# Patient Record
Sex: MALE | Race: WHITE | Employment: OTHER | URBAN - METROPOLITAN AREA
[De-identification: names, ages, dates, MRNs, and addresses within clinical notes are randomized per-mention and may not be internally consistent; named-entity substitution may affect disease eponyms.]

---

## 2018-05-23 ENCOUNTER — APPOINTMENT (OUTPATIENT)
Dept: GENERAL RADIOLOGY | Age: 78
DRG: 189 | End: 2018-05-23
Payer: MEDICARE

## 2018-05-23 ENCOUNTER — HOSPITAL ENCOUNTER (INPATIENT)
Age: 78
LOS: 2 days | Discharge: HOME HEALTH | DRG: 189 | End: 2018-05-25
Attending: EMERGENCY MEDICINE | Admitting: INTERNAL MEDICINE
Payer: MEDICARE

## 2018-05-23 ENCOUNTER — APPOINTMENT (OUTPATIENT)
Dept: CT IMAGING | Age: 78
DRG: 189 | End: 2018-05-23
Payer: MEDICARE

## 2018-05-23 DIAGNOSIS — J90 PLEURAL EFFUSION: ICD-10-CM

## 2018-05-23 DIAGNOSIS — R06.03 RESPIRATORY DISTRESS: Primary | ICD-10-CM

## 2018-05-23 PROBLEM — E11.9 DM2 (DIABETES MELLITUS, TYPE 2) (HCC): Status: ACTIVE | Noted: 2018-05-23

## 2018-05-23 PROBLEM — D50.9 IRON DEFICIENCY ANEMIA: Status: ACTIVE | Noted: 2018-05-23

## 2018-05-23 PROBLEM — I27.21 PULMONARY ARTERY HYPERTENSION (HCC): Status: ACTIVE | Noted: 2018-05-23

## 2018-05-23 PROBLEM — Z99.81 ON HOME O2: Status: ACTIVE | Noted: 2018-05-23

## 2018-05-23 PROBLEM — J44.1 ACUTE EXACERBATION OF CHRONIC OBSTRUCTIVE PULMONARY DISEASE (COPD) (HCC): Status: ACTIVE | Noted: 2018-05-23

## 2018-05-23 PROBLEM — Z87.891 EX-SMOKER: Status: ACTIVE | Noted: 2018-05-23

## 2018-05-23 LAB
ABSOLUTE EOS #: 0.06 K/UL (ref 0–0.44)
ABSOLUTE IMMATURE GRANULOCYTE: 0.09 K/UL (ref 0–0.3)
ABSOLUTE LYMPH #: 1.05 K/UL (ref 1.1–3.7)
ABSOLUTE MONO #: 0.87 K/UL (ref 0.1–1.2)
ALLEN TEST: ABNORMAL
ANION GAP SERPL CALCULATED.3IONS-SCNC: 10 MMOL/L (ref 9–17)
BASOPHILS # BLD: 0 % (ref 0–2)
BASOPHILS ABSOLUTE: 0.06 K/UL (ref 0–0.2)
BNP INTERPRETATION: ABNORMAL
BUN BLDV-MCNC: 11 MG/DL (ref 8–23)
BUN/CREAT BLD: ABNORMAL (ref 9–20)
CALCIUM SERPL-MCNC: 7.9 MG/DL (ref 8.6–10.4)
CARBOXYHEMOGLOBIN: 5.6 % (ref 0–5)
CHLORIDE BLD-SCNC: 96 MMOL/L (ref 98–107)
CO2: 29 MMOL/L (ref 20–31)
CREAT SERPL-MCNC: 0.63 MG/DL (ref 0.7–1.2)
CULTURE: ABNORMAL
CULTURE: ABNORMAL
DIFFERENTIAL TYPE: ABNORMAL
DIRECT EXAM: ABNORMAL
EKG ATRIAL RATE: 87 BPM
EKG ATRIAL RATE: 94 BPM
EKG P AXIS: 42 DEGREES
EKG P AXIS: 58 DEGREES
EKG P-R INTERVAL: 128 MS
EKG P-R INTERVAL: 128 MS
EKG Q-T INTERVAL: 322 MS
EKG Q-T INTERVAL: 336 MS
EKG QRS DURATION: 72 MS
EKG QRS DURATION: 82 MS
EKG QTC CALCULATION (BAZETT): 402 MS
EKG QTC CALCULATION (BAZETT): 404 MS
EKG R AXIS: 54 DEGREES
EKG R AXIS: 54 DEGREES
EKG T AXIS: 16 DEGREES
EKG T AXIS: 32 DEGREES
EKG VENTRICULAR RATE: 87 BPM
EKG VENTRICULAR RATE: 94 BPM
EOSINOPHILS RELATIVE PERCENT: 0 % (ref 1–4)
ESTIMATED AVERAGE GLUCOSE: 197 MG/DL
FERRITIN: 177 UG/L (ref 30–400)
FIO2: ABNORMAL
GFR AFRICAN AMERICAN: >60 ML/MIN
GFR NON-AFRICAN AMERICAN: >60 ML/MIN
GFR SERPL CREATININE-BSD FRML MDRD: ABNORMAL ML/MIN/{1.73_M2}
GFR SERPL CREATININE-BSD FRML MDRD: ABNORMAL ML/MIN/{1.73_M2}
GLUCOSE BLD-MCNC: 203 MG/DL (ref 75–110)
GLUCOSE BLD-MCNC: 269 MG/DL (ref 70–99)
GLUCOSE BLD-MCNC: 313 MG/DL (ref 75–110)
GLUCOSE BLD-MCNC: 324 MG/DL (ref 75–110)
GLUCOSE BLD-MCNC: 352 MG/DL (ref 75–110)
GLUCOSE BLD-MCNC: 399 MG/DL (ref 75–110)
GLUCOSE BLD-MCNC: 406 MG/DL (ref 75–110)
HBA1C MFR BLD: 8.5 % (ref 4–6)
HCO3 VENOUS: 29.1 MMOL/L (ref 24–30)
HCT VFR BLD CALC: 31.7 % (ref 40.7–50.3)
HEMOGLOBIN: 10.2 G/DL (ref 13–17)
IMMATURE GRANULOCYTES: 1 %
IRON SATURATION: 8 % (ref 20–55)
IRON: 16 UG/DL (ref 59–158)
LV EF: 55 %
LVEF MODALITY: NORMAL
LYMPHOCYTES # BLD: 8 % (ref 24–43)
Lab: ABNORMAL
MAGNESIUM: 1.8 MG/DL (ref 1.6–2.6)
MCH RBC QN AUTO: 30.9 PG (ref 25.2–33.5)
MCHC RBC AUTO-ENTMCNC: 32.2 G/DL (ref 28.4–34.8)
MCV RBC AUTO: 96.1 FL (ref 82.6–102.9)
METHEMOGLOBIN: ABNORMAL % (ref 0–1.5)
MODE: ABNORMAL
MONOCYTES # BLD: 6 % (ref 3–12)
NEGATIVE BASE EXCESS, VEN: ABNORMAL MMOL/L (ref 0–2)
NOTIFICATION TIME: ABNORMAL
NOTIFICATION: ABNORMAL
NRBC AUTOMATED: 0 PER 100 WBC
O2 DEVICE/FLOW/%: ABNORMAL
O2 SAT, VEN: 88.3 % (ref 60–85)
OXYHEMOGLOBIN: ABNORMAL % (ref 95–98)
PATIENT TEMP: 37
PCO2, VEN, TEMP ADJ: ABNORMAL MMHG (ref 39–55)
PCO2, VEN: 51.5 (ref 39–55)
PDW BLD-RTO: 13.6 % (ref 11.8–14.4)
PEEP/CPAP: ABNORMAL
PH VENOUS: 7.37 (ref 7.32–7.42)
PH, VEN, TEMP ADJ: ABNORMAL (ref 7.32–7.42)
PHOSPHORUS: 3 MG/DL (ref 2.5–4.5)
PLATELET # BLD: 247 K/UL (ref 138–453)
PLATELET ESTIMATE: ABNORMAL
PMV BLD AUTO: 9.7 FL (ref 8.1–13.5)
PO2, VEN, TEMP ADJ: ABNORMAL MMHG (ref 30–50)
PO2, VEN: 54.1 (ref 30–50)
POC TROPONIN I: 0.01 NG/ML (ref 0–0.1)
POC TROPONIN I: 0.01 NG/ML (ref 0–0.1)
POC TROPONIN INTERP: NORMAL
POC TROPONIN INTERP: NORMAL
POSITIVE BASE EXCESS, VEN: 3.7 MMOL/L (ref 0–2)
POTASSIUM SERPL-SCNC: 4.6 MMOL/L (ref 3.7–5.3)
PRO-BNP: 487 PG/ML
PSV: ABNORMAL
PT. POSITION: ABNORMAL
RBC # BLD: 3.3 M/UL (ref 4.21–5.77)
RBC # BLD: ABNORMAL 10*6/UL
RESPIRATORY RATE: ABNORMAL
SAMPLE SITE: ABNORMAL
SEG NEUTROPHILS: 85 % (ref 36–65)
SEGMENTED NEUTROPHILS ABSOLUTE COUNT: 11.88 K/UL (ref 1.5–8.1)
SET RATE: ABNORMAL
SODIUM BLD-SCNC: 135 MMOL/L (ref 135–144)
SPECIMEN DESCRIPTION: ABNORMAL
STATUS: ABNORMAL
TEXT FOR RESPIRATORY: ABNORMAL
TOTAL HB: ABNORMAL G/DL (ref 12–16)
TOTAL IRON BINDING CAPACITY: 212 UG/DL (ref 250–450)
TOTAL RATE: ABNORMAL
TROPONIN INTERP: NORMAL
TROPONIN INTERP: NORMAL
TROPONIN T: <0.03 NG/ML
TROPONIN T: <0.03 NG/ML
UNSATURATED IRON BINDING CAPACITY: 196 UG/DL (ref 112–347)
VT: ABNORMAL
WBC # BLD: 14 K/UL (ref 3.5–11.3)
WBC # BLD: ABNORMAL 10*3/UL

## 2018-05-23 PROCEDURE — 6370000000 HC RX 637 (ALT 250 FOR IP): Performed by: INTERNAL MEDICINE

## 2018-05-23 PROCEDURE — G8979 MOBILITY GOAL STATUS: HCPCS

## 2018-05-23 PROCEDURE — 6360000002 HC RX W HCPCS: Performed by: EMERGENCY MEDICINE

## 2018-05-23 PROCEDURE — 83880 ASSAY OF NATRIURETIC PEPTIDE: CPT

## 2018-05-23 PROCEDURE — 97530 THERAPEUTIC ACTIVITIES: CPT

## 2018-05-23 PROCEDURE — 99285 EMERGENCY DEPT VISIT HI MDM: CPT

## 2018-05-23 PROCEDURE — 6360000002 HC RX W HCPCS: Performed by: INTERNAL MEDICINE

## 2018-05-23 PROCEDURE — 82947 ASSAY GLUCOSE BLOOD QUANT: CPT

## 2018-05-23 PROCEDURE — 71045 X-RAY EXAM CHEST 1 VIEW: CPT

## 2018-05-23 PROCEDURE — G8988 SELF CARE GOAL STATUS: HCPCS

## 2018-05-23 PROCEDURE — 6360000004 HC RX CONTRAST MEDICATION: Performed by: EMERGENCY MEDICINE

## 2018-05-23 PROCEDURE — 97165 OT EVAL LOW COMPLEX 30 MIN: CPT

## 2018-05-23 PROCEDURE — G8987 SELF CARE CURRENT STATUS: HCPCS

## 2018-05-23 PROCEDURE — 6370000000 HC RX 637 (ALT 250 FOR IP): Performed by: STUDENT IN AN ORGANIZED HEALTH CARE EDUCATION/TRAINING PROGRAM

## 2018-05-23 PROCEDURE — 2580000003 HC RX 258: Performed by: STUDENT IN AN ORGANIZED HEALTH CARE EDUCATION/TRAINING PROGRAM

## 2018-05-23 PROCEDURE — 6370000000 HC RX 637 (ALT 250 FOR IP): Performed by: HOSPITALIST

## 2018-05-23 PROCEDURE — 80048 BASIC METABOLIC PNL TOTAL CA: CPT

## 2018-05-23 PROCEDURE — 83036 HEMOGLOBIN GLYCOSYLATED A1C: CPT

## 2018-05-23 PROCEDURE — 85025 COMPLETE CBC W/AUTO DIFF WBC: CPT

## 2018-05-23 PROCEDURE — 94640 AIRWAY INHALATION TREATMENT: CPT

## 2018-05-23 PROCEDURE — 83735 ASSAY OF MAGNESIUM: CPT

## 2018-05-23 PROCEDURE — 94660 CPAP INITIATION&MGMT: CPT

## 2018-05-23 PROCEDURE — 87205 SMEAR GRAM STAIN: CPT

## 2018-05-23 PROCEDURE — 2060000000 HC ICU INTERMEDIATE R&B

## 2018-05-23 PROCEDURE — 93306 TTE W/DOPPLER COMPLETE: CPT

## 2018-05-23 PROCEDURE — 83540 ASSAY OF IRON: CPT

## 2018-05-23 PROCEDURE — 97535 SELF CARE MNGMENT TRAINING: CPT

## 2018-05-23 PROCEDURE — 6360000002 HC RX W HCPCS: Performed by: HOSPITALIST

## 2018-05-23 PROCEDURE — 36415 COLL VENOUS BLD VENIPUNCTURE: CPT

## 2018-05-23 PROCEDURE — 94762 N-INVAS EAR/PLS OXIMTRY CONT: CPT

## 2018-05-23 PROCEDURE — 84484 ASSAY OF TROPONIN QUANT: CPT

## 2018-05-23 PROCEDURE — 82805 BLOOD GASES W/O2 SATURATION: CPT

## 2018-05-23 PROCEDURE — 97162 PT EVAL MOD COMPLEX 30 MIN: CPT

## 2018-05-23 PROCEDURE — 71260 CT THORAX DX C+: CPT

## 2018-05-23 PROCEDURE — 99223 1ST HOSP IP/OBS HIGH 75: CPT | Performed by: INTERNAL MEDICINE

## 2018-05-23 PROCEDURE — 82728 ASSAY OF FERRITIN: CPT

## 2018-05-23 PROCEDURE — 6360000002 HC RX W HCPCS: Performed by: STUDENT IN AN ORGANIZED HEALTH CARE EDUCATION/TRAINING PROGRAM

## 2018-05-23 PROCEDURE — 2580000003 HC RX 258: Performed by: EMERGENCY MEDICINE

## 2018-05-23 PROCEDURE — G8978 MOBILITY CURRENT STATUS: HCPCS

## 2018-05-23 PROCEDURE — 93005 ELECTROCARDIOGRAM TRACING: CPT

## 2018-05-23 PROCEDURE — 83550 IRON BINDING TEST: CPT

## 2018-05-23 PROCEDURE — 84100 ASSAY OF PHOSPHORUS: CPT

## 2018-05-23 PROCEDURE — 87070 CULTURE OTHR SPECIMN AEROBIC: CPT

## 2018-05-23 RX ORDER — METHYLPREDNISOLONE SODIUM SUCCINATE 40 MG/ML
40 INJECTION, POWDER, LYOPHILIZED, FOR SOLUTION INTRAMUSCULAR; INTRAVENOUS EVERY 12 HOURS
Status: DISCONTINUED | OUTPATIENT
Start: 2018-05-23 | End: 2018-05-24

## 2018-05-23 RX ORDER — FAMOTIDINE 20 MG/1
20 TABLET, FILM COATED ORAL DAILY
Status: DISCONTINUED | OUTPATIENT
Start: 2018-05-23 | End: 2018-05-25 | Stop reason: HOSPADM

## 2018-05-23 RX ORDER — IPRATROPIUM BROMIDE AND ALBUTEROL SULFATE 2.5; .5 MG/3ML; MG/3ML
1 SOLUTION RESPIRATORY (INHALATION) 4 TIMES DAILY
Status: DISCONTINUED | OUTPATIENT
Start: 2018-05-23 | End: 2018-05-25 | Stop reason: HOSPADM

## 2018-05-23 RX ORDER — AZITHROMYCIN 250 MG/1
500 TABLET, FILM COATED ORAL DAILY
Status: CANCELLED | OUTPATIENT
Start: 2018-05-23 | End: 2018-05-24

## 2018-05-23 RX ORDER — ACETAMINOPHEN 325 MG/1
650 TABLET ORAL EVERY 4 HOURS PRN
Status: DISCONTINUED | OUTPATIENT
Start: 2018-05-23 | End: 2018-05-23 | Stop reason: SDUPTHER

## 2018-05-23 RX ORDER — SODIUM CHLORIDE 0.9 % (FLUSH) 0.9 %
10 SYRINGE (ML) INJECTION EVERY 12 HOURS SCHEDULED
Status: DISCONTINUED | OUTPATIENT
Start: 2018-05-23 | End: 2018-05-25 | Stop reason: HOSPADM

## 2018-05-23 RX ORDER — METHYLPREDNISOLONE SODIUM SUCCINATE 40 MG/ML
40 INJECTION, POWDER, LYOPHILIZED, FOR SOLUTION INTRAMUSCULAR; INTRAVENOUS EVERY 12 HOURS
Status: DISCONTINUED | OUTPATIENT
Start: 2018-05-23 | End: 2018-05-23

## 2018-05-23 RX ORDER — DEXTROSE MONOHYDRATE 25 G/50ML
12.5 INJECTION, SOLUTION INTRAVENOUS PRN
Status: DISCONTINUED | OUTPATIENT
Start: 2018-05-23 | End: 2018-05-24 | Stop reason: SDUPTHER

## 2018-05-23 RX ORDER — INSULIN GLARGINE 100 [IU]/ML
15 INJECTION, SOLUTION SUBCUTANEOUS NIGHTLY
Status: DISCONTINUED | OUTPATIENT
Start: 2018-05-23 | End: 2018-05-24

## 2018-05-23 RX ORDER — AZITHROMYCIN 250 MG/1
250 TABLET, FILM COATED ORAL DAILY
Status: CANCELLED | OUTPATIENT
Start: 2018-05-24 | End: 2018-05-28

## 2018-05-23 RX ORDER — LEVOFLOXACIN 500 MG/1
500 TABLET, FILM COATED ORAL DAILY
Status: DISCONTINUED | OUTPATIENT
Start: 2018-05-24 | End: 2018-05-23

## 2018-05-23 RX ORDER — ASPIRIN 81 MG/1
81 TABLET ORAL DAILY
Status: DISCONTINUED | OUTPATIENT
Start: 2018-05-23 | End: 2018-05-25 | Stop reason: HOSPADM

## 2018-05-23 RX ORDER — ALBUTEROL SULFATE 2.5 MG/3ML
2.5 SOLUTION RESPIRATORY (INHALATION) EVERY 4 HOURS PRN
Status: DISCONTINUED | OUTPATIENT
Start: 2018-05-23 | End: 2018-05-25 | Stop reason: HOSPADM

## 2018-05-23 RX ORDER — ESCITALOPRAM OXALATE 10 MG/1
10 TABLET ORAL DAILY
Status: DISCONTINUED | OUTPATIENT
Start: 2018-05-23 | End: 2018-05-25 | Stop reason: HOSPADM

## 2018-05-23 RX ORDER — ACETAMINOPHEN 325 MG/1
650 TABLET ORAL EVERY 4 HOURS PRN
Status: DISCONTINUED | OUTPATIENT
Start: 2018-05-23 | End: 2018-05-25 | Stop reason: HOSPADM

## 2018-05-23 RX ORDER — METHYLPREDNISOLONE SODIUM SUCCINATE 40 MG/ML
40 INJECTION, POWDER, LYOPHILIZED, FOR SOLUTION INTRAMUSCULAR; INTRAVENOUS DAILY
Status: DISCONTINUED | OUTPATIENT
Start: 2018-05-23 | End: 2018-05-23

## 2018-05-23 RX ORDER — METHYLPREDNISOLONE SODIUM SUCCINATE 125 MG/2ML
125 INJECTION, POWDER, LYOPHILIZED, FOR SOLUTION INTRAMUSCULAR; INTRAVENOUS ONCE
Status: COMPLETED | OUTPATIENT
Start: 2018-05-23 | End: 2018-05-23

## 2018-05-23 RX ORDER — 0.9 % SODIUM CHLORIDE 0.9 %
1000 INTRAVENOUS SOLUTION INTRAVENOUS ONCE
Status: COMPLETED | OUTPATIENT
Start: 2018-05-23 | End: 2018-05-23

## 2018-05-23 RX ORDER — LEVOFLOXACIN 5 MG/ML
750 INJECTION, SOLUTION INTRAVENOUS ONCE
Status: COMPLETED | OUTPATIENT
Start: 2018-05-23 | End: 2018-05-23

## 2018-05-23 RX ORDER — DEXTROSE MONOHYDRATE 50 MG/ML
100 INJECTION, SOLUTION INTRAVENOUS PRN
Status: DISCONTINUED | OUTPATIENT
Start: 2018-05-23 | End: 2018-05-24 | Stop reason: SDUPTHER

## 2018-05-23 RX ORDER — SODIUM CHLORIDE 0.9 % (FLUSH) 0.9 %
10 SYRINGE (ML) INJECTION PRN
Status: DISCONTINUED | OUTPATIENT
Start: 2018-05-23 | End: 2018-05-25 | Stop reason: HOSPADM

## 2018-05-23 RX ORDER — ONDANSETRON 2 MG/ML
4 INJECTION INTRAMUSCULAR; INTRAVENOUS EVERY 6 HOURS PRN
Status: DISCONTINUED | OUTPATIENT
Start: 2018-05-23 | End: 2018-05-25 | Stop reason: HOSPADM

## 2018-05-23 RX ORDER — NICOTINE POLACRILEX 4 MG
15 LOZENGE BUCCAL PRN
Status: DISCONTINUED | OUTPATIENT
Start: 2018-05-23 | End: 2018-05-24 | Stop reason: SDUPTHER

## 2018-05-23 RX ORDER — LEVOFLOXACIN 750 MG/1
750 TABLET ORAL DAILY
Status: DISCONTINUED | OUTPATIENT
Start: 2018-05-24 | End: 2018-05-25 | Stop reason: HOSPADM

## 2018-05-23 RX ADMIN — Medication 10 ML: at 09:10

## 2018-05-23 RX ADMIN — IPRATROPIUM BROMIDE AND ALBUTEROL SULFATE 1 AMPULE: .5; 3 SOLUTION RESPIRATORY (INHALATION) at 20:40

## 2018-05-23 RX ADMIN — ENOXAPARIN SODIUM 40 MG: 40 INJECTION SUBCUTANEOUS at 09:11

## 2018-05-23 RX ADMIN — IPRATROPIUM BROMIDE AND ALBUTEROL SULFATE 1 AMPULE: .5; 3 SOLUTION RESPIRATORY (INHALATION) at 08:33

## 2018-05-23 RX ADMIN — INSULIN GLARGINE 15 UNITS: 100 INJECTION, SOLUTION SUBCUTANEOUS at 13:46

## 2018-05-23 RX ADMIN — MOMETASONE FUROATE AND FORMOTEROL FUMARATE DIHYDRATE 2 PUFF: 200; 5 AEROSOL RESPIRATORY (INHALATION) at 08:33

## 2018-05-23 RX ADMIN — METHYLPREDNISOLONE SODIUM SUCCINATE 125 MG: 125 INJECTION, POWDER, FOR SOLUTION INTRAMUSCULAR; INTRAVENOUS at 00:56

## 2018-05-23 RX ADMIN — INSULIN LISPRO 10 UNITS: 100 INJECTION, SOLUTION INTRAVENOUS; SUBCUTANEOUS at 09:11

## 2018-05-23 RX ADMIN — IPRATROPIUM BROMIDE AND ALBUTEROL SULFATE 1 AMPULE: .5; 3 SOLUTION RESPIRATORY (INHALATION) at 15:34

## 2018-05-23 RX ADMIN — FAMOTIDINE 20 MG: 20 TABLET, FILM COATED ORAL at 09:11

## 2018-05-23 RX ADMIN — METHYLPREDNISOLONE SODIUM SUCCINATE 40 MG: 40 INJECTION, POWDER, FOR SOLUTION INTRAMUSCULAR; INTRAVENOUS at 20:47

## 2018-05-23 RX ADMIN — ALBUTEROL SULFATE 2.5 MG: 2.5 SOLUTION RESPIRATORY (INHALATION) at 05:43

## 2018-05-23 RX ADMIN — ASPIRIN 81 MG: 81 TABLET, COATED ORAL at 09:11

## 2018-05-23 RX ADMIN — IPRATROPIUM BROMIDE AND ALBUTEROL SULFATE 1 AMPULE: .5; 3 SOLUTION RESPIRATORY (INHALATION) at 12:23

## 2018-05-23 RX ADMIN — IOPAMIDOL 75 ML: 755 INJECTION, SOLUTION INTRAVENOUS at 02:36

## 2018-05-23 RX ADMIN — ACETAMINOPHEN 650 MG: 325 TABLET ORAL at 14:17

## 2018-05-23 RX ADMIN — INSULIN LISPRO 12 UNITS: 100 INJECTION, SOLUTION INTRAVENOUS; SUBCUTANEOUS at 11:40

## 2018-05-23 RX ADMIN — INSULIN LISPRO 12 UNITS: 100 INJECTION, SOLUTION INTRAVENOUS; SUBCUTANEOUS at 16:39

## 2018-05-23 RX ADMIN — SODIUM CHLORIDE 1000 ML: 9 INJECTION, SOLUTION INTRAVENOUS at 00:56

## 2018-05-23 RX ADMIN — LEVOFLOXACIN 750 MG: 5 INJECTION, SOLUTION INTRAVENOUS at 02:46

## 2018-05-23 RX ADMIN — METHYLPREDNISOLONE SODIUM SUCCINATE 40 MG: 40 INJECTION, POWDER, FOR SOLUTION INTRAMUSCULAR; INTRAVENOUS at 09:10

## 2018-05-23 RX ADMIN — Medication 10 ML: at 20:47

## 2018-05-23 RX ADMIN — ACETAMINOPHEN 650 MG: 325 TABLET ORAL at 05:15

## 2018-05-23 RX ADMIN — ESCITALOPRAM 10 MG: 10 TABLET, FILM COATED ORAL at 09:11

## 2018-05-23 RX ADMIN — INSULIN LISPRO 6 UNITS: 100 INJECTION, SOLUTION INTRAVENOUS; SUBCUTANEOUS at 20:46

## 2018-05-23 RX ADMIN — ACETAMINOPHEN 650 MG: 325 TABLET ORAL at 22:54

## 2018-05-23 RX ADMIN — MOMETASONE FUROATE AND FORMOTEROL FUMARATE DIHYDRATE 2 PUFF: 200; 5 AEROSOL RESPIRATORY (INHALATION) at 20:40

## 2018-05-23 RX ADMIN — ACETAMINOPHEN 650 MG: 325 TABLET ORAL at 18:17

## 2018-05-23 ASSESSMENT — ENCOUNTER SYMPTOMS
CONSTIPATION: 0
COUGH: 1
DIARRHEA: 0
VOMITING: 0
SORE THROAT: 0
ABDOMINAL PAIN: 0
RHINORRHEA: 0
SHORTNESS OF BREATH: 1
WHEEZING: 0
NAUSEA: 0

## 2018-05-23 ASSESSMENT — PAIN SCALES - GENERAL
PAINLEVEL_OUTOF10: 5
PAINLEVEL_OUTOF10: 7
PAINLEVEL_OUTOF10: 6
PAINLEVEL_OUTOF10: 6

## 2018-05-24 LAB
ABSOLUTE EOS #: <0.03 K/UL (ref 0–0.44)
ABSOLUTE IMMATURE GRANULOCYTE: 0.1 K/UL (ref 0–0.3)
ABSOLUTE LYMPH #: 0.9 K/UL (ref 1.1–3.7)
ABSOLUTE MONO #: 0.72 K/UL (ref 0.1–1.2)
ALBUMIN SERPL-MCNC: 2.7 G/DL (ref 3.5–5.2)
ALBUMIN/GLOBULIN RATIO: 0.7 (ref 1–2.5)
ALP BLD-CCNC: 67 U/L (ref 40–129)
ALT SERPL-CCNC: 6 U/L (ref 5–41)
ANION GAP SERPL CALCULATED.3IONS-SCNC: 12 MMOL/L (ref 9–17)
AST SERPL-CCNC: 7 U/L
BASOPHILS # BLD: 0 % (ref 0–2)
BASOPHILS ABSOLUTE: <0.03 K/UL (ref 0–0.2)
BILIRUB SERPL-MCNC: 0.19 MG/DL (ref 0.3–1.2)
BILIRUBIN DIRECT: <0.08 MG/DL
BILIRUBIN, INDIRECT: ABNORMAL MG/DL (ref 0–1)
BUN BLDV-MCNC: 20 MG/DL (ref 8–23)
BUN/CREAT BLD: ABNORMAL (ref 9–20)
CALCIUM SERPL-MCNC: 8.3 MG/DL (ref 8.6–10.4)
CHLORIDE BLD-SCNC: 97 MMOL/L (ref 98–107)
CO2: 25 MMOL/L (ref 20–31)
CREAT SERPL-MCNC: 0.57 MG/DL (ref 0.7–1.2)
DIFFERENTIAL TYPE: ABNORMAL
EOSINOPHILS RELATIVE PERCENT: 0 % (ref 1–4)
GFR AFRICAN AMERICAN: >60 ML/MIN
GFR NON-AFRICAN AMERICAN: >60 ML/MIN
GFR SERPL CREATININE-BSD FRML MDRD: ABNORMAL ML/MIN/{1.73_M2}
GFR SERPL CREATININE-BSD FRML MDRD: ABNORMAL ML/MIN/{1.73_M2}
GLOBULIN: ABNORMAL G/DL (ref 1.5–3.8)
GLUCOSE BLD-MCNC: 202 MG/DL (ref 75–110)
GLUCOSE BLD-MCNC: 217 MG/DL (ref 75–110)
GLUCOSE BLD-MCNC: 230 MG/DL (ref 70–99)
GLUCOSE BLD-MCNC: 277 MG/DL (ref 75–110)
GLUCOSE BLD-MCNC: 282 MG/DL (ref 75–110)
HCT VFR BLD CALC: 29.4 % (ref 40.7–50.3)
HEMOGLOBIN: 9.3 G/DL (ref 13–17)
IMMATURE GRANULOCYTES: 1 %
LYMPHOCYTES # BLD: 5 % (ref 24–43)
MCH RBC QN AUTO: 31.2 PG (ref 25.2–33.5)
MCHC RBC AUTO-ENTMCNC: 31.6 G/DL (ref 28.4–34.8)
MCV RBC AUTO: 98.7 FL (ref 82.6–102.9)
MONOCYTES # BLD: 4 % (ref 3–12)
NRBC AUTOMATED: 0 PER 100 WBC
PDW BLD-RTO: 13.6 % (ref 11.8–14.4)
PLATELET # BLD: 252 K/UL (ref 138–453)
PLATELET ESTIMATE: ABNORMAL
PMV BLD AUTO: 10.1 FL (ref 8.1–13.5)
POTASSIUM SERPL-SCNC: 4.7 MMOL/L (ref 3.7–5.3)
RBC # BLD: 2.98 M/UL (ref 4.21–5.77)
RBC # BLD: ABNORMAL 10*6/UL
SEG NEUTROPHILS: 90 % (ref 36–65)
SEGMENTED NEUTROPHILS ABSOLUTE COUNT: 16.81 K/UL (ref 1.5–8.1)
SODIUM BLD-SCNC: 134 MMOL/L (ref 135–144)
TOTAL PROTEIN: 6.4 G/DL (ref 6.4–8.3)
WBC # BLD: 18.6 K/UL (ref 3.5–11.3)
WBC # BLD: ABNORMAL 10*3/UL

## 2018-05-24 PROCEDURE — 6360000002 HC RX W HCPCS: Performed by: HOSPITALIST

## 2018-05-24 PROCEDURE — 6370000000 HC RX 637 (ALT 250 FOR IP): Performed by: STUDENT IN AN ORGANIZED HEALTH CARE EDUCATION/TRAINING PROGRAM

## 2018-05-24 PROCEDURE — 2060000000 HC ICU INTERMEDIATE R&B

## 2018-05-24 PROCEDURE — 85025 COMPLETE CBC W/AUTO DIFF WBC: CPT

## 2018-05-24 PROCEDURE — 6370000000 HC RX 637 (ALT 250 FOR IP): Performed by: HOSPITALIST

## 2018-05-24 PROCEDURE — 6370000000 HC RX 637 (ALT 250 FOR IP): Performed by: INTERNAL MEDICINE

## 2018-05-24 PROCEDURE — 97110 THERAPEUTIC EXERCISES: CPT

## 2018-05-24 PROCEDURE — 94762 N-INVAS EAR/PLS OXIMTRY CONT: CPT

## 2018-05-24 PROCEDURE — 6360000002 HC RX W HCPCS: Performed by: STUDENT IN AN ORGANIZED HEALTH CARE EDUCATION/TRAINING PROGRAM

## 2018-05-24 PROCEDURE — 82947 ASSAY GLUCOSE BLOOD QUANT: CPT

## 2018-05-24 PROCEDURE — 2580000003 HC RX 258: Performed by: INTERNAL MEDICINE

## 2018-05-24 PROCEDURE — 80076 HEPATIC FUNCTION PANEL: CPT

## 2018-05-24 PROCEDURE — 97116 GAIT TRAINING THERAPY: CPT

## 2018-05-24 PROCEDURE — 36415 COLL VENOUS BLD VENIPUNCTURE: CPT

## 2018-05-24 PROCEDURE — 94640 AIRWAY INHALATION TREATMENT: CPT

## 2018-05-24 PROCEDURE — 99233 SBSQ HOSP IP/OBS HIGH 50: CPT | Performed by: INTERNAL MEDICINE

## 2018-05-24 PROCEDURE — 80048 BASIC METABOLIC PNL TOTAL CA: CPT

## 2018-05-24 RX ORDER — DEXTROSE MONOHYDRATE 50 MG/ML
100 INJECTION, SOLUTION INTRAVENOUS PRN
Status: DISCONTINUED | OUTPATIENT
Start: 2018-05-24 | End: 2018-05-25 | Stop reason: HOSPADM

## 2018-05-24 RX ORDER — NICOTINE POLACRILEX 4 MG
15 LOZENGE BUCCAL PRN
Status: DISCONTINUED | OUTPATIENT
Start: 2018-05-24 | End: 2018-05-25 | Stop reason: HOSPADM

## 2018-05-24 RX ORDER — PREDNISONE 20 MG/1
40 TABLET ORAL DAILY
Status: DISCONTINUED | OUTPATIENT
Start: 2018-05-25 | End: 2018-05-25 | Stop reason: HOSPADM

## 2018-05-24 RX ORDER — INSULIN GLARGINE 100 [IU]/ML
30 INJECTION, SOLUTION SUBCUTANEOUS NIGHTLY
Status: DISCONTINUED | OUTPATIENT
Start: 2018-05-24 | End: 2018-05-25 | Stop reason: HOSPADM

## 2018-05-24 RX ORDER — DEXTROSE MONOHYDRATE 25 G/50ML
12.5 INJECTION, SOLUTION INTRAVENOUS PRN
Status: DISCONTINUED | OUTPATIENT
Start: 2018-05-24 | End: 2018-05-25 | Stop reason: HOSPADM

## 2018-05-24 RX ADMIN — INSULIN GLARGINE 30 UNITS: 100 INJECTION, SOLUTION SUBCUTANEOUS at 19:59

## 2018-05-24 RX ADMIN — MOMETASONE FUROATE AND FORMOTEROL FUMARATE DIHYDRATE 2 PUFF: 200; 5 AEROSOL RESPIRATORY (INHALATION) at 08:40

## 2018-05-24 RX ADMIN — FAMOTIDINE 20 MG: 20 TABLET, FILM COATED ORAL at 08:40

## 2018-05-24 RX ADMIN — INSULIN LISPRO 6 UNITS: 100 INJECTION, SOLUTION INTRAVENOUS; SUBCUTANEOUS at 18:00

## 2018-05-24 RX ADMIN — INSULIN LISPRO 4 UNITS: 100 INJECTION, SOLUTION INTRAVENOUS; SUBCUTANEOUS at 11:29

## 2018-05-24 RX ADMIN — IPRATROPIUM BROMIDE AND ALBUTEROL SULFATE 1 AMPULE: .5; 3 SOLUTION RESPIRATORY (INHALATION) at 12:16

## 2018-05-24 RX ADMIN — INSULIN LISPRO 6 UNITS: 100 INJECTION, SOLUTION INTRAVENOUS; SUBCUTANEOUS at 08:40

## 2018-05-24 RX ADMIN — METHYLPREDNISOLONE SODIUM SUCCINATE 40 MG: 40 INJECTION, POWDER, FOR SOLUTION INTRAMUSCULAR; INTRAVENOUS at 08:39

## 2018-05-24 RX ADMIN — IPRATROPIUM BROMIDE AND ALBUTEROL SULFATE 1 AMPULE: .5; 3 SOLUTION RESPIRATORY (INHALATION) at 21:01

## 2018-05-24 RX ADMIN — MOMETASONE FUROATE AND FORMOTEROL FUMARATE DIHYDRATE 2 PUFF: 200; 5 AEROSOL RESPIRATORY (INHALATION) at 21:01

## 2018-05-24 RX ADMIN — IPRATROPIUM BROMIDE AND ALBUTEROL SULFATE 1 AMPULE: .5; 3 SOLUTION RESPIRATORY (INHALATION) at 08:40

## 2018-05-24 RX ADMIN — INSULIN LISPRO 8 UNITS: 100 INJECTION, SOLUTION INTRAVENOUS; SUBCUTANEOUS at 17:58

## 2018-05-24 RX ADMIN — ASPIRIN 81 MG: 81 TABLET, COATED ORAL at 08:40

## 2018-05-24 RX ADMIN — IPRATROPIUM BROMIDE AND ALBUTEROL SULFATE 1 AMPULE: .5; 3 SOLUTION RESPIRATORY (INHALATION) at 15:21

## 2018-05-24 RX ADMIN — SODIUM CHLORIDE, PRESERVATIVE FREE 10 ML: 5 INJECTION INTRAVENOUS at 08:44

## 2018-05-24 RX ADMIN — LEVOFLOXACIN 750 MG: 750 TABLET, FILM COATED ORAL at 08:40

## 2018-05-24 RX ADMIN — ESCITALOPRAM 10 MG: 10 TABLET, FILM COATED ORAL at 08:40

## 2018-05-24 RX ADMIN — ENOXAPARIN SODIUM 40 MG: 40 INJECTION SUBCUTANEOUS at 08:40

## 2018-05-24 RX ADMIN — INSULIN LISPRO 8 UNITS: 100 INJECTION, SOLUTION INTRAVENOUS; SUBCUTANEOUS at 11:31

## 2018-05-24 RX ADMIN — INSULIN LISPRO 3 UNITS: 100 INJECTION, SOLUTION INTRAVENOUS; SUBCUTANEOUS at 19:58

## 2018-05-25 VITALS
RESPIRATION RATE: 22 BRPM | HEART RATE: 85 BPM | HEIGHT: 70 IN | DIASTOLIC BLOOD PRESSURE: 66 MMHG | BODY MASS INDEX: 22.6 KG/M2 | OXYGEN SATURATION: 95 % | WEIGHT: 157.9 LBS | SYSTOLIC BLOOD PRESSURE: 118 MMHG | TEMPERATURE: 98.6 F

## 2018-05-25 LAB
ABSOLUTE EOS #: 0.03 K/UL (ref 0–0.44)
ABSOLUTE IMMATURE GRANULOCYTE: 0.1 K/UL (ref 0–0.3)
ABSOLUTE LYMPH #: 2.21 K/UL (ref 1.1–3.7)
ABSOLUTE MONO #: 0.87 K/UL (ref 0.1–1.2)
ANION GAP SERPL CALCULATED.3IONS-SCNC: 9 MMOL/L (ref 9–17)
BASOPHILS # BLD: 0 % (ref 0–2)
BASOPHILS ABSOLUTE: <0.03 K/UL (ref 0–0.2)
BUN BLDV-MCNC: 24 MG/DL (ref 8–23)
BUN/CREAT BLD: ABNORMAL (ref 9–20)
CALCIUM SERPL-MCNC: 8.4 MG/DL (ref 8.6–10.4)
CHLORIDE BLD-SCNC: 101 MMOL/L (ref 98–107)
CO2: 28 MMOL/L (ref 20–31)
CREAT SERPL-MCNC: 0.72 MG/DL (ref 0.7–1.2)
DIFFERENTIAL TYPE: ABNORMAL
EOSINOPHILS RELATIVE PERCENT: 0 % (ref 1–4)
GFR AFRICAN AMERICAN: >60 ML/MIN
GFR NON-AFRICAN AMERICAN: >60 ML/MIN
GFR SERPL CREATININE-BSD FRML MDRD: ABNORMAL ML/MIN/{1.73_M2}
GFR SERPL CREATININE-BSD FRML MDRD: ABNORMAL ML/MIN/{1.73_M2}
GLUCOSE BLD-MCNC: 172 MG/DL (ref 75–110)
GLUCOSE BLD-MCNC: 74 MG/DL (ref 70–99)
GLUCOSE BLD-MCNC: 74 MG/DL (ref 75–110)
GLUCOSE BLD-MCNC: 95 MG/DL (ref 75–110)
HCT VFR BLD CALC: 28.7 % (ref 40.7–50.3)
HEMOGLOBIN: 9.1 G/DL (ref 13–17)
IMMATURE GRANULOCYTES: 1 %
LYMPHOCYTES # BLD: 16 % (ref 24–43)
MCH RBC QN AUTO: 30.6 PG (ref 25.2–33.5)
MCHC RBC AUTO-ENTMCNC: 31.7 G/DL (ref 28.4–34.8)
MCV RBC AUTO: 96.6 FL (ref 82.6–102.9)
MONOCYTES # BLD: 6 % (ref 3–12)
NRBC AUTOMATED: 0 PER 100 WBC
PDW BLD-RTO: 13.9 % (ref 11.8–14.4)
PLATELET # BLD: 267 K/UL (ref 138–453)
PLATELET ESTIMATE: ABNORMAL
PMV BLD AUTO: 9.9 FL (ref 8.1–13.5)
POTASSIUM SERPL-SCNC: 3.8 MMOL/L (ref 3.7–5.3)
RBC # BLD: 2.97 M/UL (ref 4.21–5.77)
RBC # BLD: ABNORMAL 10*6/UL
SEG NEUTROPHILS: 77 % (ref 36–65)
SEGMENTED NEUTROPHILS ABSOLUTE COUNT: 10.74 K/UL (ref 1.5–8.1)
SODIUM BLD-SCNC: 138 MMOL/L (ref 135–144)
WBC # BLD: 14 K/UL (ref 3.5–11.3)
WBC # BLD: ABNORMAL 10*3/UL

## 2018-05-25 PROCEDURE — 80048 BASIC METABOLIC PNL TOTAL CA: CPT

## 2018-05-25 PROCEDURE — 94640 AIRWAY INHALATION TREATMENT: CPT

## 2018-05-25 PROCEDURE — 6370000000 HC RX 637 (ALT 250 FOR IP): Performed by: INTERNAL MEDICINE

## 2018-05-25 PROCEDURE — 99233 SBSQ HOSP IP/OBS HIGH 50: CPT | Performed by: INTERNAL MEDICINE

## 2018-05-25 PROCEDURE — 6360000002 HC RX W HCPCS: Performed by: STUDENT IN AN ORGANIZED HEALTH CARE EDUCATION/TRAINING PROGRAM

## 2018-05-25 PROCEDURE — 2580000003 HC RX 258: Performed by: STUDENT IN AN ORGANIZED HEALTH CARE EDUCATION/TRAINING PROGRAM

## 2018-05-25 PROCEDURE — 97116 GAIT TRAINING THERAPY: CPT

## 2018-05-25 PROCEDURE — 97110 THERAPEUTIC EXERCISES: CPT

## 2018-05-25 PROCEDURE — 82947 ASSAY GLUCOSE BLOOD QUANT: CPT

## 2018-05-25 PROCEDURE — 6370000000 HC RX 637 (ALT 250 FOR IP): Performed by: STUDENT IN AN ORGANIZED HEALTH CARE EDUCATION/TRAINING PROGRAM

## 2018-05-25 PROCEDURE — 94762 N-INVAS EAR/PLS OXIMTRY CONT: CPT

## 2018-05-25 PROCEDURE — 36415 COLL VENOUS BLD VENIPUNCTURE: CPT

## 2018-05-25 PROCEDURE — 85025 COMPLETE CBC W/AUTO DIFF WBC: CPT

## 2018-05-25 RX ORDER — ALBUTEROL SULFATE 90 UG/1
2 AEROSOL, METERED RESPIRATORY (INHALATION) EVERY 6 HOURS PRN
Qty: 1 INHALER | Refills: 3 | Status: SHIPPED | OUTPATIENT
Start: 2018-05-25 | End: 2018-06-22 | Stop reason: SDUPTHER

## 2018-05-25 RX ORDER — PREDNISONE 20 MG/1
40 TABLET ORAL DAILY
Qty: 4 TABLET | Refills: 0 | Status: SHIPPED | OUTPATIENT
Start: 2018-05-26 | End: 2018-05-28

## 2018-05-25 RX ORDER — BUDESONIDE AND FORMOTEROL FUMARATE DIHYDRATE 80; 4.5 UG/1; UG/1
2 AEROSOL RESPIRATORY (INHALATION) 2 TIMES DAILY
Qty: 1 INHALER | Refills: 3 | Status: SHIPPED | OUTPATIENT
Start: 2018-05-25

## 2018-05-25 RX ORDER — LEVOFLOXACIN 750 MG/1
750 TABLET ORAL DAILY
Qty: 1 TABLET | Refills: 0 | Status: SHIPPED | OUTPATIENT
Start: 2018-05-26 | End: 2018-05-27

## 2018-05-25 RX ADMIN — PREDNISONE 40 MG: 20 TABLET ORAL at 08:33

## 2018-05-25 RX ADMIN — Medication 10 ML: at 08:35

## 2018-05-25 RX ADMIN — ESCITALOPRAM 10 MG: 10 TABLET, FILM COATED ORAL at 08:34

## 2018-05-25 RX ADMIN — FAMOTIDINE 20 MG: 20 TABLET, FILM COATED ORAL at 08:33

## 2018-05-25 RX ADMIN — ENOXAPARIN SODIUM 40 MG: 40 INJECTION SUBCUTANEOUS at 08:33

## 2018-05-25 RX ADMIN — ASPIRIN 81 MG: 81 TABLET, COATED ORAL at 08:33

## 2018-05-25 RX ADMIN — ACETAMINOPHEN 650 MG: 325 TABLET ORAL at 02:56

## 2018-05-25 RX ADMIN — LEVOFLOXACIN 750 MG: 750 TABLET, FILM COATED ORAL at 08:33

## 2018-05-25 RX ADMIN — IPRATROPIUM BROMIDE AND ALBUTEROL SULFATE 1 AMPULE: .5; 3 SOLUTION RESPIRATORY (INHALATION) at 07:56

## 2018-05-25 RX ADMIN — IPRATROPIUM BROMIDE AND ALBUTEROL SULFATE 1 AMPULE: .5; 3 SOLUTION RESPIRATORY (INHALATION) at 11:40

## 2018-05-25 RX ADMIN — MOMETASONE FUROATE AND FORMOTEROL FUMARATE DIHYDRATE 2 PUFF: 200; 5 AEROSOL RESPIRATORY (INHALATION) at 07:56

## 2018-05-25 ASSESSMENT — PAIN SCALES - GENERAL: PAINLEVEL_OUTOF10: 6

## 2018-06-06 ENCOUNTER — HOSPITAL ENCOUNTER (EMERGENCY)
Age: 78
Discharge: HOME | End: 2018-06-06
Attending: EMERGENCY MEDICINE
Payer: MEDICARE

## 2018-06-06 ENCOUNTER — APPOINTMENT (OUTPATIENT)
Dept: GENERAL RADIOLOGY | Age: 78
End: 2018-06-06
Payer: MEDICARE

## 2018-06-06 VITALS
OXYGEN SATURATION: 96 % | RESPIRATION RATE: 18 BRPM | TEMPERATURE: 97.2 F | SYSTOLIC BLOOD PRESSURE: 132 MMHG | WEIGHT: 155 LBS | BODY MASS INDEX: 22.96 KG/M2 | HEART RATE: 82 BPM | HEIGHT: 69 IN | DIASTOLIC BLOOD PRESSURE: 76 MMHG

## 2018-06-06 DIAGNOSIS — J20.9 ACUTE BRONCHITIS, UNSPECIFIED ORGANISM: Primary | ICD-10-CM

## 2018-06-06 LAB
HCT VFR BLD CALC: 34.8 % (ref 40.7–50.3)
HEMOGLOBIN: 11.1 G/DL (ref 13–17)
MCH RBC QN AUTO: 30.4 PG (ref 25.2–33.5)
MCHC RBC AUTO-ENTMCNC: 31.9 G/DL (ref 28.4–34.8)
MCV RBC AUTO: 95.3 FL (ref 82.6–102.9)
NRBC AUTOMATED: 0 PER 100 WBC
PDW BLD-RTO: 13.2 % (ref 11.8–14.4)
PLATELET # BLD: 259 K/UL (ref 138–453)
PMV BLD AUTO: 9.6 FL (ref 8.1–13.5)
RBC # BLD: 3.65 M/UL (ref 4.21–5.77)
WBC # BLD: 8.6 K/UL (ref 3.5–11.3)

## 2018-06-06 PROCEDURE — 71046 X-RAY EXAM CHEST 2 VIEWS: CPT

## 2018-06-06 PROCEDURE — 85027 COMPLETE CBC AUTOMATED: CPT

## 2018-06-06 PROCEDURE — 99284 EMERGENCY DEPT VISIT MOD MDM: CPT

## 2018-06-06 ASSESSMENT — ENCOUNTER SYMPTOMS
NAUSEA: 0
VOMITING: 0
COUGH: 1
DIARRHEA: 0
CHEST TIGHTNESS: 0
SORE THROAT: 0
CONSTIPATION: 0
CHOKING: 0
TROUBLE SWALLOWING: 0
SHORTNESS OF BREATH: 0
BACK PAIN: 0
WHEEZING: 0
ABDOMINAL PAIN: 0

## 2018-06-22 ENCOUNTER — OFFICE VISIT (OUTPATIENT)
Dept: PULMONOLOGY | Age: 78
End: 2018-06-22
Payer: MEDICARE

## 2018-06-22 VITALS
HEIGHT: 69 IN | DIASTOLIC BLOOD PRESSURE: 65 MMHG | WEIGHT: 155 LBS | BODY MASS INDEX: 22.96 KG/M2 | SYSTOLIC BLOOD PRESSURE: 97 MMHG | RESPIRATION RATE: 12 BRPM | HEART RATE: 90 BPM | TEMPERATURE: 98.2 F | OXYGEN SATURATION: 93 %

## 2018-06-22 DIAGNOSIS — J44.9 COPD WITH CHRONIC BRONCHITIS AND EMPHYSEMA (HCC): Primary | ICD-10-CM

## 2018-06-22 DIAGNOSIS — D63.8 ANEMIA DUE TO CHRONIC ILLNESS: ICD-10-CM

## 2018-06-22 DIAGNOSIS — J96.11 CHRONIC RESPIRATORY FAILURE WITH HYPOXIA, ON HOME O2 THERAPY (HCC): ICD-10-CM

## 2018-06-22 DIAGNOSIS — E11.29 TYPE 2 DIABETES MELLITUS WITH MICROALBUMINURIA, WITHOUT LONG-TERM CURRENT USE OF INSULIN (HCC): ICD-10-CM

## 2018-06-22 DIAGNOSIS — I27.81 CHRONIC COR PULMONALE (HCC): ICD-10-CM

## 2018-06-22 DIAGNOSIS — Z99.81 CHRONIC RESPIRATORY FAILURE WITH HYPOXIA, ON HOME O2 THERAPY (HCC): ICD-10-CM

## 2018-06-22 DIAGNOSIS — Z87.891 EX-SMOKER: ICD-10-CM

## 2018-06-22 DIAGNOSIS — J96.11 CHRONIC RESPIRATORY FAILURE WITH HYPOXIA (HCC): ICD-10-CM

## 2018-06-22 DIAGNOSIS — R80.9 TYPE 2 DIABETES MELLITUS WITH MICROALBUMINURIA, WITHOUT LONG-TERM CURRENT USE OF INSULIN (HCC): ICD-10-CM

## 2018-06-22 PROCEDURE — G8420 CALC BMI NORM PARAMETERS: HCPCS | Performed by: INTERNAL MEDICINE

## 2018-06-22 PROCEDURE — 99214 OFFICE O/P EST MOD 30 MIN: CPT | Performed by: INTERNAL MEDICINE

## 2018-06-22 PROCEDURE — 1036F TOBACCO NON-USER: CPT | Performed by: INTERNAL MEDICINE

## 2018-06-22 PROCEDURE — 4040F PNEUMOC VAC/ADMIN/RCVD: CPT | Performed by: INTERNAL MEDICINE

## 2018-06-22 PROCEDURE — 1111F DSCHRG MED/CURRENT MED MERGE: CPT | Performed by: INTERNAL MEDICINE

## 2018-06-22 PROCEDURE — G8427 DOCREV CUR MEDS BY ELIG CLIN: HCPCS | Performed by: INTERNAL MEDICINE

## 2018-06-22 PROCEDURE — G8926 SPIRO NO PERF OR DOC: HCPCS | Performed by: INTERNAL MEDICINE

## 2018-06-22 PROCEDURE — 3023F SPIROM DOC REV: CPT | Performed by: INTERNAL MEDICINE

## 2018-06-22 PROCEDURE — 1123F ACP DISCUSS/DSCN MKR DOCD: CPT | Performed by: INTERNAL MEDICINE

## 2018-06-22 RX ORDER — ALBUTEROL SULFATE 90 UG/1
2 AEROSOL, METERED RESPIRATORY (INHALATION) EVERY 6 HOURS PRN
Qty: 1 INHALER | Refills: 3 | Status: SHIPPED | OUTPATIENT
Start: 2018-06-22

## 2018-06-25 ENCOUNTER — HOSPITAL ENCOUNTER (OUTPATIENT)
Age: 78
Discharge: HOME | End: 2018-06-25
Payer: MEDICARE

## 2018-06-25 LAB
ALBUMIN SERPL-MCNC: 4.1 G/DL (ref 3.5–5.2)
ALBUMIN/GLOBULIN RATIO: 1.2 (ref 1–2.5)
ALP BLD-CCNC: 88 U/L (ref 40–129)
ALT SERPL-CCNC: 9 U/L (ref 5–41)
ANION GAP SERPL CALCULATED.3IONS-SCNC: 12 MMOL/L (ref 9–17)
AST SERPL-CCNC: 10 U/L
BILIRUB SERPL-MCNC: 0.42 MG/DL (ref 0.3–1.2)
BILIRUBIN DIRECT: 0.09 MG/DL
BILIRUBIN, INDIRECT: 0.33 MG/DL (ref 0–1)
BUN BLDV-MCNC: 9 MG/DL (ref 8–23)
CALCIUM SERPL-MCNC: 9.1 MG/DL (ref 8.6–10.4)
CHLORIDE BLD-SCNC: 102 MMOL/L (ref 98–107)
CHOLESTEROL/HDL RATIO: 2.7
CHOLESTEROL: 173 MG/DL
CO2: 25 MMOL/L (ref 20–31)
CREAT SERPL-MCNC: 0.66 MG/DL (ref 0.7–1.2)
CREATININE URINE: 107.1 MG/DL (ref 39–259)
GFR AFRICAN AMERICAN: >60 ML/MIN
GFR NON-AFRICAN AMERICAN: >60 ML/MIN
GFR SERPL CREATININE-BSD FRML MDRD: ABNORMAL ML/MIN/{1.73_M2}
GFR SERPL CREATININE-BSD FRML MDRD: ABNORMAL ML/MIN/{1.73_M2}
GLOBULIN: NORMAL G/DL (ref 1.5–3.8)
GLUCOSE BLD-MCNC: 142 MG/DL (ref 70–99)
HCT VFR BLD CALC: 36.6 % (ref 40.7–50.3)
HDLC SERPL-MCNC: 64 MG/DL
HEMOGLOBIN: 11.8 G/DL (ref 13–17)
LDL CHOLESTEROL: 95 MG/DL (ref 0–130)
MCH RBC QN AUTO: 30.3 PG (ref 25.2–33.5)
MCHC RBC AUTO-ENTMCNC: 32.2 G/DL (ref 28.4–34.8)
MCV RBC AUTO: 93.8 FL (ref 82.6–102.9)
MICROALBUMIN/CREAT 24H UR: 16 MG/L
MICROALBUMIN/CREAT UR-RTO: 15 MCG/MG CREAT
NRBC AUTOMATED: 0 PER 100 WBC
PDW BLD-RTO: 14.3 % (ref 11.8–14.4)
PLATELET # BLD: 285 K/UL (ref 138–453)
PMV BLD AUTO: 9.8 FL (ref 8.1–13.5)
POTASSIUM SERPL-SCNC: 4.1 MMOL/L (ref 3.7–5.3)
RBC # BLD: 3.9 M/UL (ref 4.21–5.77)
SODIUM BLD-SCNC: 139 MMOL/L (ref 135–144)
TOTAL PROTEIN: 7.4 G/DL (ref 6.4–8.3)
TRIGL SERPL-MCNC: 71 MG/DL
TSH SERPL DL<=0.05 MIU/L-ACNC: 2.37 MIU/L (ref 0.3–5)
VITAMIN D 25-HYDROXY: 14.9 NG/ML (ref 30–100)
VLDLC SERPL CALC-MCNC: NORMAL MG/DL (ref 1–30)
WBC # BLD: 8 K/UL (ref 3.5–11.3)

## 2018-06-25 PROCEDURE — 82310 ASSAY OF CALCIUM: CPT

## 2018-06-25 PROCEDURE — 83036 HEMOGLOBIN GLYCOSYLATED A1C: CPT

## 2018-06-25 PROCEDURE — 80061 LIPID PANEL: CPT

## 2018-06-25 PROCEDURE — 82570 ASSAY OF URINE CREATININE: CPT

## 2018-06-25 PROCEDURE — 84443 ASSAY THYROID STIM HORMONE: CPT

## 2018-06-25 PROCEDURE — 36415 COLL VENOUS BLD VENIPUNCTURE: CPT

## 2018-06-25 PROCEDURE — 82565 ASSAY OF CREATININE: CPT

## 2018-06-25 PROCEDURE — 80051 ELECTROLYTE PANEL: CPT

## 2018-06-25 PROCEDURE — 85027 COMPLETE CBC AUTOMATED: CPT

## 2018-06-25 PROCEDURE — 80076 HEPATIC FUNCTION PANEL: CPT

## 2018-06-25 PROCEDURE — 82947 ASSAY GLUCOSE BLOOD QUANT: CPT

## 2018-06-25 PROCEDURE — 84520 ASSAY OF UREA NITROGEN: CPT

## 2018-06-25 PROCEDURE — 82043 UR ALBUMIN QUANTITATIVE: CPT

## 2018-06-25 PROCEDURE — 82306 VITAMIN D 25 HYDROXY: CPT

## 2018-06-26 LAB
ESTIMATED AVERAGE GLUCOSE: 177 MG/DL
HBA1C MFR BLD: 7.8 % (ref 4–6)

## 2018-10-22 ENCOUNTER — APPOINTMENT (OUTPATIENT)
Dept: CT IMAGING | Age: 78
DRG: 193 | End: 2018-10-22
Payer: MEDICARE

## 2018-10-22 ENCOUNTER — HOSPITAL ENCOUNTER (INPATIENT)
Age: 78
LOS: 7 days | Discharge: NURSING, REHAB OR LONG-TERM CARE | DRG: 193 | End: 2018-10-29
Attending: EMERGENCY MEDICINE | Admitting: INTERNAL MEDICINE
Payer: MEDICARE

## 2018-10-22 DIAGNOSIS — J18.9 PNEUMONIA DUE TO ORGANISM: Primary | ICD-10-CM

## 2018-10-22 PROBLEM — E87.20 LACTIC ACIDOSIS: Status: ACTIVE | Noted: 2018-10-22

## 2018-10-22 PROBLEM — D72.829 LEUKOCYTOSIS: Status: ACTIVE | Noted: 2018-10-22

## 2018-10-22 PROBLEM — R30.0 DYSURIA: Status: ACTIVE | Noted: 2018-10-22

## 2018-10-22 PROBLEM — J84.10 PULMONARY FIBROSIS (HCC): Status: ACTIVE | Noted: 2018-10-22

## 2018-10-22 LAB
ABSOLUTE EOS #: 0.18 K/UL (ref 0–0.44)
ABSOLUTE IMMATURE GRANULOCYTE: 0.05 K/UL (ref 0–0.3)
ABSOLUTE LYMPH #: 0.86 K/UL (ref 1.1–3.7)
ABSOLUTE MONO #: 0.79 K/UL (ref 0.1–1.2)
ALBUMIN SERPL-MCNC: 3.8 G/DL (ref 3.5–5.2)
ALBUMIN/GLOBULIN RATIO: 0.9 (ref 1–2.5)
ALLEN TEST: ABNORMAL
ALP BLD-CCNC: 99 U/L (ref 40–129)
ALT SERPL-CCNC: 9 U/L (ref 5–41)
ANION GAP SERPL CALCULATED.3IONS-SCNC: 13 MMOL/L (ref 9–17)
ANION GAP: 11 MMOL/L (ref 7–16)
AST SERPL-CCNC: 13 U/L
BASOPHILS # BLD: 0 % (ref 0–2)
BASOPHILS ABSOLUTE: 0.05 K/UL (ref 0–0.2)
BILIRUB SERPL-MCNC: 0.57 MG/DL (ref 0.3–1.2)
BUN BLDV-MCNC: 11 MG/DL (ref 8–23)
BUN/CREAT BLD: ABNORMAL (ref 9–20)
CALCIUM SERPL-MCNC: 8.8 MG/DL (ref 8.6–10.4)
CHLORIDE BLD-SCNC: 99 MMOL/L (ref 98–107)
CO2: 27 MMOL/L (ref 20–31)
CREAT SERPL-MCNC: 0.6 MG/DL (ref 0.7–1.2)
DIFFERENTIAL TYPE: ABNORMAL
DIRECT EXAM: NORMAL
EKG ATRIAL RATE: 93 BPM
EKG P AXIS: 60 DEGREES
EKG P-R INTERVAL: 128 MS
EKG Q-T INTERVAL: 344 MS
EKG QRS DURATION: 72 MS
EKG QTC CALCULATION (BAZETT): 427 MS
EKG R AXIS: 55 DEGREES
EKG T AXIS: -67 DEGREES
EKG VENTRICULAR RATE: 93 BPM
EOSINOPHILS RELATIVE PERCENT: 1 % (ref 1–4)
FERRITIN: 109 UG/L (ref 30–400)
FIO2: ABNORMAL
GFR AFRICAN AMERICAN: >60 ML/MIN
GFR NON-AFRICAN AMERICAN: >60 ML/MIN
GFR NON-AFRICAN AMERICAN: >60 ML/MIN
GFR SERPL CREATININE-BSD FRML MDRD: >60 ML/MIN
GFR SERPL CREATININE-BSD FRML MDRD: ABNORMAL ML/MIN/{1.73_M2}
GFR SERPL CREATININE-BSD FRML MDRD: ABNORMAL ML/MIN/{1.73_M2}
GFR SERPL CREATININE-BSD FRML MDRD: NORMAL ML/MIN/{1.73_M2}
GLUCOSE BLD-MCNC: 134 MG/DL (ref 75–110)
GLUCOSE BLD-MCNC: 174 MG/DL (ref 74–100)
GLUCOSE BLD-MCNC: 183 MG/DL (ref 70–99)
GLUCOSE BLD-MCNC: 263 MG/DL (ref 75–110)
HCO3 VENOUS: 29.6 MMOL/L (ref 22–29)
HCT VFR BLD CALC: 38 % (ref 40.7–50.3)
HEMOGLOBIN: 12.2 G/DL (ref 13–17)
IMMATURE GRANULOCYTES: 0 %
INR BLD: 1
IRON SATURATION: 11 % (ref 20–55)
IRON: 26 UG/DL (ref 59–158)
LACTIC ACID, WHOLE BLOOD: 2.4 MMOL/L (ref 0.7–2.1)
LACTIC ACID, WHOLE BLOOD: 2.5 MMOL/L (ref 0.7–2.1)
LACTIC ACID, WHOLE BLOOD: 3.1 MMOL/L (ref 0.7–2.1)
LYMPHOCYTES # BLD: 6 % (ref 24–43)
Lab: NORMAL
MCH RBC QN AUTO: 29.9 PG (ref 25.2–33.5)
MCHC RBC AUTO-ENTMCNC: 32.1 G/DL (ref 28.4–34.8)
MCV RBC AUTO: 93.1 FL (ref 82.6–102.9)
MODE: ABNORMAL
MONOCYTES # BLD: 5 % (ref 3–12)
NEGATIVE BASE EXCESS, VEN: ABNORMAL (ref 0–2)
NRBC AUTOMATED: 0 PER 100 WBC
O2 DEVICE/FLOW/%: ABNORMAL
O2 SAT, VEN: 23 % (ref 60–85)
PARTIAL THROMBOPLASTIN TIME: 18.4 SEC (ref 20.5–30.5)
PATIENT TEMP: ABNORMAL
PCO2, VEN: 49.1 MM HG (ref 41–51)
PDW BLD-RTO: 15 % (ref 11.8–14.4)
PH VENOUS: 7.39 (ref 7.32–7.43)
PLATELET # BLD: 212 K/UL (ref 138–453)
PLATELET ESTIMATE: ABNORMAL
PMV BLD AUTO: 10.2 FL (ref 8.1–13.5)
PO2, VEN: 17 MM HG (ref 30–50)
POC CHLORIDE: 100 MMOL/L (ref 98–107)
POC CREATININE: 0.69 MG/DL (ref 0.51–1.19)
POC HEMATOCRIT: 41 % (ref 41–53)
POC HEMOGLOBIN: 14 G/DL (ref 13.5–17.5)
POC IONIZED CALCIUM: 1.12 MMOL/L (ref 1.15–1.33)
POC LACTIC ACID: 3.35 MMOL/L (ref 0.56–1.39)
POC PCO2 TEMP: ABNORMAL MM HG
POC PH TEMP: ABNORMAL
POC PO2 TEMP: ABNORMAL MM HG
POC POTASSIUM: 4.6 MMOL/L (ref 3.5–4.5)
POC SODIUM: 141 MMOL/L (ref 138–146)
POC TROPONIN I: 0.01 NG/ML (ref 0–0.1)
POC TROPONIN INTERP: NORMAL
POSITIVE BASE EXCESS, VEN: 4 (ref 0–3)
POTASSIUM SERPL-SCNC: 4.1 MMOL/L (ref 3.7–5.3)
PROTHROMBIN TIME: 10.4 SEC (ref 9–12)
RBC # BLD: 4.08 M/UL (ref 4.21–5.77)
RBC # BLD: ABNORMAL 10*6/UL
SAMPLE SITE: ABNORMAL
SEG NEUTROPHILS: 88 % (ref 36–65)
SEGMENTED NEUTROPHILS ABSOLUTE COUNT: 13.36 K/UL (ref 1.5–8.1)
SODIUM BLD-SCNC: 139 MMOL/L (ref 135–144)
SPECIMEN DESCRIPTION: NORMAL
STATUS: NORMAL
TOTAL CO2, VENOUS: 31 MMOL/L (ref 23–30)
TOTAL IRON BINDING CAPACITY: 240 UG/DL (ref 250–450)
TOTAL PROTEIN: 8 G/DL (ref 6.4–8.3)
TROPONIN INTERP: NORMAL
TROPONIN T: <0.03 NG/ML
UNSATURATED IRON BINDING CAPACITY: 214 UG/DL (ref 112–347)
WBC # BLD: 15.3 K/UL (ref 3.5–11.3)
WBC # BLD: ABNORMAL 10*3/UL

## 2018-10-22 PROCEDURE — 83550 IRON BINDING TEST: CPT

## 2018-10-22 PROCEDURE — 84484 ASSAY OF TROPONIN QUANT: CPT

## 2018-10-22 PROCEDURE — 82330 ASSAY OF CALCIUM: CPT

## 2018-10-22 PROCEDURE — 71260 CT THORAX DX C+: CPT

## 2018-10-22 PROCEDURE — 87070 CULTURE OTHR SPECIMN AEROBIC: CPT

## 2018-10-22 PROCEDURE — 85014 HEMATOCRIT: CPT

## 2018-10-22 PROCEDURE — 83540 ASSAY OF IRON: CPT

## 2018-10-22 PROCEDURE — 99285 EMERGENCY DEPT VISIT HI MDM: CPT

## 2018-10-22 PROCEDURE — 6370000000 HC RX 637 (ALT 250 FOR IP): Performed by: STUDENT IN AN ORGANIZED HEALTH CARE EDUCATION/TRAINING PROGRAM

## 2018-10-22 PROCEDURE — 6360000002 HC RX W HCPCS: Performed by: STUDENT IN AN ORGANIZED HEALTH CARE EDUCATION/TRAINING PROGRAM

## 2018-10-22 PROCEDURE — 84132 ASSAY OF SERUM POTASSIUM: CPT

## 2018-10-22 PROCEDURE — 83605 ASSAY OF LACTIC ACID: CPT

## 2018-10-22 PROCEDURE — 84295 ASSAY OF SERUM SODIUM: CPT

## 2018-10-22 PROCEDURE — 2580000003 HC RX 258: Performed by: STUDENT IN AN ORGANIZED HEALTH CARE EDUCATION/TRAINING PROGRAM

## 2018-10-22 PROCEDURE — 6360000004 HC RX CONTRAST MEDICATION: Performed by: EMERGENCY MEDICINE

## 2018-10-22 PROCEDURE — 82565 ASSAY OF CREATININE: CPT

## 2018-10-22 PROCEDURE — 6360000002 HC RX W HCPCS: Performed by: EMERGENCY MEDICINE

## 2018-10-22 PROCEDURE — 85025 COMPLETE CBC W/AUTO DIFF WBC: CPT

## 2018-10-22 PROCEDURE — 80053 COMPREHEN METABOLIC PANEL: CPT

## 2018-10-22 PROCEDURE — 87205 SMEAR GRAM STAIN: CPT

## 2018-10-22 PROCEDURE — 82728 ASSAY OF FERRITIN: CPT

## 2018-10-22 PROCEDURE — 85730 THROMBOPLASTIN TIME PARTIAL: CPT

## 2018-10-22 PROCEDURE — 2700000000 HC OXYGEN THERAPY PER DAY

## 2018-10-22 PROCEDURE — 82435 ASSAY OF BLOOD CHLORIDE: CPT

## 2018-10-22 PROCEDURE — 82947 ASSAY GLUCOSE BLOOD QUANT: CPT

## 2018-10-22 PROCEDURE — 87804 INFLUENZA ASSAY W/OPTIC: CPT

## 2018-10-22 PROCEDURE — 85610 PROTHROMBIN TIME: CPT

## 2018-10-22 PROCEDURE — 2060000000 HC ICU INTERMEDIATE R&B

## 2018-10-22 PROCEDURE — 93005 ELECTROCARDIOGRAM TRACING: CPT

## 2018-10-22 PROCEDURE — 36415 COLL VENOUS BLD VENIPUNCTURE: CPT

## 2018-10-22 PROCEDURE — 94762 N-INVAS EAR/PLS OXIMTRY CONT: CPT

## 2018-10-22 PROCEDURE — 82803 BLOOD GASES ANY COMBINATION: CPT

## 2018-10-22 PROCEDURE — 94640 AIRWAY INHALATION TREATMENT: CPT

## 2018-10-22 PROCEDURE — 2580000003 HC RX 258: Performed by: EMERGENCY MEDICINE

## 2018-10-22 PROCEDURE — 87040 BLOOD CULTURE FOR BACTERIA: CPT

## 2018-10-22 PROCEDURE — 94660 CPAP INITIATION&MGMT: CPT

## 2018-10-22 RX ORDER — SODIUM CHLORIDE 0.9 % (FLUSH) 0.9 %
10 SYRINGE (ML) INJECTION EVERY 12 HOURS SCHEDULED
Status: DISCONTINUED | OUTPATIENT
Start: 2018-10-22 | End: 2018-10-29 | Stop reason: HOSPADM

## 2018-10-22 RX ORDER — ALBUTEROL SULFATE 2.5 MG/3ML
5 SOLUTION RESPIRATORY (INHALATION)
Status: DISCONTINUED | OUTPATIENT
Start: 2018-10-22 | End: 2018-10-23

## 2018-10-22 RX ORDER — IPRATROPIUM BROMIDE AND ALBUTEROL SULFATE 2.5; .5 MG/3ML; MG/3ML
1 SOLUTION RESPIRATORY (INHALATION)
Status: DISCONTINUED | OUTPATIENT
Start: 2018-10-22 | End: 2018-10-23

## 2018-10-22 RX ORDER — 0.9 % SODIUM CHLORIDE 0.9 %
1000 INTRAVENOUS SOLUTION INTRAVENOUS ONCE
Status: COMPLETED | OUTPATIENT
Start: 2018-10-22 | End: 2018-10-22

## 2018-10-22 RX ORDER — ESCITALOPRAM OXALATE 10 MG/1
10 TABLET ORAL DAILY
Status: DISCONTINUED | OUTPATIENT
Start: 2018-10-22 | End: 2018-10-29 | Stop reason: HOSPADM

## 2018-10-22 RX ORDER — ALBUTEROL SULFATE 90 UG/1
2 AEROSOL, METERED RESPIRATORY (INHALATION)
Status: DISCONTINUED | OUTPATIENT
Start: 2018-10-22 | End: 2018-10-23

## 2018-10-22 RX ORDER — LEVOFLOXACIN 5 MG/ML
750 INJECTION, SOLUTION INTRAVENOUS EVERY 24 HOURS
Status: DISCONTINUED | OUTPATIENT
Start: 2018-10-22 | End: 2018-10-27

## 2018-10-22 RX ORDER — SODIUM CHLORIDE 0.9 % (FLUSH) 0.9 %
10 SYRINGE (ML) INJECTION PRN
Status: DISCONTINUED | OUTPATIENT
Start: 2018-10-22 | End: 2018-10-29 | Stop reason: HOSPADM

## 2018-10-22 RX ORDER — ONDANSETRON 2 MG/ML
4 INJECTION INTRAMUSCULAR; INTRAVENOUS EVERY 6 HOURS PRN
Status: DISCONTINUED | OUTPATIENT
Start: 2018-10-22 | End: 2018-10-29 | Stop reason: HOSPADM

## 2018-10-22 RX ORDER — ASPIRIN 81 MG/1
81 TABLET ORAL DAILY
Status: DISCONTINUED | OUTPATIENT
Start: 2018-10-22 | End: 2018-10-29 | Stop reason: HOSPADM

## 2018-10-22 RX ORDER — SODIUM CHLORIDE 9 MG/ML
INJECTION, SOLUTION INTRAVENOUS CONTINUOUS
Status: DISCONTINUED | OUTPATIENT
Start: 2018-10-22 | End: 2018-10-24

## 2018-10-22 RX ADMIN — SODIUM CHLORIDE: 9 INJECTION, SOLUTION INTRAVENOUS at 20:50

## 2018-10-22 RX ADMIN — INSULIN LISPRO 6 UNITS: 100 INJECTION, SOLUTION INTRAVENOUS; SUBCUTANEOUS at 18:28

## 2018-10-22 RX ADMIN — CEFTRIAXONE SODIUM 1 G: 1 INJECTION, POWDER, FOR SOLUTION INTRAMUSCULAR; INTRAVENOUS at 16:53

## 2018-10-22 RX ADMIN — IOPAMIDOL 75 ML: 755 INJECTION, SOLUTION INTRAVENOUS at 12:54

## 2018-10-22 RX ADMIN — PIPERACILLIN AND TAZOBACTAM 3.38 G: 3; .375 INJECTION, POWDER, LYOPHILIZED, FOR SOLUTION INTRAVENOUS; PARENTERAL at 20:52

## 2018-10-22 RX ADMIN — ESCITALOPRAM OXALATE 10 MG: 10 TABLET ORAL at 18:32

## 2018-10-22 RX ADMIN — SODIUM CHLORIDE 500 ML: 9 INJECTION, SOLUTION INTRAVENOUS at 14:38

## 2018-10-22 RX ADMIN — SODIUM CHLORIDE 1000 ML: 9 INJECTION, SOLUTION INTRAVENOUS at 18:21

## 2018-10-22 RX ADMIN — IPRATROPIUM BROMIDE 0.5 MG: 0.5 SOLUTION RESPIRATORY (INHALATION) at 11:52

## 2018-10-22 RX ADMIN — AZITHROMYCIN MONOHYDRATE 500 MG: 500 INJECTION, POWDER, LYOPHILIZED, FOR SOLUTION INTRAVENOUS at 14:38

## 2018-10-22 RX ADMIN — ALBUTEROL SULFATE 5 MG: 2.5 SOLUTION RESPIRATORY (INHALATION) at 11:51

## 2018-10-22 RX ADMIN — IPRATROPIUM BROMIDE AND ALBUTEROL SULFATE 1 AMPULE: .5; 3 SOLUTION RESPIRATORY (INHALATION) at 18:31

## 2018-10-22 RX ADMIN — Medication 10 ML: at 20:54

## 2018-10-22 ASSESSMENT — PAIN SCALES - GENERAL
PAINLEVEL_OUTOF10: 0
PAINLEVEL_OUTOF10: 0

## 2018-10-22 NOTE — ED PROVIDER NOTES
limits   CALCIUM, IONIC (POC) - Abnormal; Notable for the following:     POC Ionized Calcium 1.12 (*)     All other components within normal limits   LACTIC ACID, WHOLE BLOOD - Abnormal; Notable for the following:     Lactic Acid, Whole Blood 3.1 (*)     All other components within normal limits   VENOUS BLOOD GAS, POINT OF CARE - Abnormal; Notable for the following:     pO2, Cuate 17.0 (*)     HCO3, Venous 29.6 (*)     Total CO2, Venous 31 (*)     Positive Base Excess, Cuate 4 (*)     O2 Sat, Cuate 23 (*)     All other components within normal limits   LACTIC ACID,POINT OF CARE - Abnormal; Notable for the following:     POC Lactic Acid 3.35 (*)     All other components within normal limits   POCT GLUCOSE - Abnormal; Notable for the following:     POC Glucose 174 (*)     All other components within normal limits   CULTURE BLOOD #1   CULTURE BLOOD #1   URINE CULTURE   PROTIME-INR   HGB/HCT   SODIUM (POC)   CHLORIDE (POC)   URINALYSIS WITH MICROSCOPIC   LACTIC ACID, WHOLE BLOOD   TROPONIN   POC BLOOD GAS AND CHEMISTRY   POCT TROPONIN   CREATININE W/GFR POINT OF CARE   ANION GAP (CALC) POC       Ct Chest Pulmonary Embolism W Contrast    Result Date: 10/22/2018  EXAMINATION: CTA OF THE CHEST 10/22/2018 12:59 pm TECHNIQUE: CTA of the chest was performed after the administration of intravenous contrast.  Multiplanar reformatted images are provided for review. MIP images are provided for review. Dose modulation, iterative reconstruction, and/or weight based adjustment of the mA/kV was utilized to reduce the radiation dose to as low as reasonably achievable. COMPARISON: Chest CT may 23, 2018 HISTORY: ORDERING SYSTEM PROVIDED HISTORY: tachycardic, hypoxic FINDINGS: Pulmonary Arteries: Pulmonary arteries are adequately opacified for evaluation. No evidence of intraluminal filling defect to suggest pulmonary embolism. Main pulmonary artery is mildly enlarged measuring 35 mm.  Mediastinum: Mediastinal lymph nodes appear less prominent in the interval. Mildly enlarged right hilar lymphatic tissue is again noted measuring up to 18 mm. The heart and pericardium demonstrate no acute abnormality. There is no acute abnormality of the thoracic aorta. Lungs/pleura: Moderately severe emphysema is noted. Chronic peripheral opacities and pleural thickening is again noted with an upper lobe predominance. More confluent airspace disease is noted in the posterior lateral right upper lobe in the interval.  Findings of subsegmental atelectasis or scar in the right lower lobe appears unchanged. No pneumothorax. No layering effusion. Partially calcified pleural plaques are noted bilaterally. Upper Abdomen: No acute findings. Status post cholecystectomy. Status post endovascular repair of the aorta, partially visualized. Soft Tissues/Bones: No acute bone or soft tissue abnormality. No evidence for acute pulmonary embolism. More confluent peripheral pleuroparenchymal opacities, predominantly in the upper lobes. This may represent progression of inflammatory process or infection. Mildly prominent right hilar lymphatic tissue appears unchanged. Mediastinal lymph nodes appear less prominent. Attention to on follow-up is recommended. Emphysema with fibrosis. Calcified pleural plaques suggestive of prior asbestos exposure. RECENT VITALS:     Temp: 97.8 °F (36.6 °C),  Pulse: 102, Resp: (!) 32, BP: 133/66, SpO2: 93 %    This patient is a 70 y.o. Male with shortness of breath. Patient found to have pneumonia. Hypoxic on arrival which improved with Ventimask. Started on required antibiotics. Septic workup done. OUTSTANDING TASKS / RECOMMENDATIONS:    1. Patient admitted medicine. Awaiting transfer to floor. FINAL IMPRESSION:     1.  Pneumonia due to organism        DISPOSITION:         DISPOSITION:  []  Discharge   []  Transfer -    [x]  Admission - IM    []  Against Medical Advice   []  Eloped   FOLLOW-UP: Maury Flores VANDANA  [facility]  [phone]           DISCHARGE MEDICATIONS: New Prescriptions    No medications on file           Hanh Delarosa DO  Emergency Medicine Resident  [facility]        Hanh Delarosa, [address] [place] [address] Avenue  10/22/18 0863

## 2018-10-22 NOTE — ED NOTES
Pt came to Ed with onset of COPD exacerbation and yellow sputum noted. Pt noted to be low 02 sat in 50s and placed on NC and tald to brath through nose. On arrival IV placed blood sent. EKG done. Monitor placed. Pt shows moderate distress. RT paged to bedside. Pt has no CP. Pt states jut increased fatigue.       Natalie Enriquez RN  10/22/18 0141

## 2018-10-22 NOTE — CARE COORDINATION
Initial DC assessment deferred secondary to dyspnea. Patient is on full face mask O2, no family at bedside.

## 2018-10-22 NOTE — ED PROVIDER NOTES
Anion Gap (Calc) POC   Result Value Ref Range    Anion Gap 11 7 - 16 mmol/L       IMPRESSION: Pneumonia    RADIOLOGY:  Ct Chest Pulmonary Embolism W Contrast    Result Date: 10/22/2018  EXAMINATION: CTA OF THE CHEST 10/22/2018 12:59 pm TECHNIQUE: CTA of the chest was performed after the administration of intravenous contrast.  Multiplanar reformatted images are provided for review. MIP images are provided for review. Dose modulation, iterative reconstruction, and/or weight based adjustment of the mA/kV was utilized to reduce the radiation dose to as low as reasonably achievable. COMPARISON: Chest CT may 23, 2018 HISTORY: ORDERING SYSTEM PROVIDED HISTORY: tachycardic, hypoxic FINDINGS: Pulmonary Arteries: Pulmonary arteries are adequately opacified for evaluation. No evidence of intraluminal filling defect to suggest pulmonary embolism. Main pulmonary artery is mildly enlarged measuring 35 mm. Mediastinum: Mediastinal lymph nodes appear less prominent in the interval. Mildly enlarged right hilar lymphatic tissue is again noted measuring up to 18 mm. The heart and pericardium demonstrate no acute abnormality. There is no acute abnormality of the thoracic aorta. Lungs/pleura: Moderately severe emphysema is noted. Chronic peripheral opacities and pleural thickening is again noted with an upper lobe predominance. More confluent airspace disease is noted in the posterior lateral right upper lobe in the interval.  Findings of subsegmental atelectasis or scar in the right lower lobe appears unchanged. No pneumothorax. No layering effusion. Partially calcified pleural plaques are noted bilaterally. Upper Abdomen: No acute findings. Status post cholecystectomy. Status post endovascular repair of the aorta, partially visualized. Soft Tissues/Bones: No acute bone or soft tissue abnormality. No evidence for acute pulmonary embolism.  More confluent peripheral pleuroparenchymal opacities, predominantly in the albuterol (PROVENTIL) nebulizer solution 5 mg    albuterol sulfate  (90 Base) MCG/ACT inhaler 2 puff    ipratropium (ATROVENT) 0.02 % nebulizer solution 0.5 mg    iopamidol (ISOVUE-370) 76 % injection 75 mL    cefTRIAXone (ROCEPHIN) 1 g IVPB in 50 mL D5W minibag    azithromycin (ZITHROMAX) 500 mg in dextrose 5 % 250 mL IVPB    0.9 % sodium chloride bolus         PROCEDURES:  None    CONSULTS:  IP CONSULT TO INTERNAL MEDICINE    CRITICAL CARE:  None    FINAL IMPRESSION      1. Pneumonia due to organism          DISPOSITION / PLAN     DISPOSITION Decision To Admit 10/22/2018 01:31:51 PM      PATIENT REFERRED TO:  No follow-up provider specified. DISCHARGE MEDICATIONS:  New Prescriptions    No medications on file       Petty Lindo DO  Emergency Medicine Resident    Pre-hypertension/Hypertension: You have been informed that you may have pre-hypertension or Hypertension based on a blood pressure reading in the Emergency Department. I recommend you call the primary care provider listed on your discharge instructions or a physician of your choice this week to arrange follow up for further evaluation of possible pre-hypertension or Hypertension. (Please note that portions of this note were completed with a voice recognition program.  Efforts were made to edit the dictations but occasionally words are mis-transcribed.  Whenever words are used in this note in any gender, they shall be construed as though they were used in the gender appropriate to the circumstances; and whenever words are used in this note in the singular or plural form, they shall be construed as though they were used in the form appropriate to the circumstances.)       Petty Lindo DO  Resident  10/22/18 5012

## 2018-10-22 NOTE — ED TRIAGE NOTES
Patient arrived with O2 sat of 55% after 10 seconds without O2 on to take shirt off. Brought up to 85% on 3L/min after five minutes. Patient coughing up brown sputum. Chronic COPD normally wears 6L O2 at home. Bilateral rales per EMS report. Has tremors that affected EKG reading.

## 2018-10-22 NOTE — ED NOTES
Bed: 29  Expected date:   Expected time:   Means of arrival:   Comments:   Maggie Lock RN  10/22/18 112

## 2018-10-23 PROBLEM — J96.20 ACUTE ON CHRONIC RESPIRATORY FAILURE (HCC): Status: ACTIVE | Noted: 2018-10-23

## 2018-10-23 LAB
ABSOLUTE EOS #: 0.14 K/UL (ref 0–0.44)
ABSOLUTE IMMATURE GRANULOCYTE: 0.06 K/UL (ref 0–0.3)
ABSOLUTE LYMPH #: 1.18 K/UL (ref 1.1–3.7)
ABSOLUTE MONO #: 0.97 K/UL (ref 0.1–1.2)
ANION GAP SERPL CALCULATED.3IONS-SCNC: 15 MMOL/L (ref 9–17)
BASOPHILS # BLD: 0 % (ref 0–2)
BASOPHILS ABSOLUTE: 0.03 K/UL (ref 0–0.2)
BILIRUBIN URINE: NEGATIVE
BUN BLDV-MCNC: 10 MG/DL (ref 8–23)
BUN/CREAT BLD: ABNORMAL (ref 9–20)
CALCIUM SERPL-MCNC: 8 MG/DL (ref 8.6–10.4)
CHLORIDE BLD-SCNC: 100 MMOL/L (ref 98–107)
CO2: 25 MMOL/L (ref 20–31)
COLOR: YELLOW
COMMENT UA: NORMAL
CREAT SERPL-MCNC: 0.64 MG/DL (ref 0.7–1.2)
DIFFERENTIAL TYPE: ABNORMAL
DIRECT EXAM: NORMAL
EOSINOPHILS RELATIVE PERCENT: 1 % (ref 1–4)
GFR AFRICAN AMERICAN: >60 ML/MIN
GFR NON-AFRICAN AMERICAN: >60 ML/MIN
GFR SERPL CREATININE-BSD FRML MDRD: ABNORMAL ML/MIN/{1.73_M2}
GFR SERPL CREATININE-BSD FRML MDRD: ABNORMAL ML/MIN/{1.73_M2}
GLUCOSE BLD-MCNC: 126 MG/DL (ref 75–110)
GLUCOSE BLD-MCNC: 135 MG/DL (ref 70–99)
GLUCOSE BLD-MCNC: 153 MG/DL (ref 75–110)
GLUCOSE BLD-MCNC: 191 MG/DL (ref 75–110)
GLUCOSE BLD-MCNC: 232 MG/DL (ref 75–110)
GLUCOSE URINE: NEGATIVE
HCT VFR BLD CALC: 29.5 % (ref 40.7–50.3)
HEMOGLOBIN: 9.6 G/DL (ref 13–17)
IMMATURE GRANULOCYTES: 1 %
KETONES, URINE: NEGATIVE
LACTIC ACID, WHOLE BLOOD: 1.5 MMOL/L (ref 0.7–2.1)
LEUKOCYTE ESTERASE, URINE: NEGATIVE
LYMPHOCYTES # BLD: 10 % (ref 24–43)
Lab: NORMAL
MCH RBC QN AUTO: 30.6 PG (ref 25.2–33.5)
MCHC RBC AUTO-ENTMCNC: 32.5 G/DL (ref 28.4–34.8)
MCV RBC AUTO: 93.9 FL (ref 82.6–102.9)
MONOCYTES # BLD: 8 % (ref 3–12)
NITRITE, URINE: NEGATIVE
NRBC AUTOMATED: 0 PER 100 WBC
PDW BLD-RTO: 15.6 % (ref 11.8–14.4)
PH UA: 5.5 (ref 5–8)
PLATELET # BLD: 206 K/UL (ref 138–453)
PLATELET ESTIMATE: ABNORMAL
PMV BLD AUTO: 10.6 FL (ref 8.1–13.5)
POTASSIUM SERPL-SCNC: 3.8 MMOL/L (ref 3.7–5.3)
PROTEIN UA: NEGATIVE
RBC # BLD: 3.14 M/UL (ref 4.21–5.77)
RBC # BLD: ABNORMAL 10*6/UL
SEG NEUTROPHILS: 80 % (ref 36–65)
SEGMENTED NEUTROPHILS ABSOLUTE COUNT: 9.7 K/UL (ref 1.5–8.1)
SODIUM BLD-SCNC: 140 MMOL/L (ref 135–144)
SPECIFIC GRAVITY UA: 1.02 (ref 1–1.03)
SPECIMEN DESCRIPTION: NORMAL
STATUS: NORMAL
TURBIDITY: CLEAR
URINE HGB: NEGATIVE
UROBILINOGEN, URINE: NORMAL
WBC # BLD: 12.1 K/UL (ref 3.5–11.3)
WBC # BLD: ABNORMAL 10*3/UL

## 2018-10-23 PROCEDURE — 83605 ASSAY OF LACTIC ACID: CPT

## 2018-10-23 PROCEDURE — 6360000002 HC RX W HCPCS: Performed by: HOSPITALIST

## 2018-10-23 PROCEDURE — 2580000003 HC RX 258: Performed by: STUDENT IN AN ORGANIZED HEALTH CARE EDUCATION/TRAINING PROGRAM

## 2018-10-23 PROCEDURE — 97530 THERAPEUTIC ACTIVITIES: CPT

## 2018-10-23 PROCEDURE — 87899 AGENT NOS ASSAY W/OPTIC: CPT

## 2018-10-23 PROCEDURE — 6360000002 HC RX W HCPCS: Performed by: STUDENT IN AN ORGANIZED HEALTH CARE EDUCATION/TRAINING PROGRAM

## 2018-10-23 PROCEDURE — 2700000000 HC OXYGEN THERAPY PER DAY

## 2018-10-23 PROCEDURE — G0008 ADMIN INFLUENZA VIRUS VAC: HCPCS | Performed by: INTERNAL MEDICINE

## 2018-10-23 PROCEDURE — 87300 AG DETECTION POLYVAL IF: CPT

## 2018-10-23 PROCEDURE — G8978 MOBILITY CURRENT STATUS: HCPCS

## 2018-10-23 PROCEDURE — 99222 1ST HOSP IP/OBS MODERATE 55: CPT | Performed by: INTERNAL MEDICINE

## 2018-10-23 PROCEDURE — 87255 GENET VIRUS ISOLATE HSV: CPT

## 2018-10-23 PROCEDURE — 85025 COMPLETE CBC W/AUTO DIFF WBC: CPT

## 2018-10-23 PROCEDURE — 6370000000 HC RX 637 (ALT 250 FOR IP): Performed by: STUDENT IN AN ORGANIZED HEALTH CARE EDUCATION/TRAINING PROGRAM

## 2018-10-23 PROCEDURE — 87015 SPECIMEN INFECT AGNT CONCNTJ: CPT

## 2018-10-23 PROCEDURE — 81003 URINALYSIS AUTO W/O SCOPE: CPT

## 2018-10-23 PROCEDURE — 87252 VIRUS INOCULATION TISSUE: CPT

## 2018-10-23 PROCEDURE — 36415 COLL VENOUS BLD VENIPUNCTURE: CPT

## 2018-10-23 PROCEDURE — 94762 N-INVAS EAR/PLS OXIMTRY CONT: CPT

## 2018-10-23 PROCEDURE — 2060000000 HC ICU INTERMEDIATE R&B

## 2018-10-23 PROCEDURE — 87140 CULTURE TYPE IMMUNOFLUORESC: CPT

## 2018-10-23 PROCEDURE — 80048 BASIC METABOLIC PNL TOTAL CA: CPT

## 2018-10-23 PROCEDURE — 6360000002 HC RX W HCPCS: Performed by: INTERNAL MEDICINE

## 2018-10-23 PROCEDURE — G8979 MOBILITY GOAL STATUS: HCPCS

## 2018-10-23 PROCEDURE — 82947 ASSAY GLUCOSE BLOOD QUANT: CPT

## 2018-10-23 PROCEDURE — 90686 IIV4 VACC NO PRSV 0.5 ML IM: CPT | Performed by: INTERNAL MEDICINE

## 2018-10-23 PROCEDURE — 99223 1ST HOSP IP/OBS HIGH 75: CPT | Performed by: INTERNAL MEDICINE

## 2018-10-23 PROCEDURE — 97162 PT EVAL MOD COMPLEX 30 MIN: CPT

## 2018-10-23 PROCEDURE — 94640 AIRWAY INHALATION TREATMENT: CPT

## 2018-10-23 RX ORDER — IPRATROPIUM BROMIDE AND ALBUTEROL SULFATE 2.5; .5 MG/3ML; MG/3ML
1 SOLUTION RESPIRATORY (INHALATION) 4 TIMES DAILY
Status: DISCONTINUED | OUTPATIENT
Start: 2018-10-23 | End: 2018-10-29 | Stop reason: HOSPADM

## 2018-10-23 RX ORDER — ALBUTEROL SULFATE 2.5 MG/3ML
2.5 SOLUTION RESPIRATORY (INHALATION) EVERY 6 HOURS PRN
Status: DISCONTINUED | OUTPATIENT
Start: 2018-10-23 | End: 2018-10-29 | Stop reason: HOSPADM

## 2018-10-23 RX ORDER — ALBUTEROL SULFATE 2.5 MG/3ML
2.5 SOLUTION RESPIRATORY (INHALATION)
Status: DISCONTINUED | OUTPATIENT
Start: 2018-10-23 | End: 2018-10-23

## 2018-10-23 RX ORDER — METHYLPREDNISOLONE SODIUM SUCCINATE 40 MG/ML
40 INJECTION, POWDER, LYOPHILIZED, FOR SOLUTION INTRAMUSCULAR; INTRAVENOUS EVERY 8 HOURS
Status: DISCONTINUED | OUTPATIENT
Start: 2018-10-23 | End: 2018-10-25

## 2018-10-23 RX ADMIN — PIPERACILLIN AND TAZOBACTAM 3.38 G: 3; .375 INJECTION, POWDER, LYOPHILIZED, FOR SOLUTION INTRAVENOUS; PARENTERAL at 11:22

## 2018-10-23 RX ADMIN — IPRATROPIUM BROMIDE AND ALBUTEROL SULFATE 1 AMPULE: .5; 3 SOLUTION RESPIRATORY (INHALATION) at 15:27

## 2018-10-23 RX ADMIN — IPRATROPIUM BROMIDE AND ALBUTEROL SULFATE 1 AMPULE: .5; 3 SOLUTION RESPIRATORY (INHALATION) at 10:08

## 2018-10-23 RX ADMIN — METHYLPREDNISOLONE SODIUM SUCCINATE 40 MG: 40 INJECTION, POWDER, FOR SOLUTION INTRAMUSCULAR; INTRAVENOUS at 20:39

## 2018-10-23 RX ADMIN — IPRATROPIUM BROMIDE AND ALBUTEROL SULFATE 1 AMPULE: .5; 3 SOLUTION RESPIRATORY (INHALATION) at 19:37

## 2018-10-23 RX ADMIN — ASPIRIN 81 MG: 81 TABLET, COATED ORAL at 09:02

## 2018-10-23 RX ADMIN — ESCITALOPRAM OXALATE 10 MG: 10 TABLET ORAL at 09:02

## 2018-10-23 RX ADMIN — INSULIN LISPRO 2 UNITS: 100 INJECTION, SOLUTION INTRAVENOUS; SUBCUTANEOUS at 20:42

## 2018-10-23 RX ADMIN — PIPERACILLIN AND TAZOBACTAM 3.38 G: 3; .375 INJECTION, POWDER, LYOPHILIZED, FOR SOLUTION INTRAVENOUS; PARENTERAL at 04:09

## 2018-10-23 RX ADMIN — SODIUM CHLORIDE: 9 INJECTION, SOLUTION INTRAVENOUS at 18:22

## 2018-10-23 RX ADMIN — LEVOFLOXACIN 750 MG: 5 INJECTION, SOLUTION INTRAVENOUS at 00:21

## 2018-10-23 RX ADMIN — ENOXAPARIN SODIUM 40 MG: 40 INJECTION SUBCUTANEOUS at 09:02

## 2018-10-23 RX ADMIN — MOMETASONE FUROATE AND FORMOTEROL FUMARATE DIHYDRATE 2 PUFF: 100; 5 AEROSOL RESPIRATORY (INHALATION) at 10:12

## 2018-10-23 RX ADMIN — INSULIN LISPRO 2 UNITS: 100 INJECTION, SOLUTION INTRAVENOUS; SUBCUTANEOUS at 11:22

## 2018-10-23 RX ADMIN — INSULIN LISPRO 2 UNITS: 100 INJECTION, SOLUTION INTRAVENOUS; SUBCUTANEOUS at 16:45

## 2018-10-23 RX ADMIN — MOMETASONE FUROATE AND FORMOTEROL FUMARATE DIHYDRATE 2 PUFF: 100; 5 AEROSOL RESPIRATORY (INHALATION) at 19:37

## 2018-10-23 RX ADMIN — INFLUENZA A VIRUS A/MICHIGAN/45/2015 X-275 (H1N1) ANTIGEN (FORMALDEHYDE INACTIVATED), INFLUENZA A VIRUS A/SINGAPORE/INFIMH-16-0019/2016 IVR-186 (H3N2) ANTIGEN (FORMALDEHYDE INACTIVATED), INFLUENZA B VIRUS B/PHUKET/3073/2013 ANTIGEN (FORMALDEHYDE INACTIVATED), AND INFLUENZA B VIRUS B/MARYLAND/15/2016 BX-69A ANTIGEN (FORMALDEHYDE INACTIVATED) 0.5 ML: 15; 15; 15; 15 INJECTION, SUSPENSION INTRAMUSCULAR at 09:02

## 2018-10-23 RX ADMIN — PIPERACILLIN AND TAZOBACTAM 3.38 G: 3; .375 INJECTION, POWDER, LYOPHILIZED, FOR SOLUTION INTRAVENOUS; PARENTERAL at 19:05

## 2018-10-23 RX ADMIN — IRON SUCROSE 200 MG: 20 INJECTION, SOLUTION INTRAVENOUS at 09:02

## 2018-10-23 RX ADMIN — IPRATROPIUM BROMIDE AND ALBUTEROL SULFATE 1 AMPULE: .5; 3 SOLUTION RESPIRATORY (INHALATION) at 12:24

## 2018-10-23 RX ADMIN — METHYLPREDNISOLONE SODIUM SUCCINATE 40 MG: 40 INJECTION, POWDER, FOR SOLUTION INTRAMUSCULAR; INTRAVENOUS at 14:15

## 2018-10-23 ASSESSMENT — PAIN SCALES - GENERAL: PAINLEVEL_OUTOF10: 5

## 2018-10-23 NOTE — PROGRESS NOTES
- on duonebs and dulera. Hold spiriva               - RT eval and treat. Keep sPO2> 90%              - follow urine pneumococcal antigen, sputum culture, rapid flu              - started on IV zosyn and levaquin.              - Monitor WBC. - Pulmonology consulted. Follow up                2. Type 2 diabetes:              - hold metformin XR (takes 500 mg bid at home)              - start medium dose SS insulin     3. Probable UTI:              - symptoms of frequency, burning micturition              - UA with reflex to culture pending.               - patient started on antibiotics as stated above.     4. Lactic acidosis:              - initial 3.1. Patient rehydrated with 1 L NS bolus and continuous 100 ml/hr.              - latest lactic acid 1.5. Resolved      5. Iron deficiency anemia:              - Hb at presentation.              - iron studies ordered. Shows low TIBC, low serum iron but normal ferritin   - started on IV venofer.                 No growth on blood cultures.     PT/OT  Lovenox for DVT prophylaxis.   Discharge planning: home with home care when stable. Belinda Dhillon MD             10/23/2018, 12:42 PM     Attending Physician Statement  I have discussed the care of Jamel Lennox, including pertinent history and exam findings with the resident. I have reviewed the key elements of all parts of the encounter with the resident. I have seen and examined the patient with the resident and the key elements of all parts of the encounter have been performed by me.         Christopher Ordonez MD  Attending and Faculty Internal Medicine  [address] [place] [address]  Internal Medicine [address] [place] [address], S.   10/23/18

## 2018-10-23 NOTE — PROGRESS NOTES
Pt SpO2 decreased to 50's per RN. Upon entering pt room, SpO2 in the 80's. Pt RR is high, in the mid to upper 20's. RN perfectserved dr, but awaiting response for orders. Began BiPAP to aid in pt comfort and oxygenation. Pt oxygenating well, but still high RR at 33. Will continue to monitor.

## 2018-10-23 NOTE — H&P
ROS: negative  Musculoskeletal ROS: negative  Dermatological ROS: negative      Physical Exam:    Vitals: /82   Pulse 107   Temp 99.1 °F (37.3 °C) (Axillary)   Resp (!) 33   Ht 5' 10\" (1.778 m)   Wt 160 lb (72.6 kg)   SpO2 98%   BMI 22.96 kg/m²     Last Body weight:   Wt Readings from Last 3 Encounters:   10/22/18 160 lb (72.6 kg)   06/22/18 155 lb (70.3 kg)   06/06/18 155 lb (70.3 kg)       Body Mass Index : Body mass index is 22.96 kg/m². PHYSICAL EXAMINATION :    General appearance - alert, well appearing, and in mild respiratory distress, on BiPAP  Mental status - alert, oriented to person, place, and time  Head- atraumatic, normocephalic  Eyes - pupils equal and reactive, extraocular eye movements intact, sclera anicteric  Ears - hearing grossly normal bilaterally  Mouth - mucous membranes moist, pharynx normal without lesions  Neck - supple, no significant adenopathy, carotids upstroke normal bilaterally, no bruits  Chest - diffuse wheezes and rales heard. Heart - normal rate, regular rhythm, normal S1, S2, no murmurs, rubs, clicks or gallops, no JVD  Abdomen - soft, +BS, nontender, nondistended, no masses or organomegaly   Neurological - alert, oriented, normal speech, no focal findings or movement disorder noted, cranial nerves II-XII grossly intact  Extremities - peripheral pulses normal, no pedal edema, no clubbing or cyanosis,  Skin - normal coloration and turgor, no rashes, no suspicious skin lesions noted     Any additional physical findings:    Laboratory findings:-    CBC:   Recent Labs      10/22/18   1207   WBC  15.3*   HGB  12.2*   PLT  212     BMP:    Recent Labs      10/22/18   1140  10/22/18   1207   NA   --   139   K   --   4.1   CL   --   99   CO2   --   27   BUN   --   11   CREATININE  0.69  0.60*   GLUCOSE   --   183*     S. Calcium:  Recent Labs      10/22/18   1207   CALCIUM  8.8     S. Ionized Calcium:No results for input(s): IONCA in the last 72 hours.   S.

## 2018-10-23 NOTE — CARE COORDINATION
Case Management Initial Discharge Plan  Sirena Stevens,             Met with:patient to discuss discharge plans. Information verified: address, contacts, phone number, , insurance Yes  PCP: Angle Higginbotham PA-C  Date of last visit: 8 weeks ago    Insurance Provider: [facility] Medicare    Discharge Planning    Living Arrangements:  Alone   Support Systems:  Family Members, Friends/Neighbors    Home has 1 stories  2 stairs to climb to get into front door, n/a stairs to climb to reach second floor  Location of bedroom/bathroom in home main    Patient able to perform ADL's:Independent    Current Services (outpatient & in home) none  DME equipment: oxygen  DME provider: unknown    Pharmacy: [address] [place] [address] in [address] [place] [address] Medications:  No  Does patient want to participate in local refill/ meds to beds program?  No    Potential Assistance Needed:  Home Care  y  Patient agreeable to home care: Yes  Freedom of choice provided:  yes    Prior SNF/Rehab Placement and Facility: none  Agreeable to SNF/Rehab: No  [place] of choice provided: n/a   Evaluation: n/a    Expected Discharge date:     Patient expects to be discharged to: Follow Up Appointment: Best Day/ Time:      Transportation provider: marbella  Transportation arrangements needed for discharge: No    Readmission Risk              Risk of Unplanned Readmission:        20               Does patient have a readmission risk score greater than 14?: Yes  If yes, follow-up appointment must be made within 7 days of discharge. Discharge Plan: home with [facility].           Electronically signed by Dariel Mobley RN on 10/23/18 at 11:56 AM

## 2018-10-23 NOTE — PROGRESS NOTES
surgery. Restrictions  Restrictions/Precautions  Required Braces or Orthoses?: No  Vision/Hearing  Vision: Within Functional Limits  Hearing: Within functional limits     Subjective  General  Patient assessed for rehabilitation services?: Yes  Family / Caregiver Present: No  Follows Commands: Within Functional Limits  Subjective  Subjective: Pt supine in bed upon entry. Agreeable to PT. Pt on high flow O2 @ 100%. Pain Screening  Patient Currently in Pain: Yes  Pain Assessment  Pain Assessment: 0-10  Pain Level: 5  Vital Signs  Patient Currently in Pain: Yes     Orientation  Orientation  Overall Orientation Status: Within Normal Limits    Social/Functional History  Social/Functional History  Lives With: Alone  Type of Home: House  Home Layout: One level  Home Access: Stairs to enter without rails  Entrance Stairs - Number of Steps: 2  Bathroom Shower/Tub: Tub/Shower unit  Bathroom Equipment: Grab bars in shower  Home Equipment: Oxygen  Receives Help From: Friend(s)  ADL Assistance: Needs assistance  Homemaking Assistance: Needs assistance  Ambulation Assistance: Independent  Transfer Assistance: Independent  Active : Yes  Mode of Transportation: Car  Occupation: Retired  Additional Comments: Pt is on 4 L of O2 at home. Pt has a friend across the street who is there to help him when needed.    Objective  AROM RLE (degrees)  RLE AROM: WFL  AROM LLE (degrees)  LLE AROM : WFL  AROM RUE (degrees)  RUE AROM : WFL  AROM LUE (degrees)  LUE AROM : WFL  Strength RLE  Strength RLE: WFL  Strength LLE  Strength LLE: WFL  Strength RUE  Strength RUE: WFL  Strength LUE  Strength LUE: WFL        Bed mobility  Supine to Sit: Minimal assistance  Sit to Supine: Contact guard assistance  Scooting: Independent  Transfers  Sit to Stand: Contact guard assistance  Stand to sit: Contact guard assistance  Ambulation  Ambulation?: Yes  Ambulation 1  Surface: level tile  Device: Rolling Walker  Other Apparatus: O2  Assistance: Contact exercise to improve endurance  Short term goal 2: Independent bed mobility  Short term goal 3: Ambulate 100 ft with no AD SBA  Short term goal 4: Ambulate 2 stairs without rails CGA  Short term goal 5: Independent with home exercise program  Patient Goals   Patient goals : To return home     Therapy Time   Individual Concurrent Group Co-treatment   Time In 1424         Time Out 1450         Minutes Maria C MOYA  Evaluation/treatment performed by Student PT under the supervision of co-signing PT who agrees with all evaluation/treatment and documentation.

## 2018-10-23 NOTE — CONSULTS
PULMONARY & CRITICAL CARE MEDICINE CONSULT NOTE     Patient:  Deirdre Veronica  MRN: [id number]  Admit date: 10/22/2018    Primary Care Physician: Carlotta Ching PA-C  Consulting Physician: Carmelo Haider MD  Reason for Consult: Pneumonia, Emphysema with fibrosis    HISTORY     HISTORY OF PRESENT ILLNESS:   The patient is a 70 y.o. male with significant past medical history of COPD on 4 L home oxygen, ex-smoker, interstitial lung disease presented to hospital for worsening shortness of breath, cough and sputum production. Patient states for last 2-4 days he's feeling more short of breath and bringing up brown and yellow color sputum. He reports compliance with the medication intake, he is using albuterol/Symbicort/Tudorza at home. Patient denies fever. Headache,runny nose, watery eyes, nausea, vomiting, chest pain, abdominal pain, changes in bowel habits, focal neurological deficit. On arrival to the ED he was found to be tachycardic, tachypneic with desaturating in 70s. He was placed on nonrebreather mask with improvement of the saturation. He is being treated with Ull[place] Must and started on Levaquin and Zosyn. Initial labs showed white count of 15 K, CT PE done that was negative. PAST MEDICAL HISTORY:        Diagnosis Date    Anemia     Anxiety     Bronchitis     [facility]     Diabetes [facility]     Former smoker     Hyperlipidemia     Oxygen dependent     Respiratory distress     Type 2 diabetes mellitus ([facility]      PAST SURGICAL HISTORY:        Procedure Laterality Date    ABDOMINAL AORTIC ANEURYSM REPAIR, OPEN      FINGER SURGERY      FOOT SURGERY      PANCREAS SURGERY       FAMILY HISTORY:   History reviewed. No pertinent family history. SOCIAL HISTORY:   TOBACCO:   reports that he has quit smoking. His smoking use included Cigarettes. He has never used smokeless tobacco.  ETOH:  reports that he does not drink alcohol.   DRUGS: reports that sputum  CARDIOVASCULAR:  negative  GASTROINTESTINAL:  negative  GENITOURINARY:  positive for dysuria  HEMATOLOGIC/LYMPHATIC:  negative  MUSCULOSKELETAL:  negative  NEUROLOGICAL:  negative    PHYSICAL EXAMINATION     VITAL SIGNS:   /83   Pulse 84   Temp 98 °F (36.7 °C) (Oral)   Resp 24   Ht 5' 10\" (1.778 m)   Wt 160 lb (72.6 kg)   SpO2 98%   BMI 22.96 kg/m²   SYSTEMIC EXAMINATION:   General appearance - alert, well appearing, and in no distress  Mental status - alert, oriented to person, place, and time  Eyes - pupils equal and reactive, extraocular eye movements intact  Mouth - mucous membranes moist, pharynx normal without lesions  Neck - supple, no significant adenopathy  Chest - Chest was symmetrical without dullness to percussion. He has bilateral mild diffuse wheezing and prolonged expiration. Fine inspiratory crackles at bases.   Heart - normal rate, regular rhythm, normal S1, S2, no murmurs, rubs, clicks or gallops  Abdomen - soft, nontender, nondistended, no masses or organomegaly  Neurological - alert, oriented, normal speech, no focal findings or movement disorder noted}  Extremities - peripheral pulses normal, no pedal edema, no clubbing or cyanosis  Skin - normal coloration and turgor, no rashes, no suspicious skin lesions noted     DATA REVIEW     Medications: Current Inpatient  Scheduled Meds:   iron sucrose  200 mg Intravenous Q24H    sodium chloride flush  10 mL Intravenous 2 times per day    aspirin  81 mg Oral Daily    escitalopram  10 mg Oral Daily    sodium chloride flush  10 mL Intravenous 2 times per day    ipratropium-albuterol  1 ampule Inhalation Q4H WA    mometasone-formoterol  2 puff Inhalation BID    piperacillin-tazobactam  3.375 g Intravenous Q8H    And    levofloxacin  750 mg Intravenous Q24H    insulin lispro  0-12 Units Subcutaneous TID WC    insulin lispro  0-6 Units Subcutaneous Nightly    enoxaparin  40 mg Subcutaneous Daily     Continuous Infusions:   sodium chloride 100 mL/hr at 10/22/18 2050     INPUT/OUTPUT:  In: 1750 [P.O.:450;  I.V.:1300]  Out: -   Ventilator Settings:  Vent Information  FiO2 : (S) 60 %  I Time/ I Time %: 0.9 s  Additional Respiratory  Assessments  Pulse: 84  Resp: 24  SpO2: 98 %  Lab Results   Component Value Date    MODE NOT REPORTED 10/22/2018     LABS:-  ABGs:   No results found for: PH, PCO2, PO2, HCO3, O2SAT  No results found for: PHART, PO2ART, AIB1AVI, TEV9QUN, BEART, C7HFAURW  No results found for: PH, PCO2, PO2, HCO3, O2SAT  CBC:   Lab Results   Component Value Date    WBC 12.1 (H) 10/23/2018    HGB 9.6 (L) 10/23/2018    HCT 29.5 (L) 10/23/2018    MCV 93.9 10/23/2018     10/23/2018    LYMPHOPCT 10 (L) 10/23/2018    RBC 3.14 (L) 10/23/2018    MCH 30.6 10/23/2018    MCHC 32.5 10/23/2018    RDW 15.6 (H) 10/23/2018     Recent Labs      10/22/18   1207  10/23/18   0621   WBC  15.3*  12.1*   HGB  12.2*  9.6*   PLT  212  206     Lab Results   Component Value Date    NEUTROABS 9.70 (H) 10/23/2018      BMP:   Lab Results   Component Value Date     10/23/2018    K 3.8 10/23/2018     10/23/2018    CO2 25 10/23/2018    BUN 10 10/23/2018    CREATININE 0.64 10/23/2018    GLUCOSE 135 10/23/2018    GLUCOSE 151 03/26/2012    MG 1.8 05/23/2018    CALCIUM 8.0 10/23/2018    PHOS 3.0 05/23/2018       Recent Labs      10/22/18   1140  10/22/18   1207  10/23/18   0621   NA   --   139  140   K   --   4.1  3.8   CL   --   99  100   CO2   --   27  25   BUN   --   11  10   CREATININE  0.69  0.60*  0.64*   GLUCOSE   --   183*  135*     Liver Function Test:   Lab Results   Component Value Date    ALT 9 10/22/2018    AST 13 10/22/2018    ALKPHOS 99 10/22/2018    BILITOT 0.57 10/22/2018     Coagulation Profile:   Lab Results   Component Value Date    INR 1.0 10/22/2018    PROTIME 10.4 10/22/2018    APTT 18.4 (L) 10/22/2018     Cardiac Enzymes:  Lab Results   Component Value Date    TROPONINI 0.01 10/22/2018     Recent Labs      10/22/18   1141

## 2018-10-24 LAB
ABSOLUTE EOS #: 0 K/UL (ref 0–0.44)
ABSOLUTE IMMATURE GRANULOCYTE: 0.11 K/UL (ref 0–0.3)
ABSOLUTE LYMPH #: 0.57 K/UL (ref 1.1–3.7)
ABSOLUTE MONO #: 0.34 K/UL (ref 0.1–1.2)
BASOPHILS # BLD: 0 % (ref 0–2)
BASOPHILS ABSOLUTE: 0 K/UL (ref 0–0.2)
DIFFERENTIAL TYPE: ABNORMAL
EOSINOPHILS RELATIVE PERCENT: 0 % (ref 1–4)
FOLATE: 13.5 NG/ML
GLUCOSE BLD-MCNC: 198 MG/DL (ref 75–110)
GLUCOSE BLD-MCNC: 205 MG/DL (ref 75–110)
GLUCOSE BLD-MCNC: 214 MG/DL (ref 75–110)
GLUCOSE BLD-MCNC: 320 MG/DL (ref 75–110)
HCT VFR BLD CALC: 30.5 % (ref 40.7–50.3)
HEMOGLOBIN: 10 G/DL (ref 13–17)
IMMATURE GRANULOCYTES: 1 %
LYMPHOCYTES # BLD: 5 % (ref 24–43)
MCH RBC QN AUTO: 30.8 PG (ref 25.2–33.5)
MCHC RBC AUTO-ENTMCNC: 32.8 G/DL (ref 28.4–34.8)
MCV RBC AUTO: 93.8 FL (ref 82.6–102.9)
MONOCYTES # BLD: 3 % (ref 3–12)
MORPHOLOGY: ABNORMAL
NRBC AUTOMATED: 0 PER 100 WBC
PDW BLD-RTO: 15.9 % (ref 11.8–14.4)
PLATELET # BLD: 267 K/UL (ref 138–453)
PLATELET ESTIMATE: ABNORMAL
PMV BLD AUTO: 11 FL (ref 8.1–13.5)
RBC # BLD: 3.25 M/UL (ref 4.21–5.77)
RBC # BLD: ABNORMAL 10*6/UL
SEG NEUTROPHILS: 91 % (ref 36–65)
SEGMENTED NEUTROPHILS ABSOLUTE COUNT: 10.28 K/UL (ref 1.5–8.1)
VITAMIN B-12: 491 PG/ML (ref 232–1245)
WBC # BLD: 11.3 K/UL (ref 3.5–11.3)
WBC # BLD: ABNORMAL 10*3/UL

## 2018-10-24 PROCEDURE — 97110 THERAPEUTIC EXERCISES: CPT

## 2018-10-24 PROCEDURE — 99233 SBSQ HOSP IP/OBS HIGH 50: CPT | Performed by: INTERNAL MEDICINE

## 2018-10-24 PROCEDURE — 94762 N-INVAS EAR/PLS OXIMTRY CONT: CPT

## 2018-10-24 PROCEDURE — 36415 COLL VENOUS BLD VENIPUNCTURE: CPT

## 2018-10-24 PROCEDURE — 97116 GAIT TRAINING THERAPY: CPT

## 2018-10-24 PROCEDURE — 6370000000 HC RX 637 (ALT 250 FOR IP): Performed by: STUDENT IN AN ORGANIZED HEALTH CARE EDUCATION/TRAINING PROGRAM

## 2018-10-24 PROCEDURE — 2060000000 HC ICU INTERMEDIATE R&B

## 2018-10-24 PROCEDURE — 6360000002 HC RX W HCPCS: Performed by: STUDENT IN AN ORGANIZED HEALTH CARE EDUCATION/TRAINING PROGRAM

## 2018-10-24 PROCEDURE — 82746 ASSAY OF FOLIC ACID SERUM: CPT

## 2018-10-24 PROCEDURE — 6360000002 HC RX W HCPCS: Performed by: HOSPITALIST

## 2018-10-24 PROCEDURE — 82607 VITAMIN B-12: CPT

## 2018-10-24 PROCEDURE — 2700000000 HC OXYGEN THERAPY PER DAY

## 2018-10-24 PROCEDURE — 85025 COMPLETE CBC W/AUTO DIFF WBC: CPT

## 2018-10-24 PROCEDURE — 2580000003 HC RX 258: Performed by: STUDENT IN AN ORGANIZED HEALTH CARE EDUCATION/TRAINING PROGRAM

## 2018-10-24 PROCEDURE — 82947 ASSAY GLUCOSE BLOOD QUANT: CPT

## 2018-10-24 PROCEDURE — 94640 AIRWAY INHALATION TREATMENT: CPT

## 2018-10-24 RX ORDER — UREA 10 %
3 LOTION (ML) TOPICAL NIGHTLY PRN
Status: DISCONTINUED | OUTPATIENT
Start: 2018-10-24 | End: 2018-10-29 | Stop reason: HOSPADM

## 2018-10-24 RX ORDER — ACETAMINOPHEN 325 MG/1
650 TABLET ORAL ONCE
Status: COMPLETED | OUTPATIENT
Start: 2018-10-24 | End: 2018-10-24

## 2018-10-24 RX ADMIN — Medication 10 ML: at 20:54

## 2018-10-24 RX ADMIN — INSULIN LISPRO 4 UNITS: 100 INJECTION, SOLUTION INTRAVENOUS; SUBCUTANEOUS at 20:54

## 2018-10-24 RX ADMIN — ESCITALOPRAM OXALATE 10 MG: 10 TABLET ORAL at 08:11

## 2018-10-24 RX ADMIN — PIPERACILLIN AND TAZOBACTAM 3.38 G: 3; .375 INJECTION, POWDER, LYOPHILIZED, FOR SOLUTION INTRAVENOUS; PARENTERAL at 11:54

## 2018-10-24 RX ADMIN — ACETAMINOPHEN 650 MG: 325 TABLET ORAL at 11:55

## 2018-10-24 RX ADMIN — IPRATROPIUM BROMIDE AND ALBUTEROL SULFATE 1 AMPULE: .5; 3 SOLUTION RESPIRATORY (INHALATION) at 13:02

## 2018-10-24 RX ADMIN — INSULIN LISPRO 4 UNITS: 100 INJECTION, SOLUTION INTRAVENOUS; SUBCUTANEOUS at 11:46

## 2018-10-24 RX ADMIN — MOMETASONE FUROATE AND FORMOTEROL FUMARATE DIHYDRATE 2 PUFF: 100; 5 AEROSOL RESPIRATORY (INHALATION) at 20:04

## 2018-10-24 RX ADMIN — LEVOFLOXACIN 750 MG: 5 INJECTION, SOLUTION INTRAVENOUS at 00:26

## 2018-10-24 RX ADMIN — ASPIRIN 81 MG: 81 TABLET, COATED ORAL at 08:11

## 2018-10-24 RX ADMIN — IPRATROPIUM BROMIDE AND ALBUTEROL SULFATE 1 AMPULE: .5; 3 SOLUTION RESPIRATORY (INHALATION) at 08:10

## 2018-10-24 RX ADMIN — Medication 3 MG: at 20:54

## 2018-10-24 RX ADMIN — METHYLPREDNISOLONE SODIUM SUCCINATE 40 MG: 40 INJECTION, POWDER, FOR SOLUTION INTRAMUSCULAR; INTRAVENOUS at 05:37

## 2018-10-24 RX ADMIN — IPRATROPIUM BROMIDE AND ALBUTEROL SULFATE 1 AMPULE: .5; 3 SOLUTION RESPIRATORY (INHALATION) at 16:02

## 2018-10-24 RX ADMIN — PIPERACILLIN AND TAZOBACTAM 3.38 G: 3; .375 INJECTION, POWDER, LYOPHILIZED, FOR SOLUTION INTRAVENOUS; PARENTERAL at 20:53

## 2018-10-24 RX ADMIN — METHYLPREDNISOLONE SODIUM SUCCINATE 40 MG: 40 INJECTION, POWDER, FOR SOLUTION INTRAMUSCULAR; INTRAVENOUS at 20:53

## 2018-10-24 RX ADMIN — MOMETASONE FUROATE AND FORMOTEROL FUMARATE DIHYDRATE 2 PUFF: 100; 5 AEROSOL RESPIRATORY (INHALATION) at 08:10

## 2018-10-24 RX ADMIN — INSULIN LISPRO 2 UNITS: 100 INJECTION, SOLUTION INTRAVENOUS; SUBCUTANEOUS at 17:54

## 2018-10-24 RX ADMIN — Medication 3 MG: at 04:00

## 2018-10-24 RX ADMIN — PIPERACILLIN AND TAZOBACTAM 3.38 G: 3; .375 INJECTION, POWDER, LYOPHILIZED, FOR SOLUTION INTRAVENOUS; PARENTERAL at 02:47

## 2018-10-24 RX ADMIN — METHYLPREDNISOLONE SODIUM SUCCINATE 40 MG: 40 INJECTION, POWDER, FOR SOLUTION INTRAMUSCULAR; INTRAVENOUS at 14:41

## 2018-10-24 RX ADMIN — IRON SUCROSE 200 MG: 20 INJECTION, SOLUTION INTRAVENOUS at 07:22

## 2018-10-24 RX ADMIN — ENOXAPARIN SODIUM 40 MG: 40 INJECTION SUBCUTANEOUS at 08:11

## 2018-10-24 RX ADMIN — INSULIN LISPRO 4 UNITS: 100 INJECTION, SOLUTION INTRAVENOUS; SUBCUTANEOUS at 08:12

## 2018-10-24 RX ADMIN — IPRATROPIUM BROMIDE AND ALBUTEROL SULFATE 1 AMPULE: .5; 3 SOLUTION RESPIRATORY (INHALATION) at 20:04

## 2018-10-24 ASSESSMENT — PAIN SCALES - GENERAL
PAINLEVEL_OUTOF10: 0
PAINLEVEL_OUTOF10: 3
PAINLEVEL_OUTOF10: 0

## 2018-10-24 NOTE — CARE COORDINATION
Met with patient to discuss transitional planning  Would like to discharge to home  Wife has been to [facility] in the past  Discussed benefits of LTACH and pt requiring High Flow  Freedom of choice provided, referral sent to [facility]

## 2018-10-24 NOTE — PROGRESS NOTES
Attending Physician Statement  I have discussed the care of Jori Chauhan, including pertinent history and exam findings with the resident. I have reviewed the key elements of all parts of the encounter with the resident. I have seen and examined the patient with the resident. I agree with the assessment and plan and status of the problem list as documented. I seen the patient and events noted, chest x-ray and CT scan of the chest reviewed. CT scan of the chest from May 2018 seen also. CT scan showed pleural leg anteriorly more on the left than the right  Previous echo seen from May 2010 which shows moderate pulmonary hypertension with estimated right ventricle systolic pressure of 68-32  He looked comfortable at rest although he is mildly tachypneic, his oxygen saturation is 94% and he is on 30 L with FiO2 of 70% on high flow oxygen. He does have bilateral crackles present also at the bases dry inspiratory crackles no expiratory wheezing and no rhonchi. His CT scan of the chest shows fibrotic changes present in the upper and also in the lower lungs and also emphysematous changes present panlobular diffusely in the upper and the lower lung field. We'll continue with antibiotics IV and continued IV steroids currently. We'll continue to try to wean FiO2 on high flow    August with nursing staff and respiratory therapist.    Samuel Larson MD  10/24/2018 11:46 AM    Please note that this chart was generated using voice recognition Dragon dictation software. Although every effort was made to ensure the accuracy of this automated transcription, some errors in transcription may have occurred.

## 2018-10-24 NOTE — PROGRESS NOTES
10/22/18 160 lb (72.6 kg)   06/22/18 155 lb (70.3 kg)   06/06/18 155 lb (70.3 kg)         Physical Examination:   General appearance - alert, well appearing, and in no distress, on high flow nasal cannula  Mental status - alert, oriented to person, place, and time  Head- atraumatic, normocephalic  Eyes - pupils equal and reactive, extraocular eye movements intact, sclera anicteric  Ears - hearing grossly normal bilaterally  Mouth - mucous membranes moist, pharynx normal without lesions  Neck - supple, no significant adenopathy, carotids upstroke normal bilaterally, no bruits  Chest - mild bibasal crackles heard. Heart - normal rate, regular rhythm, normal S1, S2, no murmurs, rubs, clicks or gallops, no JVD  Abdomen - soft, +BS, nontender, nondistended, no masses or organomegaly   Neurological - alert, oriented, normal speech, no focal findings or movement disorder noted, cranial nerves II-XII grossly intact  Extremities - peripheral pulses normal, no pedal edema, no clubbing or cyanosis,  Skin - normal coloration and turgor, no rashes, no suspicious skin lesions noted      Labs:-     CBC:        Recent Labs      10/22/18   1207  10/23/18   0621   WBC  15.3*  12.1*   HGB  12.2*  9.6*   PLT  212  206            BMP:  Recent Labs      10/22/18   1140  10/22/18   1207  10/23/18   0621   NA   --   139  140   K   --   4.1  3.8   CL   --   99  100   CO2   --   27  25   BUN   --   11  10   CREATININE  0.69  0.60*  0.64*   GLUCOSE   --   183*  135*      Calcium:      Recent Labs      10/23/18   0621   CALCIUM  8.0*      Ionized Calcium:No results for input(s): IONCA in the last 72 hours. Magnesium:No results for input(s): MG in the last 72 hours. Phosphorus:No results for input(s): PHOS in the last 72 hours. BNP:No results for input(s): BNP in the last 72 hours.   Glucose:        Recent Labs      10/22/18   2037  10/23/18   0659  10/23/18   1120   POCGLU  134*  126*  153*      HgbA1C: No results for input(s): LABA1C in the last 72 hours. INR:       Recent Labs      10/22/18   1207   INR  1.0          Hepatic: Recent Labs      10/22/18   1207   ALKPHOS  99   ALT  9   AST  13   PROT  8.0   BILITOT  0.57   LABALBU  3.8      Amylase and Lipase:No results for input(s): LACTA, AMYLASE in the last 72 hours. Lactic Acid: No results for input(s): LACTA in the last 72 hours. CARDIAC ENZYMES:      Recent Labs      10/22/18   1141   TROPONINI  0.01      BNP: No results for input(s): BNP in the last 72 hours.   Lipids: No results for input(s): CHOL, TRIG, HDL, LDLCALC in the last 72 hours.     Invalid input(s): LDL  ABGs: No results found for: PH, PCO2, PO2, HCO3, O2SAT  Thyroid:         Lab Results   Component Value Date     TSH 2.37 06/25/2018            Urinalysis: Color, UA   Date Value Ref Range Status   10/23/2018 YELLOW YEL Final            pH, UA   Date Value Ref Range Status   10/23/2018 5.5 5.0 - 8.0 Final            Specific Gravity, UA   Date Value Ref Range Status   10/23/2018 1.021 1.005 - 1.030 Final            Protein, UA   Date Value Ref Range Status   10/23/2018 NEGATIVE NEG Final            Nitrite, Urine   Date Value Ref Range Status   10/23/2018 NEGATIVE NEG Final            Leukocyte Esterase, Urine   Date Value Ref Range Status   10/23/2018 NEGATIVE NEG Final            Glucose, UA   Date Value Ref Range Status   11/15/2011 3+ (A) NEG Final            Glucose, Ur   Date Value Ref Range Status   10/23/2018 NEGATIVE NEG Final            Bilirubin, Urine   Date Value Ref Range Status   11/15/2011 NEGATIVE NEG Final            Bilirubin Urine   Date Value Ref Range Status   10/23/2018 NEGATIVE NEG Final         CULTURES:     Current Rehabilitation Assessments:  PHYSICAL THERAPY:  pending     Assessment:  Principal Problem:    Pneumonia  Active Problems:    Type 2 diabetes mellitus (HCC)    Anxiety    On home O2    Pulmonary artery hypertension (HCC)    Ex-smoker    Iron deficiency anemia    Lactic acidosis    Pulmonary [facility]  Internal Medicine [address] [place] [address], S..   10/24/18

## 2018-10-24 NOTE — PROGRESS NOTES
SOB  Stairs/Curb  Stairs?: No     Balance  Posture: Fair  Sitting - Static: Good  Sitting - Dynamic: Good;-  Standing - Static: Good;- (very unsteady initially upon standing)  Standing - Dynamic: Fair;+       Exercises  Seated LE exercise program: Long Arc Quads, hip abduction/adduction, heel/toe raises, and marches. Reps: 10x each  Upper extremity exercises: Bicep curl, shoulder flexion/extension, punches, tricep curl, shoulder abduction/adduction. Reps: 10x each      Assessment   Body structures, Functions, Activity limitations: Decreased functional mobility ; Decreased endurance;Decreased balance;Decreased strength  Assessment: Pt required [place]/CGA for all functional mobility. Displays decreased activity tolerance 2* SOB and poor endurance. Rest breaks encouraged to ensure safety. Pt most appropriate to discharge to [facility] based on respiratory status. Prognosis: Good  Patient Education: safety with transfers, HEP  REQUIRES PT FOLLOW UP: Yes  Activity Tolerance  Activity Tolerance: Patient limited by endurance  Activity Tolerance: SOB with exertion     Goals  Short term goals  Time Frame for Short term goals: 14 visits  Short term goal 1: Tolerate 30 minutes of exercise to improve endurance  Short term goal 2: Independent bed mobility  Short term goal 3: Ambulate 100 ft with no AD SBA  Short term goal 4: Ambulate 2 stairs without rails CGA  Short term goal 5: Independent with home exercise program  Patient Goals   Patient goals : To return home    Plan    Plan  Times per week: 5x/week  Current Treatment Recommendations: Strengthening, Balance Training, Gait Training, Stair training, Safety Education & Training, Home Exercise Program, Endurance Training  Safety Devices  Type of devices:  All fall risk precautions in place, Left in chair, Call light within reach, Chair alarm in place, Patient at risk for falls, Gait belt, Nurse notified  Restraints  Initially in place: No     Therapy Time   Individual Concurrent

## 2018-10-24 NOTE — PLAN OF CARE
Problem: Respiratory  Intervention: Respiratory assessment  BRONCHOSPASM/BRONCHOCONSTRICTION     [x]         IMPROVE AERATION/BREATH SOUNDS  [x]   ADMINISTER BRONCHODILATOR THERAPY AS APPROPRIATE  [x]   ASSESS BREATH SOUNDS  [x]   IMPLEMENT AEROSOL/MDI PROTOCOL  [x]   PATIENT EDUCATION AS NEEDED    FAISAL WOODRUFF RCP   PROVIDE ADEQUATE OXYGENATION WITH ACCEPTABLE SP02/ABG'S    [x]  IDENTIFY APPROPRIATE OXYGEN THERAPY  [x]   MONITOR SP02/ABG'S AS NEEDED   [x]   PATIENT EDUCATION AS NEEDED    Patient Assessment complete. Pneumonia [J18.9] . Vitals:    10/24/18 0810   BP:    Pulse:    Resp: 20   Temp:    SpO2: 97%   . Patients home meds are   Prior to Admission medications    Medication Sig Start Date End Date Taking? Authorizing Provider   aclidinium (TUDORZA PRESSAIR) 400 MCG/ACT AEPB inhaler Inhale 1 puff into the lungs 2 times daily 6/22/18 7/22/19 Yes Bruna Gaines MD   albuterol sulfate HFA (VENTOLIN HFA) 108 (90 Base) MCG/ACT inhaler Inhale 2 puffs into the lungs every 6 hours as needed for Wheezing 6/22/18  Yes Bruna Gaines MD   budesonide-formoterol (SYMBICORT) 80-4.5 MCG/ACT AERO Inhale 2 puffs into the lungs 2 times daily 5/25/18  Yes Mary Mohr MD   metFORMIN ER (GLUCOPHAGE-XR) 500 MG XR tablet take 2 tablets by mouth twice a day 8/1/16  Yes Haylee Coello PA-C   aspirin 81 MG tablet Take 81 mg by mouth daily   Yes Historical Provider, MD   escitalopram (LEXAPRO) 10 MG tablet Take 1 tablet by mouth daily 11/10/15  Yes Dominique Jensen PA-C   ONE TOUCH ULTRA TEST strip STRIP MISC ONCE A DAY 7/1/15   Dominique Jensen PA-C   .       Assessment     RR 20  Breath Sounds: clear, diminished bases      · Bronchodilator assessment at level  3  · Hyperinflation assessment at level   · Secretion Management assessment at level    ·   · [x]    Bronchodilator Assessment  BRONCHODILATOR ASSESSMENT SCORE  Score 0 1 2 3 4 5   Breath Sounds   []  Patient Baseline []  No Wheeze good aeration [x]  Faint,

## 2018-10-25 PROBLEM — D72.829 LEUKOCYTOSIS: Status: RESOLVED | Noted: 2018-10-22 | Resolved: 2018-10-25

## 2018-10-25 PROBLEM — E87.20 LACTIC ACIDOSIS: Status: RESOLVED | Noted: 2018-10-22 | Resolved: 2018-10-25

## 2018-10-25 LAB
ABSOLUTE EOS #: 0 K/UL (ref 0–0.4)
ABSOLUTE IMMATURE GRANULOCYTE: 0 K/UL (ref 0–0.3)
ABSOLUTE LYMPH #: 0.17 K/UL (ref 1–4.8)
ABSOLUTE MONO #: 0.68 K/UL (ref 0.1–0.8)
ANION GAP SERPL CALCULATED.3IONS-SCNC: 17 MMOL/L (ref 9–17)
BASOPHILS # BLD: 0 % (ref 0–2)
BASOPHILS ABSOLUTE: 0 K/UL (ref 0–0.2)
BUN BLDV-MCNC: 20 MG/DL (ref 8–23)
BUN/CREAT BLD: ABNORMAL (ref 9–20)
CALCIUM SERPL-MCNC: 8.7 MG/DL (ref 8.6–10.4)
CHLORIDE BLD-SCNC: 96 MMOL/L (ref 98–107)
CO2: 21 MMOL/L (ref 20–31)
CREAT SERPL-MCNC: 0.85 MG/DL (ref 0.7–1.2)
CULTURE: ABNORMAL
DIFFERENTIAL TYPE: ABNORMAL
DIRECT EXAM: ABNORMAL
EOSINOPHILS RELATIVE PERCENT: 0 % (ref 1–4)
GFR AFRICAN AMERICAN: >60 ML/MIN
GFR NON-AFRICAN AMERICAN: >60 ML/MIN
GFR SERPL CREATININE-BSD FRML MDRD: ABNORMAL ML/MIN/{1.73_M2}
GFR SERPL CREATININE-BSD FRML MDRD: ABNORMAL ML/MIN/{1.73_M2}
GLUCOSE BLD-MCNC: 231 MG/DL (ref 75–110)
GLUCOSE BLD-MCNC: 241 MG/DL (ref 70–99)
GLUCOSE BLD-MCNC: 296 MG/DL (ref 75–110)
GLUCOSE BLD-MCNC: 321 MG/DL (ref 75–110)
HCT VFR BLD CALC: 32.2 % (ref 40.7–50.3)
HEMOGLOBIN: 10.4 G/DL (ref 13–17)
IMMATURE GRANULOCYTES: 0 %
LYMPHOCYTES # BLD: 1 % (ref 24–44)
Lab: ABNORMAL
MCH RBC QN AUTO: 30.9 PG (ref 25.2–33.5)
MCHC RBC AUTO-ENTMCNC: 32.3 G/DL (ref 28.4–34.8)
MCV RBC AUTO: 95.5 FL (ref 82.6–102.9)
MONOCYTES # BLD: 4 % (ref 1–7)
MORPHOLOGY: ABNORMAL
NRBC AUTOMATED: 0 PER 100 WBC
PDW BLD-RTO: 15.9 % (ref 11.8–14.4)
PLATELET # BLD: 268 K/UL (ref 138–453)
PLATELET ESTIMATE: ABNORMAL
PMV BLD AUTO: 10.4 FL (ref 8.1–13.5)
POTASSIUM SERPL-SCNC: 4.1 MMOL/L (ref 3.7–5.3)
RBC # BLD: 3.37 M/UL (ref 4.21–5.77)
RBC # BLD: ABNORMAL 10*6/UL
SEG NEUTROPHILS: 95 % (ref 36–66)
SEGMENTED NEUTROPHILS ABSOLUTE COUNT: 16.15 K/UL (ref 1.8–7.7)
SODIUM BLD-SCNC: 134 MMOL/L (ref 135–144)
SPECIMEN DESCRIPTION: ABNORMAL
STATUS: ABNORMAL
WBC # BLD: 17 K/UL (ref 3.5–11.3)
WBC # BLD: ABNORMAL 10*3/UL

## 2018-10-25 PROCEDURE — 82947 ASSAY GLUCOSE BLOOD QUANT: CPT

## 2018-10-25 PROCEDURE — 85025 COMPLETE CBC W/AUTO DIFF WBC: CPT

## 2018-10-25 PROCEDURE — 94762 N-INVAS EAR/PLS OXIMTRY CONT: CPT

## 2018-10-25 PROCEDURE — 97535 SELF CARE MNGMENT TRAINING: CPT

## 2018-10-25 PROCEDURE — 97116 GAIT TRAINING THERAPY: CPT

## 2018-10-25 PROCEDURE — 99233 SBSQ HOSP IP/OBS HIGH 50: CPT | Performed by: INTERNAL MEDICINE

## 2018-10-25 PROCEDURE — 80048 BASIC METABOLIC PNL TOTAL CA: CPT

## 2018-10-25 PROCEDURE — 6360000002 HC RX W HCPCS: Performed by: STUDENT IN AN ORGANIZED HEALTH CARE EDUCATION/TRAINING PROGRAM

## 2018-10-25 PROCEDURE — G8988 SELF CARE GOAL STATUS: HCPCS

## 2018-10-25 PROCEDURE — 6370000000 HC RX 637 (ALT 250 FOR IP): Performed by: STUDENT IN AN ORGANIZED HEALTH CARE EDUCATION/TRAINING PROGRAM

## 2018-10-25 PROCEDURE — 97110 THERAPEUTIC EXERCISES: CPT

## 2018-10-25 PROCEDURE — 97166 OT EVAL MOD COMPLEX 45 MIN: CPT

## 2018-10-25 PROCEDURE — 2060000000 HC ICU INTERMEDIATE R&B

## 2018-10-25 PROCEDURE — G8987 SELF CARE CURRENT STATUS: HCPCS

## 2018-10-25 PROCEDURE — 97530 THERAPEUTIC ACTIVITIES: CPT

## 2018-10-25 PROCEDURE — 2580000003 HC RX 258: Performed by: STUDENT IN AN ORGANIZED HEALTH CARE EDUCATION/TRAINING PROGRAM

## 2018-10-25 PROCEDURE — 94640 AIRWAY INHALATION TREATMENT: CPT

## 2018-10-25 PROCEDURE — 6360000002 HC RX W HCPCS: Performed by: HOSPITALIST

## 2018-10-25 PROCEDURE — 2700000000 HC OXYGEN THERAPY PER DAY

## 2018-10-25 PROCEDURE — 36415 COLL VENOUS BLD VENIPUNCTURE: CPT

## 2018-10-25 RX ORDER — PREDNISONE 20 MG/1
40 TABLET ORAL DAILY
Status: COMPLETED | OUTPATIENT
Start: 2018-10-26 | End: 2018-10-28

## 2018-10-25 RX ORDER — PREDNISONE 10 MG/1
10 TABLET ORAL DAILY
Status: DISCONTINUED | OUTPATIENT
Start: 2018-11-04 | End: 2018-10-29 | Stop reason: HOSPADM

## 2018-10-25 RX ORDER — METFORMIN HYDROCHLORIDE 500 MG/1
500 TABLET, EXTENDED RELEASE ORAL 2 TIMES DAILY WITH MEALS
Status: DISCONTINUED | OUTPATIENT
Start: 2018-10-26 | End: 2018-10-29 | Stop reason: HOSPADM

## 2018-10-25 RX ORDER — ACETAMINOPHEN 325 MG/1
650 TABLET ORAL EVERY 4 HOURS PRN
Status: DISCONTINUED | OUTPATIENT
Start: 2018-10-25 | End: 2018-10-29 | Stop reason: HOSPADM

## 2018-10-25 RX ORDER — PREDNISONE 20 MG/1
20 TABLET ORAL DAILY
Status: DISCONTINUED | OUTPATIENT
Start: 2018-11-01 | End: 2018-10-29 | Stop reason: HOSPADM

## 2018-10-25 RX ADMIN — ESCITALOPRAM OXALATE 10 MG: 10 TABLET ORAL at 15:50

## 2018-10-25 RX ADMIN — METHYLPREDNISOLONE SODIUM SUCCINATE 40 MG: 40 INJECTION, POWDER, FOR SOLUTION INTRAMUSCULAR; INTRAVENOUS at 05:40

## 2018-10-25 RX ADMIN — LEVOFLOXACIN 750 MG: 5 INJECTION, SOLUTION INTRAVENOUS at 23:10

## 2018-10-25 RX ADMIN — INSULIN LISPRO 8 UNITS: 100 INJECTION, SOLUTION INTRAVENOUS; SUBCUTANEOUS at 15:53

## 2018-10-25 RX ADMIN — Medication 10 ML: at 20:19

## 2018-10-25 RX ADMIN — IRON SUCROSE 200 MG: 20 INJECTION, SOLUTION INTRAVENOUS at 09:19

## 2018-10-25 RX ADMIN — IPRATROPIUM BROMIDE AND ALBUTEROL SULFATE 1 AMPULE: .5; 3 SOLUTION RESPIRATORY (INHALATION) at 11:14

## 2018-10-25 RX ADMIN — ACETAMINOPHEN 650 MG: 325 TABLET ORAL at 20:17

## 2018-10-25 RX ADMIN — INSULIN LISPRO 3 UNITS: 100 INJECTION, SOLUTION INTRAVENOUS; SUBCUTANEOUS at 20:19

## 2018-10-25 RX ADMIN — IPRATROPIUM BROMIDE AND ALBUTEROL SULFATE 1 AMPULE: .5; 3 SOLUTION RESPIRATORY (INHALATION) at 19:43

## 2018-10-25 RX ADMIN — Medication 3 MG: at 20:17

## 2018-10-25 RX ADMIN — ASPIRIN 81 MG: 81 TABLET, COATED ORAL at 09:34

## 2018-10-25 RX ADMIN — MOMETASONE FUROATE AND FORMOTEROL FUMARATE DIHYDRATE 2 PUFF: 100; 5 AEROSOL RESPIRATORY (INHALATION) at 19:43

## 2018-10-25 RX ADMIN — PIPERACILLIN AND TAZOBACTAM 3.38 G: 3; .375 INJECTION, POWDER, LYOPHILIZED, FOR SOLUTION INTRAVENOUS; PARENTERAL at 04:15

## 2018-10-25 RX ADMIN — MOMETASONE FUROATE AND FORMOTEROL FUMARATE DIHYDRATE 2 PUFF: 100; 5 AEROSOL RESPIRATORY (INHALATION) at 08:13

## 2018-10-25 RX ADMIN — ACETAMINOPHEN 650 MG: 325 TABLET ORAL at 09:19

## 2018-10-25 RX ADMIN — IPRATROPIUM BROMIDE AND ALBUTEROL SULFATE 1 AMPULE: .5; 3 SOLUTION RESPIRATORY (INHALATION) at 15:13

## 2018-10-25 RX ADMIN — INSULIN LISPRO 4 UNITS: 100 INJECTION, SOLUTION INTRAVENOUS; SUBCUTANEOUS at 09:21

## 2018-10-25 RX ADMIN — IPRATROPIUM BROMIDE AND ALBUTEROL SULFATE 1 AMPULE: .5; 3 SOLUTION RESPIRATORY (INHALATION) at 08:13

## 2018-10-25 RX ADMIN — LEVOFLOXACIN 750 MG: 5 INJECTION, SOLUTION INTRAVENOUS at 01:34

## 2018-10-25 RX ADMIN — PIPERACILLIN AND TAZOBACTAM 3.38 G: 3; .375 INJECTION, POWDER, LYOPHILIZED, FOR SOLUTION INTRAVENOUS; PARENTERAL at 15:51

## 2018-10-25 RX ADMIN — ENOXAPARIN SODIUM 40 MG: 40 INJECTION SUBCUTANEOUS at 09:20

## 2018-10-25 ASSESSMENT — PAIN SCALES - GENERAL
PAINLEVEL_OUTOF10: 3
PAINLEVEL_OUTOF10: 0
PAINLEVEL_OUTOF10: 3

## 2018-10-25 ASSESSMENT — PAIN DESCRIPTION - LOCATION
LOCATION: FOOT
LOCATION: FOOT

## 2018-10-25 ASSESSMENT — PAIN DESCRIPTION - ORIENTATION
ORIENTATION: RIGHT;LEFT
ORIENTATION: RIGHT;LEFT

## 2018-10-25 ASSESSMENT — PAIN DESCRIPTION - PAIN TYPE
TYPE: ACUTE PAIN
TYPE: ACUTE PAIN

## 2018-10-25 ASSESSMENT — PAIN DESCRIPTION - DESCRIPTORS
DESCRIPTORS: ACHING
DESCRIPTORS: PINS AND NEEDLES

## 2018-10-25 NOTE — PROGRESS NOTES
WFL  LUE Strength  Gross LUE Strength: WFL  L Hand Grasp: 5/5  L Hand Release: 5/5  RUE Strength  Gross RUE Strength: WFL  R Hand Grasp: 5/5  R Hand Release: 5/5     Assessment   Performance deficits / Impairments: Decreased functional mobility ; Decreased ADL status; Decreased endurance;Decreased high-level IADLs  Prognosis: Good  Decision Making: Medium Complexity  Patient Education: Safety awareness, OTPOC, discharge rec-good return  REQUIRES OT FOLLOW UP: Yes  Activity Tolerance  Activity Tolerance: Patient Tolerated treatment well;Patient limited by fatigue  Activity Tolerance: Poor O2 sats with activity  Safety Devices  Safety Devices in place: Yes  Type of devices: All fall risk precautions in place;Gait belt;Call light within reach  Restraints  Initially in place: No         Plan   Plan  Times per week: 3-4x  Current Treatment Recommendations: Balance Training, Functional Mobility Training, Endurance Training, Safety Education & Training, Self-Care / ADL, Home Management Training, Patient/Caregiver Education & Training, Equipment Evaluation, Education, & procurement    G-Code  OT G-codes  Functional Assessment Tool Used: [facility]  Score: 18/24  Functional Limitation: Self care  Self Care Current Status (): At least 40 percent but less than 60 percent impaired, limited or restricted  Self Care Goal Status (): At least 20 percent but less than 40 percent impaired, limited or restricted  How much help from another person does the pt currently need? Unable A Lot A Little None   1. Putting on and taking off regular lower body clothing? 1      2      3       4   2. Bathing (including washing, rinsing, drying)? 1      2      3      4   3. Toileting, which includes using toilet, bedpan, or urinal?      1      2        3      4   4. Putting on and taking off regular upper body clothing? 1      2      3       4   5. Taking care of personal grooming such as brushing teeth?       1      2

## 2018-10-25 NOTE — PROGRESS NOTES
46 - 49 2.70 2.92 3.14 3.37 3.61 3.85 4.10 4.36   50 - 53 2.31 2.48 2.66 2.85 3.04 3.24 3.45 3.66 50 - 53 2.58 2.80 3.02 3.25 3.49 3.73 3.98 4.24   54 - 57 2.21 2.38 2.57 2.75 2.95 3.14 3.35 3.56 54 - 57 2.46 2.67 2.89 3.12 3.36 3.60 3.85 4.11   58 - 61 2.10 2.28 2.46 2.65 2.84 3.04 3.24 3.45 58 - 61 2.32 2.54 2.76 2.99 3.23 3.47 3.72 3.98   62 - 65 1.99 2.17 2.35 2.54 2.73 2.93 3.13 3.34 62 - 65 2.19 2.40 2.62 2.85 3.09 3.33 3.58 3.84   66 - 69 1.88 2.05 2.23 2.42 2.61 2.81 3.02 3.23 66 - 69 2.04 2.26 2.48 2.71 2.95 3.19 3.44 3.70   70+ 1.82 1.99 2.17 2.36 2.55 2.75 2.95 3.16 70+ 1.97 2.19 2.41 2.64 2.87 3.12 3.37 3.62             Predicted Peak Expiratory Flow Rate                                       Height (in)  Female       Height (in) Male           Age 64 63 56 61 58 73 78 74 Age            21 344 357 372 387 [phone] 446  60 62 64 66 68 70 72 74 76   25 337 352 366 381 396 411 426 441 25 447 476 505 533 562 [phone]   30 329 344 359 374 389 404 419 434 30 437 466 494 523 552 [phone] 667   35 322 337 351 366 381 396 411 426 35 426 455 484 512 541 [phone]   40 314 329 344 359 374 389 404 419 40 416 445 473 [phone] 647   45 307 322 336 351 366 381 396 411 45 405 434 463 491 520 [phone] 636   50 299 314 329 344 359 374 389 404 50 395 424 452 481 [phone] 625   55 292 307 321 336 351 366 381 396 55 384 413 442 470 499 528 556 585 615   60 284 299 314 329 344 359 374 389 60 374 403 431 460 489 517 546 575 605   65 277 292 306 321 336 351 366 381 65 363 392 421 449 478 507 535 564 594   70 269 284 299 314 329 344 359 374 70 353 382 410 439 468 [phone] 583   75 261 274 289 305 319 334 348 364 75 344 372 400 429 458 487 515 544 573   80 253 266 282 296 312 327 342 356 80 335 362 390 419 448 476 [phone]

## 2018-10-25 NOTE — PROGRESS NOTES
Physical Therapy  Facility/Department: [address] STEPDOWN  Daily Treatment Note  NAME: Tadeo Alexander  : 1940  MRN: [id number]    Date of Service: 10/25/2018    Discharge Recommendations:   (LTACH 2* respiratory needs)        Patient Diagnosis(es): The encounter diagnosis was Pneumonia due to organism. has a past medical history of Anemia; Anxiety; Bronchitis; COPD (chronic obstructive pulmonary disease) ([facility] [address] Diabetes ([facility] [address] Former smoker; Hyperlipidemia; Oxygen dependent; Respiratory distress; and Type 2 diabetes mellitus ([facility] [address] has a past surgical history that includes AAA repair, open; Pancreas surgery; Finger surgery; and Foot surgery. Restrictions  Restrictions/Precautions  Required Braces or Orthoses?: No  Subjective   General  Chart Reviewed: Yes  Response To Previous Treatment: Patient with no complaints from previous session. Family / Caregiver Present: No  Subjective  Subjective: Pt states he feels great today; reports improved breathing. Denies pain. General Comment  Comments: Pt on 40L O2 via hi flow at 40%. SPO2 fluctuated between 82-95% with functional mobility.    Pain Screening  Patient Currently in Pain: Denies  Vital Signs  Patient Currently in Pain: Denies       Orientation  Orientation  Overall Orientation Status: Within Normal Limits  Objective   Bed mobility  Supine to Sit: Contact guard assistance (cues for sequencing, HOB partially elevated)  Scooting: Stand by assistance  Transfers  Sit to Stand: Stand by assistance (required VCs for hand placement. )  Bed to Chair: Contact guard assistance (required min A with line mgmt; pt demos good safety awareness)  Ambulation  Ambulation?: Yes  Ambulation 1  Surface: level tile  Device: Rolling Walker  Other Apparatus: O2  Assistance: Contact guard assistance  Quality of Gait: decreased imer, steady with no LOB  Distance: 10ft  Comments: desat into mid 80s; recovered quickly with seated rest break  Stairs/Curb  Stairs?: No

## 2018-10-25 NOTE — PROGRESS NOTES
PULMONARY PROGRESS NOTE      Patient:  Sergey Pineda  YOB: 1940    MRN: [id number]     Acct: [de-identified]     Admit date: 10/22/2018    REASON FOR CONSULT:- Pneumonia, Emphysema with fibrosis    Pt seen and Chart reviewed. Subjective:   No acute overnight  He is on High flow with 40% and 40 liter. (yesterday was on 70%)  Patient states he is feeling much better, his shortness of breath is improving and he is feeling more stronger  Cough is improving and he is not producing any sputum now  Afebrile, VS stable      Review of Systems -   CONSTITUTIONAL:  negative  EYES:  negative  HEENT:  negative  RESPIRATORY:  positive for dyspnea  CARDIOVASCULAR:  negative  GASTROINTESTINAL:  negative  GENITOURINARY:  negative  HEMATOLOGIC/LYMPHATIC:  negative  ALLERGIC/IMMUNOLOGIC:  negative  ENDOCRINE:  negative  MUSCULOSKELETAL:  negative  NEUROLOGICAL:  negative  BEHAVIOR/PSYCH:  negative        Physical Exam:  Vitals: /84   Pulse 72   Temp 97.5 °F (36.4 °C) (Temporal)   Resp 25   Ht 5' 10\" (1.778 m)   Wt 164 lb 14.5 oz (74.8 kg)   SpO2 96%   BMI 23.66 kg/m²   24 hour intake/output:    Intake/Output Summary (Last 24 hours) at 10/25/18 1446  Last data filed at 10/25/18 0443   Gross per 24 hour   Intake          2127.24 ml   Output             1600 ml   Net           527.24 ml     Last 3 weights:   Wt Readings from Last 3 Encounters:   10/25/18 164 lb 14.5 oz (74.8 kg)   06/22/18 155 lb (70.3 kg)   06/06/18 155 lb (70.3 kg)       General appearance: alert and cooperative with exam  Physical Examination:   General appearance - alert, well appearing, and in no distress  Mental status - alert, oriented to person, place, and time  Eyes - pupils equal and reactive, extraocular eye movements intact  Ears - bilateral TM's and external ear canals normal  Nose - normal and patent, no erythema, discharge or polyps  Mouth - mucous membranes moist, pharynx normal without lesions  Neck - supple, no significant 0.64*  0.85   GLUCOSE  135*  241*     Calcium:  Recent Labs      10/25/18   1225   CALCIUM  8.7     Ionized Calcium:No results for input(s): IONCA in the last 72 hours. Magnesium:No results for input(s): MG in the last 72 hours. Phosphorus:No results for input(s): PHOS in the last 72 hours. BNP:No results for input(s): BNP in the last 72 hours. Glucose:  Recent Labs      10/24/18   1706  10/24/18   2054  10/25/18   0621   POCGLU  198*  320*  231*     HgbA1C: No results for input(s): LABA1C in the last 72 hours. INR:   No results for input(s): INR in the last 72 hours. Hepatic: No results for input(s): ALKPHOS, ALT, AST, PROT, BILITOT, BILIDIR, LABALBU in the last 72 hours. Amylase and Lipase:No results for input(s): LACTA, AMYLASE in the last 72 hours. Lactic Acid: No results for input(s): LACTA in the last 72 hours. CARDIAC ENZYMES:  No results for input(s): CKTOTAL, CKMB, CKMBINDEX, TROPONINI in the last 72 hours. BNP: No results for input(s): BNP in the last 72 hours. Lipids: No results for input(s): CHOL, TRIG, HDL, LDLCALC in the last 72 hours. Invalid input(s): LDL  ABGs: No results found for: PH, PCO2, PO2, HCO3, O2SAT  Thyroid:   Lab Results   Component Value Date    TSH 2.37 06/25/2018      Urinalysis:   Recent Labs      10/23/18   0732   COLORU  YELLOW   PHUR  5.5   PROTEINU  NEGATIVE   SPECGRAV  1.021   BILIRUBINUR  NEGATIVE   NITRU  NEGATIVE   LEUKOCYTESUR  NEGATIVE   GLUCOSEU  NEGATIVE         CT Scans  Pleural Plaques anteriorly more on the left than the right. More infiltrates on right when compare to old Previous CT.   Chronic changes consistent with Insterstitial lung disease    ECHOMay 2010  shows moderate pulmonary hypertension with estimated right ventricle systolic pressure of 52-66      Assessment and Plan:    // Acute on chronic respiratory failure  // Right sided pneumonia  // COPD exacerbation  // On home O2  // Interstitial lung disease  // Pulmonary artery hypertension [facility])  // Ex-smoker  // Pneumonia  // Type 2 diabetes mellitus ([facility] [address]  // Anxiety  // Iron deficiency anemia     Plan:     1. Pulse oximetry. Continue Oxygen therapy, Maintain O2 sats 88 - 92 %  2. Wean high flow  3. Dulera 200/5 2 puff BID, Duoneb 4 times daily. 4. Antimicrobials reviewed; continue to Zosyn for total 5 days and Levaquin for total 10 days  5. D/C solumedrol IV and start on oral prednisone taper. 6. Continue incentive spirometry, pulmonary toilet, aspiration precautions and bronchodilators  7. Continue to monitor I/O with a goal of even/negative fluid balance  8. Physical/occupational therapy; increase activity as tolerated  Cayden Clark MD      10/25/2018, 2:46 PM    Pulmonary & Critical Care  Attending Physician Statement  I have discussed the care of Gregg Majano, including pertinent history and exam findings,  with the resident. I have seen and examined the patient and the key elements of all parts of the encounter have been performed by me. I agree with the assessment, plan and orders as documented by the resident with additions . Right upper lobe pneumonia super imposed on chronic fibrotic lung disease   Complete 5 days of zosyn   Complete 10 days of levoquin   Steroid taper       Treatment plan Discussed with nursing staff in detail , all questions answered . Electronically signed by Shelby Ledesma MD on   10/25/18 at 4:50 PM    Please note that this chart was generated using voice recognition Dragon dictation software. Although every effort was made to ensure the accuracy of this automated transcription, some errors in transcription may have occurred.

## 2018-10-25 NOTE — PLAN OF CARE
BRONCHOSPASM/BRONCHOCONSTRICTION     [x]         IMPROVE AERATION/BREATH SOUNDS  [x]   ADMINISTER BRONCHODILATOR THERAPY AS APPROPRIATE  [x]   ASSESS BREATH SOUNDS  []   IMPLEMENT AEROSOL/MDI PROTOCOL  [x]   PATIENT EDUCATION AS NEEDED    PROVIDE ADEQUATE OXYGENATION WITH ACCEPTABLE SP02/ABG'S    [x]  IDENTIFY APPROPRIATE OXYGEN THERAPY  [x]   MONITOR SP02/ABG'S AS NEEDED   [x]   PATIENT EDUCATION AS NEEDED    PATIENT REFUSES TO WEAR BIPAP     [x] Risks and benefits explained to patient   [x] Patient refuses to wear Bipap   [x] Patient verbalizes understanding of information presented.

## 2018-10-26 LAB
ABSOLUTE EOS #: <0.03 K/UL (ref 0–0.44)
ABSOLUTE IMMATURE GRANULOCYTE: 0.09 K/UL (ref 0–0.3)
ABSOLUTE LYMPH #: 1.17 K/UL (ref 1.1–3.7)
ABSOLUTE MONO #: 0.75 K/UL (ref 0.1–1.2)
ANION GAP SERPL CALCULATED.3IONS-SCNC: 13 MMOL/L (ref 9–17)
BASOPHILS # BLD: 0 % (ref 0–2)
BASOPHILS ABSOLUTE: <0.03 K/UL (ref 0–0.2)
BUN BLDV-MCNC: 17 MG/DL (ref 8–23)
BUN/CREAT BLD: ABNORMAL (ref 9–20)
CALCIUM SERPL-MCNC: 8.5 MG/DL (ref 8.6–10.4)
CHLORIDE BLD-SCNC: 97 MMOL/L (ref 98–107)
CO2: 27 MMOL/L (ref 20–31)
CREAT SERPL-MCNC: 0.71 MG/DL (ref 0.7–1.2)
DIFFERENTIAL TYPE: ABNORMAL
EOSINOPHILS RELATIVE PERCENT: 0 % (ref 1–4)
GFR AFRICAN AMERICAN: >60 ML/MIN
GFR NON-AFRICAN AMERICAN: >60 ML/MIN
GFR SERPL CREATININE-BSD FRML MDRD: ABNORMAL ML/MIN/{1.73_M2}
GFR SERPL CREATININE-BSD FRML MDRD: ABNORMAL ML/MIN/{1.73_M2}
GLUCOSE BLD-MCNC: 143 MG/DL (ref 75–110)
GLUCOSE BLD-MCNC: 152 MG/DL (ref 70–99)
GLUCOSE BLD-MCNC: 153 MG/DL (ref 75–110)
GLUCOSE BLD-MCNC: 238 MG/DL (ref 75–110)
GLUCOSE BLD-MCNC: 309 MG/DL (ref 75–110)
HCT VFR BLD CALC: 32 % (ref 40.7–50.3)
HEMOGLOBIN: 10 G/DL (ref 13–17)
IMMATURE GRANULOCYTES: 1 %
LYMPHOCYTES # BLD: 11 % (ref 24–43)
MCH RBC QN AUTO: 29.9 PG (ref 25.2–33.5)
MCHC RBC AUTO-ENTMCNC: 31.3 G/DL (ref 28.4–34.8)
MCV RBC AUTO: 95.5 FL (ref 82.6–102.9)
MONOCYTES # BLD: 7 % (ref 3–12)
NRBC AUTOMATED: 0.2 PER 100 WBC
PDW BLD-RTO: 16 % (ref 11.8–14.4)
PLATELET # BLD: 244 K/UL (ref 138–453)
PLATELET ESTIMATE: ABNORMAL
PMV BLD AUTO: 10.5 FL (ref 8.1–13.5)
POTASSIUM SERPL-SCNC: 4.7 MMOL/L (ref 3.7–5.3)
RBC # BLD: 3.35 M/UL (ref 4.21–5.77)
RBC # BLD: ABNORMAL 10*6/UL
SEG NEUTROPHILS: 81 % (ref 36–65)
SEGMENTED NEUTROPHILS ABSOLUTE COUNT: 8.64 K/UL (ref 1.5–8.1)
SODIUM BLD-SCNC: 137 MMOL/L (ref 135–144)
WBC # BLD: 10.7 K/UL (ref 3.5–11.3)
WBC # BLD: ABNORMAL 10*3/UL

## 2018-10-26 PROCEDURE — 97530 THERAPEUTIC ACTIVITIES: CPT

## 2018-10-26 PROCEDURE — 97110 THERAPEUTIC EXERCISES: CPT

## 2018-10-26 PROCEDURE — 85025 COMPLETE CBC W/AUTO DIFF WBC: CPT

## 2018-10-26 PROCEDURE — 94762 N-INVAS EAR/PLS OXIMTRY CONT: CPT

## 2018-10-26 PROCEDURE — 99232 SBSQ HOSP IP/OBS MODERATE 35: CPT | Performed by: INTERNAL MEDICINE

## 2018-10-26 PROCEDURE — 6370000000 HC RX 637 (ALT 250 FOR IP): Performed by: HOSPITALIST

## 2018-10-26 PROCEDURE — 6360000002 HC RX W HCPCS: Performed by: STUDENT IN AN ORGANIZED HEALTH CARE EDUCATION/TRAINING PROGRAM

## 2018-10-26 PROCEDURE — 99233 SBSQ HOSP IP/OBS HIGH 50: CPT | Performed by: INTERNAL MEDICINE

## 2018-10-26 PROCEDURE — 6370000000 HC RX 637 (ALT 250 FOR IP): Performed by: STUDENT IN AN ORGANIZED HEALTH CARE EDUCATION/TRAINING PROGRAM

## 2018-10-26 PROCEDURE — 2580000003 HC RX 258: Performed by: STUDENT IN AN ORGANIZED HEALTH CARE EDUCATION/TRAINING PROGRAM

## 2018-10-26 PROCEDURE — 2700000000 HC OXYGEN THERAPY PER DAY

## 2018-10-26 PROCEDURE — 36415 COLL VENOUS BLD VENIPUNCTURE: CPT

## 2018-10-26 PROCEDURE — 97535 SELF CARE MNGMENT TRAINING: CPT

## 2018-10-26 PROCEDURE — 82947 ASSAY GLUCOSE BLOOD QUANT: CPT

## 2018-10-26 PROCEDURE — 94640 AIRWAY INHALATION TREATMENT: CPT

## 2018-10-26 PROCEDURE — 2060000000 HC ICU INTERMEDIATE R&B

## 2018-10-26 PROCEDURE — 80048 BASIC METABOLIC PNL TOTAL CA: CPT

## 2018-10-26 RX ORDER — NICOTINE POLACRILEX 4 MG
15 LOZENGE BUCCAL PRN
Status: DISCONTINUED | OUTPATIENT
Start: 2018-10-26 | End: 2018-10-29 | Stop reason: HOSPADM

## 2018-10-26 RX ORDER — DEXTROSE MONOHYDRATE 25 G/50ML
12.5 INJECTION, SOLUTION INTRAVENOUS PRN
Status: DISCONTINUED | OUTPATIENT
Start: 2018-10-26 | End: 2018-10-29 | Stop reason: HOSPADM

## 2018-10-26 RX ORDER — DEXTROSE MONOHYDRATE 50 MG/ML
100 INJECTION, SOLUTION INTRAVENOUS PRN
Status: DISCONTINUED | OUTPATIENT
Start: 2018-10-26 | End: 2018-10-29 | Stop reason: HOSPADM

## 2018-10-26 RX ADMIN — PIPERACILLIN AND TAZOBACTAM 3.38 G: 3; .375 INJECTION, POWDER, LYOPHILIZED, FOR SOLUTION INTRAVENOUS; PARENTERAL at 01:02

## 2018-10-26 RX ADMIN — IPRATROPIUM BROMIDE AND ALBUTEROL SULFATE 1 AMPULE: .5; 3 SOLUTION RESPIRATORY (INHALATION) at 12:27

## 2018-10-26 RX ADMIN — PIPERACILLIN AND TAZOBACTAM 3.38 G: 3; .375 INJECTION, POWDER, LYOPHILIZED, FOR SOLUTION INTRAVENOUS; PARENTERAL at 17:05

## 2018-10-26 RX ADMIN — Medication 10 ML: at 21:09

## 2018-10-26 RX ADMIN — METFORMIN HYDROCHLORIDE 500 MG: 500 TABLET, EXTENDED RELEASE ORAL at 17:15

## 2018-10-26 RX ADMIN — PIPERACILLIN AND TAZOBACTAM 3.38 G: 3; .375 INJECTION, POWDER, LYOPHILIZED, FOR SOLUTION INTRAVENOUS; PARENTERAL at 08:14

## 2018-10-26 RX ADMIN — LEVOFLOXACIN 750 MG: 5 INJECTION, SOLUTION INTRAVENOUS at 23:55

## 2018-10-26 RX ADMIN — IRON SUCROSE 200 MG: 20 INJECTION, SOLUTION INTRAVENOUS at 06:29

## 2018-10-26 RX ADMIN — ASPIRIN 81 MG: 81 TABLET, COATED ORAL at 08:13

## 2018-10-26 RX ADMIN — METFORMIN HYDROCHLORIDE 500 MG: 500 TABLET, EXTENDED RELEASE ORAL at 08:13

## 2018-10-26 RX ADMIN — INSULIN LISPRO 8 UNITS: 100 INJECTION, SOLUTION INTRAVENOUS; SUBCUTANEOUS at 17:05

## 2018-10-26 RX ADMIN — PREDNISONE 40 MG: 20 TABLET ORAL at 08:13

## 2018-10-26 RX ADMIN — Medication 3 MG: at 21:09

## 2018-10-26 RX ADMIN — INSULIN LISPRO 2 UNITS: 100 INJECTION, SOLUTION INTRAVENOUS; SUBCUTANEOUS at 12:55

## 2018-10-26 RX ADMIN — ENOXAPARIN SODIUM 40 MG: 40 INJECTION SUBCUTANEOUS at 08:12

## 2018-10-26 RX ADMIN — INSULIN LISPRO 2 UNITS: 100 INJECTION, SOLUTION INTRAVENOUS; SUBCUTANEOUS at 21:08

## 2018-10-26 RX ADMIN — INSULIN LISPRO 2 UNITS: 100 INJECTION, SOLUTION INTRAVENOUS; SUBCUTANEOUS at 08:14

## 2018-10-26 RX ADMIN — IPRATROPIUM BROMIDE AND ALBUTEROL SULFATE 1 AMPULE: .5; 3 SOLUTION RESPIRATORY (INHALATION) at 15:31

## 2018-10-26 RX ADMIN — MOMETASONE FUROATE AND FORMOTEROL FUMARATE DIHYDRATE 2 PUFF: 100; 5 AEROSOL RESPIRATORY (INHALATION) at 20:04

## 2018-10-26 RX ADMIN — IPRATROPIUM BROMIDE AND ALBUTEROL SULFATE 1 AMPULE: .5; 3 SOLUTION RESPIRATORY (INHALATION) at 08:26

## 2018-10-26 RX ADMIN — IPRATROPIUM BROMIDE AND ALBUTEROL SULFATE 1 AMPULE: .5; 3 SOLUTION RESPIRATORY (INHALATION) at 20:05

## 2018-10-26 RX ADMIN — MOMETASONE FUROATE AND FORMOTEROL FUMARATE DIHYDRATE 2 PUFF: 100; 5 AEROSOL RESPIRATORY (INHALATION) at 08:26

## 2018-10-26 RX ADMIN — ESCITALOPRAM OXALATE 10 MG: 10 TABLET ORAL at 08:13

## 2018-10-26 ASSESSMENT — PAIN SCALES - GENERAL
PAINLEVEL_OUTOF10: 0

## 2018-10-26 NOTE — PLAN OF CARE
Problem: Pain:  Goal: Control of acute pain  Control of acute pain   Outcome: Met This Shift  Pt's pain assessed frequently with hourly rounding; assessed all pain characteristics including level, type, location, frequency, and onset. Pt medicated by RN per PRN orders. Non-pharmacologic interventions offered to pt as well. Pt states pain is tolerable at this time. Will continue to monitor.

## 2018-10-26 NOTE — PROGRESS NOTES
Physical Therapy  Facility/Department: [address] STEPDOWN  Daily Treatment Note  NAME: Dominique Taylor  : 1940  MRN: [id number]    Date of Service: 10/26/2018    Discharge Recommendations:   (LTACH 2* respiratory needs)        Patient Diagnosis(es): The encounter diagnosis was Pneumonia due to organism. has a past medical history of Anemia; Anxiety; Bronchitis; COPD (chronic obstructive pulmonary disease) ([facility] Diabetes ([facility] Former smoker; Hyperlipidemia; Oxygen dependent; Respiratory distress; and Type 2 diabetes mellitus ([facility] has a past surgical history that includes AAA repair, open; Pancreas surgery; Finger surgery; and Foot surgery. Restrictions  Restrictions/Precautions  Restrictions/Precautions: General Precautions, Fall Risk, Up as Tolerated  Required Braces or Orthoses?: No  Position Activity Restriction  Other position/activity restrictions: Hi-flow O2, sats drop very quickly  Subjective   General  Chart Reviewed: Yes  Response To Previous Treatment: Patient with no complaints from previous session. Family / Caregiver Present: No  Subjective  Subjective: Pt alert in bed finishing breathing treatment. Denies pain; requesting assistance to commode. General Comment  Comments: Pt on 30LPM at 60% hi flow.  Pt desatting into high 70s with mobility; recovers to low 90s after 1-2 minute seated rest break  Pain Screening  Patient Currently in Pain: Denies  Vital Signs  Patient Currently in Pain: Denies       Orientation  Orientation  Overall Orientation Status: Within Normal Limits  Objective   Bed mobility  Supine to Sit: Minimal assistance  Scooting: Contact guard assistance  Transfers  Sit to Stand: Minimal Assistance;Contact guard assistance (completed from bed and again from commode)  Stand to sit: Contact guard assistance  Bed to Chair: Contact guard assistance  Ambulation  Ambulation?: Yes  Ambulation 1  Surface: level tile  Device: No Device  Other Apparatus: O2  Assistance: Contact guard assistance  Quality of Gait: shuffling steps, flexed posture, steady  Distance: 5ft (bed to chair)  Comments: limited by SOB; desat to 77% while static standing  Stairs/Curb  Stairs?: No     Balance  Posture: Fair  Sitting - Static: Good  Sitting - Dynamic: Good  Standing - Static: Good;- (stood 2 minutes with no AD, SBA for safety, dependent for carol care 2* multiple line mgmt)  Standing - Dynamic: Fair;+       Exercises:  Seated LE exercise program: Long Arc Quads, hip abduction/adduction, heel/toe raises, and marches. Reps: 10x each    Assessment   Body structures, Functions, Activity limitations: Decreased functional mobility ; Decreased endurance;Decreased balance;Decreased strength  Assessment: Pt with decreased activity tolerance today secondary to SOB; required frequent rest breaks to recover. Prognosis: Good  Patient Education: breathing technique  Activity Tolerance  Activity Tolerance: Patient limited by endurance      Goals  Short term goals  Time Frame for Short term goals: 14 visits  Short term goal 1: Tolerate 30 minutes of exercise to improve endurance  Short term goal 2: Independent bed mobility  Short term goal 3: Ambulate 100 ft with no AD SBA  Short term goal 4: Ambulate 2 stairs without rails CGA  Short term goal 5: Independent with home exercise program  Patient Goals   Patient goals : To return home    Plan    Plan  Times per week: 5x/week  Current Treatment Recommendations: Strengthening, Balance Training, Gait Training, Stair training, Safety Education & Training, Home Exercise Program, Endurance Training  Safety Devices  Type of devices:  All fall risk precautions in place, Left in chair, Call light within reach, Gait belt, Chair alarm in place, Nurse notified  Restraints  Initially in place: No     Therapy Time   Individual Concurrent Group Co-treatment   Time In 0834         Time Out 0905         Minutes [address]

## 2018-10-26 NOTE — PROGRESS NOTES
input(s): BNP in the last 72 hours. Glucose:  Recent Labs      10/25/18   2019  10/26/18   0653  10/26/18   1156   POCGLU  296*  143*  153*     HgbA1C: No results for input(s): LABA1C in the last 72 hours. INR:   No results for input(s): INR in the last 72 hours. Hepatic: No results for input(s): ALKPHOS, ALT, AST, PROT, BILITOT, BILIDIR, LABALBU in the last 72 hours. Amylase and Lipase:No results for input(s): LACTA, AMYLASE in the last 72 hours. Lactic Acid: No results for input(s): LACTA in the last 72 hours. CARDIAC ENZYMES:  No results for input(s): CKTOTAL, CKMB, CKMBINDEX, TROPONINI in the last 72 hours. BNP: No results for input(s): BNP in the last 72 hours. Lipids: No results for input(s): CHOL, TRIG, HDL, LDLCALC in the last 72 hours. Invalid input(s): LDL  ABGs: No results found for: PH, PCO2, PO2, HCO3, O2SAT  Thyroid:   Lab Results   Component Value Date    TSH 2.37 06/25/2018      Urinalysis:   No results for input(s): BACTERIA, BLOODU, CLARITYU, COLORU, PHUR, PROTEINU, RBCUA, SPECGRAV, BILIRUBINUR, NITRU, WBCUA, LEUKOCYTESUR, GLUCOSEU in the last 72 hours. CT Scans  Pleural Plaques anteriorly more on the left than the right. More infiltrates on right when compare to old Previous CT. Chronic changes consistent with Insterstitial lung disease    ECHOMay 2010  shows moderate pulmonary hypertension with estimated right ventricle systolic pressure of 43-60      Assessment and Plan:    // Acute on chronic Hypoxic respiratory failure  // Right upper lobe community-acquired pneumonia  // fibrotic lung disease  // On home O2  // Pulmonary artery hypertension ([address] due to lung disease  // Ex-smokeR  // Type 2 diabetes mellitus ([address]  // Anxiety  // Iron deficiency anemia     Plan:     1. Continue to wean high flow oxygen, keep saturations 88-92%  2. Complete 5 days of Zosyn and 7 days of levofloxacin  3. Steroid taper  4. Bronchodilators as needed  5.  This will be a slow progress, will need

## 2018-10-26 NOTE — PROGRESS NOTES
Spoke with pulm resident, patient is medically clear for discharge, spoke with  regarding LTAC, awaiting bed. Patient has completed 11/15 doses of Zosyn thus far and 3/10 days of Levaquin. Can discharge on PO Levaquin when bed becomes available.     Flavio Bailey MD, PGY-2  Internal Medicine  [facility]  10/26/18  2:09 PM

## 2018-10-27 LAB
ABSOLUTE EOS #: 0.09 K/UL (ref 0–0.44)
ABSOLUTE IMMATURE GRANULOCYTE: 0.15 K/UL (ref 0–0.3)
ABSOLUTE LYMPH #: 1.9 K/UL (ref 1.1–3.7)
ABSOLUTE MONO #: 0.94 K/UL (ref 0.1–1.2)
ANION GAP SERPL CALCULATED.3IONS-SCNC: 12 MMOL/L (ref 9–17)
BASOPHILS # BLD: 0 % (ref 0–2)
BASOPHILS ABSOLUTE: <0.03 K/UL (ref 0–0.2)
BILIRUBIN URINE: NEGATIVE
BUN BLDV-MCNC: 14 MG/DL (ref 8–23)
BUN/CREAT BLD: ABNORMAL (ref 9–20)
CALCIUM SERPL-MCNC: 8.6 MG/DL (ref 8.6–10.4)
CHLORIDE BLD-SCNC: 94 MMOL/L (ref 98–107)
CO2: 29 MMOL/L (ref 20–31)
COLOR: YELLOW
COMMENT UA: ABNORMAL
CREAT SERPL-MCNC: 0.73 MG/DL (ref 0.7–1.2)
DIFFERENTIAL TYPE: ABNORMAL
EOSINOPHILS RELATIVE PERCENT: 1 % (ref 1–4)
GFR AFRICAN AMERICAN: >60 ML/MIN
GFR NON-AFRICAN AMERICAN: >60 ML/MIN
GFR SERPL CREATININE-BSD FRML MDRD: ABNORMAL ML/MIN/{1.73_M2}
GFR SERPL CREATININE-BSD FRML MDRD: ABNORMAL ML/MIN/{1.73_M2}
GLUCOSE BLD-MCNC: 130 MG/DL (ref 70–99)
GLUCOSE BLD-MCNC: 136 MG/DL (ref 75–110)
GLUCOSE BLD-MCNC: 193 MG/DL (ref 75–110)
GLUCOSE BLD-MCNC: 231 MG/DL (ref 75–110)
GLUCOSE BLD-MCNC: 294 MG/DL (ref 75–110)
GLUCOSE URINE: ABNORMAL
HCT VFR BLD CALC: 33.8 % (ref 40.7–50.3)
HEMOGLOBIN: 11.1 G/DL (ref 13–17)
IMMATURE GRANULOCYTES: 2 %
KETONES, URINE: NEGATIVE
LEUKOCYTE ESTERASE, URINE: NEGATIVE
LYMPHOCYTES # BLD: 20 % (ref 24–43)
MCH RBC QN AUTO: 30.6 PG (ref 25.2–33.5)
MCHC RBC AUTO-ENTMCNC: 32.8 G/DL (ref 28.4–34.8)
MCV RBC AUTO: 93.1 FL (ref 82.6–102.9)
MONOCYTES # BLD: 10 % (ref 3–12)
NITRITE, URINE: NEGATIVE
NRBC AUTOMATED: 0.2 PER 100 WBC
PDW BLD-RTO: 15.9 % (ref 11.8–14.4)
PH UA: 8 (ref 5–8)
PLATELET # BLD: 240 K/UL (ref 138–453)
PLATELET ESTIMATE: ABNORMAL
PMV BLD AUTO: 10 FL (ref 8.1–13.5)
POTASSIUM SERPL-SCNC: 3.6 MMOL/L (ref 3.7–5.3)
PROTEIN UA: NEGATIVE
RBC # BLD: 3.63 M/UL (ref 4.21–5.77)
RBC # BLD: ABNORMAL 10*6/UL
SEG NEUTROPHILS: 67 % (ref 36–65)
SEGMENTED NEUTROPHILS ABSOLUTE COUNT: 6.55 K/UL (ref 1.5–8.1)
SODIUM BLD-SCNC: 135 MMOL/L (ref 135–144)
SPECIFIC GRAVITY UA: 1.01 (ref 1–1.03)
TURBIDITY: CLEAR
URINE HGB: NEGATIVE
UROBILINOGEN, URINE: NORMAL
WBC # BLD: 9.6 K/UL (ref 3.5–11.3)
WBC # BLD: ABNORMAL 10*3/UL

## 2018-10-27 PROCEDURE — 99232 SBSQ HOSP IP/OBS MODERATE 35: CPT | Performed by: INTERNAL MEDICINE

## 2018-10-27 PROCEDURE — 6360000002 HC RX W HCPCS: Performed by: STUDENT IN AN ORGANIZED HEALTH CARE EDUCATION/TRAINING PROGRAM

## 2018-10-27 PROCEDURE — 2580000003 HC RX 258: Performed by: STUDENT IN AN ORGANIZED HEALTH CARE EDUCATION/TRAINING PROGRAM

## 2018-10-27 PROCEDURE — 80048 BASIC METABOLIC PNL TOTAL CA: CPT

## 2018-10-27 PROCEDURE — 97530 THERAPEUTIC ACTIVITIES: CPT

## 2018-10-27 PROCEDURE — 94640 AIRWAY INHALATION TREATMENT: CPT

## 2018-10-27 PROCEDURE — 2060000000 HC ICU INTERMEDIATE R&B

## 2018-10-27 PROCEDURE — 6370000000 HC RX 637 (ALT 250 FOR IP): Performed by: STUDENT IN AN ORGANIZED HEALTH CARE EDUCATION/TRAINING PROGRAM

## 2018-10-27 PROCEDURE — 85025 COMPLETE CBC W/AUTO DIFF WBC: CPT

## 2018-10-27 PROCEDURE — 2700000000 HC OXYGEN THERAPY PER DAY

## 2018-10-27 PROCEDURE — 6370000000 HC RX 637 (ALT 250 FOR IP): Performed by: HOSPITALIST

## 2018-10-27 PROCEDURE — 97110 THERAPEUTIC EXERCISES: CPT

## 2018-10-27 PROCEDURE — 36415 COLL VENOUS BLD VENIPUNCTURE: CPT

## 2018-10-27 PROCEDURE — 82947 ASSAY GLUCOSE BLOOD QUANT: CPT

## 2018-10-27 PROCEDURE — 81003 URINALYSIS AUTO W/O SCOPE: CPT

## 2018-10-27 PROCEDURE — 94762 N-INVAS EAR/PLS OXIMTRY CONT: CPT

## 2018-10-27 RX ORDER — TAMSULOSIN HYDROCHLORIDE 0.4 MG/1
0.4 CAPSULE ORAL DAILY
Status: DISCONTINUED | OUTPATIENT
Start: 2018-10-27 | End: 2018-10-29 | Stop reason: HOSPADM

## 2018-10-27 RX ORDER — LEVOFLOXACIN 750 MG/1
750 TABLET ORAL DAILY
Status: DISCONTINUED | OUTPATIENT
Start: 2018-10-27 | End: 2018-10-29 | Stop reason: HOSPADM

## 2018-10-27 RX ORDER — POTASSIUM CHLORIDE 20 MEQ/1
40 TABLET, EXTENDED RELEASE ORAL 2 TIMES DAILY WITH MEALS
Status: COMPLETED | OUTPATIENT
Start: 2018-10-27 | End: 2018-10-27

## 2018-10-27 RX ADMIN — Medication 10 ML: at 20:41

## 2018-10-27 RX ADMIN — PIPERACILLIN AND TAZOBACTAM 3.38 G: 3; .375 INJECTION, POWDER, LYOPHILIZED, FOR SOLUTION INTRAVENOUS; PARENTERAL at 08:00

## 2018-10-27 RX ADMIN — INSULIN LISPRO 6 UNITS: 100 INJECTION, SOLUTION INTRAVENOUS; SUBCUTANEOUS at 13:08

## 2018-10-27 RX ADMIN — INSULIN LISPRO 2 UNITS: 100 INJECTION, SOLUTION INTRAVENOUS; SUBCUTANEOUS at 20:51

## 2018-10-27 RX ADMIN — MOMETASONE FUROATE AND FORMOTEROL FUMARATE DIHYDRATE 2 PUFF: 100; 5 AEROSOL RESPIRATORY (INHALATION) at 08:38

## 2018-10-27 RX ADMIN — TAMSULOSIN HYDROCHLORIDE 0.4 MG: 0.4 CAPSULE ORAL at 20:41

## 2018-10-27 RX ADMIN — ASPIRIN 81 MG: 81 TABLET, COATED ORAL at 08:00

## 2018-10-27 RX ADMIN — POTASSIUM CHLORIDE 40 MEQ: 1500 TABLET, EXTENDED RELEASE ORAL at 09:41

## 2018-10-27 RX ADMIN — ESCITALOPRAM OXALATE 10 MG: 10 TABLET ORAL at 08:00

## 2018-10-27 RX ADMIN — PREDNISONE 40 MG: 20 TABLET ORAL at 08:01

## 2018-10-27 RX ADMIN — METFORMIN HYDROCHLORIDE 500 MG: 500 TABLET, EXTENDED RELEASE ORAL at 08:00

## 2018-10-27 RX ADMIN — MOMETASONE FUROATE AND FORMOTEROL FUMARATE DIHYDRATE 2 PUFF: 100; 5 AEROSOL RESPIRATORY (INHALATION) at 20:01

## 2018-10-27 RX ADMIN — PIPERACILLIN AND TAZOBACTAM 3.38 G: 3; .375 INJECTION, POWDER, LYOPHILIZED, FOR SOLUTION INTRAVENOUS; PARENTERAL at 17:02

## 2018-10-27 RX ADMIN — INSULIN LISPRO 2 UNITS: 100 INJECTION, SOLUTION INTRAVENOUS; SUBCUTANEOUS at 17:22

## 2018-10-27 RX ADMIN — IPRATROPIUM BROMIDE AND ALBUTEROL SULFATE 1 AMPULE: .5; 3 SOLUTION RESPIRATORY (INHALATION) at 08:38

## 2018-10-27 RX ADMIN — POTASSIUM CHLORIDE 40 MEQ: 1500 TABLET, EXTENDED RELEASE ORAL at 18:15

## 2018-10-27 RX ADMIN — IPRATROPIUM BROMIDE AND ALBUTEROL SULFATE 1 AMPULE: .5; 3 SOLUTION RESPIRATORY (INHALATION) at 20:00

## 2018-10-27 RX ADMIN — ENOXAPARIN SODIUM 40 MG: 40 INJECTION SUBCUTANEOUS at 08:02

## 2018-10-27 RX ADMIN — PIPERACILLIN AND TAZOBACTAM 3.38 G: 3; .375 INJECTION, POWDER, LYOPHILIZED, FOR SOLUTION INTRAVENOUS; PARENTERAL at 01:42

## 2018-10-27 RX ADMIN — IRON SUCROSE 200 MG: 20 INJECTION, SOLUTION INTRAVENOUS at 05:55

## 2018-10-27 RX ADMIN — IPRATROPIUM BROMIDE AND ALBUTEROL SULFATE 1 AMPULE: .5; 3 SOLUTION RESPIRATORY (INHALATION) at 11:56

## 2018-10-27 RX ADMIN — IPRATROPIUM BROMIDE AND ALBUTEROL SULFATE 1 AMPULE: .5; 3 SOLUTION RESPIRATORY (INHALATION) at 16:13

## 2018-10-27 RX ADMIN — LEVOFLOXACIN 750 MG: 750 TABLET, FILM COATED ORAL at 13:41

## 2018-10-27 RX ADMIN — METFORMIN HYDROCHLORIDE 500 MG: 500 TABLET, EXTENDED RELEASE ORAL at 18:15

## 2018-10-27 ASSESSMENT — PAIN DESCRIPTION - ORIENTATION: ORIENTATION: RIGHT;LEFT

## 2018-10-27 ASSESSMENT — PAIN DESCRIPTION - PAIN TYPE: TYPE: ACUTE PAIN

## 2018-10-27 ASSESSMENT — PAIN DESCRIPTION - LOCATION: LOCATION: GROIN

## 2018-10-27 ASSESSMENT — PAIN SCALES - GENERAL
PAINLEVEL_OUTOF10: 0
PAINLEVEL_OUTOF10: 4
PAINLEVEL_OUTOF10: 0

## 2018-10-27 ASSESSMENT — PAIN DESCRIPTION - DESCRIPTORS: DESCRIPTORS: PRESSURE

## 2018-10-27 ASSESSMENT — PAIN DESCRIPTION - FREQUENCY: FREQUENCY: INTERMITTENT

## 2018-10-27 NOTE — PROGRESS NOTES
Baseline []  No SOB []  No SOB [x]  SOB on exertion []  SOB min activity []  At rest/acute   e FEV% Predicted       [x]  NA []  Above 69%  []  Unable []  Above 60-69%  []  Unable []  Above 50-59%  []  Unable []  Above 35-49%  []  Unable []  Less than 35%  []  Unable                 []  Hyperinflation Assessment  Score 1 2 3   CXR and Breath Sounds   []  Clear []  No atelectasis  Basilar aeration []  Atelectasis or absent basilar breath sounds   Incentive Spirometry Volume  (Per IBW)   []  Greater than or equal to 15ml/Kg []  less than 15ml/Kg []  less than 15ml/Kg   Surgery within last 2 weeks []  None or general   []  Abdominal or thoracic surgery  []  Abdominal or thoracic   Chronic Pulmonary Historyre []  No []  Yes []  Yes     []  Secretion Management Assessment  Score 1 2 3   Bilateral Breath Sounds   []  Occasional Rhonchi []  Scattered Rhonchi []  Course Rhonchi and/or poor aeration   Sputum    []  Small amount of thin secretions []  Moderate amount of viscous secretions []  Copius, Viscious Yellow/ Secretions   CXR as reported by physician []  clear  []  Unavailable []  Infiltrates and/or consolidation  []  Unavailable []  Mucus Plugging and or lobar consolidation  []  Unavailable   Cough []  Strong, productive cough []  Weak productive cough []  No cough or weak non-productive cough   FUAD GAR  11:59 AM                            FEMALE                                  MALE                            FEV1 Predicted Normal Values                        FEV1 Predicted Normal Values          Age                                     Height in Feet and Inches       Age                                     Height in Feet and Inches       4' 11\" 5' 1\" 5' 3\" 5' 5\" 5' 7\" 5' 9\" 5' 11\" 6' 1\"  4' 11\" 5' 1\" 5' 3\" 5' 5\" 5' 7\" 5' 9\" 5' 11\" 6' 1\"   42 - 45 2.49 2.66 2.84 3.03 3.22 3.42 3.62 3.83 42 - 45 2.82 3.03 3.26 3.49 3.72 3.96 4.22 4.47   46 - 49 2.40 2.57 2.76 2.94 3.14 3.33 3.54 3.75 46 - 49 2.70 2.92 3.14

## 2018-10-27 NOTE — PLAN OF CARE
Problem: Respiratory  Intervention: Respiratory assessment  BRONCHOSPASM/BRONCHOCONSTRICTION     [x]         IMPROVE AERATION/BREATH SOUNDS  [x]   ADMINISTER BRONCHODILATOR THERAPY AS APPROPRIATE  [x]   ASSESS BREATH SOUNDS  [x]   IMPLEMENT AEROSOL/MDI PROTOCOL  [x]   PATIENT EDUCATION AS NEEDED    PROVIDE ADEQUATE OXYGENATION WITH ACCEPTABLE SP02/ABG'S    [x]  IDENTIFY APPROPRIATE OXYGEN THERAPY  [x]   MONITOR SP02/ABG'S AS NEEDED   [x]   PATIENT EDUCATION AS NEEDED

## 2018-10-27 NOTE — PROGRESS NOTES
IM RESIDENT PROGRESS NOTE           Patient:  Marleen Query  YOB: 1940     MRN: [id number]                                     Acct: [de-identified]      Admit date: 10/22/2018       Chief Complaint   Patient presents with    Shortness of Breath       H/o pneumonia, chronic COPD, coughing up brown sputum         Pt seen and Chart reviewed.     Subjective:   Patient evaluated at bedside. He reports no new complaints. Vital signs stable per chart.  Afebrile  On high flow nasal cannula, FiO2 30     Diet:  DIET CARB CONTROL;        Medications:Current Inpatient     Scheduled Meds:  Scheduled Medications    predniSONE  40 mg Oral Daily     Followed by   Elaine Owens ON 10/29/2018] predniSONE  30 mg Oral Daily     Followed by   Elaine Owens ON 11/1/2018] predniSONE  20 mg Oral Daily     Followed by   Elaine Owens ON 11/4/2018] predniSONE  10 mg Oral Daily    metFORMIN  500 mg Oral BID WC    iron sucrose  200 mg Intravenous Q24H    ipratropium-albuterol  1 ampule Inhalation 4x daily    sodium chloride flush  10 mL Intravenous 2 times per day    aspirin  81 mg Oral Daily    escitalopram  10 mg Oral Daily    sodium chloride flush  10 mL Intravenous 2 times per day    mometasone-formoterol  2 puff Inhalation BID    piperacillin-tazobactam  3.375 g Intravenous Q8H     And    levofloxacin  750 mg Intravenous Q24H    insulin lispro  0-12 Units Subcutaneous TID WC    insulin lispro  0-6 Units Subcutaneous Nightly    enoxaparin  40 mg Subcutaneous Daily         Continuous Infusions:  Infusions Meds        PRN Meds:  PRN Medications   acetaminophen, melatonin, albuterol, sodium chloride flush, sodium chloride flush, magnesium hydroxide, ondansetron        Objective:     Physical Exam:  Vitals: BP (!) 147/96   Pulse 65   Temp 97.5 °F (36.4 °C) (Oral)   Resp 21   Ht 5' 10\" (1.778 m)   Wt 166 lb 3.6 oz (75.4 kg)   SpO2 92%   BMI 23.85 kg/m²   24 hour intake/output:  Intake/Output Summary (Last 24 hours) at 10/26/18 1158  Last data filed at 10/26/18 0631    Gross per 24 hour   Intake          1017.48 ml   Output              975 ml   Net            42.48 ml      Last 3 weights: Wt Readings from Last 3 Encounters:   10/26/18 166 lb 3.6 oz (75.4 kg)   06/22/18 155 lb (70.3 kg)   06/06/18 155 lb (70.3 kg)         Physical Examination:   General appearance - alert, well appearing, and in no distress, on nasal cannula high flow O2. Mouth - mucous membranes moist, pharynx normal without lesions  Chest - bibasal rales present, decreased air entry bilaterally   Heart - normal rate, regular rhythm, normal S1, S2, no murmurs, rubs, clicks or gallops  Abdomen - soft, nontender, nondistended, no masses or organomegaly  Neurological - alert, oriented, normal speech, no focal findings or movement disorder noted  Musculoskeletal - no joint tenderness, deformity or swelling. Extremities: No pedal edema  Labs:-     CBC:   Recent Labs      10/24/18   0756  10/25/18   1225  10/26/18   0623   WBC  11.3  17.0*  10.7   HGB  10.0*  10.4*  10.0*   PLT  267  268  244           BMP:  Recent Labs      10/25/18   1225  10/26/18   0623   NA  134*  137   K  4.1  4.7   CL  96*  97*   CO2  21  27   BUN  20  17   CREATININE  0.85  0.71   GLUCOSE  241*  152*      Calcium:      Recent Labs      10/26/18   0623   CALCIUM  8.5*      Ionized Calcium:No results for input(s): IONCA in the last 72 hours. Magnesium:No results for input(s): MG in the last 72 hours. Phosphorus:No results for input(s): PHOS in the last 72 hours. BNP:No results for input(s): BNP in the last 72 hours. Glucose:        Recent Labs      10/25/18   1557  10/25/18   2019  10/26/18   0653   POCGLU  321*  296*  143*      HgbA1C: No results for input(s): LABA1C in the last 72 hours. INR: No results for input(s): INR in the last 72 hours. Hepatic: No results for input(s): ALKPHOS, ALT, AST, PROT, BILITOT, BILIDIR, LABALBU in the last 72 hours.   Amylase and Lipase:No results for

## 2018-10-27 NOTE — PLAN OF CARE
Problem: Falls - Risk of:  Goal: Will remain free from falls  Will remain free from falls    Outcome: Met This Shift  Pt remains free from falls at this time. Floor free from obstacles, and bed is locked and in lowest position. Adequate lighting provided. Call light within reach; pt encouraged to call before getting OOB for any need. Will continue to monitor needs during hourly rounding.

## 2018-10-28 LAB
ABSOLUTE EOS #: 0.12 K/UL (ref 0–0.44)
ABSOLUTE IMMATURE GRANULOCYTE: 0.23 K/UL (ref 0–0.3)
ABSOLUTE LYMPH #: 2.05 K/UL (ref 1.1–3.7)
ABSOLUTE MONO #: 0.97 K/UL (ref 0.1–1.2)
ANION GAP SERPL CALCULATED.3IONS-SCNC: 12 MMOL/L (ref 9–17)
BASOPHILS # BLD: 0 % (ref 0–2)
BASOPHILS ABSOLUTE: 0.03 K/UL (ref 0–0.2)
BUN BLDV-MCNC: 17 MG/DL (ref 8–23)
BUN/CREAT BLD: ABNORMAL (ref 9–20)
CALCIUM SERPL-MCNC: 8.7 MG/DL (ref 8.6–10.4)
CHLORIDE BLD-SCNC: 96 MMOL/L (ref 98–107)
CO2: 28 MMOL/L (ref 20–31)
CREAT SERPL-MCNC: 0.77 MG/DL (ref 0.7–1.2)
CULTURE: 2
CULTURE: NORMAL
DIFFERENTIAL TYPE: ABNORMAL
EOSINOPHILS RELATIVE PERCENT: 1 % (ref 1–4)
GFR AFRICAN AMERICAN: >60 ML/MIN
GFR NON-AFRICAN AMERICAN: >60 ML/MIN
GFR SERPL CREATININE-BSD FRML MDRD: ABNORMAL ML/MIN/{1.73_M2}
GFR SERPL CREATININE-BSD FRML MDRD: ABNORMAL ML/MIN/{1.73_M2}
GLUCOSE BLD-MCNC: 146 MG/DL (ref 75–110)
GLUCOSE BLD-MCNC: 158 MG/DL (ref 75–110)
GLUCOSE BLD-MCNC: 164 MG/DL (ref 70–99)
GLUCOSE BLD-MCNC: 262 MG/DL (ref 75–110)
GLUCOSE BLD-MCNC: 268 MG/DL (ref 75–110)
HCT VFR BLD CALC: 35.1 % (ref 40.7–50.3)
HEMOGLOBIN: 10.9 G/DL (ref 13–17)
IMMATURE GRANULOCYTES: 2 %
LYMPHOCYTES # BLD: 17 % (ref 24–43)
Lab: NORMAL
MCH RBC QN AUTO: 29.9 PG (ref 25.2–33.5)
MCHC RBC AUTO-ENTMCNC: 31.1 G/DL (ref 28.4–34.8)
MCV RBC AUTO: 96.4 FL (ref 82.6–102.9)
MONOCYTES # BLD: 8 % (ref 3–12)
NRBC AUTOMATED: 0.2 PER 100 WBC
PDW BLD-RTO: 16.4 % (ref 11.8–14.4)
PLATELET # BLD: 250 K/UL (ref 138–453)
PLATELET ESTIMATE: ABNORMAL
PMV BLD AUTO: 9.7 FL (ref 8.1–13.5)
POTASSIUM SERPL-SCNC: 4.5 MMOL/L (ref 3.7–5.3)
RBC # BLD: 3.64 M/UL (ref 4.21–5.77)
RBC # BLD: ABNORMAL 10*6/UL
SEG NEUTROPHILS: 72 % (ref 36–65)
SEGMENTED NEUTROPHILS ABSOLUTE COUNT: 8.81 K/UL (ref 1.5–8.1)
SODIUM BLD-SCNC: 136 MMOL/L (ref 135–144)
SPECIMEN DESCRIPTION: NORMAL
STATUS: NORMAL
WBC # BLD: 12.2 K/UL (ref 3.5–11.3)
WBC # BLD: ABNORMAL 10*3/UL

## 2018-10-28 PROCEDURE — 2580000003 HC RX 258: Performed by: INTERNAL MEDICINE

## 2018-10-28 PROCEDURE — 6370000000 HC RX 637 (ALT 250 FOR IP): Performed by: HOSPITALIST

## 2018-10-28 PROCEDURE — 85025 COMPLETE CBC W/AUTO DIFF WBC: CPT

## 2018-10-28 PROCEDURE — 6370000000 HC RX 637 (ALT 250 FOR IP): Performed by: STUDENT IN AN ORGANIZED HEALTH CARE EDUCATION/TRAINING PROGRAM

## 2018-10-28 PROCEDURE — 6360000002 HC RX W HCPCS: Performed by: STUDENT IN AN ORGANIZED HEALTH CARE EDUCATION/TRAINING PROGRAM

## 2018-10-28 PROCEDURE — 2700000000 HC OXYGEN THERAPY PER DAY

## 2018-10-28 PROCEDURE — 36415 COLL VENOUS BLD VENIPUNCTURE: CPT

## 2018-10-28 PROCEDURE — 80048 BASIC METABOLIC PNL TOTAL CA: CPT

## 2018-10-28 PROCEDURE — 2580000003 HC RX 258: Performed by: STUDENT IN AN ORGANIZED HEALTH CARE EDUCATION/TRAINING PROGRAM

## 2018-10-28 PROCEDURE — 94762 N-INVAS EAR/PLS OXIMTRY CONT: CPT

## 2018-10-28 PROCEDURE — 99232 SBSQ HOSP IP/OBS MODERATE 35: CPT | Performed by: INTERNAL MEDICINE

## 2018-10-28 PROCEDURE — 94640 AIRWAY INHALATION TREATMENT: CPT

## 2018-10-28 PROCEDURE — 2060000000 HC ICU INTERMEDIATE R&B

## 2018-10-28 PROCEDURE — 99233 SBSQ HOSP IP/OBS HIGH 50: CPT | Performed by: INTERNAL MEDICINE

## 2018-10-28 PROCEDURE — 82947 ASSAY GLUCOSE BLOOD QUANT: CPT

## 2018-10-28 RX ORDER — INSULIN GLARGINE 100 [IU]/ML
10 INJECTION, SOLUTION SUBCUTANEOUS DAILY
Status: DISCONTINUED | OUTPATIENT
Start: 2018-10-28 | End: 2018-10-29 | Stop reason: HOSPADM

## 2018-10-28 RX ADMIN — METFORMIN HYDROCHLORIDE 500 MG: 500 TABLET, EXTENDED RELEASE ORAL at 16:53

## 2018-10-28 RX ADMIN — TAMSULOSIN HYDROCHLORIDE 0.4 MG: 0.4 CAPSULE ORAL at 08:23

## 2018-10-28 RX ADMIN — ENOXAPARIN SODIUM 40 MG: 40 INJECTION SUBCUTANEOUS at 08:32

## 2018-10-28 RX ADMIN — INSULIN LISPRO 2 UNITS: 100 INJECTION, SOLUTION INTRAVENOUS; SUBCUTANEOUS at 12:19

## 2018-10-28 RX ADMIN — PIPERACILLIN AND TAZOBACTAM 3.38 G: 3; .375 INJECTION, POWDER, LYOPHILIZED, FOR SOLUTION INTRAVENOUS; PARENTERAL at 00:28

## 2018-10-28 RX ADMIN — LEVOFLOXACIN 750 MG: 750 TABLET, FILM COATED ORAL at 08:23

## 2018-10-28 RX ADMIN — ACETAMINOPHEN 650 MG: 325 TABLET ORAL at 20:15

## 2018-10-28 RX ADMIN — INSULIN LISPRO 6 UNITS: 100 INJECTION, SOLUTION INTRAVENOUS; SUBCUTANEOUS at 16:53

## 2018-10-28 RX ADMIN — INSULIN GLARGINE 10 UNITS: 100 INJECTION, SOLUTION SUBCUTANEOUS at 11:09

## 2018-10-28 RX ADMIN — MOMETASONE FUROATE AND FORMOTEROL FUMARATE DIHYDRATE 2 PUFF: 100; 5 AEROSOL RESPIRATORY (INHALATION) at 08:15

## 2018-10-28 RX ADMIN — ASPIRIN 81 MG: 81 TABLET, COATED ORAL at 08:24

## 2018-10-28 RX ADMIN — IPRATROPIUM BROMIDE AND ALBUTEROL SULFATE 1 AMPULE: .5; 3 SOLUTION RESPIRATORY (INHALATION) at 20:27

## 2018-10-28 RX ADMIN — MOMETASONE FUROATE AND FORMOTEROL FUMARATE DIHYDRATE 2 PUFF: 100; 5 AEROSOL RESPIRATORY (INHALATION) at 20:27

## 2018-10-28 RX ADMIN — SODIUM CHLORIDE, PRESERVATIVE FREE 10 ML: 5 INJECTION INTRAVENOUS at 09:00

## 2018-10-28 RX ADMIN — PREDNISONE 40 MG: 20 TABLET ORAL at 08:23

## 2018-10-28 RX ADMIN — IPRATROPIUM BROMIDE AND ALBUTEROL SULFATE 1 AMPULE: .5; 3 SOLUTION RESPIRATORY (INHALATION) at 08:15

## 2018-10-28 RX ADMIN — INSULIN LISPRO 1 UNITS: 100 INJECTION, SOLUTION INTRAVENOUS; SUBCUTANEOUS at 08:24

## 2018-10-28 RX ADMIN — ACETAMINOPHEN 650 MG: 325 TABLET ORAL at 04:18

## 2018-10-28 RX ADMIN — ESCITALOPRAM OXALATE 10 MG: 10 TABLET ORAL at 08:24

## 2018-10-28 RX ADMIN — IPRATROPIUM BROMIDE AND ALBUTEROL SULFATE 1 AMPULE: .5; 3 SOLUTION RESPIRATORY (INHALATION) at 15:02

## 2018-10-28 RX ADMIN — Medication 10 ML: at 08:24

## 2018-10-28 RX ADMIN — IPRATROPIUM BROMIDE AND ALBUTEROL SULFATE 1 AMPULE: .5; 3 SOLUTION RESPIRATORY (INHALATION) at 11:00

## 2018-10-28 RX ADMIN — METFORMIN HYDROCHLORIDE 500 MG: 500 TABLET, EXTENDED RELEASE ORAL at 08:23

## 2018-10-28 RX ADMIN — INSULIN LISPRO 3 UNITS: 100 INJECTION, SOLUTION INTRAVENOUS; SUBCUTANEOUS at 21:25

## 2018-10-28 RX ADMIN — Medication 3 MG: at 20:12

## 2018-10-28 ASSESSMENT — PAIN SCALES - GENERAL
PAINLEVEL_OUTOF10: 4
PAINLEVEL_OUTOF10: 3

## 2018-10-28 NOTE — PROGRESS NOTES
PULMONARY PROGRESS NOTE      Patient:  Dominique Taylor  YOB: 1940    MRN: [id number]     Acct: [de-identified]     Admit date: 10/22/2018    REASON FOR CONSULT:- Pneumonia, Emphysema with fibrosis    Pt seen and Chart reviewed. Subjective:     No acute events overnight. Patient reports he is feeling better  Continues to be on high flow oxygen and is on 40% FiO2 with 18 L, was on 45% and 25 liter yesterday  He has expectoration which is slow down. Light yellow. No hemoptysis. Dyspneic with exertion but at rest he has no work of breathing was slightly tachypneic  He is afebrile  Patient reports his his dysuria is resolved after starting Flomax. Review of Systems -  General ROS: negative for - chills, fatigue, fever or weight loss  ENT ROS: negative for - headaches, oral lesions or sore throat  Cardiovascular ROS: no chest pain , orthopnea or pnd   Gastrointestinal ROS: no abdominal pain, change in bowel habits, or black or bloody stools  Skin - no rash   Neuro - no blurry vision , no loc . No focal weakness   msk - no jt tenderness or swelling    Vascular - no claudication , rest completed and negative   Lymphatic - complete and negative   Hematology - oncology - complete and negative   Allergy immunology - complete and negative    no burning or hematuria          Physical Exam:  Vitals: /68   Pulse 88   Temp 98.4 °F (36.9 °C) (Oral)   Resp 17   Ht 5' 10\" (1.778 m)   Wt 156 lb 12 oz (71.1 kg)   SpO2 99%   BMI 22.49 kg/m²   24 hour intake/output:    Intake/Output Summary (Last 24 hours) at 10/28/18 1521  Last data filed at 10/28/18 0550   Gross per 24 hour   Intake             1590 ml   Output             1636 ml   Net              -46 ml     Last 3 weights:   Wt Readings from Last 3 Encounters:   10/28/18 156 lb 12 oz (71.1 kg)   06/22/18 155 lb (70.3 kg)   06/06/18 155 lb (70.3 kg)           Physical Examination:   Head and neck atraumatic, normocephalic    Lymph nodes-no cervical,

## 2018-10-28 NOTE — PROGRESS NOTES
IM RESIDENT PROGRESS NOTE           Patient:  Lionel Soliman  YOB: 1940     MRN: [id number]                                     HMVO: [id number]      Admit date: 10/22/2018          Chief Complaint   Patient presents with    Shortness of Breath       H/o pneumonia, chronic COPD, coughing up brown sputum         Pt seen and Chart reviewed.     Subjective:   Patient evaluated at bedside. He reports no new complaints. He denies shortness of breath, worsening cough, chest pain. Vital signs stable per chart. Afebrile  On high flow nasal cannula, FiO2 40%.  18 L/min.     Diet:  DIET CARB CONTROL;        Medications:Current Inpatient     Scheduled Meds:  Scheduled Medications    predniSONE  40 mg Oral Daily     Followed by   Jameson Trejo ON 10/29/2018] predniSONE  30 mg Oral Daily     Followed by   Jameson Trejo ON 11/1/2018] predniSONE  20 mg Oral Daily     Followed by   Jameson Trejo ON 11/4/2018] predniSONE  10 mg Oral Daily    metFORMIN  500 mg Oral BID WC    iron sucrose  200 mg Intravenous Q24H    ipratropium-albuterol  1 ampule Inhalation 4x daily    sodium chloride flush  10 mL Intravenous 2 times per day    aspirin  81 mg Oral Daily    escitalopram  10 mg Oral Daily    sodium chloride flush  10 mL Intravenous 2 times per day    mometasone-formoterol  2 puff Inhalation BID    piperacillin-tazobactam  3.375 g Intravenous Q8H     And    levofloxacin  750 mg Intravenous Q24H    insulin lispro  0-12 Units Subcutaneous TID WC    insulin lispro  0-6 Units Subcutaneous Nightly    enoxaparin  40 mg Subcutaneous Daily         Continuous Infusions:  Infusions Meds          PRN Meds:  PRN Medications   acetaminophen, melatonin, albuterol, sodium chloride flush, sodium chloride flush, magnesium hydroxide, ondansetron         Objective:     Physical Exam:  Vitals: BP (!) 147/96   Pulse 65   Temp 97.5 °F (36.4 °C) (Oral)   Resp 21   Ht 5' 10\" (1.778 m)   Wt 166 lb 3.6 oz (75.4 kg)   SpO2 92%   BMI 23.85 kg/m²   24 hour intake/output:  Intake/Output Summary (Last 24 hours) at 10/26/18 1158  Last data filed at 10/26/18 0631    Gross per 24 hour   Intake          1017.48 ml   Output              975 ml   Net            42.48 ml      Last 3 weights:        Wt Readings from Last 3 Encounters:   10/26/18 166 lb 3.6 oz (75.4 kg)   06/22/18 155 lb (70.3 kg)   06/06/18 155 lb (70.3 kg)         Physical Examination:   General appearance - alert, well appearing, and in no distress, on nasal cannula high flow O2. Mouth - mucous membranes moist, pharynx normal without lesions  Chest - bibasal rales present, decreased air entry bilaterally   Heart - normal rate, regular rhythm, normal S1, S2, no murmurs, rubs, clicks or gallops  Abdomen - soft, nontender, nondistended, no masses or organomegaly  Neurological - alert, oriented, normal speech, no focal findings or movement disorder noted  Musculoskeletal - no joint tenderness, deformity or swelling. Extremities: No pedal edema  Labs:-     CBC:         Recent Labs      10/24/18   0756  10/25/18   1225  10/26/18   0623   WBC  11.3  17.0*  10.7   HGB  10.0*  10.4*  10.0*   PLT  267  268  244              BMP:  Recent Labs      10/25/18   1225  10/26/18   0623   NA  134*  137   K  4.1  4.7   CL  96*  97*   CO2  21  27   BUN  20  17   CREATININE  0.85  0.71   GLUCOSE  241*  152*      Calcium:        Recent Labs      10/26/18   8509   CALCIUM  8.5*      Ionized Calcium:No results for input(s): IONCA in the last 72 hours. Magnesium:No results for input(s): MG in the last 72 hours. Phosphorus:No results for input(s): PHOS in the last 72 hours. BNP:No results for input(s): BNP in the last 72 hours. Glucose:            Recent Labs      10/25/18   1557  10/25/18   2019  10/26/18   2037   POCGLU  321*  296*  252*      HgbA1C: No results for input(s): LABA1C in the last 72 hours. INR: No results for input(s): INR in the last 72 hours.   Hepatic: No results for input(s): ALKPHOS, ALT,

## 2018-10-28 NOTE — PROGRESS NOTES
Writer notified Internal Medicine on-call Senior, via perfect serve,  that patients IV is due to be changed. However, patient is supposed to be discharged tomorrow and no longer has any IV meds. Was told that it was okay to remove IV line with no replacement.

## 2018-10-29 ENCOUNTER — HOSPITAL ENCOUNTER (OUTPATIENT)
Dept: MEDSURG UNIT | Age: 78
Discharge: HOME | End: 2018-11-16

## 2018-10-29 VITALS
WEIGHT: 156.75 LBS | RESPIRATION RATE: 23 BRPM | TEMPERATURE: 97.9 F | HEART RATE: 80 BPM | BODY MASS INDEX: 22.44 KG/M2 | HEIGHT: 70 IN | DIASTOLIC BLOOD PRESSURE: 62 MMHG | SYSTOLIC BLOOD PRESSURE: 120 MMHG | OXYGEN SATURATION: 94 %

## 2018-10-29 LAB
GLUCOSE BLD-MCNC: 135 MG/DL (ref 75–110)
GLUCOSE BLD-MCNC: 153 MG/DL (ref 75–110)
GLUCOSE BLD-MCNC: 302 MG/DL (ref 75–110)

## 2018-10-29 PROCEDURE — 6370000000 HC RX 637 (ALT 250 FOR IP): Performed by: HOSPITALIST

## 2018-10-29 PROCEDURE — 99232 SBSQ HOSP IP/OBS MODERATE 35: CPT | Performed by: INTERNAL MEDICINE

## 2018-10-29 PROCEDURE — 94762 N-INVAS EAR/PLS OXIMTRY CONT: CPT

## 2018-10-29 PROCEDURE — 6370000000 HC RX 637 (ALT 250 FOR IP): Performed by: STUDENT IN AN ORGANIZED HEALTH CARE EDUCATION/TRAINING PROGRAM

## 2018-10-29 PROCEDURE — 97116 GAIT TRAINING THERAPY: CPT

## 2018-10-29 PROCEDURE — 6360000002 HC RX W HCPCS: Performed by: STUDENT IN AN ORGANIZED HEALTH CARE EDUCATION/TRAINING PROGRAM

## 2018-10-29 PROCEDURE — 99239 HOSP IP/OBS DSCHRG MGMT >30: CPT | Performed by: INTERNAL MEDICINE

## 2018-10-29 PROCEDURE — 82947 ASSAY GLUCOSE BLOOD QUANT: CPT

## 2018-10-29 PROCEDURE — 97535 SELF CARE MNGMENT TRAINING: CPT

## 2018-10-29 PROCEDURE — 94640 AIRWAY INHALATION TREATMENT: CPT

## 2018-10-29 PROCEDURE — 97110 THERAPEUTIC EXERCISES: CPT

## 2018-10-29 PROCEDURE — 2700000000 HC OXYGEN THERAPY PER DAY

## 2018-10-29 RX ORDER — LEVOFLOXACIN 750 MG/1
750 TABLET ORAL DAILY
Qty: 2 TABLET | Refills: 0 | OUTPATIENT
Start: 2018-10-30 | End: 2018-11-01

## 2018-10-29 RX ORDER — TAMSULOSIN HYDROCHLORIDE 0.4 MG/1
0.4 CAPSULE ORAL DAILY
Qty: 30 CAPSULE | Refills: 3 | OUTPATIENT
Start: 2018-10-30

## 2018-10-29 RX ORDER — PREDNISONE 10 MG/1
30 TABLET ORAL DAILY
Qty: 6 TABLET | Refills: 0 | OUTPATIENT
Start: 2018-10-30 | End: 2018-11-01

## 2018-10-29 RX ORDER — PREDNISONE 10 MG/1
10 TABLET ORAL DAILY
Qty: 3 TABLET | Refills: 0 | OUTPATIENT
Start: 2018-11-04 | End: 2018-11-07

## 2018-10-29 RX ORDER — PREDNISONE 20 MG/1
20 TABLET ORAL DAILY
Qty: 3 TABLET | Refills: 0 | OUTPATIENT
Start: 2018-11-01 | End: 2018-11-04

## 2018-10-29 RX ORDER — INSULIN GLARGINE 100 [IU]/ML
10 INJECTION, SOLUTION SUBCUTANEOUS DAILY
Qty: 1 VIAL | Refills: 3 | OUTPATIENT
Start: 2018-10-30

## 2018-10-29 RX ADMIN — MOMETASONE FUROATE AND FORMOTEROL FUMARATE DIHYDRATE 2 PUFF: 100; 5 AEROSOL RESPIRATORY (INHALATION) at 08:41

## 2018-10-29 RX ADMIN — METFORMIN HYDROCHLORIDE 500 MG: 500 TABLET, EXTENDED RELEASE ORAL at 07:49

## 2018-10-29 RX ADMIN — ASPIRIN 81 MG: 81 TABLET, COATED ORAL at 07:49

## 2018-10-29 RX ADMIN — ESCITALOPRAM OXALATE 10 MG: 10 TABLET ORAL at 07:49

## 2018-10-29 RX ADMIN — TAMSULOSIN HYDROCHLORIDE 0.4 MG: 0.4 CAPSULE ORAL at 07:49

## 2018-10-29 RX ADMIN — IPRATROPIUM BROMIDE AND ALBUTEROL SULFATE 1 AMPULE: .5; 3 SOLUTION RESPIRATORY (INHALATION) at 17:27

## 2018-10-29 RX ADMIN — INSULIN LISPRO 2 UNITS: 100 INJECTION, SOLUTION INTRAVENOUS; SUBCUTANEOUS at 07:50

## 2018-10-29 RX ADMIN — METFORMIN HYDROCHLORIDE 500 MG: 500 TABLET, EXTENDED RELEASE ORAL at 18:18

## 2018-10-29 RX ADMIN — PREDNISONE 30 MG: 20 TABLET ORAL at 07:49

## 2018-10-29 RX ADMIN — ENOXAPARIN SODIUM 40 MG: 40 INJECTION SUBCUTANEOUS at 07:49

## 2018-10-29 RX ADMIN — IPRATROPIUM BROMIDE AND ALBUTEROL SULFATE 1 AMPULE: .5; 3 SOLUTION RESPIRATORY (INHALATION) at 11:43

## 2018-10-29 RX ADMIN — LEVOFLOXACIN 750 MG: 750 TABLET, FILM COATED ORAL at 07:49

## 2018-10-29 RX ADMIN — INSULIN LISPRO 8 UNITS: 100 INJECTION, SOLUTION INTRAVENOUS; SUBCUTANEOUS at 17:50

## 2018-10-29 RX ADMIN — IPRATROPIUM BROMIDE AND ALBUTEROL SULFATE 1 AMPULE: .5; 3 SOLUTION RESPIRATORY (INHALATION) at 08:40

## 2018-10-29 RX ADMIN — INSULIN GLARGINE 10 UNITS: 100 INJECTION, SOLUTION SUBCUTANEOUS at 07:50

## 2018-10-29 NOTE — PROGRESS NOTES
CAR[facility] JUNE[facility] [J18.9] . Vitals:    10/29/18 1731   BP:    Pulse:    Resp: 24   Temp:    SpO2: 98%   . Patients home meds are   Prior to Admission medications    Medication Sig Start Date End Date Taking? Authorizing Provider   aclidinium (TUDORZA PRESSAIR) 400 MCG/ACT AEPB inhaler Inhale 1 puff into the lungs 2 times daily 6/22/18 7/22/19 Yes Diogo Ragland MD   albuterol sulfate HFA (VENTOLIN HFA) 108 (90 Base) MCG/ACT inhaler Inhale 2 puffs into the lungs every 6 hours as needed for Wheezing 6/22/18  Yes Diogo Ragland MD   budesonide-formoterol (SYMBICORT) 80-4.5 MCG/ACT AERO Inhale 2 puffs into the lungs 2 times daily 5/25/18  Yes Garrett El MD   metFORMIN ER (GLUCOPHAGE-XR) 500 MG XR tablet take 2 tablets by mouth twice a day 8/1/16  Yes Ann Gordon PA-C   aspirin 81 MG tablet Take 81 mg by mouth daily   Yes Historical Provider, MD   escitalopram (LEXAPRO) 10 MG tablet Take 1 tablet by mouth daily 11/10/15  Yes Dominique Jensen PA-C   ONE TOUCH ULTRA TEST strip STRIP MISC ONCE A DAY 7/1/15   Dominique Jensen PA-C   .   Recent Surgical History: None = 0     Assessment     Peak Flow (asthma only)    Predicted:   Personal Best:   PEF   % Predicted   Peak Flow :     FEV1/FVC    FEV1 Predicted       FEV1     FEV1 % Predicted   FVC   IS volume   IBW     RR 21  Breath Sounds: clear      · Bronchodilator assessment at level  0  · Hyperinflation assessment at level   · Secretion Management assessment at level    ·   · []    Bronchodilator Assessment  BRONCHODILATOR ASSESSMENT SCORE  Score 0 1 2 3 4 5   Breath Sounds   [x]  Patient Baseline []  No Wheeze good aeration []  Faint, scattered wheezing, good aeration []  Expiratory Wheezing and or moderately diminished []  Insp/Exp wheeze and/or very diminished []  Insp/Exp and/ or marked distress   Respiratory Rate   [x]  Patient Baseline []  Less than 20 []  Less than 20 []  20-25 []  Greater than 25 []  Greater than 25   Peak flow % of Pred or PB [x]  NA   []  Greater than 90%  []  81-90% []  71-80% []  Less than or equal to 70%  or unable to perform []  Unable due to Respiratory Distress   Dyspnea re [x]  Patient Baseline []  No SOB []  No SOB []  SOB on exertion []  SOB min activity []  At rest/acute   e FEV% Predicted       [x]  NA []  Above 69%  []  Unable []  Above 60-69%  []  Unable []  Above 50-59%  []  Unable []  Above 35-49%  []  Unable []  Less than 35%  []  Unable                 []  Hyperinflation Assessment  Score 1 2 3   CXR and Breath Sounds   []  Clear []  No atelectasis  Basilar aeration []  Atelectasis or absent basilar breath sounds   Incentive Spirometry Volume  (Per IBW)   []  Greater than or equal to 15ml/Kg []  less than 15ml/Kg []  less than 15ml/Kg   Surgery within last 2 weeks []  None or general   []  Abdominal or thoracic surgery  []  Abdominal or thoracic   Chronic Pulmonary Historyre []  No []  Yes []  Yes     []  Secretion Management Assessment  Score 1 2 3   Bilateral Breath Sounds   []  Occasional Rhonchi []  Scattered Rhonchi []  Course Rhonchi and/or poor aeration   Sputum    []  Small amount of thin secretions []  Moderate amount of viscous secretions []  Copius, Viscious Yellow/ Secretions   CXR as reported by physician []  clear  []  Unavailable []  Infiltrates and/or consolidation  []  Unavailable []  Mucus Plugging and or lobar consolidation  []  Unavailable   Cough []  Strong, productive cough []  Weak productive cough []  No cough or weak non-productive cough   CAROLE JUNE  5:33 PM                            FEMALE                                  MALE                            FEV1 Predicted Normal Values                        FEV1 Predicted Normal Values          Age                                     Height in Feet and Inches       Age                                     Height in Feet and Inches       4' 11\" 5' 1\" 5' 3\" 5' 5\" 5' 7\" 5' 9\" 5' 11\" 6' 1\"  4' 11\" 5' 1\" 5'

## 2018-10-29 NOTE — PROGRESS NOTES
assistance  Standing Balance  Time: 3 minutes  Activity: [facility] in standing at chair w/o device. Sit to stand: Stand by assistance  Stand to sit: Stand by assistance  Transfers  Sit to stand: Stand by assistance  Stand to sit: Stand by assistance  Cognition  Overall Cognitive Status: [facility]     Assessment   Performance deficits / Impairments: Decreased functional mobility ; Decreased ADL status; Decreased endurance;Decreased high-level IADLs  Prognosis: Good  Patient Education: OT POC, transfer safety, EC/WS, pursed lip breathing with good return. REQUIRES OT FOLLOW UP: Yes  Activity Tolerance  Activity Tolerance: Patient Tolerated treatment well  Safety Devices  Safety Devices in place: Yes  Type of devices: Call light within reach; Chair alarm in place; Patient at risk for falls; Left in chair;Nurse notified          Plan   Plan  Times per week: 3-4x  Current Treatment Recommendations: Balance Training, Functional Mobility Training, Endurance Training, Safety Education & Training, Self-Care / ADL, Home Management Training, Patient/Caregiver Education & Training, Equipment Evaluation, Education, & procurement    Goals  Short term goals  Time Frame for Short term goals: Pt will by discharge  Short term goal 1: demo supine<>sit transfer to EOB with mod I  Short term goal 2: demo good safety awareness during func mob around room with RW and mod I (may be limited by hi-flow)  Short term goal 3: demo ADL UB/LB dressing/bathing activity seated with setup and mod I  Short term goal 4: demo EC/WS technniques during func activity with 1 vc  Short term goal 5: demo standing during func activity for 10 min with RW and mod I       Therapy Time   Individual Concurrent Group Co-treatment   Time In 9141         Time Out 1555         Minutes 76            Pt seated in chair upon therapist arrival. Pleasant and agreeable to therapy.  Pt displayed good energy conservation techniques monitoring O2 saturation independently with portable

## 2018-10-29 NOTE — PROGRESS NOTES
Ambulation  Ambulation?: Yes  Ambulation 1  Surface: level tile  Device: Rolling Walker  Other Apparatus: O2  Assistance: Contact guard assistance  Quality of Gait: Minimal forward flexed posture, steady steps  Distance: 7 steps forward/backward x 3 trials (bed to chair), 6 steps x2 trials lateral HHA (bed to chair)  Comments: limited by SOB and length of tubing for Hi-flow     Balance  Posture: Fair  Sitting - Static: Good  Sitting - Dynamic: Good  Standing - Static: Good  Standing - Dynamic: Fair    Exercises:  Supine LE exercise program: Short Arc Quads, hip abduction/adduction, heel/toe raises, and marches. Reps: 15  Upper extremity exercises: Bicep curl, shoulder flexion/extension, punches, tricep curl, shoulder abduction/adduction. Reps: 15       Assessment   Body structures, Functions, Activity limitations: Decreased functional mobility ; Decreased endurance;Decreased balance;Decreased strength  Assessment: Pt cont to require frequent seated rest breaks during ambulation and supine exercises. Decreaesed activity tolerance due to desats down to low 80's. Prognosis: Good  Patient Education: breathing technique  REQUIRES PT FOLLOW UP: Yes  Activity Tolerance  Activity Tolerance: Patient limited by endurance; Patient Tolerated treatment well  Activity Tolerance: SOB with exertion       Goals  Short term goals  Time Frame for Short term goals: 14 visits  Short term goal 1: Tolerate 30 minutes of exercise to improve endurance  Short term goal 2: Independent bed mobility  Short term goal 3: Ambulate 100 ft with no AD SBA  Short term goal 4: Ambulate 2 stairs without rails CGA  Short term goal 5: Independent with home exercise program  Patient Goals   Patient goals :  To return home    Plan    Plan  Times per week: 5x/week  Current Treatment Recommendations: Strengthening, Balance Training, Gait Training, Stair training, Safety Education & Training, Home Exercise Program, Endurance Training  Safety Devices  Type of

## 2018-11-05 NOTE — DISCHARGE SUMMARY
[address] [place] [address]     Department of Internal Medicine - Staff Internal Medicine Service    INPATIENT DISCHARGE SUMMARY        Patient Identification:  Nena Ayala is a 70 y.o. male. :  1940  MRN: [id number]     Acct: [de-identified]   Admit Date:  10/22/2018  Discharge date and time: 10/29/2018  7:51 PM   Attending Provider: No att. providers found                                     Admission Diagnoses:   Pneumonia [J18.9]    Discharge Diagnoses:   Principal Problem:    Pneumonia due to organism  Active Problems:    Type 2 diabetes mellitus ([address]    Anxiety    On high flow O2    Pulmonary artery hypertension (HCC)    Ex-smoker    Iron deficiency anemia    Pulmonary fibrosis (HCC)    Acute on chronic respiratory failure (HCC)    Pleural plaque due to asbestos exposure  Resolved Problems:    Lactic acidosis    Leukocytosis    Dysuria     Consults:   Pulmonology     Brief Inpatient course:  70 y.o male who presented with complaints of worsening shortness of breath, cough and sputum, lower urinary symptoms. He was found to be in respiratory distress with WBC 15.3. CT pulmonary angiogram did nont show PE. Fibrotic changes, emphysema and pleural plaques seen. He was managed for COPD exacerbation and UTI. He was started on levaquin and steroids. He required high flow oxygen via nasal cannula. Pulmonology consulted. Patient improved but had to be transferred to [facility] as a result of continued need for high flow oxygen.       Procedures:  none    Any Hospital Acquired Infections: none    Discharge Functional Status:  stable    Disposition: long term care facility    Patient Instructions:   Discharge Medication List as of 10/29/2018  6:16 PM      CONTINUE these medications which have NOT CHANGED    Details   aclidinium (TUDORZA PRESSAIR) 400 MCG/ACT AEPB inhaler Inhale 1 puff into the lungs 2 times daily, Disp-1 each, R-3Normal      albuterol sulfate HFA (VENTOLIN HFA) 108 (90 Base) MCG/ACT inhaler

## 2018-11-09 LAB
HCT VFR BLD CALC: 33.7 % (ref 41–53)
HEMOGLOBIN: 11.4 G/DL (ref 13.5–17.5)
MCH RBC QN AUTO: 32.2 PG (ref 26–34)
MCHC RBC AUTO-ENTMCNC: 33.9 G/DL (ref 31–37)
MCV RBC AUTO: 95.2 FL (ref 80–100)
NRBC AUTOMATED: ABNORMAL PER 100 WBC
PDW BLD-RTO: 17.5 % (ref 11.5–14.5)
PLATELET # BLD: 189 K/UL (ref 130–400)
PMV BLD AUTO: ABNORMAL FL (ref 6–12)
RBC # BLD: 3.54 M/UL (ref 4.5–5.9)
WBC # BLD: 16.2 K/UL (ref 3.5–11)

## 2018-11-09 PROCEDURE — 87493 C DIFF AMPLIFIED PROBE: CPT

## 2018-11-09 PROCEDURE — 85027 COMPLETE CBC AUTOMATED: CPT

## 2018-11-10 LAB
C DIFFICILE TOXINS, PCR: ABNORMAL
SPECIMEN DESCRIPTION: ABNORMAL

## 2018-11-27 ENCOUNTER — APPOINTMENT (OUTPATIENT)
Dept: GENERAL RADIOLOGY | Age: 78
DRG: 191 | End: 2018-11-27
Payer: MEDICARE

## 2018-11-27 ENCOUNTER — HOSPITAL ENCOUNTER (INPATIENT)
Age: 78
LOS: 3 days | Discharge: HOME HEALTH | DRG: 191 | End: 2018-11-30
Attending: EMERGENCY MEDICINE | Admitting: INTERNAL MEDICINE
Payer: MEDICARE

## 2018-11-27 DIAGNOSIS — J84.10 PULMONARY FIBROSIS (HCC): ICD-10-CM

## 2018-11-27 DIAGNOSIS — J44.1 COPD EXACERBATION (HCC): Primary | ICD-10-CM

## 2018-11-27 PROBLEM — J96.21 ACUTE ON CHRONIC RESPIRATORY FAILURE WITH HYPOXIA (HCC): Status: ACTIVE | Noted: 2018-11-27

## 2018-11-27 LAB
ANION GAP SERPL CALCULATED.3IONS-SCNC: 10 MMOL/L (ref 9–17)
BNP INTERPRETATION: ABNORMAL
BUN BLDV-MCNC: 9 MG/DL (ref 8–23)
BUN/CREAT BLD: ABNORMAL (ref 9–20)
CALCIUM SERPL-MCNC: 8.6 MG/DL (ref 8.6–10.4)
CHLORIDE BLD-SCNC: 96 MMOL/L (ref 98–107)
CO2: 28 MMOL/L (ref 20–31)
CREAT SERPL-MCNC: 0.59 MG/DL (ref 0.7–1.2)
EKG ATRIAL RATE: 85 BPM
EKG P AXIS: 54 DEGREES
EKG P-R INTERVAL: 122 MS
EKG Q-T INTERVAL: 346 MS
EKG QRS DURATION: 70 MS
EKG QTC CALCULATION (BAZETT): 411 MS
EKG R AXIS: 56 DEGREES
EKG T AXIS: 39 DEGREES
EKG VENTRICULAR RATE: 85 BPM
GFR AFRICAN AMERICAN: >60 ML/MIN
GFR NON-AFRICAN AMERICAN: >60 ML/MIN
GFR SERPL CREATININE-BSD FRML MDRD: ABNORMAL ML/MIN/{1.73_M2}
GFR SERPL CREATININE-BSD FRML MDRD: ABNORMAL ML/MIN/{1.73_M2}
GLUCOSE BLD-MCNC: 193 MG/DL (ref 70–99)
HCT VFR BLD CALC: 36.6 % (ref 40.7–50.3)
HEMOGLOBIN: 11.4 G/DL (ref 13–17)
MCH RBC QN AUTO: 30.6 PG (ref 25.2–33.5)
MCHC RBC AUTO-ENTMCNC: 31.1 G/DL (ref 28.4–34.8)
MCV RBC AUTO: 98.1 FL (ref 82.6–102.9)
NRBC AUTOMATED: 0 PER 100 WBC
PDW BLD-RTO: 15.1 % (ref 11.8–14.4)
PLATELET # BLD: 276 K/UL (ref 138–453)
PMV BLD AUTO: 10.1 FL (ref 8.1–13.5)
POC TROPONIN I: 0 NG/ML (ref 0–0.1)
POC TROPONIN I: 0 NG/ML (ref 0–0.1)
POC TROPONIN INTERP: NORMAL
POC TROPONIN INTERP: NORMAL
POTASSIUM SERPL-SCNC: 4.3 MMOL/L (ref 3.7–5.3)
PRO-BNP: 423 PG/ML
RBC # BLD: 3.73 M/UL (ref 4.21–5.77)
SODIUM BLD-SCNC: 134 MMOL/L (ref 135–144)
WBC # BLD: 12.5 K/UL (ref 3.5–11.3)

## 2018-11-27 PROCEDURE — 84484 ASSAY OF TROPONIN QUANT: CPT

## 2018-11-27 PROCEDURE — 85027 COMPLETE CBC AUTOMATED: CPT

## 2018-11-27 PROCEDURE — 6370000000 HC RX 637 (ALT 250 FOR IP): Performed by: STUDENT IN AN ORGANIZED HEALTH CARE EDUCATION/TRAINING PROGRAM

## 2018-11-27 PROCEDURE — 71045 X-RAY EXAM CHEST 1 VIEW: CPT

## 2018-11-27 PROCEDURE — 93005 ELECTROCARDIOGRAM TRACING: CPT

## 2018-11-27 PROCEDURE — 2580000003 HC RX 258: Performed by: STUDENT IN AN ORGANIZED HEALTH CARE EDUCATION/TRAINING PROGRAM

## 2018-11-27 PROCEDURE — 2700000000 HC OXYGEN THERAPY PER DAY

## 2018-11-27 PROCEDURE — 83880 ASSAY OF NATRIURETIC PEPTIDE: CPT

## 2018-11-27 PROCEDURE — 99223 1ST HOSP IP/OBS HIGH 75: CPT | Performed by: INTERNAL MEDICINE

## 2018-11-27 PROCEDURE — 94762 N-INVAS EAR/PLS OXIMTRY CONT: CPT

## 2018-11-27 PROCEDURE — 87040 BLOOD CULTURE FOR BACTERIA: CPT

## 2018-11-27 PROCEDURE — 99285 EMERGENCY DEPT VISIT HI MDM: CPT

## 2018-11-27 PROCEDURE — 6360000002 HC RX W HCPCS: Performed by: PHYSICIAN ASSISTANT

## 2018-11-27 PROCEDURE — 94640 AIRWAY INHALATION TREATMENT: CPT

## 2018-11-27 PROCEDURE — 80048 BASIC METABOLIC PNL TOTAL CA: CPT

## 2018-11-27 PROCEDURE — 1200000000 HC SEMI PRIVATE

## 2018-11-27 PROCEDURE — 6360000002 HC RX W HCPCS: Performed by: STUDENT IN AN ORGANIZED HEALTH CARE EDUCATION/TRAINING PROGRAM

## 2018-11-27 PROCEDURE — 94660 CPAP INITIATION&MGMT: CPT

## 2018-11-27 RX ORDER — SODIUM CHLORIDE 0.9 % (FLUSH) 0.9 %
10 SYRINGE (ML) INJECTION PRN
Status: DISCONTINUED | OUTPATIENT
Start: 2018-11-27 | End: 2018-11-30 | Stop reason: HOSPADM

## 2018-11-27 RX ORDER — ACETAMINOPHEN 325 MG/1
650 TABLET ORAL EVERY 6 HOURS PRN
COMMUNITY

## 2018-11-27 RX ORDER — FAMOTIDINE 20 MG/1
20 TABLET, FILM COATED ORAL 2 TIMES DAILY
COMMUNITY

## 2018-11-27 RX ORDER — ASPIRIN 81 MG/1
81 TABLET, CHEWABLE ORAL DAILY
Status: DISCONTINUED | OUTPATIENT
Start: 2018-11-27 | End: 2018-11-30 | Stop reason: HOSPADM

## 2018-11-27 RX ORDER — ALBUTEROL SULFATE 90 UG/1
2 AEROSOL, METERED RESPIRATORY (INHALATION)
Status: DISCONTINUED | OUTPATIENT
Start: 2018-11-27 | End: 2018-11-27

## 2018-11-27 RX ORDER — METHYLPREDNISOLONE SODIUM SUCCINATE 40 MG/ML
40 INJECTION, POWDER, LYOPHILIZED, FOR SOLUTION INTRAMUSCULAR; INTRAVENOUS EVERY 8 HOURS
Status: DISCONTINUED | OUTPATIENT
Start: 2018-11-27 | End: 2018-11-29

## 2018-11-27 RX ORDER — FLUTICASONE PROPIONATE AND SALMETEROL 113; 14 UG/1; UG/1
113 POWDER, METERED RESPIRATORY (INHALATION) 2 TIMES DAILY
Status: ON HOLD | COMMUNITY
Start: 2018-11-16 | End: 2018-11-27 | Stop reason: ALTCHOICE

## 2018-11-27 RX ORDER — SODIUM CHLORIDE 0.9 % (FLUSH) 0.9 %
10 SYRINGE (ML) INJECTION EVERY 12 HOURS SCHEDULED
Status: DISCONTINUED | OUTPATIENT
Start: 2018-11-27 | End: 2018-11-30 | Stop reason: HOSPADM

## 2018-11-27 RX ORDER — ALBUTEROL SULFATE 2.5 MG/3ML
2.5 SOLUTION RESPIRATORY (INHALATION) EVERY 4 HOURS PRN
Status: DISCONTINUED | OUTPATIENT
Start: 2018-11-27 | End: 2018-11-28

## 2018-11-27 RX ORDER — VANCOMYCIN HYDROCHLORIDE 125 MG/1
125 CAPSULE ORAL 4 TIMES DAILY
Status: ON HOLD | COMMUNITY
End: 2018-11-27 | Stop reason: ALTCHOICE

## 2018-11-27 RX ORDER — TAMSULOSIN HYDROCHLORIDE 0.4 MG/1
0.4 CAPSULE ORAL DAILY
Status: DISCONTINUED | OUTPATIENT
Start: 2018-11-27 | End: 2018-11-30 | Stop reason: HOSPADM

## 2018-11-27 RX ORDER — IPRATROPIUM BROMIDE AND ALBUTEROL SULFATE 2.5; .5 MG/3ML; MG/3ML
1 SOLUTION RESPIRATORY (INHALATION)
Status: DISCONTINUED | OUTPATIENT
Start: 2018-11-27 | End: 2018-11-27

## 2018-11-27 RX ORDER — ALBUTEROL SULFATE 2.5 MG/3ML
5 SOLUTION RESPIRATORY (INHALATION)
Status: DISCONTINUED | OUTPATIENT
Start: 2018-11-27 | End: 2018-11-27

## 2018-11-27 RX ORDER — TAMSULOSIN HYDROCHLORIDE 0.4 MG/1
0.4 CAPSULE ORAL DAILY
COMMUNITY

## 2018-11-27 RX ORDER — LEVOFLOXACIN 5 MG/ML
500 INJECTION, SOLUTION INTRAVENOUS EVERY 24 HOURS
Status: DISCONTINUED | OUTPATIENT
Start: 2018-11-28 | End: 2018-11-29

## 2018-11-27 RX ORDER — LEVOFLOXACIN 5 MG/ML
750 INJECTION, SOLUTION INTRAVENOUS ONCE
Status: COMPLETED | OUTPATIENT
Start: 2018-11-27 | End: 2018-11-27

## 2018-11-27 RX ORDER — FAMOTIDINE 20 MG/1
20 TABLET, FILM COATED ORAL 2 TIMES DAILY
Status: DISCONTINUED | OUTPATIENT
Start: 2018-11-27 | End: 2018-11-30 | Stop reason: HOSPADM

## 2018-11-27 RX ORDER — ACETAMINOPHEN 325 MG/1
650 TABLET ORAL EVERY 4 HOURS PRN
Status: DISCONTINUED | OUTPATIENT
Start: 2018-11-27 | End: 2018-11-30 | Stop reason: HOSPADM

## 2018-11-27 RX ORDER — VANCOMYCIN HYDROCHLORIDE 125 MG/1
125 CAPSULE ORAL 4 TIMES DAILY
Status: ON HOLD | COMMUNITY
End: 2018-12-21

## 2018-11-27 RX ORDER — ESCITALOPRAM OXALATE 10 MG/1
10 TABLET ORAL DAILY
Status: DISCONTINUED | OUTPATIENT
Start: 2018-11-27 | End: 2018-11-30 | Stop reason: HOSPADM

## 2018-11-27 RX ORDER — IPRATROPIUM BROMIDE AND ALBUTEROL SULFATE 2.5; .5 MG/3ML; MG/3ML
1 SOLUTION RESPIRATORY (INHALATION) 4 TIMES DAILY
Status: DISCONTINUED | OUTPATIENT
Start: 2018-11-27 | End: 2018-11-30 | Stop reason: HOSPADM

## 2018-11-27 RX ORDER — ONDANSETRON 2 MG/ML
4 INJECTION INTRAMUSCULAR; INTRAVENOUS EVERY 6 HOURS PRN
Status: DISCONTINUED | OUTPATIENT
Start: 2018-11-27 | End: 2018-11-30 | Stop reason: HOSPADM

## 2018-11-27 RX ORDER — ALBUTEROL SULFATE 2.5 MG/3ML
2.5 SOLUTION RESPIRATORY (INHALATION)
Status: DISCONTINUED | OUTPATIENT
Start: 2018-11-28 | End: 2018-11-27

## 2018-11-27 RX ORDER — ALBUTEROL SULFATE 2.5 MG/3ML
2.5 SOLUTION RESPIRATORY (INHALATION)
Status: DISCONTINUED | OUTPATIENT
Start: 2018-11-27 | End: 2018-11-27

## 2018-11-27 RX ORDER — ONDANSETRON 2 MG/ML
4 INJECTION INTRAMUSCULAR; INTRAVENOUS EVERY 8 HOURS PRN
Status: DISCONTINUED | OUTPATIENT
Start: 2018-11-27 | End: 2018-11-30 | Stop reason: HOSPADM

## 2018-11-27 RX ADMIN — METHYLPREDNISOLONE SODIUM SUCCINATE 40 MG: 40 INJECTION, POWDER, FOR SOLUTION INTRAMUSCULAR; INTRAVENOUS at 22:20

## 2018-11-27 RX ADMIN — Medication 10 ML: at 20:35

## 2018-11-27 RX ADMIN — IPRATROPIUM BROMIDE AND ALBUTEROL SULFATE 1 AMPULE: .5; 3 SOLUTION RESPIRATORY (INHALATION) at 21:20

## 2018-11-27 RX ADMIN — ENOXAPARIN SODIUM 40 MG: 40 INJECTION SUBCUTANEOUS at 22:21

## 2018-11-27 RX ADMIN — ASPIRIN 81 MG 81 MG: 81 TABLET ORAL at 16:40

## 2018-11-27 RX ADMIN — ACETAMINOPHEN 650 MG: 325 TABLET ORAL at 22:20

## 2018-11-27 RX ADMIN — MOMETASONE FUROATE AND FORMOTEROL FUMARATE DIHYDRATE 2 PUFF: 100; 5 AEROSOL RESPIRATORY (INHALATION) at 21:20

## 2018-11-27 RX ADMIN — LEVOFLOXACIN 750 MG: 5 INJECTION, SOLUTION INTRAVENOUS at 15:26

## 2018-11-27 RX ADMIN — TAMSULOSIN HYDROCHLORIDE 0.4 MG: 0.4 CAPSULE ORAL at 16:40

## 2018-11-27 RX ADMIN — FAMOTIDINE 20 MG: 20 TABLET, FILM COATED ORAL at 22:20

## 2018-11-27 RX ADMIN — ESCITALOPRAM OXALATE 10 MG: 10 TABLET ORAL at 18:03

## 2018-11-27 ASSESSMENT — PAIN SCALES - GENERAL
PAINLEVEL_OUTOF10: 5
PAINLEVEL_OUTOF10: 0

## 2018-11-27 ASSESSMENT — ENCOUNTER SYMPTOMS
ABDOMINAL DISTENTION: 0
STRIDOR: 0
SHORTNESS OF BREATH: 1
WHEEZING: 1
DIARRHEA: 0
EYE PAIN: 0
BACK PAIN: 0
ANAL BLEEDING: 0
EYE DISCHARGE: 0
SORE THROAT: 0
VOMITING: 0
ABDOMINAL PAIN: 0
APNEA: 0
CHEST TIGHTNESS: 0
BLOOD IN STOOL: 0
COUGH: 1
WHEEZING: 0
EYE ITCHING: 0
NAUSEA: 0
CHOKING: 0
COLOR CHANGE: 0

## 2018-11-27 NOTE — ED PROVIDER NOTES
[address] [facility]     Emergency Department     Faculty Attestation    I performed a history and physical examination of the patient and discussed management with the resident. I have reviewed and agree with the residents findings including all diagnostic interpretations, and treatment plans as written. Any areas of disagreement are noted on the chart. I was personally present for the key portions of any procedures. I have documented in the chart those procedures where I was not present during the key portions. I have reviewed the emergency nurses triage note. I agree with the chief complaint, past medical history, past surgical history, allergies, medications, social and family history as documented unless otherwise noted below. Documentation of the HPI, Physical Exam and Medical Decision Making performed by scribmartha is based on my personal performance of the HPI, PE and MDM. For Physician Assistant/ Nurse Practitioner cases/documentation I have personally evaluated this patient and have completed at least one if not all key elements of the E/M (history, physical exam, and MDM). Additional findings are as noted. Primary Care Physician: Gabino Joy PA-C    History: This is a 70 y.o. male who presents to the Emergency Department with complaint of shortness of breath, EMS was called today. Does have history of COPD he is on 4 L oxygen at home. When EMS arrived patient had to 6 L it was 80%. Patient does report he is coughing up sputum and the and the fluid. Denies a history of CHF, denies a history of lower extremity edema. No fever or chills    Physical:   height is 5' 8\" (1.727 m) and weight is 200 lb (90.7 kg). His oral temperature is 98.1 °F (36.7 °C). His blood pressure is 126/62 and his pulse is 89. His respiration is 24 and oxygen saturation is 99%.     Patient is awake alert, on nonrebreather mask he is able to answer questions,  Cardia vascular: Regular rate and rhythm, no murmurs gallops or rubs  Respiratory: No wheezes, patient does have Rales noted in the bases bilaterally  No swelling noted to the lower extremities bilaterally  Abdomen is soft nondistended nontender to palpation in all quadrants no rebound or guarding noted. Impression: SOB, ? CHF    Plan: bipap, o2, cbc, bmp, ekg, trop, BNP, CXR        EKG Interpretation    Interpreted by me    EKG shows normal sinus rhythm, normal axis, T-wave flattening noted in aVL, no Q waves noted, no ST changes noted.         Tc Hoffman D.O, M.P.H  Attending Emergency Medicine Physician         Tc Hoffman, DO  11/27/18 [address] [facility],[address] [place] Snellen, DO  11/27/18 [facility]

## 2018-11-27 NOTE — H&P
[address] [facility]     Department of Internal Medicine - Staff Internal Medicine Service          ADMISSION NOTE/HISTORY AND PHYSICAL EXAMINATION   ______________________________________________________________________    HISTORY OBTAIN FROM:  Patient    CHIEF COMPLAINT: Shortness of breath with productive cough X 1 day      HISTORY OF PRESENT ILLNESS:      69 Yo male with past medical history of COPD on home 4L of oxygen, pulmonary fibrosis, pulmonary artery hypertension, diabetes mellitus presenting with acute onset shortness of breath with productive cough for the last one day with increase in quantity and change in color from white to yellow. According to the patient he lives alone at home and has had increasing shortness of breath worse per baseline for the past 2-3 days and has usual white phlegm however it has changed to yellow for the past 1 day. Patient denies any fever, nausea, vomiting, chest pain, exposure to anyone with similar symptoms, flu or cold-like symptoms, myalgias. Has had multiple previous admissions for COPD, this time put on BiPAP after being brought in by EMS with 6 L of nasal cannula oxygen. Patient reports that he is compliant with his medications and inhalers, does not use of CPAP at home. PAST MEDICAL HISTORY:        Diagnosis Date    Anemia     Anxiety     Bronchitis     COPD (chronic obstructive pulmonary disease) [facility]     Diabetes ([facility]     Former smoker     Hyperlipidemia     Oxygen dependent     Respiratory distress     Type 2 diabetes mellitus ([facility]        PAST SURGICAL HISTORY:        Procedure Laterality Date    ABDOMINAL AORTIC ANEURYSM REPAIR, OPEN      FINGER SURGERY      FOOT SURGERY      PANCREAS SURGERY         MEDICATION PRIOR TO ADMISSION:  Not in a hospital admission. Allergies:  Patient has no known allergies. SOCIAL HISTORY:  Former cigarette smoker, denies any alcohol or illicit drug abuse.     FAMILY

## 2018-11-27 NOTE — ED NOTES
Report recd from [facility], [facility].       Nunu Powell RN  11/27/18 6959

## 2018-11-27 NOTE — ED NOTES
Pt report received from [facility] [address]  Pt here for COPD  Pt is to be admitted  Writer at bedside, ATB started at this time, pt comfortable on C-PAP machine   Pt remains on cardiac monitor      Antwon Nino RN  11/27/18 3367

## 2018-11-27 NOTE — ED PROVIDER NOTES
Respiratory distress     Type 2 diabetes mellitus ([facility]        SURGICAL HISTORY           Procedure Laterality Date    ABDOMINAL AORTIC ANEURYSM REPAIR, OPEN      FINGER SURGERY      FOOT SURGERY      PANCREAS SURGERY         CURRENT MEDICATIONS       Previous Medications    ACETAMINOPHEN (TYLENOL) 325 MG TABLET    Take 650 mg by mouth every 6 hours as needed for Pain    ACLIDINIUM (TUDORZA PRESSAIR) 400 MCG/ACT AEPB INHALER    Inhale 1 puff into the lungs 2 times daily    ALBUTEROL SULFATE HFA (VENTOLIN HFA) 108 (90 BASE) MCG/ACT INHALER    Inhale 2 puffs into the lungs every 6 hours as needed for Wheezing    ASPIRIN 81 MG TABLET    Take 81 mg by mouth daily    BUDESONIDE-FORMOTEROL (SYMBICORT) 80-4.5 MCG/ACT AERO    Inhale 2 puffs into the lungs 2 times daily    ESCITALOPRAM (LEXAPRO) 10 MG TABLET    Take 1 tablet by mouth daily    FAMOTIDINE (PEPCID) 20 MG TABLET    Take 20 mg by mouth 2 times daily    METFORMIN ER (GLUCOPHAGE-XR) 500 MG XR TABLET    take 2 tablets by mouth twice a day    ONE TOUCH ULTRA TEST STRIP    STRIP MISC ONCE A DAY    TAMSULOSIN (FLOMAX) 0.4 MG CAPSULE    Take 0.4 mg by mouth daily    VANCOMYCIN (VANCOCIN) 125 MG CAPSULE    Take 125 mg by mouth 4 times daily       ALLERGIES     Patient has no known allergies. Reviewed  FAMILY HISTORY     History reviewed. No pertinent family history. Family Status   Relation Status    Mother     Father     Sister Alive    Brother     MGM     MGF     [facility]     PGF     Sister Alive    Sister Alive    Sister Alive    Sister     Sister         SOCIAL HISTORY      reports that he has quit smoking. His smoking use included Cigarettes. He has never used smokeless tobacco. He reports that he does not drink alcohol or use drugs.     PHYSICAL EXAM    (up to 7 for level 4, 8 or more for level 5)     Vitals:    18 1232 18 1247 18 1346 18 1531   BP: 125/68 131/77 136/75 131/78   Pulse: 83 78 69 78   Resp:       Temp:       TempSrc:       SpO2: 99% 98% 97% 98%   Weight:       Height:           Physical Exam   Constitutional: He is oriented to person, place, and time. He appears well-developed and well-nourished. No distress. HENT:   Head: Normocephalic and atraumatic. Eyes: Pupils are equal, round, and reactive to light. Conjunctivae and EOM are normal.   Neck: Normal range of motion. Neck supple. Cardiovascular: Normal rate, regular rhythm, normal heart sounds and intact distal pulses. Exam reveals no gallop and no friction rub. No murmur heard. Pulmonary/Chest: Effort normal. No respiratory distress. He has no wheezes. He has rales. Abdominal: Soft. Bowel sounds are normal. He exhibits no distension and no mass. There is no tenderness. There is no rebound and no guarding. No hernia. Musculoskeletal: Normal range of motion. Neurological: He is alert and oriented to person, place, and time. No cranial nerve deficit. Skin: Skin is warm and dry. Capillary refill takes less than 2 seconds. He is not diaphoretic. Psychiatric: He has a normal mood and affect. His behavior is normal. Judgment and thought content normal.         DIAGNOSTIC RESULTS     EKG: All EKG's are interpreted by Virginia Anne PA-C in theabsence of a cardiologist.    Reviewed by attending physician. RADIOLOGY:   Non-plain film images such asCT, Ultrasound and MRI are read by the radiologist. Plain radiographic images are visualized and preliminarily interpreted by Virginia Anne PA-C with the below findings:    See below. 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       Difficult to exclude superimposed airspace disease at the lung bases, would   consider aspiration most likely given copious tracheobronchial secretions on   comparison recent CT. LABS:  Labs Reviewed   CBC - Abnormal; Notable for the following:        Result Value    WBC 12.5 (*)     RBC 3.73 (*)     Hemoglobin 11.4 (*)     Hematocrit 36.6 (*)     RDW 15.1 (*)     All other components within normal limits   BASIC METABOLIC PANEL - Abnormal; Notable for the following:     Glucose 193 (*)     CREATININE 0.59 (*)     Sodium 134 (*)     Chloride 96 (*)     All other components within normal limits   BRAIN NATRIURETIC PEPTIDE - Abnormal; Notable for the following:     Pro- (*)     All other components within normal limits   CULTURE BLOOD #1   CULTURE BLOOD #1   POCT TROPONIN   POCT TROPONIN   POCT TROPONIN   POCT TROPONIN           EMERGENCY DEPARTMENT COURSE and DIFFERENTIAL DIAGNOSIS/MDM:   Vitals:    Vitals:    11/27/18 1232 11/27/18 1247 11/27/18 1346 11/27/18 1531   BP: 125/68 131/77 136/75 131/78   Pulse: 83 78 69 78   Resp:       Temp:       TempSrc:       SpO2: 99% 98% 97% 98%   Weight:       Height: This is a 70-year-old male presenting to the emergency department with increased shortness of breath. He was on nonrebreather at arrival and we will place him on BiPAP at this time and he is satting around 95%. We will obtain a cardiac workup at this time. Laboratory studies are fairly unremarkable at this time and I do believe this to be partially associated with a potential hospital-acquired pneumonia versus COPD exacerbation. I did speak with internal medicine at this time and they graciously accepted admission. Patient will be admitted to the hospital at this time. Patient is completely agreeable with this. I will give center 750 IV of Levaquin at this time. This patient was seen by the attending [phone] they agreed with the assessment and plan.     CONSULTS:  IP CONSULT TO INTERNAL MEDICINE  IP

## 2018-11-28 LAB
ABSOLUTE EOS #: 0 K/UL (ref 0–0.4)
ABSOLUTE IMMATURE GRANULOCYTE: 0 K/UL (ref 0–0.3)
ABSOLUTE LYMPH #: 0.6 K/UL (ref 1–4.8)
ABSOLUTE MONO #: 0 K/UL (ref 0.1–0.8)
ANION GAP SERPL CALCULATED.3IONS-SCNC: 9 MMOL/L (ref 9–17)
BASOPHILS # BLD: 0 % (ref 0–2)
BASOPHILS ABSOLUTE: 0 K/UL (ref 0–0.2)
BUN BLDV-MCNC: 9 MG/DL (ref 8–23)
BUN/CREAT BLD: ABNORMAL (ref 9–20)
CALCIUM SERPL-MCNC: 8.5 MG/DL (ref 8.6–10.4)
CHLORIDE BLD-SCNC: 97 MMOL/L (ref 98–107)
CO2: 26 MMOL/L (ref 20–31)
CREAT SERPL-MCNC: 0.66 MG/DL (ref 0.7–1.2)
DIFFERENTIAL TYPE: ABNORMAL
EOSINOPHILS RELATIVE PERCENT: 0 % (ref 1–4)
GFR AFRICAN AMERICAN: >60 ML/MIN
GFR NON-AFRICAN AMERICAN: >60 ML/MIN
GFR SERPL CREATININE-BSD FRML MDRD: ABNORMAL ML/MIN/{1.73_M2}
GFR SERPL CREATININE-BSD FRML MDRD: ABNORMAL ML/MIN/{1.73_M2}
GLUCOSE BLD-MCNC: 335 MG/DL (ref 70–99)
GLUCOSE BLD-MCNC: 347 MG/DL (ref 75–110)
GLUCOSE BLD-MCNC: 420 MG/DL (ref 75–110)
HCT VFR BLD CALC: 34.9 % (ref 40.7–50.3)
HEMOGLOBIN: 11.2 G/DL (ref 13–17)
IMMATURE GRANULOCYTES: 0 %
LYMPHOCYTES # BLD: 7 % (ref 24–44)
MCH RBC QN AUTO: 31.1 PG (ref 25.2–33.5)
MCHC RBC AUTO-ENTMCNC: 32.1 G/DL (ref 28.4–34.8)
MCV RBC AUTO: 96.9 FL (ref 82.6–102.9)
MONOCYTES # BLD: 0 % (ref 1–7)
MORPHOLOGY: NORMAL
NRBC AUTOMATED: 0 PER 100 WBC
NUCLEATED RED BLOOD CELLS: 1 PER 100 WBC
PDW BLD-RTO: 14.6 % (ref 11.8–14.4)
PLATELET # BLD: 250 K/UL (ref 138–453)
PLATELET ESTIMATE: ABNORMAL
PMV BLD AUTO: 9.8 FL (ref 8.1–13.5)
POTASSIUM SERPL-SCNC: 4.9 MMOL/L (ref 3.7–5.3)
RBC # BLD: 3.6 M/UL (ref 4.21–5.77)
RBC # BLD: ABNORMAL 10*6/UL
SEG NEUTROPHILS: 93 % (ref 36–66)
SEGMENTED NEUTROPHILS ABSOLUTE COUNT: 7.9 K/UL (ref 1.8–7.7)
SODIUM BLD-SCNC: 132 MMOL/L (ref 135–144)
WBC # BLD: 8.5 K/UL (ref 3.5–11.3)
WBC # BLD: ABNORMAL 10*3/UL

## 2018-11-28 PROCEDURE — 80048 BASIC METABOLIC PNL TOTAL CA: CPT

## 2018-11-28 PROCEDURE — 6370000000 HC RX 637 (ALT 250 FOR IP): Performed by: HOSPITALIST

## 2018-11-28 PROCEDURE — G8988 SELF CARE GOAL STATUS: HCPCS

## 2018-11-28 PROCEDURE — 97162 PT EVAL MOD COMPLEX 30 MIN: CPT

## 2018-11-28 PROCEDURE — 97166 OT EVAL MOD COMPLEX 45 MIN: CPT

## 2018-11-28 PROCEDURE — 2580000003 HC RX 258: Performed by: STUDENT IN AN ORGANIZED HEALTH CARE EDUCATION/TRAINING PROGRAM

## 2018-11-28 PROCEDURE — S9441 ASTHMA EDUCATION: HCPCS

## 2018-11-28 PROCEDURE — 94640 AIRWAY INHALATION TREATMENT: CPT

## 2018-11-28 PROCEDURE — 6370000000 HC RX 637 (ALT 250 FOR IP): Performed by: STUDENT IN AN ORGANIZED HEALTH CARE EDUCATION/TRAINING PROGRAM

## 2018-11-28 PROCEDURE — 2580000003 HC RX 258: Performed by: PHYSICIAN ASSISTANT

## 2018-11-28 PROCEDURE — 97535 SELF CARE MNGMENT TRAINING: CPT

## 2018-11-28 PROCEDURE — G8987 SELF CARE CURRENT STATUS: HCPCS

## 2018-11-28 PROCEDURE — 82947 ASSAY GLUCOSE BLOOD QUANT: CPT

## 2018-11-28 PROCEDURE — 36415 COLL VENOUS BLD VENIPUNCTURE: CPT

## 2018-11-28 PROCEDURE — 97530 THERAPEUTIC ACTIVITIES: CPT

## 2018-11-28 PROCEDURE — 6360000002 HC RX W HCPCS: Performed by: STUDENT IN AN ORGANIZED HEALTH CARE EDUCATION/TRAINING PROGRAM

## 2018-11-28 PROCEDURE — 1200000000 HC SEMI PRIVATE

## 2018-11-28 PROCEDURE — 94762 N-INVAS EAR/PLS OXIMTRY CONT: CPT

## 2018-11-28 PROCEDURE — G8978 MOBILITY CURRENT STATUS: HCPCS

## 2018-11-28 PROCEDURE — 2700000000 HC OXYGEN THERAPY PER DAY

## 2018-11-28 PROCEDURE — 85025 COMPLETE CBC W/AUTO DIFF WBC: CPT

## 2018-11-28 PROCEDURE — G8979 MOBILITY GOAL STATUS: HCPCS

## 2018-11-28 RX ORDER — ALBUTEROL SULFATE 2.5 MG/3ML
2.5 SOLUTION RESPIRATORY (INHALATION) EVERY 4 HOURS PRN
Status: DISCONTINUED | OUTPATIENT
Start: 2018-11-28 | End: 2018-11-30 | Stop reason: HOSPADM

## 2018-11-28 RX ORDER — DEXTROSE MONOHYDRATE 50 MG/ML
100 INJECTION, SOLUTION INTRAVENOUS PRN
Status: DISCONTINUED | OUTPATIENT
Start: 2018-11-28 | End: 2018-11-30 | Stop reason: HOSPADM

## 2018-11-28 RX ORDER — NICOTINE POLACRILEX 4 MG
15 LOZENGE BUCCAL PRN
Status: DISCONTINUED | OUTPATIENT
Start: 2018-11-28 | End: 2018-11-30 | Stop reason: HOSPADM

## 2018-11-28 RX ORDER — DEXTROSE MONOHYDRATE 25 G/50ML
12.5 INJECTION, SOLUTION INTRAVENOUS PRN
Status: DISCONTINUED | OUTPATIENT
Start: 2018-11-28 | End: 2018-11-30 | Stop reason: HOSPADM

## 2018-11-28 RX ORDER — INSULIN GLARGINE 100 [IU]/ML
10 INJECTION, SOLUTION SUBCUTANEOUS NIGHTLY
Status: DISCONTINUED | OUTPATIENT
Start: 2018-11-28 | End: 2018-11-29

## 2018-11-28 RX ADMIN — INSULIN LISPRO 8 UNITS: 100 INJECTION, SOLUTION INTRAVENOUS; SUBCUTANEOUS at 17:58

## 2018-11-28 RX ADMIN — ASPIRIN 81 MG 81 MG: 81 TABLET ORAL at 09:54

## 2018-11-28 RX ADMIN — TAMSULOSIN HYDROCHLORIDE 0.4 MG: 0.4 CAPSULE ORAL at 09:54

## 2018-11-28 RX ADMIN — ESCITALOPRAM OXALATE 10 MG: 10 TABLET ORAL at 09:54

## 2018-11-28 RX ADMIN — IPRATROPIUM BROMIDE AND ALBUTEROL SULFATE 1 AMPULE: .5; 3 SOLUTION RESPIRATORY (INHALATION) at 20:49

## 2018-11-28 RX ADMIN — FAMOTIDINE 20 MG: 20 TABLET, FILM COATED ORAL at 22:51

## 2018-11-28 RX ADMIN — MOMETASONE FUROATE AND FORMOTEROL FUMARATE DIHYDRATE 2 PUFF: 100; 5 AEROSOL RESPIRATORY (INHALATION) at 20:50

## 2018-11-28 RX ADMIN — FAMOTIDINE 20 MG: 20 TABLET, FILM COATED ORAL at 09:54

## 2018-11-28 RX ADMIN — INSULIN GLARGINE 10 UNITS: 100 INJECTION, SOLUTION SUBCUTANEOUS at 13:03

## 2018-11-28 RX ADMIN — Medication 10 ML: at 09:56

## 2018-11-28 RX ADMIN — INSULIN LISPRO 4 UNITS: 100 INJECTION, SOLUTION INTRAVENOUS; SUBCUTANEOUS at 23:01

## 2018-11-28 RX ADMIN — Medication 10 ML: at 09:55

## 2018-11-28 RX ADMIN — ENOXAPARIN SODIUM 40 MG: 40 INJECTION SUBCUTANEOUS at 09:53

## 2018-11-28 RX ADMIN — LEVOFLOXACIN 500 MG: 5 INJECTION, SOLUTION INTRAVENOUS at 00:46

## 2018-11-28 RX ADMIN — IPRATROPIUM BROMIDE AND ALBUTEROL SULFATE 1 AMPULE: .5; 3 SOLUTION RESPIRATORY (INHALATION) at 15:45

## 2018-11-28 RX ADMIN — MOMETASONE FUROATE AND FORMOTEROL FUMARATE DIHYDRATE 2 PUFF: 100; 5 AEROSOL RESPIRATORY (INHALATION) at 07:57

## 2018-11-28 RX ADMIN — IPRATROPIUM BROMIDE AND ALBUTEROL SULFATE 1 AMPULE: .5; 3 SOLUTION RESPIRATORY (INHALATION) at 12:35

## 2018-11-28 RX ADMIN — METHYLPREDNISOLONE SODIUM SUCCINATE 40 MG: 40 INJECTION, POWDER, FOR SOLUTION INTRAMUSCULAR; INTRAVENOUS at 05:27

## 2018-11-28 RX ADMIN — METHYLPREDNISOLONE SODIUM SUCCINATE 40 MG: 40 INJECTION, POWDER, FOR SOLUTION INTRAMUSCULAR; INTRAVENOUS at 22:50

## 2018-11-28 RX ADMIN — INSULIN LISPRO 12 UNITS: 100 INJECTION, SOLUTION INTRAVENOUS; SUBCUTANEOUS at 13:03

## 2018-11-28 RX ADMIN — LEVOFLOXACIN 500 MG: 5 INJECTION, SOLUTION INTRAVENOUS at 23:18

## 2018-11-28 RX ADMIN — IPRATROPIUM BROMIDE AND ALBUTEROL SULFATE 1 AMPULE: .5; 3 SOLUTION RESPIRATORY (INHALATION) at 07:56

## 2018-11-28 RX ADMIN — METHYLPREDNISOLONE SODIUM SUCCINATE 40 MG: 40 INJECTION, POWDER, FOR SOLUTION INTRAMUSCULAR; INTRAVENOUS at 13:04

## 2018-11-28 ASSESSMENT — PAIN SCALES - GENERAL: PAINLEVEL_OUTOF10: 0

## 2018-11-28 NOTE — PROGRESS NOTES
assessment at level  1 (home meds)  · Hyperinflation assessment at level   · Secretion Management assessment at level    ·   · []    Bronchodilator Assessment  BRONCHODILATOR ASSESSMENT SCORE  Score 0 1 2 3 4 5   Breath Sounds   []  Patient Baseline [x]  No Wheeze good aeration []  Faint, scattered wheezing, good aeration []  Expiratory Wheezing and or moderately diminished []  Insp/Exp wheeze and/or very diminished []  Insp/Exp and/ or marked distress   Respiratory Rate   []  Patient Baseline [x]  Less than 20 []  Less than 20 []  20-25 []  Greater than 25 []  Greater than 25   Peak flow % of Pred or PB []  NA   []  Greater than 90%  []  81-90% []  71-80% []  Less than or equal to 70%  or unable to perform []  Unable due to Respiratory Distress   Dyspnea re []  Patient Baseline [x]  No SOB []  No SOB []  SOB on exertion []  SOB min activity []  At rest/acute   e FEV% Predicted       []  NA []  Above 69%  []  Unable []  Above 60-69%  []  Unable []  Above 50-59%  []  Unable []  Above 35-49%  []  Unable []  Less than 35%  []  Unable

## 2018-11-28 NOTE — PLAN OF CARE
Problem: Safety:  Goal: Free from accidental physical injury  Free from accidental physical injury   Outcome: Ongoing    Goal: Free from intentional harm  Free from intentional harm   Outcome: Ongoing      Problem: Discharge Planning:  Goal: Patients continuum of care needs are met  Patients continuum of care needs are met   Outcome: Ongoing

## 2018-11-28 NOTE — PROGRESS NOTES
(Most need for endurance training )  Safety Devices  Type of devices: Call light within reach, Gait belt, Left in chair  Restraints  Initially in place: No    G-Code  PT G-Codes  Functional Assessment Tool Used: -PAC  Score: 22  Functional Limitation: Mobility: Walking and moving around  Mobility: Walking and Moving Around Current Status (): At least 20 percent but less than 40 percent impaired, limited or restricted  Mobility: Walking and Moving Around Goal Status (): At least 1 percent but less than 20 percent impaired, limited or restricted    AM-PAC Score  AM-PAC Inpatient Mobility Raw Score : 22  AM-PAC Inpatient T-Scale Score : 53.28  Mobility Inpatient CMS 0-100% Score: 20.91  Mobility Inpatient CMS G-Code Modifier : CJ          Goals  Short term goals  Time Frame for Short term goals: 14 visits   Short term goal 1: Pt will be indepdent in all bed mobility. Short term goal 2: Pt will be SBA for all transfers. Short term goal 3: Pt will ambulate 150\" SBA while maintaining spO2 above 90%. Short term goal 4: Pt will increase endurance to tolerate 30 minutes of therapy. Short term goal 5: Pt will improve standing baalnce to Good to facilitate safe gait. Patient Goals   Patient goals : Return home        Therapy Time   Individual Concurrent Group Co-treatment   Time In 1050         Time Out 1118         Minutes 28         Timed Code Treatment Minutes: 8 Minutes       NITA Oneill  Evaluation/treatment performed by Student PT under the supervision of co-signing PT who agrees with all evaluation/treatment and documentation.

## 2018-11-28 NOTE — PROGRESS NOTES
Responsibilities: Yes  Meal Prep Responsibility: Secondary (neighbor assists)  Laundry Responsibility: Secondary (neighbor assists)  Cleaning Responsibility: Secondary (neighbor assists)  Shopping Responsibility: Primary (use of electric cart )  Ambulation Assistance: Independent  Transfer Assistance: Independent  Active : Yes  Mode of Transportation: Car  Occupation: Retired  Type of occupation:    Leisure & Hobbies: Fishing, gambling, girlfriends         Objective   Vision: Impaired  Vision Exceptions: Wears glasses for reading  Hearing: Exceptions to [facility]  Hearing Exceptions: Hard of hearing/hearing concerns (Pt reports pt needs hearing aides but does not have any)    Orientation  Overall Orientation Status: Within Functional Limits  Observation/Palpation  Posture: Good  Balance  Sitting Balance: Contact guard assistance  Standing Balance: Contact guard assistance (no device)  Standing Balance  Sit to stand: Contact guard assistance  Stand to sit: Contact guard assistance  Functional Mobility  Functional - Mobility Device: No device  Activity: Other  Assist Level: Contact guard assistance  Functional Mobility Comments: Pt demo slight impulsivity with decreased awareness of deficits. Pt SOB with increased activity, VCs for proper pursed lipped breathing and rest breaks   ADL  Feeding: Independent  Grooming: Contact guard assistance;Setup  UE Bathing: Contact guard assistance;Setup  LE Bathing: Contact guard assistance;Setup  UE Dressing: Contact guard assistance;Setup (to manage gown )  LE Dressing: Contact guard assistance;Setup (to don socks seated EOB)  Toileting: Contact guard assistance  Additional Comments: Pt supine in bed on arrival. Pt completed bed mobility and sat at EOB. Pt completed sit > stand transfer and functional mobility into villavicencio with no device.  Pt demo increased SOB with activity, O2 saturation between 74-93%, pt required cues for rest breaks and proper pursed lipped breathing care  Self Care Current Status (): At least 40 percent but less than 60 percent impaired, limited or restricted  Self Care Goal Status (): At least 20 percent but less than 40 percent impaired, limited or restricted    AM-PAC Score   AM-PAC Inpatient Daily Activity Raw Score: 19  AM-PAC Inpatient ADL T-Scale Score : 40.22  ADL Inpatient CMS 0-100% Score: 42.8  ADL Inpatient CMS G-Code Modifier : CK  How much help from another person does the pt currently need? Unable A Lot A Little None   1. Putting on and taking off regular lower body clothing? 1      2      3       4   2. Bathing (including washing, rinsing, drying)? 1      2      3      4   3. Toileting, which includes using toilet, bedpan, or urinal?      1      2        3      4   4. Putting on and taking off regular upper body clothing? 1      2      3       4   5. Taking care of personal grooming such as brushing teeth? 1      2      3      4   6. Eating meals? 1      2       3       4     1. Unable = Total/Dependent Assist  2. A Lot = Maximum/Moderate Assist  3. A Little = Minimum/Contact Guard Assist/Supervision  4.  None= [place]/Independent    Raw Score Scale Score Scale Score Standard Error Approximate Degree of Functional Impairment     6 17.07 3.74 100%   7 20.13 3.68 92%   8 22.86 3.43 86%   9 25.33 3.17 80%   10 27.31 2.96 75%   11 29.04 2.79 70%   12 30.60 2.68 67%   13 32.03 2.62 63%   14 33.39 2.61 60%   15 34.69 2.65 56%   16 35.96 2.71 53%   17 37.26 2.82 50%   18 38.66 2.97 47%   19 40.22 3.20 43%   20 42.03 3.55 38%   21 44.27 4.08 33%   22 47.10 4.81 26%   23 51.12 5.88 16%   24 57.54 7.36 0%     Goals  Short term goals  Time Frame for Short term goals: by discharge, pt will  Short term goal 1: demo I in UE ADL activities   Short term goal 2: demo MI in LE ADL activities with AD as needed  Short term goal 3: demo understanding and I use of EC/WS, fall prevention and proper pursed lipped

## 2018-11-28 NOTE — PROGRESS NOTES
10 mL Intravenous 2 times per day    enoxaparin  40 mg Subcutaneous Daily    methylPREDNISolone  40 mg Intravenous Q8H    levofloxacin  500 mg Intravenous Q24H    aspirin  81 mg Oral Daily    mometasone-formoterol  2 puff Inhalation BID    escitalopram  10 mg Oral Daily    famotidine  20 mg Oral BID    tamsulosin  0.4 mg Oral Daily    ipratropium-albuterol  1 ampule Inhalation 4x daily       PRN Medications  sodium chloride flush 10 mL PRN   acetaminophen 650 mg Q4H PRN   ondansetron 4 mg Q8H PRN   sodium chloride flush 10 mL PRN   magnesium hydroxide 30 mL Daily PRN   ondansetron 4 mg Q6H PRN   acetaminophen 650 mg Q4H PRN   albuterol 2.5 mg Q4H PRN       Diagnostic Labs and Imaging:  CBC:  Recent Labs      11/27/18   1231  11/28/18   0531   WBC  12.5*  8.5   HGB  11.4*  11.2*   PLT  276  250     BMP: Recent Labs      11/27/18   1231  11/28/18   0531   NA  134*  132*   K  4.3  4.9   CL  96*  97*   CO2  28  26   BUN  9  9   CREATININE  0.59*  0.66*   GLUCOSE  193*  335*     Hepatic: No results for input(s): AST, ALT, ALB, BILITOT, ALKPHOS in the last 72 hours. Assessment and Plan:   1. Acute exacerbation of COPD with pleural plaques likely IPF: Patient states he was a , Currently patient on BiPAP, use when necessary, IV steroids 3 times a day, IV Levaquin OD, inhalers, will continue to monitor. 2.  Type 2 diabetes mellitus with last HbA1c June 2018: 7.8: On home metformin 500 twice a day: Hold for now. 3.  Depression: Continue citalopram 10 OD. 4.  BPH: Continue Flomax 0.4 OD.     Jackie Resendiz MD  Internal Medicine Resident   [facility], [facility]         11/28/2018, 9:55 AM

## 2018-11-29 LAB
ABSOLUTE EOS #: 0 K/UL (ref 0–0.44)
ABSOLUTE IMMATURE GRANULOCYTE: 0.15 K/UL (ref 0–0.3)
ABSOLUTE LYMPH #: 0.73 K/UL (ref 1.1–3.7)
ABSOLUTE MONO #: 0.58 K/UL (ref 0.1–1.2)
ANION GAP SERPL CALCULATED.3IONS-SCNC: 10 MMOL/L (ref 9–17)
BASOPHILS # BLD: 0 % (ref 0–2)
BASOPHILS ABSOLUTE: 0 K/UL (ref 0–0.2)
BUN BLDV-MCNC: 18 MG/DL (ref 8–23)
BUN/CREAT BLD: ABNORMAL (ref 9–20)
CALCIUM SERPL-MCNC: 8.5 MG/DL (ref 8.6–10.4)
CHLORIDE BLD-SCNC: 96 MMOL/L (ref 98–107)
CO2: 25 MMOL/L (ref 20–31)
CREAT SERPL-MCNC: 0.69 MG/DL (ref 0.7–1.2)
DIFFERENTIAL TYPE: ABNORMAL
EOSINOPHILS RELATIVE PERCENT: 0 % (ref 1–4)
GFR AFRICAN AMERICAN: >60 ML/MIN
GFR NON-AFRICAN AMERICAN: >60 ML/MIN
GFR SERPL CREATININE-BSD FRML MDRD: ABNORMAL ML/MIN/{1.73_M2}
GFR SERPL CREATININE-BSD FRML MDRD: ABNORMAL ML/MIN/{1.73_M2}
GLUCOSE BLD-MCNC: 274 MG/DL (ref 70–99)
GLUCOSE BLD-MCNC: 304 MG/DL (ref 75–110)
GLUCOSE BLD-MCNC: 322 MG/DL (ref 75–110)
GLUCOSE BLD-MCNC: 327 MG/DL (ref 75–110)
GLUCOSE BLD-MCNC: 428 MG/DL (ref 75–110)
HCT VFR BLD CALC: 30.9 % (ref 40.7–50.3)
HEMOGLOBIN: 10.3 G/DL (ref 13–17)
IMMATURE GRANULOCYTES: 1 %
LYMPHOCYTES # BLD: 5 % (ref 24–43)
MCH RBC QN AUTO: 31.9 PG (ref 25.2–33.5)
MCHC RBC AUTO-ENTMCNC: 33.3 G/DL (ref 28.4–34.8)
MCV RBC AUTO: 95.7 FL (ref 82.6–102.9)
MONOCYTES # BLD: 4 % (ref 3–12)
MORPHOLOGY: ABNORMAL
NRBC AUTOMATED: 0 PER 100 WBC
PDW BLD-RTO: 15 % (ref 11.8–14.4)
PLATELET # BLD: 322 K/UL (ref 138–453)
PLATELET ESTIMATE: ABNORMAL
PMV BLD AUTO: 10.5 FL (ref 8.1–13.5)
POTASSIUM SERPL-SCNC: 4.8 MMOL/L (ref 3.7–5.3)
RBC # BLD: 3.23 M/UL (ref 4.21–5.77)
RBC # BLD: ABNORMAL 10*6/UL
SEG NEUTROPHILS: 90 % (ref 36–65)
SEGMENTED NEUTROPHILS ABSOLUTE COUNT: 13.04 K/UL (ref 1.5–8.1)
SODIUM BLD-SCNC: 131 MMOL/L (ref 135–144)
WBC # BLD: 14.5 K/UL (ref 3.5–11.3)
WBC # BLD: ABNORMAL 10*3/UL

## 2018-11-29 PROCEDURE — 6370000000 HC RX 637 (ALT 250 FOR IP): Performed by: STUDENT IN AN ORGANIZED HEALTH CARE EDUCATION/TRAINING PROGRAM

## 2018-11-29 PROCEDURE — 6360000002 HC RX W HCPCS: Performed by: STUDENT IN AN ORGANIZED HEALTH CARE EDUCATION/TRAINING PROGRAM

## 2018-11-29 PROCEDURE — 2580000003 HC RX 258: Performed by: PHYSICIAN ASSISTANT

## 2018-11-29 PROCEDURE — 82947 ASSAY GLUCOSE BLOOD QUANT: CPT

## 2018-11-29 PROCEDURE — 97530 THERAPEUTIC ACTIVITIES: CPT

## 2018-11-29 PROCEDURE — 85025 COMPLETE CBC W/AUTO DIFF WBC: CPT

## 2018-11-29 PROCEDURE — 80048 BASIC METABOLIC PNL TOTAL CA: CPT

## 2018-11-29 PROCEDURE — 36415 COLL VENOUS BLD VENIPUNCTURE: CPT

## 2018-11-29 PROCEDURE — 6370000000 HC RX 637 (ALT 250 FOR IP): Performed by: HOSPITALIST

## 2018-11-29 PROCEDURE — 94640 AIRWAY INHALATION TREATMENT: CPT

## 2018-11-29 PROCEDURE — 1200000000 HC SEMI PRIVATE

## 2018-11-29 PROCEDURE — 2580000003 HC RX 258: Performed by: STUDENT IN AN ORGANIZED HEALTH CARE EDUCATION/TRAINING PROGRAM

## 2018-11-29 PROCEDURE — 2700000000 HC OXYGEN THERAPY PER DAY

## 2018-11-29 PROCEDURE — 94762 N-INVAS EAR/PLS OXIMTRY CONT: CPT

## 2018-11-29 RX ORDER — LEVOFLOXACIN 500 MG/1
500 TABLET, FILM COATED ORAL DAILY
Status: DISCONTINUED | OUTPATIENT
Start: 2018-11-29 | End: 2018-11-30 | Stop reason: HOSPADM

## 2018-11-29 RX ORDER — PREDNISONE 20 MG/1
40 TABLET ORAL DAILY
Status: DISCONTINUED | OUTPATIENT
Start: 2018-11-29 | End: 2018-11-30 | Stop reason: HOSPADM

## 2018-11-29 RX ORDER — INSULIN GLARGINE 100 [IU]/ML
20 INJECTION, SOLUTION SUBCUTANEOUS NIGHTLY
Status: DISCONTINUED | OUTPATIENT
Start: 2018-11-29 | End: 2018-11-30 | Stop reason: HOSPADM

## 2018-11-29 RX ORDER — FUROSEMIDE 20 MG/1
20 TABLET ORAL EVERY OTHER DAY
Status: DISCONTINUED | OUTPATIENT
Start: 2018-11-29 | End: 2018-11-30 | Stop reason: HOSPADM

## 2018-11-29 RX ADMIN — INSULIN LISPRO 6 UNITS: 100 INJECTION, SOLUTION INTRAVENOUS; SUBCUTANEOUS at 20:59

## 2018-11-29 RX ADMIN — METHYLPREDNISOLONE SODIUM SUCCINATE 40 MG: 40 INJECTION, POWDER, FOR SOLUTION INTRAMUSCULAR; INTRAVENOUS at 05:42

## 2018-11-29 RX ADMIN — ESCITALOPRAM OXALATE 10 MG: 10 TABLET ORAL at 08:21

## 2018-11-29 RX ADMIN — MOMETASONE FUROATE AND FORMOTEROL FUMARATE DIHYDRATE 2 PUFF: 100; 5 AEROSOL RESPIRATORY (INHALATION) at 19:57

## 2018-11-29 RX ADMIN — LEVOFLOXACIN 500 MG: 500 TABLET, FILM COATED ORAL at 12:07

## 2018-11-29 RX ADMIN — ENOXAPARIN SODIUM 40 MG: 40 INJECTION SUBCUTANEOUS at 08:21

## 2018-11-29 RX ADMIN — IPRATROPIUM BROMIDE AND ALBUTEROL SULFATE 1 AMPULE: .5; 3 SOLUTION RESPIRATORY (INHALATION) at 11:29

## 2018-11-29 RX ADMIN — INSULIN LISPRO 8 UNITS: 100 INJECTION, SOLUTION INTRAVENOUS; SUBCUTANEOUS at 18:34

## 2018-11-29 RX ADMIN — ACETAMINOPHEN 650 MG: 325 TABLET ORAL at 01:20

## 2018-11-29 RX ADMIN — FAMOTIDINE 20 MG: 20 TABLET, FILM COATED ORAL at 21:02

## 2018-11-29 RX ADMIN — MOMETASONE FUROATE AND FORMOTEROL FUMARATE DIHYDRATE 2 PUFF: 100; 5 AEROSOL RESPIRATORY (INHALATION) at 07:57

## 2018-11-29 RX ADMIN — FAMOTIDINE 20 MG: 20 TABLET, FILM COATED ORAL at 08:21

## 2018-11-29 RX ADMIN — INSULIN LISPRO 6 UNITS: 100 INJECTION, SOLUTION INTRAVENOUS; SUBCUTANEOUS at 08:24

## 2018-11-29 RX ADMIN — TAMSULOSIN HYDROCHLORIDE 0.4 MG: 0.4 CAPSULE ORAL at 08:21

## 2018-11-29 RX ADMIN — Medication 10 ML: at 21:02

## 2018-11-29 RX ADMIN — IPRATROPIUM BROMIDE AND ALBUTEROL SULFATE 1 AMPULE: .5; 3 SOLUTION RESPIRATORY (INHALATION) at 07:57

## 2018-11-29 RX ADMIN — IPRATROPIUM BROMIDE AND ALBUTEROL SULFATE 1 AMPULE: .5; 3 SOLUTION RESPIRATORY (INHALATION) at 16:30

## 2018-11-29 RX ADMIN — INSULIN LISPRO 8 UNITS: 100 INJECTION, SOLUTION INTRAVENOUS; SUBCUTANEOUS at 12:09

## 2018-11-29 RX ADMIN — IPRATROPIUM BROMIDE AND ALBUTEROL SULFATE 1 AMPULE: .5; 3 SOLUTION RESPIRATORY (INHALATION) at 19:57

## 2018-11-29 RX ADMIN — ASPIRIN 81 MG 81 MG: 81 TABLET ORAL at 08:21

## 2018-11-29 RX ADMIN — PREDNISONE 40 MG: 20 TABLET ORAL at 12:07

## 2018-11-29 RX ADMIN — FUROSEMIDE 20 MG: 20 TABLET ORAL at 16:40

## 2018-11-29 RX ADMIN — INSULIN GLARGINE 20 UNITS: 100 INJECTION, SOLUTION SUBCUTANEOUS at 21:01

## 2018-11-29 RX ADMIN — Medication 10 ML: at 08:23

## 2018-11-29 RX ADMIN — Medication 10 ML: at 08:22

## 2018-11-29 ASSESSMENT — PAIN SCALES - GENERAL
PAINLEVEL_OUTOF10: 0
PAINLEVEL_OUTOF10: 5

## 2018-11-29 NOTE — PROGRESS NOTES
[phone]   [phone]   70 [phone] 314 [phone] 374 70 [phone]   75 261 274 289 305 319 334 [phone] 477 114 [phone]   27 [phone] 383 [phone]

## 2018-11-29 NOTE — PLAN OF CARE
Problem: Infection:  Goal: Will remain free from infection  Will remain free from infection   Outcome: Ongoing      Problem: Safety:  Goal: Free from accidental physical injury  Free from accidental physical injury   Outcome: Ongoing      Problem: Discharge Planning:  Goal: Patients continuum of care needs are met  Patients continuum of care needs are met   Outcome: Ongoing

## 2018-11-29 NOTE — PROGRESS NOTES
[facility]  Internal Medicine Residency Program  Inpatient Daily Progress Note  ______________________________________________________________________________    Patient: Petty Clark  YOB: 1940   MRN: [id number]    Acct: [de-identified]     Admit date: 11/27/2018  Today's date: 11/29/18  Number of days in the hospital: 2  Expected Discharge Date: 11/28/18    Admitting Diagnosis: Acute on chronic respiratory failure with hypoxia ([address]    Subjective:   Pt seen and Chart reviewed. No acute issues overnight. Shift to oral and likely discharge today. Patient wants to go home. Objective:   Vital Sign:  /64   Pulse 92   Temp 98.2 °F (36.8 °C) (Oral)   Resp 26   Ht 5' 8\" (1.727 m)   Wt 173 lb 9.6 oz (78.7 kg)   SpO2 90%   BMI 26.40 kg/m²       Physical Exam:  CONSTITUTIONAL: [place], alert, cooperative, no apparent distress, and appears stated age  EYES:  Lids and lashes normal, pupils equal, round and reactive to light, extra ocular muscles intact, sclera clear, conjunctiva normal  NECK:  supple, symmetrical, trachea midline, skin normal and no stridor  HEMATOLOGIC/LYMPHATICS:  no cervical lymphadenopathy and no supraclavicular lymphadenopathy  BACK:  symmetric and no curvature  LUNGS:  Bilateral clear to auscultation with mildly decreased breath sounds, chest expansion symmetrical, rest NAD.   CARDIOVASCULAR:  normal S1 and S2  ABDOMEN:  No scars, normal bowel sounds, soft, non-distended, non-tender, no masses palpated, no hepatosplenomegally  MUSCULOSKELETAL:  full range of motion noted  NEUROLOGIC:  Awake, alert, oriented to name, place and time.  Cranial nerves II-XII are grossly intact.  Motor is 5 out of 5 bilaterally.  Cerebellar finger to nose, heel to shin intact.  Sensory is intact.  Babinski down going, Romberg negative, and gait is normal.  SKIN:  no rashes and no lesions  Medications:  Scheduled Medications   predniSONE  40 mg Oral Daily

## 2018-11-29 NOTE — PROGRESS NOTES
continued decreased imer and standing rest breaks required. No change in quality of gait when not using device  Distance: 75ft     Balance  Posture: Good  Sitting - Static: Good  Sitting - Dynamic: Good;-  Standing - Static: Good;-  Standing - Dynamic: Fair;+                           Assessment   Body structures, Functions, Activity limitations: Decreased safe awareness;Decreased endurance;Decreased balance  Assessment: Pt able to ambulate 150ft total without device and with 4L O2, no LOB noted. Prognosis: Good  REQUIRES PT FOLLOW UP: Yes  Activity Tolerance  Activity Tolerance: Patient limited by fatigue     G-Code     OutComes Score                                                    AM-PAC Score             Goals  Short term goals  Time Frame for Short term goals: 14 visits   Short term goal 1: Pt will be indepdent in all bed mobility. Short term goal 2: Pt will be SBA for all transfers. Short term goal 3: Pt will walk 150\" SBA while maintaining spO2 above 90%. Short term goal 4: Pt will increase endurance to tolerate 30 minutes of therapy. Short term goal 5: Pt will improve standing baalnce to Good to facilitate safe gait.    Patient Goals   Patient goals : Return home     Plan    Plan  Times per week: x5  Times per day: Daily  Current Treatment Recommendations: Balance Training, Functional Mobility Training, Transfer Training, Gait Training, Endurance Training  Safety Devices  Type of devices: Call light within reach, Gait belt, Left in chair  Restraints  Initially in place: No     Therapy Time   Individual Concurrent Group Co-treatment   Time In 1341         Time Out 1415         Minutes 6 [address]

## 2018-11-30 VITALS
OXYGEN SATURATION: 87 % | HEART RATE: 84 BPM | DIASTOLIC BLOOD PRESSURE: 64 MMHG | HEIGHT: 68 IN | SYSTOLIC BLOOD PRESSURE: 142 MMHG | BODY MASS INDEX: 26.31 KG/M2 | RESPIRATION RATE: 18 BRPM | WEIGHT: 173.6 LBS | TEMPERATURE: 98.2 F

## 2018-11-30 LAB
ABSOLUTE EOS #: <0.03 K/UL (ref 0–0.44)
ABSOLUTE IMMATURE GRANULOCYTE: 0.14 K/UL (ref 0–0.3)
ABSOLUTE LYMPH #: 1.54 K/UL (ref 1.1–3.7)
ABSOLUTE MONO #: 1.1 K/UL (ref 0.1–1.2)
ANION GAP SERPL CALCULATED.3IONS-SCNC: 9 MMOL/L (ref 9–17)
BASOPHILS # BLD: 0 % (ref 0–2)
BASOPHILS ABSOLUTE: <0.03 K/UL (ref 0–0.2)
BUN BLDV-MCNC: 19 MG/DL (ref 8–23)
BUN/CREAT BLD: ABNORMAL (ref 9–20)
CALCIUM SERPL-MCNC: 8.6 MG/DL (ref 8.6–10.4)
CHLORIDE BLD-SCNC: 98 MMOL/L (ref 98–107)
CO2: 29 MMOL/L (ref 20–31)
CREAT SERPL-MCNC: 0.67 MG/DL (ref 0.7–1.2)
DIFFERENTIAL TYPE: ABNORMAL
EOSINOPHILS RELATIVE PERCENT: 0 % (ref 1–4)
GFR AFRICAN AMERICAN: >60 ML/MIN
GFR NON-AFRICAN AMERICAN: >60 ML/MIN
GFR SERPL CREATININE-BSD FRML MDRD: ABNORMAL ML/MIN/{1.73_M2}
GFR SERPL CREATININE-BSD FRML MDRD: ABNORMAL ML/MIN/{1.73_M2}
GLUCOSE BLD-MCNC: 123 MG/DL (ref 75–110)
GLUCOSE BLD-MCNC: 146 MG/DL (ref 70–99)
GLUCOSE BLD-MCNC: 292 MG/DL (ref 75–110)
HCT VFR BLD CALC: 32.5 % (ref 40.7–50.3)
HEMOGLOBIN: 10.5 G/DL (ref 13–17)
IMMATURE GRANULOCYTES: 1 %
LYMPHOCYTES # BLD: 10 % (ref 24–43)
MCH RBC QN AUTO: 31.3 PG (ref 25.2–33.5)
MCHC RBC AUTO-ENTMCNC: 32.3 G/DL (ref 28.4–34.8)
MCV RBC AUTO: 96.7 FL (ref 82.6–102.9)
MONOCYTES # BLD: 7 % (ref 3–12)
NRBC AUTOMATED: 0 PER 100 WBC
PDW BLD-RTO: 15.4 % (ref 11.8–14.4)
PLATELET # BLD: 279 K/UL (ref 138–453)
PLATELET ESTIMATE: ABNORMAL
PMV BLD AUTO: 10 FL (ref 8.1–13.5)
POTASSIUM SERPL-SCNC: 4.1 MMOL/L (ref 3.7–5.3)
RBC # BLD: 3.36 M/UL (ref 4.21–5.77)
RBC # BLD: ABNORMAL 10*6/UL
SEG NEUTROPHILS: 82 % (ref 36–65)
SEGMENTED NEUTROPHILS ABSOLUTE COUNT: 12.24 K/UL (ref 1.5–8.1)
SODIUM BLD-SCNC: 136 MMOL/L (ref 135–144)
WBC # BLD: 15 K/UL (ref 3.5–11.3)
WBC # BLD: ABNORMAL 10*3/UL

## 2018-11-30 PROCEDURE — 6370000000 HC RX 637 (ALT 250 FOR IP): Performed by: STUDENT IN AN ORGANIZED HEALTH CARE EDUCATION/TRAINING PROGRAM

## 2018-11-30 PROCEDURE — 36415 COLL VENOUS BLD VENIPUNCTURE: CPT

## 2018-11-30 PROCEDURE — 85025 COMPLETE CBC W/AUTO DIFF WBC: CPT

## 2018-11-30 PROCEDURE — 82947 ASSAY GLUCOSE BLOOD QUANT: CPT

## 2018-11-30 PROCEDURE — 2700000000 HC OXYGEN THERAPY PER DAY

## 2018-11-30 PROCEDURE — 94762 N-INVAS EAR/PLS OXIMTRY CONT: CPT

## 2018-11-30 PROCEDURE — 80048 BASIC METABOLIC PNL TOTAL CA: CPT

## 2018-11-30 PROCEDURE — 94640 AIRWAY INHALATION TREATMENT: CPT

## 2018-11-30 PROCEDURE — 6360000002 HC RX W HCPCS: Performed by: STUDENT IN AN ORGANIZED HEALTH CARE EDUCATION/TRAINING PROGRAM

## 2018-11-30 PROCEDURE — 6370000000 HC RX 637 (ALT 250 FOR IP): Performed by: PHYSICIAN ASSISTANT

## 2018-11-30 RX ORDER — LEVOFLOXACIN 500 MG/1
500 TABLET, FILM COATED ORAL DAILY
Qty: 3 TABLET | Refills: 0 | Status: SHIPPED | OUTPATIENT
Start: 2018-12-01 | End: 2018-12-04

## 2018-11-30 RX ORDER — PREDNISONE 20 MG/1
20 TABLET ORAL DAILY
Qty: 4 TABLET | Refills: 0 | Status: SHIPPED | OUTPATIENT
Start: 2018-12-07 | End: 2018-12-11

## 2018-11-30 RX ORDER — PREDNISONE 10 MG/1
10 TABLET ORAL DAILY
Qty: 4 TABLET | Refills: 0 | Status: SHIPPED | OUTPATIENT
Start: 2018-12-11 | End: 2018-12-15

## 2018-11-30 RX ORDER — FUROSEMIDE 20 MG/1
20 TABLET ORAL EVERY OTHER DAY
Qty: 60 TABLET | Refills: 3 | Status: ON HOLD | OUTPATIENT
Start: 2018-12-01 | End: 2019-03-28 | Stop reason: HOSPADM

## 2018-11-30 RX ORDER — PREDNISONE 10 MG/1
30 TABLET ORAL DAILY
Qty: 12 TABLET | Refills: 0 | Status: SHIPPED | OUTPATIENT
Start: 2018-12-03 | End: 2018-12-07

## 2018-11-30 RX ORDER — PREDNISONE 20 MG/1
40 TABLET ORAL DAILY
Qty: 4 TABLET | Refills: 0 | Status: SHIPPED | OUTPATIENT
Start: 2018-12-01 | End: 2018-12-03

## 2018-11-30 RX ORDER — INSULIN GLARGINE 100 [IU]/ML
20 INJECTION, SOLUTION SUBCUTANEOUS NIGHTLY
Qty: 1 VIAL | Refills: 3 | Status: SHIPPED | OUTPATIENT
Start: 2018-11-30

## 2018-11-30 RX ADMIN — ACETAMINOPHEN 650 MG: 325 TABLET ORAL at 00:48

## 2018-11-30 RX ADMIN — IPRATROPIUM BROMIDE AND ALBUTEROL SULFATE 1 AMPULE: .5; 3 SOLUTION RESPIRATORY (INHALATION) at 07:53

## 2018-11-30 RX ADMIN — ENOXAPARIN SODIUM 40 MG: 40 INJECTION SUBCUTANEOUS at 08:21

## 2018-11-30 RX ADMIN — TAMSULOSIN HYDROCHLORIDE 0.4 MG: 0.4 CAPSULE ORAL at 08:21

## 2018-11-30 RX ADMIN — PREDNISONE 40 MG: 20 TABLET ORAL at 08:21

## 2018-11-30 RX ADMIN — ESCITALOPRAM OXALATE 10 MG: 10 TABLET ORAL at 08:20

## 2018-11-30 RX ADMIN — MOMETASONE FUROATE AND FORMOTEROL FUMARATE DIHYDRATE 2 PUFF: 100; 5 AEROSOL RESPIRATORY (INHALATION) at 07:53

## 2018-11-30 RX ADMIN — ASPIRIN 81 MG 81 MG: 81 TABLET ORAL at 08:20

## 2018-11-30 RX ADMIN — LEVOFLOXACIN 500 MG: 500 TABLET, FILM COATED ORAL at 08:21

## 2018-11-30 RX ADMIN — FAMOTIDINE 20 MG: 20 TABLET, FILM COATED ORAL at 08:20

## 2018-11-30 RX ADMIN — IPRATROPIUM BROMIDE AND ALBUTEROL SULFATE 1 AMPULE: .5; 3 SOLUTION RESPIRATORY (INHALATION) at 12:30

## 2018-11-30 ASSESSMENT — PAIN SCALES - GENERAL
PAINLEVEL_OUTOF10: 0
PAINLEVEL_OUTOF10: 5
PAINLEVEL_OUTOF10: 0
PAINLEVEL_OUTOF10: 0

## 2018-11-30 NOTE — PROGRESS NOTES
levofloxacin  500 mg Oral Daily    furosemide  20 mg Oral Every Other Day    insulin lispro  0-12 Units Subcutaneous TID     insulin lispro  0-6 Units Subcutaneous Nightly    sodium chloride flush  10 mL Intravenous 2 times per day    sodium chloride flush  10 mL Intravenous 2 times per day    enoxaparin  40 mg Subcutaneous Daily    aspirin  81 mg Oral Daily    mometasone-formoterol  2 puff Inhalation BID    escitalopram  10 mg Oral Daily    famotidine  20 mg Oral BID    tamsulosin  0.4 mg Oral Daily    ipratropium-albuterol  1 ampule Inhalation 4x daily       PRN Medications  glucose 15 g PRN   dextrose 12.5 g PRN   glucagon (rDNA) 1 mg PRN   dextrose 100 mL/hr PRN   albuterol 2.5 mg Q4H PRN   sodium chloride flush 10 mL PRN   acetaminophen 650 mg Q4H PRN   ondansetron 4 mg Q8H PRN   sodium chloride flush 10 mL PRN   magnesium hydroxide 30 mL Daily PRN   ondansetron 4 mg Q6H PRN   acetaminophen 650 mg Q4H PRN       Diagnostic Labs and Imaging:  CBC:  Recent Labs      11/28/18   0531  11/29/18   0509  11/30/18   0529   WBC  8.5  14.5*  15.0*   HGB  11.2*  10.3*  10.5*   PLT  250  322  279     BMP: Recent Labs      11/28/18   0531  11/29/18   0509  11/30/18   0529   NA  132*  131*  136   K  4.9  4.8  4.1   CL  97*  96*  98   CO2  26  25  29   BUN  9  18  19   CREATININE  0.66*  0.69*  0.67*   GLUCOSE  335*  274*  146*         Assessment and Plan:   1.  Acute exacerbation of COPD and IPF with chronic respiratory failure:Patient shifted to oral antibiotics and steroids, will discharge today. 2.  Type 2 diabetes mellitus with last HbA1c June 2018: 7.8: Lantus 20 units with ISS. 3.  Depression: Continue citalopram 10 OD. 4.  BPH: Continue Flomax 0.4 OD.     Mary Ann Gray MD  Internal Medicine Resident   [facility]         11/30/2018, 9:44 AM    Attending Physician Statement For Internal Medicine  I have discussed the care of Prem Castillo, including pertinent history and

## 2018-12-03 LAB
CULTURE: NORMAL
CULTURE: NORMAL
Lab: NORMAL
Lab: NORMAL
SPECIMEN DESCRIPTION: NORMAL
SPECIMEN DESCRIPTION: NORMAL
STATUS: NORMAL
STATUS: NORMAL

## 2018-12-08 NOTE — DISCHARGE SUMMARY
[address] [place] [address]     Department of Internal Medicine - Staff Internal Medicine Service    INPATIENT DISCHARGE SUMMARY        Patient Identification:  Gregg Majano is a 70 y.o. male. :  1940  MRN: [id number]     Acct: [de-identified]   Admit Date:  2018  Discharge date and time: 2018  3:00 PM   Attending Provider: Ami att. providers found                                     Admission Diagnoses:   COPD exacerbation ([facility] [J44.1]  COPD exacerbation ([facility] [J44.1]    Discharge Diagnoses:   Principal Problem:    Acute on chronic respiratory failure with hypoxia ([address]  Active Problems:    Type 2 diabetes mellitus ([facility]    Hyperlipidemia    Anxiety    Respiratory distress    Pulmonary artery hypertension (HCC)    Ex-smoker    Iron deficiency anemia    COPD exacerbation (HCC)    Pulmonary fibrosis (HCC)  Resolved Problems:    * No resolved hospital problems. *       Consults:   Pulmonary    Brief Inpatient course:  56-year-old male with past medical history of COPD home 4 L of oxygen pulmonary fibrosis PAH, DM presenting with new onset shortness of breath with productive cough, put on BiPAP in the ED, admitted for acute exacerbation of IPF and COPD, given IV steroids, IV Levaquin, inhalers. Was put on BiPAP when necessary. Patient's condition improved and he was using oxygen per his baseline and upon third day of admission medications were shifted to oral and patient was discharged on home oral antibiotics and steroids on a tapering dose. Procedures:  None    Any Hospital Acquired Infections: none    Discharge Functional Status:  stable    Disposition: Home with [facility].     Patient Instructions:   Discharge Medication List as of 2018  2:12 PM      START taking these medications    Details   levofloxacin (LEVAQUIN) 500 MG tablet Take 1 tablet by mouth daily for 3 days, Disp-3 tablet, R-0Normal      furosemide (LASIX) 20 MG tablet Take 1 tablet by mouth every other day, Nalini Noguera, [address] [address]  [phone]    Schedule an appointment as soon as possible for a visit in 3 days  For Hospital F/U    Carlotta Ching, [address]   [place] [phone]    Go on 12/13/2018  Appointment 12-13-18 at 230PM      Follow up labs: None    Follow up imaging: None    Note that over 30 minutes was spent in preparing discharge papers, discussing discharge with patient, medication review, etc.      Ron Baker MD         Department of Internal Medicine  [facility]         12/8/2018, 1:38 PM

## 2018-12-15 ENCOUNTER — HOSPITAL ENCOUNTER (OUTPATIENT)
Age: 78
Setting detail: SPECIMEN
Discharge: HOME | DRG: 871 | End: 2018-12-15
Payer: MEDICARE

## 2018-12-15 LAB
ABSOLUTE EOS #: 0 K/UL (ref 0–0.4)
ABSOLUTE IMMATURE GRANULOCYTE: 0 K/UL (ref 0–0.3)
ABSOLUTE LYMPH #: 1.36 K/UL (ref 1–4.8)
ABSOLUTE MONO #: 0.68 K/UL (ref 0.1–0.8)
ALBUMIN SERPL-MCNC: 3.3 G/DL (ref 3.5–5.2)
ALBUMIN/GLOBULIN RATIO: 1 (ref 1–2.5)
ALP BLD-CCNC: 98 U/L (ref 40–129)
ALT SERPL-CCNC: 10 U/L (ref 5–41)
ANION GAP SERPL CALCULATED.3IONS-SCNC: 14 MMOL/L (ref 9–17)
AST SERPL-CCNC: 7 U/L
BASOPHILS # BLD: 0 % (ref 0–2)
BASOPHILS ABSOLUTE: 0 K/UL (ref 0–0.2)
BILIRUB SERPL-MCNC: 0.51 MG/DL (ref 0.3–1.2)
BILIRUBIN DIRECT: 0.12 MG/DL
BILIRUBIN, INDIRECT: 0.39 MG/DL (ref 0–1)
BUN BLDV-MCNC: 17 MG/DL (ref 8–23)
BUN/CREAT BLD: ABNORMAL (ref 9–20)
CALCIUM SERPL-MCNC: 8.3 MG/DL (ref 8.6–10.4)
CHLORIDE BLD-SCNC: 90 MMOL/L (ref 98–107)
CHOLESTEROL/HDL RATIO: 2.4
CHOLESTEROL: 148 MG/DL
CO2: 28 MMOL/L (ref 20–31)
CREAT SERPL-MCNC: 0.84 MG/DL (ref 0.7–1.2)
CULTURE: NORMAL
DIFFERENTIAL TYPE: ABNORMAL
EOSINOPHILS RELATIVE PERCENT: 0 % (ref 1–4)
GFR AFRICAN AMERICAN: >60 ML/MIN
GFR NON-AFRICAN AMERICAN: >60 ML/MIN
GFR SERPL CREATININE-BSD FRML MDRD: ABNORMAL ML/MIN/{1.73_M2}
GFR SERPL CREATININE-BSD FRML MDRD: ABNORMAL ML/MIN/{1.73_M2}
GLOBULIN: ABNORMAL G/DL (ref 1.5–3.8)
GLUCOSE BLD-MCNC: 503 MG/DL (ref 70–99)
HCT VFR BLD CALC: 40.2 % (ref 40.7–50.3)
HDLC SERPL-MCNC: 61 MG/DL
HEMOGLOBIN: 13 G/DL (ref 13–17)
IMMATURE GRANULOCYTES: 0 %
LDL CHOLESTEROL: 69 MG/DL (ref 0–130)
LYMPHOCYTES # BLD: 6 % (ref 24–44)
Lab: NORMAL
Lab: NORMAL
MCH RBC QN AUTO: 31.9 PG (ref 25.2–33.5)
MCHC RBC AUTO-ENTMCNC: 32.3 G/DL (ref 28.4–34.8)
MCV RBC AUTO: 98.5 FL (ref 82.6–102.9)
MICRO OVA & PARASITES: NORMAL
MONOCYTES # BLD: 3 % (ref 1–7)
MORPHOLOGY: ABNORMAL
NRBC AUTOMATED: 0 PER 100 WBC
PDW BLD-RTO: 14.6 % (ref 11.8–14.4)
PLATELET # BLD: ABNORMAL K/UL (ref 138–453)
PLATELET ESTIMATE: ABNORMAL
PLATELET, FLUORESCENCE: NORMAL K/UL (ref 138–453)
PLATELET, IMMATURE FRACTION: NORMAL % (ref 1.1–10.3)
PMV BLD AUTO: ABNORMAL FL (ref 8.1–13.5)
POTASSIUM SERPL-SCNC: 4.1 MMOL/L (ref 3.7–5.3)
RBC # BLD: 4.08 M/UL (ref 4.21–5.77)
RBC # BLD: ABNORMAL 10*6/UL
SEG NEUTROPHILS: 91 % (ref 36–66)
SEGMENTED NEUTROPHILS ABSOLUTE COUNT: 20.66 K/UL (ref 1.8–7.7)
SODIUM BLD-SCNC: 132 MMOL/L (ref 135–144)
SPECIMEN DESCRIPTION: NORMAL
SPECIMEN DESCRIPTION: NORMAL
STATUS: NORMAL
STATUS: NORMAL
TOTAL PROTEIN: 6.5 G/DL (ref 6.4–8.3)
TRIGL SERPL-MCNC: 88 MG/DL
TSH SERPL DL<=0.05 MIU/L-ACNC: 1.28 MIU/L (ref 0.3–5)
VITAMIN B-12: 716 PG/ML (ref 232–1245)
VITAMIN D 25-HYDROXY: 15.7 NG/ML (ref 30–100)
VLDLC SERPL CALC-MCNC: NORMAL MG/DL (ref 1–30)
WBC # BLD: 22.7 K/UL (ref 3.5–11.3)
WBC # BLD: ABNORMAL 10*3/UL

## 2018-12-15 PROCEDURE — 87329 GIARDIA AG IA: CPT

## 2018-12-15 PROCEDURE — 87328 CRYPTOSPORIDIUM AG IA: CPT

## 2018-12-15 PROCEDURE — 80048 BASIC METABOLIC PNL TOTAL CA: CPT

## 2018-12-15 PROCEDURE — 85055 RETICULATED PLATELET ASSAY: CPT

## 2018-12-15 PROCEDURE — 85025 COMPLETE CBC W/AUTO DIFF WBC: CPT

## 2018-12-15 PROCEDURE — 87505 NFCT AGENT DETECTION GI: CPT

## 2018-12-15 PROCEDURE — 82306 VITAMIN D 25 HYDROXY: CPT

## 2018-12-15 PROCEDURE — 80061 LIPID PANEL: CPT

## 2018-12-15 PROCEDURE — 84443 ASSAY THYROID STIM HORMONE: CPT

## 2018-12-15 PROCEDURE — 82607 VITAMIN B-12: CPT

## 2018-12-15 PROCEDURE — 80076 HEPATIC FUNCTION PANEL: CPT

## 2018-12-15 PROCEDURE — 83036 HEMOGLOBIN GLYCOSYLATED A1C: CPT

## 2018-12-16 ENCOUNTER — HOSPITAL ENCOUNTER (INPATIENT)
Age: 78
LOS: 9 days | Discharge: NURSING, REHAB OR LONG-TERM CARE | DRG: 871 | End: 2018-12-25
Attending: EMERGENCY MEDICINE | Admitting: INTERNAL MEDICINE
Payer: MEDICARE

## 2018-12-16 ENCOUNTER — APPOINTMENT (OUTPATIENT)
Dept: GENERAL RADIOLOGY | Age: 78
DRG: 871 | End: 2018-12-16
Payer: MEDICARE

## 2018-12-16 DIAGNOSIS — R73.9 HYPERGLYCEMIA: ICD-10-CM

## 2018-12-16 DIAGNOSIS — E83.42 HYPOMAGNESEMIA: ICD-10-CM

## 2018-12-16 DIAGNOSIS — J44.1 COPD EXACERBATION (HCC): ICD-10-CM

## 2018-12-16 DIAGNOSIS — J18.9 PNEUMONIA DUE TO ORGANISM: ICD-10-CM

## 2018-12-16 DIAGNOSIS — R53.1 GENERAL WEAKNESS: ICD-10-CM

## 2018-12-16 DIAGNOSIS — A09 DIARRHEA OF INFECTIOUS ORIGIN: Primary | ICD-10-CM

## 2018-12-16 PROBLEM — A04.72 C. DIFFICILE COLITIS: Status: ACTIVE | Noted: 2018-12-16
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
## 2018-12-16 PROBLEM — D72.829 LEUKOCYTOSIS: Status: ACTIVE | Noted: 2018-12-16

## 2018-12-16 LAB
-: ABNORMAL
ABSOLUTE EOS #: 0.15 K/UL (ref 0–0.4)
ABSOLUTE IMMATURE GRANULOCYTE: 0 K/UL (ref 0–0.3)
ABSOLUTE LYMPH #: 0.77 K/UL (ref 1–4.8)
ABSOLUTE MONO #: 0 K/UL (ref 0.1–0.8)
ALBUMIN SERPL-MCNC: 2.9 G/DL (ref 3.5–5.2)
ALBUMIN/GLOBULIN RATIO: 1 (ref 1–2.5)
ALP BLD-CCNC: 88 U/L (ref 40–129)
ALT SERPL-CCNC: 11 U/L (ref 5–41)
AMORPHOUS: ABNORMAL
ANION GAP SERPL CALCULATED.3IONS-SCNC: 14 MMOL/L (ref 9–17)
AST SERPL-CCNC: 8 U/L
ATYPICAL LYMPHOCYTE ABSOLUTE COUNT: 0.15 K/UL
ATYPICAL LYMPHOCYTES: 1 %
BACTERIA: ABNORMAL
BASOPHILS # BLD: 0 % (ref 0–2)
BASOPHILS ABSOLUTE: 0 K/UL (ref 0–0.2)
BETA-HYDROXYBUTYRATE: 0.12 MMOL/L (ref 0.02–0.27)
BILIRUB SERPL-MCNC: 0.43 MG/DL (ref 0.3–1.2)
BILIRUBIN DIRECT: 0.16 MG/DL
BILIRUBIN URINE: NEGATIVE
BILIRUBIN, INDIRECT: 0.27 MG/DL (ref 0–1)
BUN BLDV-MCNC: 17 MG/DL (ref 8–23)
BUN/CREAT BLD: ABNORMAL (ref 9–20)
CALCIUM SERPL-MCNC: 8.8 MG/DL (ref 8.6–10.4)
CAMPYLOBACTER PCR: NORMAL
CASTS UA: ABNORMAL /LPF (ref 0–2)
CHLORIDE BLD-SCNC: 90 MMOL/L (ref 98–107)
CO2: 30 MMOL/L (ref 20–31)
COLOR: YELLOW
CREAT SERPL-MCNC: 0.87 MG/DL (ref 0.7–1.2)
CRYSTALS, UA: ABNORMAL /HPF
DIFFERENTIAL TYPE: ABNORMAL
DIRECT EXAM: NORMAL
EKG ATRIAL RATE: 91 BPM
EKG P AXIS: 58 DEGREES
EKG P-R INTERVAL: 116 MS
EKG Q-T INTERVAL: 354 MS
EKG QRS DURATION: 84 MS
EKG QTC CALCULATION (BAZETT): 435 MS
EKG R AXIS: 53 DEGREES
EKG T AXIS: -11 DEGREES
EKG VENTRICULAR RATE: 91 BPM
EOSINOPHILS RELATIVE PERCENT: 1 % (ref 1–4)
EPITHELIAL CELLS UA: ABNORMAL /HPF (ref 0–5)
GFR AFRICAN AMERICAN: >60 ML/MIN
GFR NON-AFRICAN AMERICAN: >60 ML/MIN
GFR SERPL CREATININE-BSD FRML MDRD: ABNORMAL ML/MIN/{1.73_M2}
GFR SERPL CREATININE-BSD FRML MDRD: ABNORMAL ML/MIN/{1.73_M2}
GLOBULIN: ABNORMAL G/DL (ref 1.5–3.8)
GLUCOSE BLD-MCNC: 302 MG/DL (ref 75–110)
GLUCOSE BLD-MCNC: 408 MG/DL (ref 75–110)
GLUCOSE BLD-MCNC: 506 MG/DL (ref 70–99)
GLUCOSE URINE: ABNORMAL
HCT VFR BLD CALC: 41.6 % (ref 40.7–50.3)
HEMOGLOBIN: 13.7 G/DL (ref 13–17)
IMMATURE GRANULOCYTES: 0 %
INR BLD: 1.1
KETONES, URINE: NEGATIVE
LACTIC ACID, SEPSIS WHOLE BLOOD: 1.7 MMOL/L (ref 0.5–1.9)
LACTIC ACID, SEPSIS WHOLE BLOOD: 2.2 MMOL/L (ref 0.5–1.9)
LACTIC ACID, SEPSIS WHOLE BLOOD: 3.6 MMOL/L (ref 0.5–1.9)
LACTIC ACID, SEPSIS: ABNORMAL MMOL/L (ref 0.5–1.9)
LACTIC ACID, SEPSIS: ABNORMAL MMOL/L (ref 0.5–1.9)
LACTIC ACID, SEPSIS: NORMAL MMOL/L (ref 0.5–1.9)
LACTIC ACID, WHOLE BLOOD: 1.8 MMOL/L (ref 0.7–2.1)
LEUKOCYTE ESTERASE, URINE: NEGATIVE
LYMPHOCYTES # BLD: 5 % (ref 24–44)
Lab: NORMAL
MAGNESIUM: 1.2 MG/DL (ref 1.6–2.6)
MCH RBC QN AUTO: 31.4 PG (ref 25.2–33.5)
MCHC RBC AUTO-ENTMCNC: 32.9 G/DL (ref 28.4–34.8)
MCV RBC AUTO: 95.2 FL (ref 82.6–102.9)
MONOCYTES # BLD: 0 % (ref 1–7)
MORPHOLOGY: NORMAL
MUCUS: ABNORMAL
NITRITE, URINE: NEGATIVE
NRBC AUTOMATED: 0.1 PER 100 WBC
OTHER OBSERVATIONS UA: ABNORMAL
PARTIAL THROMBOPLASTIN TIME: 24.2 SEC (ref 20.5–30.5)
PDW BLD-RTO: 14.4 % (ref 11.8–14.4)
PH UA: 5.5 (ref 5–8)
PLATELET # BLD: 169 K/UL (ref 138–453)
PLATELET ESTIMATE: ABNORMAL
PMV BLD AUTO: 10.3 FL (ref 8.1–13.5)
POC TROPONIN I: 0 NG/ML (ref 0–0.1)
POC TROPONIN I: 0 NG/ML (ref 0–0.1)
POC TROPONIN INTERP: NORMAL
POC TROPONIN INTERP: NORMAL
POTASSIUM SERPL-SCNC: 4.3 MMOL/L (ref 3.7–5.3)
PROTEIN UA: NEGATIVE
PROTHROMBIN TIME: 11.3 SEC (ref 9–12)
RBC # BLD: 4.37 M/UL (ref 4.21–5.77)
RBC # BLD: ABNORMAL 10*6/UL
RBC UA: ABNORMAL /HPF (ref 0–2)
RENAL EPITHELIAL, UA: ABNORMAL /HPF
SALMONELLA PCR: NORMAL
SEG NEUTROPHILS: 93 % (ref 36–66)
SEGMENTED NEUTROPHILS ABSOLUTE COUNT: 14.23 K/UL (ref 1.8–7.7)
SHIGATOXIN GENE PCR: NORMAL
SHIGELLA SP PCR: NORMAL
SODIUM BLD-SCNC: 134 MMOL/L (ref 135–144)
SPECIFIC GRAVITY UA: 1.02 (ref 1–1.03)
SPECIMEN DESCRIPTION: NORMAL
SPECIMEN: NORMAL
STATUS: NORMAL
TOTAL PROTEIN: 5.8 G/DL (ref 6.4–8.3)
TRICHOMONAS: ABNORMAL
TURBIDITY: CLEAR
URINE HGB: NEGATIVE
UROBILINOGEN, URINE: NORMAL
WBC # BLD: 15.3 K/UL (ref 3.5–11.3)
WBC # BLD: ABNORMAL 10*3/UL
WBC UA: ABNORMAL /HPF (ref 0–5)
YEAST: ABNORMAL

## 2018-12-16 PROCEDURE — 6360000002 HC RX W HCPCS: Performed by: EMERGENCY MEDICINE

## 2018-12-16 PROCEDURE — 87804 INFLUENZA ASSAY W/OPTIC: CPT

## 2018-12-16 PROCEDURE — 82947 ASSAY GLUCOSE BLOOD QUANT: CPT

## 2018-12-16 PROCEDURE — 85730 THROMBOPLASTIN TIME PARTIAL: CPT

## 2018-12-16 PROCEDURE — 99285 EMERGENCY DEPT VISIT HI MDM: CPT

## 2018-12-16 PROCEDURE — 87040 BLOOD CULTURE FOR BACTERIA: CPT

## 2018-12-16 PROCEDURE — 87324 CLOSTRIDIUM AG IA: CPT

## 2018-12-16 PROCEDURE — 82010 KETONE BODYS QUAN: CPT

## 2018-12-16 PROCEDURE — 2580000003 HC RX 258: Performed by: STUDENT IN AN ORGANIZED HEALTH CARE EDUCATION/TRAINING PROGRAM

## 2018-12-16 PROCEDURE — 36415 COLL VENOUS BLD VENIPUNCTURE: CPT

## 2018-12-16 PROCEDURE — 2580000003 HC RX 258: Performed by: EMERGENCY MEDICINE

## 2018-12-16 PROCEDURE — 83735 ASSAY OF MAGNESIUM: CPT

## 2018-12-16 PROCEDURE — 6360000002 HC RX W HCPCS: Performed by: STUDENT IN AN ORGANIZED HEALTH CARE EDUCATION/TRAINING PROGRAM

## 2018-12-16 PROCEDURE — 85025 COMPLETE CBC W/AUTO DIFF WBC: CPT

## 2018-12-16 PROCEDURE — 71045 X-RAY EXAM CHEST 1 VIEW: CPT

## 2018-12-16 PROCEDURE — 87086 URINE CULTURE/COLONY COUNT: CPT

## 2018-12-16 PROCEDURE — 81001 URINALYSIS AUTO W/SCOPE: CPT

## 2018-12-16 PROCEDURE — 84484 ASSAY OF TROPONIN QUANT: CPT

## 2018-12-16 PROCEDURE — 2060000000 HC ICU INTERMEDIATE R&B

## 2018-12-16 PROCEDURE — 80048 BASIC METABOLIC PNL TOTAL CA: CPT

## 2018-12-16 PROCEDURE — 6370000000 HC RX 637 (ALT 250 FOR IP): Performed by: STUDENT IN AN ORGANIZED HEALTH CARE EDUCATION/TRAINING PROGRAM

## 2018-12-16 PROCEDURE — 94640 AIRWAY INHALATION TREATMENT: CPT

## 2018-12-16 PROCEDURE — 83605 ASSAY OF LACTIC ACID: CPT

## 2018-12-16 PROCEDURE — 85610 PROTHROMBIN TIME: CPT

## 2018-12-16 PROCEDURE — 6370000000 HC RX 637 (ALT 250 FOR IP): Performed by: EMERGENCY MEDICINE

## 2018-12-16 PROCEDURE — 93005 ELECTROCARDIOGRAM TRACING: CPT

## 2018-12-16 PROCEDURE — 87449 NOS EACH ORGANISM AG IA: CPT

## 2018-12-16 PROCEDURE — 80076 HEPATIC FUNCTION PANEL: CPT

## 2018-12-16 RX ORDER — 0.9 % SODIUM CHLORIDE 0.9 %
1600 INTRAVENOUS SOLUTION INTRAVENOUS ONCE
Status: DISCONTINUED | OUTPATIENT
Start: 2018-12-16 | End: 2018-12-16

## 2018-12-16 RX ORDER — IPRATROPIUM BROMIDE AND ALBUTEROL SULFATE 2.5; .5 MG/3ML; MG/3ML
1 SOLUTION RESPIRATORY (INHALATION)
Status: DISCONTINUED | OUTPATIENT
Start: 2018-12-16 | End: 2018-12-16

## 2018-12-16 RX ORDER — ALBUTEROL SULFATE 90 UG/1
2 AEROSOL, METERED RESPIRATORY (INHALATION) EVERY 6 HOURS PRN
Status: DISCONTINUED | OUTPATIENT
Start: 2018-12-16 | End: 2018-12-16

## 2018-12-16 RX ORDER — INSULIN GLARGINE 100 [IU]/ML
10 INJECTION, SOLUTION SUBCUTANEOUS NIGHTLY
Status: DISCONTINUED | OUTPATIENT
Start: 2018-12-16 | End: 2018-12-17

## 2018-12-16 RX ORDER — ALBUTEROL SULFATE 2.5 MG/3ML
2.5 SOLUTION RESPIRATORY (INHALATION)
Status: DISCONTINUED | OUTPATIENT
Start: 2018-12-16 | End: 2018-12-16

## 2018-12-16 RX ORDER — NICOTINE POLACRILEX 4 MG
15 LOZENGE BUCCAL PRN
Status: DISCONTINUED | OUTPATIENT
Start: 2018-12-16 | End: 2018-12-25 | Stop reason: HOSPADM

## 2018-12-16 RX ORDER — DEXTROSE MONOHYDRATE 50 MG/ML
100 INJECTION, SOLUTION INTRAVENOUS PRN
Status: DISCONTINUED | OUTPATIENT
Start: 2018-12-16 | End: 2018-12-25 | Stop reason: HOSPADM

## 2018-12-16 RX ORDER — VANCOMYCIN HYDROCHLORIDE 125 MG/1
125 CAPSULE ORAL 4 TIMES DAILY
Status: DISCONTINUED | OUTPATIENT
Start: 2018-12-16 | End: 2018-12-17

## 2018-12-16 RX ORDER — ALBUTEROL SULFATE 90 UG/1
2 AEROSOL, METERED RESPIRATORY (INHALATION)
Status: DISCONTINUED | OUTPATIENT
Start: 2018-12-16 | End: 2018-12-16

## 2018-12-16 RX ORDER — MAGNESIUM SULFATE 1 G/100ML
2 INJECTION INTRAVENOUS ONCE
Status: COMPLETED | OUTPATIENT
Start: 2018-12-16 | End: 2018-12-16

## 2018-12-16 RX ORDER — 0.9 % SODIUM CHLORIDE 0.9 %
500 INTRAVENOUS SOLUTION INTRAVENOUS ONCE
Status: COMPLETED | OUTPATIENT
Start: 2018-12-16 | End: 2018-12-16

## 2018-12-16 RX ORDER — METHYLPREDNISOLONE SODIUM SUCCINATE 40 MG/ML
40 INJECTION, POWDER, LYOPHILIZED, FOR SOLUTION INTRAMUSCULAR; INTRAVENOUS EVERY 12 HOURS
Status: DISCONTINUED | OUTPATIENT
Start: 2018-12-16 | End: 2018-12-17

## 2018-12-16 RX ORDER — SODIUM CHLORIDE 0.9 % (FLUSH) 0.9 %
10 SYRINGE (ML) INJECTION EVERY 12 HOURS SCHEDULED
Status: DISCONTINUED | OUTPATIENT
Start: 2018-12-16 | End: 2018-12-25 | Stop reason: HOSPADM

## 2018-12-16 RX ORDER — TAMSULOSIN HYDROCHLORIDE 0.4 MG/1
0.4 CAPSULE ORAL DAILY
Status: DISCONTINUED | OUTPATIENT
Start: 2018-12-17 | End: 2018-12-25 | Stop reason: HOSPADM

## 2018-12-16 RX ORDER — ACETAMINOPHEN 325 MG/1
650 TABLET ORAL EVERY 4 HOURS PRN
Status: DISCONTINUED | OUTPATIENT
Start: 2018-12-16 | End: 2018-12-16 | Stop reason: SDUPTHER

## 2018-12-16 RX ORDER — ASPIRIN 81 MG/1
81 TABLET ORAL DAILY
Status: DISCONTINUED | OUTPATIENT
Start: 2018-12-17 | End: 2018-12-25 | Stop reason: HOSPADM

## 2018-12-16 RX ORDER — IPRATROPIUM BROMIDE AND ALBUTEROL SULFATE 2.5; .5 MG/3ML; MG/3ML
1 SOLUTION RESPIRATORY (INHALATION) 4 TIMES DAILY
Status: DISCONTINUED | OUTPATIENT
Start: 2018-12-17 | End: 2018-12-16

## 2018-12-16 RX ORDER — SODIUM CHLORIDE 9 MG/ML
INJECTION, SOLUTION INTRAVENOUS CONTINUOUS
Status: DISCONTINUED | OUTPATIENT
Start: 2018-12-16 | End: 2018-12-20

## 2018-12-16 RX ORDER — ALBUTEROL SULFATE 2.5 MG/3ML
2.5 SOLUTION RESPIRATORY (INHALATION) 4 TIMES DAILY
Status: DISCONTINUED | OUTPATIENT
Start: 2018-12-17 | End: 2018-12-17

## 2018-12-16 RX ORDER — SODIUM CHLORIDE 0.9 % (FLUSH) 0.9 %
10 SYRINGE (ML) INJECTION PRN
Status: DISCONTINUED | OUTPATIENT
Start: 2018-12-16 | End: 2018-12-25 | Stop reason: HOSPADM

## 2018-12-16 RX ORDER — DEXAMETHASONE SODIUM PHOSPHATE 10 MG/ML
10 INJECTION INTRAMUSCULAR; INTRAVENOUS ONCE
Status: COMPLETED | OUTPATIENT
Start: 2018-12-16 | End: 2018-12-16

## 2018-12-16 RX ORDER — ALBUTEROL SULFATE 2.5 MG/3ML
2.5 SOLUTION RESPIRATORY (INHALATION) EVERY 6 HOURS PRN
Status: DISCONTINUED | OUTPATIENT
Start: 2018-12-16 | End: 2018-12-25 | Stop reason: HOSPADM

## 2018-12-16 RX ORDER — DEXTROSE MONOHYDRATE 25 G/50ML
12.5 INJECTION, SOLUTION INTRAVENOUS PRN
Status: DISCONTINUED | OUTPATIENT
Start: 2018-12-16 | End: 2018-12-25 | Stop reason: HOSPADM

## 2018-12-16 RX ORDER — VANCOMYCIN HYDROCHLORIDE 1 G/200ML
1000 INJECTION, SOLUTION INTRAVENOUS ONCE
Status: DISCONTINUED | OUTPATIENT
Start: 2018-12-16 | End: 2018-12-16 | Stop reason: DRUGHIGH

## 2018-12-16 RX ORDER — LEVOFLOXACIN 5 MG/ML
750 INJECTION, SOLUTION INTRAVENOUS ONCE
Status: COMPLETED | OUTPATIENT
Start: 2018-12-16 | End: 2018-12-17

## 2018-12-16 RX ORDER — ACETAMINOPHEN 325 MG/1
650 TABLET ORAL EVERY 6 HOURS PRN
Status: DISCONTINUED | OUTPATIENT
Start: 2018-12-16 | End: 2018-12-25 | Stop reason: HOSPADM

## 2018-12-16 RX ORDER — ALBUTEROL SULFATE 2.5 MG/3ML
5 SOLUTION RESPIRATORY (INHALATION)
Status: DISCONTINUED | OUTPATIENT
Start: 2018-12-16 | End: 2018-12-16

## 2018-12-16 RX ORDER — VANCOMYCIN HYDROCHLORIDE 1 G/200ML
1000 INJECTION, SOLUTION INTRAVENOUS EVERY 12 HOURS
Status: DISCONTINUED | OUTPATIENT
Start: 2018-12-17 | End: 2018-12-16

## 2018-12-16 RX ORDER — ESCITALOPRAM OXALATE 10 MG/1
10 TABLET ORAL DAILY
Status: DISCONTINUED | OUTPATIENT
Start: 2018-12-17 | End: 2018-12-25 | Stop reason: HOSPADM

## 2018-12-16 RX ORDER — ONDANSETRON 2 MG/ML
4 INJECTION INTRAMUSCULAR; INTRAVENOUS EVERY 6 HOURS PRN
Status: DISCONTINUED | OUTPATIENT
Start: 2018-12-16 | End: 2018-12-25 | Stop reason: HOSPADM

## 2018-12-16 RX ADMIN — INSULIN GLARGINE 10 UNITS: 100 INJECTION, SOLUTION SUBCUTANEOUS at 21:34

## 2018-12-16 RX ADMIN — INSULIN LISPRO 6 UNITS: 100 INJECTION, SOLUTION INTRAVENOUS; SUBCUTANEOUS at 16:59

## 2018-12-16 RX ADMIN — ALBUTEROL SULFATE 5 MG: 2.5 SOLUTION RESPIRATORY (INHALATION) at 15:40

## 2018-12-16 RX ADMIN — MAGNESIUM SULFATE HEPTAHYDRATE 1 G: 1 INJECTION, SOLUTION INTRAVENOUS at 16:12

## 2018-12-16 RX ADMIN — ENOXAPARIN SODIUM 40 MG: 40 INJECTION SUBCUTANEOUS at 21:46

## 2018-12-16 RX ADMIN — SODIUM CHLORIDE: 9 INJECTION, SOLUTION INTRAVENOUS at 23:24

## 2018-12-16 RX ADMIN — INSULIN LISPRO 3 UNITS: 100 INJECTION, SOLUTION INTRAVENOUS; SUBCUTANEOUS at 21:34

## 2018-12-16 RX ADMIN — LEVOFLOXACIN 750 MG: 5 INJECTION, SOLUTION INTRAVENOUS at 23:14

## 2018-12-16 RX ADMIN — IPRATROPIUM BROMIDE 0.5 MG: 0.5 SOLUTION RESPIRATORY (INHALATION) at 15:40

## 2018-12-16 RX ADMIN — SODIUM CHLORIDE 500 ML: 9 INJECTION, SOLUTION INTRAVENOUS at 16:12

## 2018-12-16 RX ADMIN — PIPERACILLIN AND TAZOBACTAM 3.38 G: 3; .375 INJECTION, POWDER, LYOPHILIZED, FOR SOLUTION INTRAVENOUS; PARENTERAL at 17:28

## 2018-12-16 RX ADMIN — METHYLPREDNISOLONE SODIUM SUCCINATE 40 MG: 40 INJECTION, POWDER, FOR SOLUTION INTRAMUSCULAR; INTRAVENOUS at 21:46

## 2018-12-16 RX ADMIN — VANCOMYCIN HYDROCHLORIDE 750 MG: 10 INJECTION, POWDER, LYOPHILIZED, FOR SOLUTION INTRAVENOUS at 23:24

## 2018-12-16 RX ADMIN — DEXAMETHASONE SODIUM PHOSPHATE 10 MG: 10 INJECTION INTRAMUSCULAR; INTRAVENOUS at 16:16

## 2018-12-16 RX ADMIN — Medication 10 ML: at 21:47

## 2018-12-16 RX ADMIN — VANCOMYCIN HYDROCHLORIDE 125 MG: 125 CAPSULE ORAL at 23:43

## 2018-12-16 ASSESSMENT — PAIN SCALES - GENERAL: PAINLEVEL_OUTOF10: 0

## 2018-12-16 NOTE — ED PROVIDER NOTES
[facility] ED  Emergency Department Encounter  Emergency Medicine Resident     Pt Name: Kelly Villalba  MRN: [id number]  [place] 1940  Date of evaluation: 12/16/18  PCP:  Angelika Borja PA-C    CHIEF COMPLAINT       Chief Complaint   Patient presents with    Shortness of Breath       HISTORY OF PRESENT ILLNESS  (Location/Symptom, Timing/Onset, Context/Setting, Quality, Duration, Modifying Factors, Severity.)      Kelly Villalba is a 70 y.o. Who has a history of COPD and Fibrotic Lung Disease, essential non smoker, PAH on 4 LPM home oxygen, recently treated for community aquired pna presents with acute  bilateral shortness of breath, and slight productive cough with colorful sputum that started a few days ago that is green and gradually worsened and is constant. Patient denies nausea, vomiting, fever, chills, night sweats, unintentional weight loss. Patient took nothing as his home health nurse was concerned due to to his oxygen saturations being low, with him being short of breath and week and states that breathing treatments helps symptoms. Moderate severity. Patient also test positive via pcr for c diff and continues to have seven episodes of diarrhea per day and is on oral vancomycin. Patient remains on eliquis for afib    PAST MEDICAL / SURGICAL / SOCIAL / FAMILY HISTORY      has a past medical history of Anemia; Anxiety; Bronchitis; COPD (chronic obstructive pulmonary disease) ([address] Diabetes ([address] Former smoker; Hyperlipidemia; Oxygen dependent; Respiratory distress; and Type 2 diabetes mellitus ([address] has a past surgical history that includes AAA repair, open; Pancreas surgery; Finger surgery; and Foot surgery.     Social History     Social History    Marital status:      Spouse name: N/A    Number of children: N/A    Years of education: N/A     Occupational History    none      Social History Main Topics    Smoking status: Former Smoker     Types: Cigarettes    PA-C       REVIEW OF SYSTEMS    (2-9 systems for level 4, 10 or more for level 5)        ROS:  Constitutional: Denied fever, weight loss  Eyes: Denied change in vision, eye pain  Respiratory: + shortness of breath, denied chest pain upon inspiration  CV: Denied edema, chest pain, palpitations  GI: Denies nausea, vomiting, constipation, psoitive diarrhea, change in stools   : Denied Change in urination, hematuria,   MS: Denied muscle stiffness, arthritis  Pscyh:Denies depression and hallucinations  Neuro: Denies weakness, headaches and seizures  Endocrine Denies polyphagia, polydipsia,   Hematological/lympathic: Denies lymphadenopathy,positive  bleeds easily  Integumentary:Denies skin changes, rashes    PHYSICAL EXAM   (up to 7 for level 4, 8 or more for level 5)      INITIAL VITALS:   /77   Pulse 85   Temp 98.3 °F (36.8 °C) (Oral)   Resp 20   Wt 157 lb (71.2 kg)   SpO2 94%   BMI 23.87 kg/m²       Vital signs reviewed.  Nursing note reviewed    Constitutional: Well developed; well-nourished  HENT: Normocephalic, atraumatic   Eyes: VELVET Conj nl  Neck: ROM nl Supple  Cardiovascular:  Regular Rhythm No murmurs, rubs, gallops* radial pulses 2+  Pulmonary/Chest Wall: Effort normal limit, clear to ausculte bilaterally without wheezes rhonchi or rhales except faint expiratory wheezes with a slight prolonged I:E ratio  Abdomen: Soft, non-tender, non-distended bowel sounds present no pulsatile mass  Musculoskeletal: Normal ROM, no edema  Neurological: Alert and Oriented x3,   Skin: Warm and dry  Psych: Mood/affect normal, behavior normal    DIFFERENTIAL  DIAGNOSIS     PLAN (LABS / IMAGING / EKG):  Orders Placed This Encounter   Procedures    Culture Blood #1    Culture Blood #1    Urine Culture    XR CHEST PORTABLE    CBC Auto Differential    Basic Metabolic Panel    Magnesium    Lactate, Sepsis    Protime-INR    APTT    Hepatic Function Panel    Urinalysis with microscopic    Lactic Acid, Whole

## 2018-12-17 PROBLEM — E86.0 DEHYDRATION: Status: RESOLVED | Noted: 2018-12-17 | Resolved: 2018-12-17

## 2018-12-17 PROBLEM — E86.0 DEHYDRATION: Status: ACTIVE | Noted: 2018-12-17

## 2018-12-17 LAB
ABSOLUTE EOS #: 0 K/UL (ref 0–0.4)
ABSOLUTE IMMATURE GRANULOCYTE: 0 K/UL (ref 0–0.3)
ABSOLUTE LYMPH #: 0.48 K/UL (ref 1–4.8)
ABSOLUTE MONO #: 0.16 K/UL (ref 0.1–0.8)
ANION GAP SERPL CALCULATED.3IONS-SCNC: 12 MMOL/L (ref 9–17)
BASOPHILS # BLD: 0 % (ref 0–2)
BASOPHILS ABSOLUTE: 0 K/UL (ref 0–0.2)
BUN BLDV-MCNC: 15 MG/DL (ref 8–23)
BUN/CREAT BLD: ABNORMAL (ref 9–20)
C DIFF AG + TOXIN: NORMAL
CALCIUM SERPL-MCNC: 7.6 MG/DL (ref 8.6–10.4)
CHLORIDE BLD-SCNC: 96 MMOL/L (ref 98–107)
CO2: 26 MMOL/L (ref 20–31)
CREAT SERPL-MCNC: 0.68 MG/DL (ref 0.7–1.2)
CULTURE: NORMAL
DIFFERENTIAL TYPE: ABNORMAL
DIRECT EXAM: NORMAL
EOSINOPHILS RELATIVE PERCENT: 0 % (ref 1–4)
ESTIMATED AVERAGE GLUCOSE: 232 MG/DL
GFR AFRICAN AMERICAN: >60 ML/MIN
GFR NON-AFRICAN AMERICAN: >60 ML/MIN
GFR SERPL CREATININE-BSD FRML MDRD: ABNORMAL ML/MIN/{1.73_M2}
GFR SERPL CREATININE-BSD FRML MDRD: ABNORMAL ML/MIN/{1.73_M2}
GLUCOSE BLD-MCNC: 260 MG/DL (ref 70–99)
GLUCOSE BLD-MCNC: 273 MG/DL (ref 75–110)
GLUCOSE BLD-MCNC: 275 MG/DL (ref 75–110)
GLUCOSE BLD-MCNC: 278 MG/DL (ref 75–110)
GLUCOSE BLD-MCNC: 298 MG/DL (ref 75–110)
GLUCOSE BLD-MCNC: 298 MG/DL (ref 75–110)
GLUCOSE BLD-MCNC: 347 MG/DL (ref 75–110)
HBA1C MFR BLD: 9.7 % (ref 4–6)
HCT VFR BLD CALC: 33.2 % (ref 40.7–50.3)
HEMOGLOBIN: 10.7 G/DL (ref 13–17)
IMMATURE GRANULOCYTES: 0 %
LYMPHOCYTES # BLD: 3 % (ref 24–44)
Lab: NORMAL
Lab: NORMAL
MCH RBC QN AUTO: 31.1 PG (ref 25.2–33.5)
MCHC RBC AUTO-ENTMCNC: 32.2 G/DL (ref 28.4–34.8)
MCV RBC AUTO: 96.5 FL (ref 82.6–102.9)
MONOCYTES # BLD: 1 % (ref 1–7)
MORPHOLOGY: ABNORMAL
NRBC AUTOMATED: 0 PER 100 WBC
PDW BLD-RTO: 14.6 % (ref 11.8–14.4)
PLATELET # BLD: 157 K/UL (ref 138–453)
PLATELET ESTIMATE: ABNORMAL
PMV BLD AUTO: 10.7 FL (ref 8.1–13.5)
POTASSIUM SERPL-SCNC: 4.2 MMOL/L (ref 3.7–5.3)
RBC # BLD: 3.44 M/UL (ref 4.21–5.77)
RBC # BLD: ABNORMAL 10*6/UL
SEG NEUTROPHILS: 96 % (ref 36–66)
SEGMENTED NEUTROPHILS ABSOLUTE COUNT: 15.36 K/UL (ref 1.8–7.7)
SODIUM BLD-SCNC: 134 MMOL/L (ref 135–144)
SPECIMEN DESCRIPTION: NORMAL
STATUS: NORMAL
STATUS: NORMAL
WBC # BLD: 16 K/UL (ref 3.5–11.3)
WBC # BLD: ABNORMAL 10*3/UL

## 2018-12-17 PROCEDURE — 6360000002 HC RX W HCPCS: Performed by: STUDENT IN AN ORGANIZED HEALTH CARE EDUCATION/TRAINING PROGRAM

## 2018-12-17 PROCEDURE — 99222 1ST HOSP IP/OBS MODERATE 55: CPT | Performed by: INTERNAL MEDICINE

## 2018-12-17 PROCEDURE — 94660 CPAP INITIATION&MGMT: CPT

## 2018-12-17 PROCEDURE — 6370000000 HC RX 637 (ALT 250 FOR IP): Performed by: STUDENT IN AN ORGANIZED HEALTH CARE EDUCATION/TRAINING PROGRAM

## 2018-12-17 PROCEDURE — G8979 MOBILITY GOAL STATUS: HCPCS

## 2018-12-17 PROCEDURE — 94762 N-INVAS EAR/PLS OXIMTRY CONT: CPT

## 2018-12-17 PROCEDURE — 36415 COLL VENOUS BLD VENIPUNCTURE: CPT

## 2018-12-17 PROCEDURE — 6370000000 HC RX 637 (ALT 250 FOR IP): Performed by: NURSE PRACTITIONER

## 2018-12-17 PROCEDURE — 99223 1ST HOSP IP/OBS HIGH 75: CPT | Performed by: INTERNAL MEDICINE

## 2018-12-17 PROCEDURE — 85025 COMPLETE CBC W/AUTO DIFF WBC: CPT

## 2018-12-17 PROCEDURE — 97530 THERAPEUTIC ACTIVITIES: CPT

## 2018-12-17 PROCEDURE — 82947 ASSAY GLUCOSE BLOOD QUANT: CPT

## 2018-12-17 PROCEDURE — 6370000000 HC RX 637 (ALT 250 FOR IP): Performed by: HOSPITALIST

## 2018-12-17 PROCEDURE — 80048 BASIC METABOLIC PNL TOTAL CA: CPT

## 2018-12-17 PROCEDURE — 97166 OT EVAL MOD COMPLEX 45 MIN: CPT

## 2018-12-17 PROCEDURE — 94640 AIRWAY INHALATION TREATMENT: CPT

## 2018-12-17 PROCEDURE — 2060000000 HC ICU INTERMEDIATE R&B

## 2018-12-17 PROCEDURE — 2700000000 HC OXYGEN THERAPY PER DAY

## 2018-12-17 PROCEDURE — 97535 SELF CARE MNGMENT TRAINING: CPT

## 2018-12-17 PROCEDURE — G8987 SELF CARE CURRENT STATUS: HCPCS

## 2018-12-17 PROCEDURE — 97162 PT EVAL MOD COMPLEX 30 MIN: CPT

## 2018-12-17 PROCEDURE — G8988 SELF CARE GOAL STATUS: HCPCS

## 2018-12-17 PROCEDURE — 2580000003 HC RX 258: Performed by: STUDENT IN AN ORGANIZED HEALTH CARE EDUCATION/TRAINING PROGRAM

## 2018-12-17 PROCEDURE — G8978 MOBILITY CURRENT STATUS: HCPCS

## 2018-12-17 RX ORDER — INSULIN GLARGINE 100 [IU]/ML
20 INJECTION, SOLUTION SUBCUTANEOUS NIGHTLY
Status: DISCONTINUED | OUTPATIENT
Start: 2018-12-17 | End: 2018-12-18

## 2018-12-17 RX ORDER — IPRATROPIUM BROMIDE AND ALBUTEROL SULFATE 2.5; .5 MG/3ML; MG/3ML
1 SOLUTION RESPIRATORY (INHALATION)
Status: DISCONTINUED | OUTPATIENT
Start: 2018-12-17 | End: 2018-12-25 | Stop reason: HOSPADM

## 2018-12-17 RX ORDER — VANCOMYCIN HYDROCHLORIDE 125 MG/1
125 CAPSULE ORAL 4 TIMES DAILY
Status: COMPLETED | OUTPATIENT
Start: 2018-12-17 | End: 2018-12-20

## 2018-12-17 RX ORDER — VANCOMYCIN HYDROCHLORIDE 125 MG/1
125 CAPSULE ORAL 4 TIMES DAILY
Status: DISCONTINUED | OUTPATIENT
Start: 2018-12-17 | End: 2018-12-17

## 2018-12-17 RX ORDER — ERGOCALCIFEROL 1.25 MG/1
50000 CAPSULE ORAL WEEKLY
Status: DISCONTINUED | OUTPATIENT
Start: 2018-12-17 | End: 2018-12-25 | Stop reason: HOSPADM

## 2018-12-17 RX ORDER — IPRATROPIUM BROMIDE AND ALBUTEROL SULFATE 2.5; .5 MG/3ML; MG/3ML
1 SOLUTION RESPIRATORY (INHALATION)
Status: DISCONTINUED | OUTPATIENT
Start: 2018-12-17 | End: 2018-12-17

## 2018-12-17 RX ORDER — ALBUTEROL SULFATE 2.5 MG/3ML
2.5 SOLUTION RESPIRATORY (INHALATION)
Status: DISCONTINUED | OUTPATIENT
Start: 2018-12-17 | End: 2018-12-17

## 2018-12-17 RX ADMIN — ALBUTEROL SULFATE 2.5 MG: 2.5 SOLUTION RESPIRATORY (INHALATION) at 07:39

## 2018-12-17 RX ADMIN — INSULIN GLARGINE 20 UNITS: 100 INJECTION, SOLUTION SUBCUTANEOUS at 20:39

## 2018-12-17 RX ADMIN — MOMETASONE FUROATE AND FORMOTEROL FUMARATE DIHYDRATE 2 PUFF: 100; 5 AEROSOL RESPIRATORY (INHALATION) at 07:40

## 2018-12-17 RX ADMIN — ACETAMINOPHEN 650 MG: 325 TABLET ORAL at 22:13

## 2018-12-17 RX ADMIN — PIPERACILLIN AND TAZOBACTAM 3.38 G: 3; .375 INJECTION, POWDER, LYOPHILIZED, FOR SOLUTION INTRAVENOUS; PARENTERAL at 08:33

## 2018-12-17 RX ADMIN — VANCOMYCIN HYDROCHLORIDE 125 MG: 125 CAPSULE ORAL at 17:36

## 2018-12-17 RX ADMIN — INSULIN LISPRO 9 UNITS: 100 INJECTION, SOLUTION INTRAVENOUS; SUBCUTANEOUS at 16:47

## 2018-12-17 RX ADMIN — Medication 10 ML: at 20:38

## 2018-12-17 RX ADMIN — ESCITALOPRAM OXALATE 10 MG: 10 TABLET ORAL at 08:34

## 2018-12-17 RX ADMIN — IPRATROPIUM BROMIDE AND ALBUTEROL SULFATE 1 AMPULE: .5; 3 SOLUTION RESPIRATORY (INHALATION) at 15:32

## 2018-12-17 RX ADMIN — ERGOCALCIFEROL 50000 UNITS: 1.25 CAPSULE ORAL at 08:34

## 2018-12-17 RX ADMIN — ENOXAPARIN SODIUM 40 MG: 40 INJECTION SUBCUTANEOUS at 08:34

## 2018-12-17 RX ADMIN — ASPIRIN 81 MG: 81 TABLET ORAL at 08:34

## 2018-12-17 RX ADMIN — METHYLPREDNISOLONE SODIUM SUCCINATE 40 MG: 40 INJECTION, POWDER, FOR SOLUTION INTRAMUSCULAR; INTRAVENOUS at 08:34

## 2018-12-17 RX ADMIN — MOMETASONE FUROATE AND FORMOTEROL FUMARATE DIHYDRATE 2 PUFF: 100; 5 AEROSOL RESPIRATORY (INHALATION) at 20:12

## 2018-12-17 RX ADMIN — INSULIN LISPRO 9 UNITS: 100 INJECTION, SOLUTION INTRAVENOUS; SUBCUTANEOUS at 08:34

## 2018-12-17 RX ADMIN — VANCOMYCIN HYDROCHLORIDE 125 MG: 125 CAPSULE ORAL at 20:38

## 2018-12-17 RX ADMIN — INSULIN LISPRO 6 UNITS: 100 INJECTION, SOLUTION INTRAVENOUS; SUBCUTANEOUS at 01:46

## 2018-12-17 RX ADMIN — INSULIN LISPRO 9 UNITS: 100 INJECTION, SOLUTION INTRAVENOUS; SUBCUTANEOUS at 11:44

## 2018-12-17 RX ADMIN — TAMSULOSIN HYDROCHLORIDE 0.4 MG: 0.4 CAPSULE ORAL at 08:34

## 2018-12-17 RX ADMIN — SODIUM CHLORIDE: 9 INJECTION, SOLUTION INTRAVENOUS at 06:05

## 2018-12-17 RX ADMIN — IPRATROPIUM BROMIDE AND ALBUTEROL SULFATE 1 AMPULE: .5; 3 SOLUTION RESPIRATORY (INHALATION) at 23:10

## 2018-12-17 RX ADMIN — TIOTROPIUM BROMIDE 18 MCG: 18 CAPSULE ORAL; RESPIRATORY (INHALATION) at 07:39

## 2018-12-17 RX ADMIN — IPRATROPIUM BROMIDE AND ALBUTEROL SULFATE 1 AMPULE: .5; 3 SOLUTION RESPIRATORY (INHALATION) at 20:12

## 2018-12-17 RX ADMIN — PIPERACILLIN AND TAZOBACTAM 3.38 G: 3; .375 INJECTION, POWDER, LYOPHILIZED, FOR SOLUTION INTRAVENOUS; PARENTERAL at 01:24

## 2018-12-17 RX ADMIN — ALBUTEROL SULFATE 2.5 MG: 2.5 SOLUTION RESPIRATORY (INHALATION) at 11:15

## 2018-12-17 ASSESSMENT — ENCOUNTER SYMPTOMS
WHEEZING: 1
CHEST TIGHTNESS: 0
BACK PAIN: 1
COLOR CHANGE: 0
COUGH: 1
APNEA: 0
EYE DISCHARGE: 0
ANAL BLEEDING: 0
EYE ITCHING: 0
STRIDOR: 0
CHOKING: 0
ABDOMINAL DISTENTION: 0
SHORTNESS OF BREATH: 1
ABDOMINAL PAIN: 0
BLOOD IN STOOL: 0
EYE PAIN: 0

## 2018-12-17 ASSESSMENT — PULMONARY FUNCTION TESTS: PEFR_L/MIN: 28

## 2018-12-17 ASSESSMENT — PAIN SCALES - GENERAL: PAINLEVEL_OUTOF10: 4

## 2018-12-17 NOTE — PROGRESS NOTES
Limits  Social/Functional History  Social/Functional History  Lives With: Alone  Type of Home: House  Home Layout: One level (pt states having a basement however does not use)  Home Access: Stairs to enter without rails  Entrance Stairs - Number of Steps: 2 steps  Bathroom Shower/Tub: Tub/Shower unit  Bathroom Toilet: Standard (pt also reports having a BSC)  Bathroom Equipment: Shower chair, Grab bars in shower  Bathroom Accessibility: Accessible  Home Equipment: Standard walker, Oxygen (4L home O2, pt states O2 is bumped up to 5-6L during activity, pt self checks oxygen saturations)  Receives Help From: Neighbor (Pt reports supportive neighbor spends time with pt daily and performs high level IADL tasks)  ADL Assistance: Independent  Homemaking Assistance: Needs assistance  Homemaking Responsibilities: Yes (neighbor performs majority)  Meal Prep Responsibility: Secondary  Laundry Responsibility: Secondary  Cleaning Responsibility: Secondary  Bill Paying/Finance Responsibility: Primary  Shopping Responsibility: No  Health Care Management: Primary  Ambulation Assistance: Independent  Transfer Assistance: Independent  Active : No  Patient's  Info: neighbor drives  Mode of Transportation: Car  Occupation: Retired  Type of occupation: Previously worked as a   Leisure & [address]: Enjoys stationary biking, weightlifting (2-3lb dumbells) and relaxing  Cognition   Cognition  Overall Cognitive Status: WFL    Objective          AROM RLE (degrees)  RLE AROM: WFL  AROM LLE (degrees)  LLE AROM : WFL  AROM RUE (degrees)  RUE AROM : WFL  AROM LUE (degrees)  LUE AROM : WFL  Strength RLE  Strength RLE: WFL  Strength LLE  Strength LLE: WFL  Strength RUE  Strength RUE: WFL  Strength LUE  Strength LUE: WFL     Sensation  Overall Sensation Status: Impaired  Additional Comments: Pt reports new onset numbness/tinling to B wrists and hands  Bed mobility  Supine to Sit: Contact guard assistance  Sit to Supine: Stand Limitation: Mobility: Walking and moving around  Mobility: Walking and Moving Around Current Status (): At least 20 percent but less than 40 percent impaired, limited or restricted  Mobility: Walking and Moving Around Goal Status ():  At least 1 percent but less than 20 percent impaired, limited or restricted    AM-PAC Score  AM-PAC Inpatient Mobility Raw Score : 21  AM-PAC Inpatient T-Scale Score : 50.25  Mobility Inpatient CMS 0-100% Score: 28.97  Mobility Inpatient CMS G-Code Modifier : CJ          Goals  Short term goals  Time Frame for Short term goals: 14 visits  Short term goal 1: Pt will be I with bed mobility  Short term goal 2: Pt will be I with transfers  Short term goal 3: Pt will be I with amb 150'  Short term goal 4: Pt will navigate 4 steps without rail David       Therapy Time   Individual Concurrent Group Co-treatment   Time In 1039         Time Out 1104         Minutes 25                 Yaritza Qureshi, PT

## 2018-12-17 NOTE — H&P
[facility]     Department of Internal Medicine - Staff Internal Medicine Service          ADMISSION NOTE/HISTORY AND PHYSICAL EXAMINATION   ______________________________________________________________________    HISTORY OBTAIN FROM:  Patient    CHIEF COMPLAINT:  Malaise with shortness of breath worse per baseline. HISTORY OF PRESENT ILLNESS:      73-year-old male past smoker 28 years ago significant past medical history COPD on home 4 L of O2, IPF, pulmonary artery hypertension, type 2 diabetes mellitus last HbA1c 7. 8 June 2018 recently discharged 15 days ago after he was admitted for similar complaints of worse shortness of breath and generalized malaise per baseline, acute exacerbation of COPD versus IPF. Patient complains that ever since he got home he had been feeding mildly better however his cough had been the same with grayish white sputum and he had not been feeling well at all, he was supposed to follow-up with Dr. Nancy Garcia for his chronic IPF however could not get an appointment until now. Patient finished his recent most dose of tapering steroids today a.m. Patient denied any fever, nausea, vomiting, chest pain, exposure to anyone with similar symptoms, flu or cold-like symptoms, myalgias. Brought by EMS this time on 4 L of oxygen as per home. Patient reported not using BiPAP/CPAP at home again. In the ED patients labs showed leukocytosis with hyperglycemia of about 500, was given 6 units of Lantus, which brought his sugars back down to 300. Was admitted for sepsis of unknown origin at this time and being treated for likely hospital-acquired pneumonia. Patient was on metformin for diabetes at home previously however upon last discharge he was also prescribed Lantus 20 units at home which he never took.           PAST MEDICAL HISTORY:        Diagnosis Date    Anemia     Anxiety     Bronchitis     COPD (chronic obstructive pulmonary disease) ([facility] MD  12/17/2018 9:00 AM      Please note that this chart was generated using voice recognition Dragon dictation software. Although every effort was made to ensure the accuracy of this automated transcription, some errors in transcription may have occurred.

## 2018-12-17 NOTE — PROGRESS NOTES
Smoking Cessation - topics covered   []  Health Risks  []  Benefits of Quitting   []  Smoking Cessation  []  Patient has no history of tobacco use  [x]  Patient is former smoker. [x]  No need for tobacco cessation education. []  Booklet given  []  Patient verbalizes understanding. []  Patient denies need for tobacco cessation education.   Sharon Speak  8:28 AM

## 2018-12-17 NOTE — CONSULTS
[facility] - Initial Consult Note  Today's Date and Time: 12/17/2018, 1:14 PM    Impression :   1. Recurrent C Difficile colitis  2. ES COPD  3. IPF  4. Pulmonary HTN  5. DM II    Recommendations:   · Continue Oral Vancomycin 125 mg QID x 14 days 12/17-12/31, then BID x 14 days 1/1-1/14 then daily x 2 weeks 1/15-1/29 because of relapsing C diff infection  · Pulmonary toilet  · Supportive care    Medical Decision Making/Summary/Discussion:   · 69 yo gentleman with ES COPD on home O2 of 4L, also with IPF, Pulmonary HTN and DMII  · Reports that he has had diarrhea (16-20 BM's per day) for the past 6 weeks  · He states that he was discharged to an LTAC at the end of Oct and developed diarrhea shortly after arriving there. He was treated with oral vancomycin and discharged home. · His diarrhea returned after he got home. ·  He presented to the ER on 11/27 and was treated for a COPD exacerbation and CAP. He states that the diarrhea was not addressed. He was discharged home on 11/30  · Since arriving home he has gotten weaker and weaker, lost weight, and his breathing has gotten worse. · On 12-16 he presented to the ER because of low oxygen saturations. His stool showed C diff.  He was admitted for COPD exacerbation and C diff colitis  · Pt states that he hs not taken any medication for the diarrhea since October  · In the ER his O2 saturation was 80% on 6L NC  · Pt is basically home bound and states that it is very difficult for him to get to physician appointments  Infection Control Recommendations   · [place] Precautions  · Contact Isolation - Enteric    Antimicrobial Stewardship Recommendations     · IV to oral conversion - Oral vancomycin    Coordination of Outpatient Care:   · Estimated Length of IV antimicrobials: None  · Patient will need Midline Catheter Insertion:   · Patient will need PICC line Insertion:  · Patient will need: Home IV , Marisa,  YAMINI,  LTAC:

## 2018-12-18 LAB
ABSOLUTE EOS #: 0.17 K/UL (ref 0–0.4)
ABSOLUTE IMMATURE GRANULOCYTE: 0 K/UL (ref 0–0.3)
ABSOLUTE LYMPH #: 0.68 K/UL (ref 1–4.8)
ABSOLUTE MONO #: 0.68 K/UL (ref 0.1–0.8)
ANION GAP SERPL CALCULATED.3IONS-SCNC: 11 MMOL/L (ref 9–17)
BASOPHILS # BLD: 0 % (ref 0–2)
BASOPHILS ABSOLUTE: 0 K/UL (ref 0–0.2)
BUN BLDV-MCNC: 14 MG/DL (ref 8–23)
BUN/CREAT BLD: ABNORMAL (ref 9–20)
CALCIUM SERPL-MCNC: 7 MG/DL (ref 8.6–10.4)
CHLORIDE BLD-SCNC: 99 MMOL/L (ref 98–107)
CO2: 25 MMOL/L (ref 20–31)
CREAT SERPL-MCNC: 0.58 MG/DL (ref 0.7–1.2)
DIFFERENTIAL TYPE: ABNORMAL
EOSINOPHILS RELATIVE PERCENT: 1 % (ref 1–4)
GFR AFRICAN AMERICAN: >60 ML/MIN
GFR NON-AFRICAN AMERICAN: >60 ML/MIN
GFR SERPL CREATININE-BSD FRML MDRD: ABNORMAL ML/MIN/{1.73_M2}
GFR SERPL CREATININE-BSD FRML MDRD: ABNORMAL ML/MIN/{1.73_M2}
GLUCOSE BLD-MCNC: 109 MG/DL (ref 75–110)
GLUCOSE BLD-MCNC: 122 MG/DL (ref 75–110)
GLUCOSE BLD-MCNC: 126 MG/DL (ref 75–110)
GLUCOSE BLD-MCNC: 146 MG/DL (ref 75–110)
GLUCOSE BLD-MCNC: 152 MG/DL (ref 70–99)
HCT VFR BLD CALC: 30.3 % (ref 40.7–50.3)
HEMOGLOBIN: 10.2 G/DL (ref 13–17)
IMMATURE GRANULOCYTES: 0 %
LYMPHOCYTES # BLD: 4 % (ref 24–44)
MCH RBC QN AUTO: 32.4 PG (ref 25.2–33.5)
MCHC RBC AUTO-ENTMCNC: 33.7 G/DL (ref 28.4–34.8)
MCV RBC AUTO: 96.2 FL (ref 82.6–102.9)
MONOCYTES # BLD: 4 % (ref 1–7)
MORPHOLOGY: ABNORMAL
NRBC AUTOMATED: 0 PER 100 WBC
PDW BLD-RTO: 14.8 % (ref 11.8–14.4)
PLATELET # BLD: 187 K/UL (ref 138–453)
PLATELET ESTIMATE: ABNORMAL
PMV BLD AUTO: 11.1 FL (ref 8.1–13.5)
POTASSIUM SERPL-SCNC: 3.9 MMOL/L (ref 3.7–5.3)
RBC # BLD: 3.15 M/UL (ref 4.21–5.77)
RBC # BLD: ABNORMAL 10*6/UL
SEG NEUTROPHILS: 91 % (ref 36–66)
SEGMENTED NEUTROPHILS ABSOLUTE COUNT: 15.47 K/UL (ref 1.8–7.7)
SODIUM BLD-SCNC: 135 MMOL/L (ref 135–144)
WBC # BLD: 17 K/UL (ref 3.5–11.3)
WBC # BLD: ABNORMAL 10*3/UL

## 2018-12-18 PROCEDURE — 82947 ASSAY GLUCOSE BLOOD QUANT: CPT

## 2018-12-18 PROCEDURE — 6370000000 HC RX 637 (ALT 250 FOR IP): Performed by: STUDENT IN AN ORGANIZED HEALTH CARE EDUCATION/TRAINING PROGRAM

## 2018-12-18 PROCEDURE — 6370000000 HC RX 637 (ALT 250 FOR IP): Performed by: NURSE PRACTITIONER

## 2018-12-18 PROCEDURE — 2060000000 HC ICU INTERMEDIATE R&B

## 2018-12-18 PROCEDURE — 2580000003 HC RX 258: Performed by: STUDENT IN AN ORGANIZED HEALTH CARE EDUCATION/TRAINING PROGRAM

## 2018-12-18 PROCEDURE — 36415 COLL VENOUS BLD VENIPUNCTURE: CPT

## 2018-12-18 PROCEDURE — 94640 AIRWAY INHALATION TREATMENT: CPT

## 2018-12-18 PROCEDURE — 99233 SBSQ HOSP IP/OBS HIGH 50: CPT | Performed by: INTERNAL MEDICINE

## 2018-12-18 PROCEDURE — 36600 WITHDRAWAL OF ARTERIAL BLOOD: CPT

## 2018-12-18 PROCEDURE — 94762 N-INVAS EAR/PLS OXIMTRY CONT: CPT

## 2018-12-18 PROCEDURE — 2700000000 HC OXYGEN THERAPY PER DAY

## 2018-12-18 PROCEDURE — 99232 SBSQ HOSP IP/OBS MODERATE 35: CPT | Performed by: INTERNAL MEDICINE

## 2018-12-18 PROCEDURE — 93005 ELECTROCARDIOGRAM TRACING: CPT

## 2018-12-18 PROCEDURE — 94660 CPAP INITIATION&MGMT: CPT

## 2018-12-18 PROCEDURE — 80048 BASIC METABOLIC PNL TOTAL CA: CPT

## 2018-12-18 PROCEDURE — 6370000000 HC RX 637 (ALT 250 FOR IP): Performed by: HOSPITALIST

## 2018-12-18 PROCEDURE — 85025 COMPLETE CBC W/AUTO DIFF WBC: CPT

## 2018-12-18 PROCEDURE — 6360000002 HC RX W HCPCS: Performed by: STUDENT IN AN ORGANIZED HEALTH CARE EDUCATION/TRAINING PROGRAM

## 2018-12-18 RX ORDER — INSULIN GLARGINE 100 [IU]/ML
25 INJECTION, SOLUTION SUBCUTANEOUS NIGHTLY
Status: DISCONTINUED | OUTPATIENT
Start: 2018-12-18 | End: 2018-12-20

## 2018-12-18 RX ORDER — INSULIN GLARGINE 100 [IU]/ML
30 INJECTION, SOLUTION SUBCUTANEOUS NIGHTLY
Status: DISCONTINUED | OUTPATIENT
Start: 2018-12-18 | End: 2018-12-18

## 2018-12-18 RX ADMIN — ENOXAPARIN SODIUM 40 MG: 40 INJECTION SUBCUTANEOUS at 07:59

## 2018-12-18 RX ADMIN — INSULIN LISPRO 2 UNITS: 100 INJECTION, SOLUTION INTRAVENOUS; SUBCUTANEOUS at 20:18

## 2018-12-18 RX ADMIN — ASPIRIN 81 MG: 81 TABLET ORAL at 07:59

## 2018-12-18 RX ADMIN — INSULIN GLARGINE 25 UNITS: 100 INJECTION, SOLUTION SUBCUTANEOUS at 20:18

## 2018-12-18 RX ADMIN — IPRATROPIUM BROMIDE AND ALBUTEROL SULFATE 1 AMPULE: .5; 3 SOLUTION RESPIRATORY (INHALATION) at 21:08

## 2018-12-18 RX ADMIN — IPRATROPIUM BROMIDE AND ALBUTEROL SULFATE 1 AMPULE: .5; 3 SOLUTION RESPIRATORY (INHALATION) at 15:14

## 2018-12-18 RX ADMIN — TAMSULOSIN HYDROCHLORIDE 0.4 MG: 0.4 CAPSULE ORAL at 07:59

## 2018-12-18 RX ADMIN — VANCOMYCIN HYDROCHLORIDE 125 MG: 125 CAPSULE ORAL at 13:00

## 2018-12-18 RX ADMIN — ESCITALOPRAM OXALATE 10 MG: 10 TABLET ORAL at 07:59

## 2018-12-18 RX ADMIN — ACETAMINOPHEN 650 MG: 325 TABLET ORAL at 11:11

## 2018-12-18 RX ADMIN — VANCOMYCIN HYDROCHLORIDE 125 MG: 125 CAPSULE ORAL at 20:19

## 2018-12-18 RX ADMIN — SODIUM CHLORIDE: 9 INJECTION, SOLUTION INTRAVENOUS at 03:32

## 2018-12-18 RX ADMIN — ACETAMINOPHEN 650 MG: 325 TABLET ORAL at 20:26

## 2018-12-18 RX ADMIN — VANCOMYCIN HYDROCHLORIDE 125 MG: 125 CAPSULE ORAL at 07:59

## 2018-12-18 RX ADMIN — MOMETASONE FUROATE AND FORMOTEROL FUMARATE DIHYDRATE 2 PUFF: 100; 5 AEROSOL RESPIRATORY (INHALATION) at 08:28

## 2018-12-18 RX ADMIN — VANCOMYCIN HYDROCHLORIDE 125 MG: 125 CAPSULE ORAL at 17:50

## 2018-12-18 RX ADMIN — MOMETASONE FUROATE AND FORMOTEROL FUMARATE DIHYDRATE 2 PUFF: 100; 5 AEROSOL RESPIRATORY (INHALATION) at 21:09

## 2018-12-18 RX ADMIN — IPRATROPIUM BROMIDE AND ALBUTEROL SULFATE 1 AMPULE: .5; 3 SOLUTION RESPIRATORY (INHALATION) at 11:18

## 2018-12-18 RX ADMIN — IPRATROPIUM BROMIDE AND ALBUTEROL SULFATE 1 AMPULE: .5; 3 SOLUTION RESPIRATORY (INHALATION) at 08:28

## 2018-12-18 ASSESSMENT — PAIN DESCRIPTION - FREQUENCY: FREQUENCY: CONTINUOUS

## 2018-12-18 ASSESSMENT — PAIN DESCRIPTION - ONSET: ONSET: ON-GOING

## 2018-12-18 ASSESSMENT — PAIN SCALES - GENERAL
PAINLEVEL_OUTOF10: 4
PAINLEVEL_OUTOF10: 3

## 2018-12-18 ASSESSMENT — PAIN DESCRIPTION - PROGRESSION: CLINICAL_PROGRESSION: NOT CHANGED

## 2018-12-18 ASSESSMENT — PAIN DESCRIPTION - PAIN TYPE: TYPE: CHRONIC PAIN

## 2018-12-18 ASSESSMENT — PAIN DESCRIPTION - ORIENTATION: ORIENTATION: MID

## 2018-12-18 ASSESSMENT — PAIN DESCRIPTION - DESCRIPTORS: DESCRIPTORS: ACHING;CONSTANT

## 2018-12-18 ASSESSMENT — PAIN DESCRIPTION - LOCATION: LOCATION: BACK

## 2018-12-18 NOTE — PROGRESS NOTES
[facility]  Internal Medicine Residency Program  Inpatient Daily Progress Note  ______________________________________________________________________________    Patient: Prem Castillo  YOB: 1940   MRN: [id number]    Acct: [de-identified]     Admit date: 12/16/2018  Today's date: 12/18/18  Number of days in the hospital: 2  Expected Discharge Date: 12/21/18    Admitting Diagnosis: C. difficile colitis    Subjective:   Pt seen and Chart reviewed. No acute issues overnight. On oral Vancomycin 125 capsules. 20 units Lantus last PM. 27 Humalog given yesterday but patient previously was on steroids which were d/c. Will only increase to Lantus 25 units. Leukocytosis likely secondary to exogenous steroids. Objective:   Vital Sign:  /64   Pulse 66   Temp 97 °F (36.1 °C) (Oral)   Resp 24   Ht 5' 8.11\" (1.73 m)   Wt 160 lb 15 oz (73 kg)   SpO2 93%   BMI 24.39 kg/m²       Physical Exam:  CONSTITUTIONAL:  awake, alert, cooperative, no apparent distress, and appears stated age  EYES:  Lids and lashes normal, pupils equal, round and reactive to light, extra ocular muscles intact, sclera clear, conjunctiva normal  HEMATOLOGIC/LYMPHATICS:  no cervical lymphadenopathy and no supraclavicular lymphadenopathy  BACK:  symmetric and no curvature  LUNGS:  No increased work of breathing, good air exchange, clear to auscultation bilaterally, no crackles or wheezing  CARDIOVASCULAR:  normal apical pulses and normal S1 and S2  ABDOMEN:  No scars, normal bowel sounds, soft, non-distended, non-tender, no masses palpated, no hepatosplenomegally  MUSCULOSKELETAL:  full range of motion noted  motor strength is 5 out of 5 all extremities bilaterally  NEUROLOGIC:  Awake, alert, oriented to name, place and time. Cranial nerves II-XII are grossly intact. Motor is 5 out of 5 bilaterally. Cerebellar finger to nose, heel to shin intact. Sensory is intact.   Babinski

## 2018-12-18 NOTE — PROGRESS NOTES
Per my My chart email, treatment is not indicated for those with mild-moderate symptoms.  Supportive care through adequate fluids and Imodium is recommended.  I will send over if she insists on taking medication.  The side effects can include abdominal pain and cramping.  Thank you   3*  4*   MONOPCT  1  4     BMP:   Recent Labs      18   1535  18   0715  18   0543   NA  134*  134*  135   K  4.3  4.2  3.9   CL  90*  96*  99   CO2  30  26  25   BUN  17  15  14   CREATININE  0.87  0.68*  0.58*   MG  1.2*   --    --      Hepatic Function Panel:   Recent Labs      12/15/18   1520  18   1619   PROT  6.5  5.8*   LABALBU  3.3*  2.9*   BILIDIR  0.12  0.16   IBILI  0.39  0.27   BILITOT  0.51  0.43   ALKPHOS  98  88   ALT  10  11   AST  7  8     Lab Results   Component Value Date    MUCUS NOT REPORTED 2018    RBC 3.15 2018    RBC 4.72 2012    TRICHOMONAS NOT REPORTED 2018    WBC 17.0 2018    YEAST NOT REPORTED 2018    TURBIDITY CLEAR 2018     Lab Results   Component Value Date    CREATININE 0.58 2018    GLUCOSE 152 2018    GLUCOSE 151 2012       Medical Decision Making-Imagin/16 CXR  EXAMINATION:   SINGLE XRAY VIEW OF THE CHEST       2018 3:52 pm       COMPARISON:   Chest 2018, CT chest 2018       HISTORY:   ORDERING SYSTEM PROVIDED HISTORY: Shortness of breath   TECHNOLOGIST PROVIDED HISTORY:   anuja       FINDINGS:   Chronic pulmonary fibrosis with no new consolidation evident.  There is   blunting bilateral costophrenic sulci which may reflect pleural effusion or   thickening.  The lungs are hyperinflated with mild flattening of the   hemidiaphragms, increased translucency both lung apices and tapering of the   vasculature in the upper lungs.  Bilateral superior hilar retraction is   typical of remote fibrotic sequela.  Cardiac silhouette appears within normal   limits for size.  Aortic knob is unusually large, 5 cm, at least in part   likely related underlying adenopathy as seen on prior CT.  Opacity in the   upper right hemithorax is stable possibly reflecting consolidation or mass   lesion.           Impression   1.  Chronic pulmonary fibrosis mixed with emphysema.  Superimposed

## 2018-12-18 NOTE — CARE COORDINATION
Transitional Planning    1757 Patient now requiring high flow at 60% 25 lpm. Baseline home oxygen is 4lpm/nc. From home with home care and DME. Will follow for possible LTACH / SNF needs. Patient would like to discharge to home with current services.

## 2018-12-19 ENCOUNTER — APPOINTMENT (OUTPATIENT)
Dept: GENERAL RADIOLOGY | Age: 78
DRG: 871 | End: 2018-12-19
Payer: MEDICARE

## 2018-12-19 LAB
ABSOLUTE EOS #: 0.28 K/UL (ref 0–0.44)
ABSOLUTE IMMATURE GRANULOCYTE: 0.14 K/UL (ref 0–0.3)
ABSOLUTE LYMPH #: 1.12 K/UL (ref 1.1–3.7)
ABSOLUTE MONO #: 0.42 K/UL (ref 0.1–1.2)
ALLEN TEST: POSITIVE
ANION GAP SERPL CALCULATED.3IONS-SCNC: 8 MMOL/L (ref 9–17)
BASOPHILS # BLD: 0 % (ref 0–2)
BASOPHILS ABSOLUTE: 0 K/UL (ref 0–0.2)
BUN BLDV-MCNC: 10 MG/DL (ref 8–23)
BUN/CREAT BLD: ABNORMAL (ref 9–20)
CALCIUM SERPL-MCNC: 7.1 MG/DL (ref 8.6–10.4)
CHLORIDE BLD-SCNC: 101 MMOL/L (ref 98–107)
CO2: 27 MMOL/L (ref 20–31)
CREAT SERPL-MCNC: 0.59 MG/DL (ref 0.7–1.2)
DIFFERENTIAL TYPE: ABNORMAL
EOSINOPHILS RELATIVE PERCENT: 2 % (ref 1–4)
FIO2: 50
GFR AFRICAN AMERICAN: >60 ML/MIN
GFR NON-AFRICAN AMERICAN: >60 ML/MIN
GFR SERPL CREATININE-BSD FRML MDRD: ABNORMAL ML/MIN/{1.73_M2}
GFR SERPL CREATININE-BSD FRML MDRD: ABNORMAL ML/MIN/{1.73_M2}
GLUCOSE BLD-MCNC: 149 MG/DL (ref 75–110)
GLUCOSE BLD-MCNC: 162 MG/DL (ref 70–99)
GLUCOSE BLD-MCNC: 165 MG/DL (ref 75–110)
GLUCOSE BLD-MCNC: 171 MG/DL (ref 75–110)
GLUCOSE BLD-MCNC: 257 MG/DL (ref 75–110)
HCT VFR BLD CALC: 30.3 % (ref 40.7–50.3)
HEMOGLOBIN: 9.9 G/DL (ref 13–17)
IMMATURE GRANULOCYTES: 1 %
LYMPHOCYTES # BLD: 8 % (ref 24–43)
MAGNESIUM: 1.4 MG/DL (ref 1.6–2.6)
MAGNESIUM: 1.8 MG/DL (ref 1.6–2.6)
MCH RBC QN AUTO: 31.8 PG (ref 25.2–33.5)
MCHC RBC AUTO-ENTMCNC: 32.7 G/DL (ref 28.4–34.8)
MCV RBC AUTO: 97.4 FL (ref 82.6–102.9)
MODE: ABNORMAL
MONOCYTES # BLD: 3 % (ref 3–12)
MORPHOLOGY: ABNORMAL
NEGATIVE BASE EXCESS, ART: ABNORMAL (ref 0–2)
NRBC AUTOMATED: 0 PER 100 WBC
O2 DEVICE/FLOW/%: ABNORMAL
PATIENT TEMP: ABNORMAL
PDW BLD-RTO: 14.9 % (ref 11.8–14.4)
PLATELET # BLD: 156 K/UL (ref 138–453)
PLATELET ESTIMATE: ABNORMAL
PMV BLD AUTO: 10.2 FL (ref 8.1–13.5)
POC HCO3: 23.9 MMOL/L (ref 21–28)
POC O2 SATURATION: 98 % (ref 94–98)
POC PCO2 TEMP: ABNORMAL MM HG
POC PCO2: 30.6 MM HG (ref 35–48)
POC PH TEMP: ABNORMAL
POC PH: 7.5 (ref 7.35–7.45)
POC PO2 TEMP: ABNORMAL MM HG
POC PO2: 86.2 MM HG (ref 83–108)
POSITIVE BASE EXCESS, ART: 1 (ref 0–3)
POTASSIUM SERPL-SCNC: 3.5 MMOL/L (ref 3.7–5.3)
RBC # BLD: 3.11 M/UL (ref 4.21–5.77)
RBC # BLD: ABNORMAL 10*6/UL
SAMPLE SITE: ABNORMAL
SEG NEUTROPHILS: 86 % (ref 36–65)
SEGMENTED NEUTROPHILS ABSOLUTE COUNT: 12.04 K/UL (ref 1.5–8.1)
SODIUM BLD-SCNC: 136 MMOL/L (ref 135–144)
TCO2 (CALC), ART: 25 MMOL/L (ref 22–29)
WBC # BLD: 14 K/UL (ref 3.5–11.3)
WBC # BLD: ABNORMAL 10*3/UL

## 2018-12-19 PROCEDURE — 2580000003 HC RX 258: Performed by: STUDENT IN AN ORGANIZED HEALTH CARE EDUCATION/TRAINING PROGRAM

## 2018-12-19 PROCEDURE — 85025 COMPLETE CBC W/AUTO DIFF WBC: CPT

## 2018-12-19 PROCEDURE — 99233 SBSQ HOSP IP/OBS HIGH 50: CPT | Performed by: INTERNAL MEDICINE

## 2018-12-19 PROCEDURE — 6360000002 HC RX W HCPCS: Performed by: STUDENT IN AN ORGANIZED HEALTH CARE EDUCATION/TRAINING PROGRAM

## 2018-12-19 PROCEDURE — 97110 THERAPEUTIC EXERCISES: CPT

## 2018-12-19 PROCEDURE — 6370000000 HC RX 637 (ALT 250 FOR IP): Performed by: STUDENT IN AN ORGANIZED HEALTH CARE EDUCATION/TRAINING PROGRAM

## 2018-12-19 PROCEDURE — 71045 X-RAY EXAM CHEST 1 VIEW: CPT

## 2018-12-19 PROCEDURE — 94762 N-INVAS EAR/PLS OXIMTRY CONT: CPT

## 2018-12-19 PROCEDURE — 94660 CPAP INITIATION&MGMT: CPT

## 2018-12-19 PROCEDURE — 83735 ASSAY OF MAGNESIUM: CPT

## 2018-12-19 PROCEDURE — 93005 ELECTROCARDIOGRAM TRACING: CPT

## 2018-12-19 PROCEDURE — 6370000000 HC RX 637 (ALT 250 FOR IP): Performed by: HOSPITALIST

## 2018-12-19 PROCEDURE — 97535 SELF CARE MNGMENT TRAINING: CPT

## 2018-12-19 PROCEDURE — 82947 ASSAY GLUCOSE BLOOD QUANT: CPT

## 2018-12-19 PROCEDURE — 36415 COLL VENOUS BLD VENIPUNCTURE: CPT

## 2018-12-19 PROCEDURE — 80048 BASIC METABOLIC PNL TOTAL CA: CPT

## 2018-12-19 PROCEDURE — 2060000000 HC ICU INTERMEDIATE R&B

## 2018-12-19 PROCEDURE — 6360000002 HC RX W HCPCS: Performed by: HOSPITALIST

## 2018-12-19 PROCEDURE — 2700000000 HC OXYGEN THERAPY PER DAY

## 2018-12-19 PROCEDURE — 97116 GAIT TRAINING THERAPY: CPT

## 2018-12-19 PROCEDURE — 6370000000 HC RX 637 (ALT 250 FOR IP): Performed by: NURSE PRACTITIONER

## 2018-12-19 PROCEDURE — 82803 BLOOD GASES ANY COMBINATION: CPT

## 2018-12-19 PROCEDURE — 94640 AIRWAY INHALATION TREATMENT: CPT

## 2018-12-19 RX ORDER — MAGNESIUM SULFATE 1 G/100ML
1 INJECTION INTRAVENOUS ONCE
Status: COMPLETED | OUTPATIENT
Start: 2018-12-19 | End: 2018-12-19

## 2018-12-19 RX ORDER — MAGNESIUM SULFATE 1 G/100ML
1 INJECTION INTRAVENOUS
Status: COMPLETED | OUTPATIENT
Start: 2018-12-19 | End: 2018-12-19

## 2018-12-19 RX ORDER — FUROSEMIDE 10 MG/ML
40 INJECTION INTRAMUSCULAR; INTRAVENOUS ONCE
Status: COMPLETED | OUTPATIENT
Start: 2018-12-19 | End: 2018-12-19

## 2018-12-19 RX ORDER — POTASSIUM CHLORIDE 20 MEQ/1
20 TABLET, EXTENDED RELEASE ORAL ONCE
Status: COMPLETED | OUTPATIENT
Start: 2018-12-19 | End: 2018-12-19

## 2018-12-19 RX ADMIN — VANCOMYCIN HYDROCHLORIDE 125 MG: 125 CAPSULE ORAL at 08:42

## 2018-12-19 RX ADMIN — IPRATROPIUM BROMIDE AND ALBUTEROL SULFATE 1 AMPULE: .5; 3 SOLUTION RESPIRATORY (INHALATION) at 11:27

## 2018-12-19 RX ADMIN — ACETAMINOPHEN 650 MG: 325 TABLET ORAL at 09:50

## 2018-12-19 RX ADMIN — TAMSULOSIN HYDROCHLORIDE 0.4 MG: 0.4 CAPSULE ORAL at 08:42

## 2018-12-19 RX ADMIN — MAGNESIUM SULFATE HEPTAHYDRATE 1 G: 1 INJECTION, SOLUTION INTRAVENOUS at 13:57

## 2018-12-19 RX ADMIN — MAGNESIUM SULFATE HEPTAHYDRATE 1 G: 1 INJECTION, SOLUTION INTRAVENOUS at 08:43

## 2018-12-19 RX ADMIN — VANCOMYCIN HYDROCHLORIDE 125 MG: 125 CAPSULE ORAL at 17:06

## 2018-12-19 RX ADMIN — Medication 10 ML: at 20:04

## 2018-12-19 RX ADMIN — MAGNESIUM SULFATE HEPTAHYDRATE 1 G: 1 INJECTION, SOLUTION INTRAVENOUS at 12:42

## 2018-12-19 RX ADMIN — POTASSIUM CHLORIDE 20 MEQ: 20 TABLET, EXTENDED RELEASE ORAL at 11:27

## 2018-12-19 RX ADMIN — INSULIN LISPRO 9 UNITS: 100 INJECTION, SOLUTION INTRAVENOUS; SUBCUTANEOUS at 17:07

## 2018-12-19 RX ADMIN — INSULIN LISPRO 2 UNITS: 100 INJECTION, SOLUTION INTRAVENOUS; SUBCUTANEOUS at 20:08

## 2018-12-19 RX ADMIN — MOMETASONE FUROATE AND FORMOTEROL FUMARATE DIHYDRATE 2 PUFF: 100; 5 AEROSOL RESPIRATORY (INHALATION) at 08:23

## 2018-12-19 RX ADMIN — INSULIN LISPRO 3 UNITS: 100 INJECTION, SOLUTION INTRAVENOUS; SUBCUTANEOUS at 12:42

## 2018-12-19 RX ADMIN — VANCOMYCIN HYDROCHLORIDE 125 MG: 125 CAPSULE ORAL at 12:43

## 2018-12-19 RX ADMIN — SODIUM CHLORIDE: 9 INJECTION, SOLUTION INTRAVENOUS at 02:49

## 2018-12-19 RX ADMIN — ASPIRIN 81 MG: 81 TABLET ORAL at 08:42

## 2018-12-19 RX ADMIN — VANCOMYCIN HYDROCHLORIDE 125 MG: 125 CAPSULE ORAL at 20:04

## 2018-12-19 RX ADMIN — ENOXAPARIN SODIUM 40 MG: 40 INJECTION SUBCUTANEOUS at 08:43

## 2018-12-19 RX ADMIN — IPRATROPIUM BROMIDE AND ALBUTEROL SULFATE 1 AMPULE: .5; 3 SOLUTION RESPIRATORY (INHALATION) at 16:17

## 2018-12-19 RX ADMIN — CALCIUM GLUCONATE 1 G: 98 INJECTION, SOLUTION INTRAVENOUS at 08:43

## 2018-12-19 RX ADMIN — IPRATROPIUM BROMIDE AND ALBUTEROL SULFATE 1 AMPULE: .5; 3 SOLUTION RESPIRATORY (INHALATION) at 21:23

## 2018-12-19 RX ADMIN — INSULIN LISPRO 3 UNITS: 100 INJECTION, SOLUTION INTRAVENOUS; SUBCUTANEOUS at 08:43

## 2018-12-19 RX ADMIN — IPRATROPIUM BROMIDE AND ALBUTEROL SULFATE 1 AMPULE: .5; 3 SOLUTION RESPIRATORY (INHALATION) at 08:23

## 2018-12-19 RX ADMIN — INSULIN GLARGINE 25 UNITS: 100 INJECTION, SOLUTION SUBCUTANEOUS at 20:04

## 2018-12-19 RX ADMIN — FUROSEMIDE 40 MG: 10 INJECTION, SOLUTION INTRAMUSCULAR; INTRAVENOUS at 17:07

## 2018-12-19 RX ADMIN — MOMETASONE FUROATE AND FORMOTEROL FUMARATE DIHYDRATE 2 PUFF: 100; 5 AEROSOL RESPIRATORY (INHALATION) at 21:24

## 2018-12-19 RX ADMIN — ESCITALOPRAM OXALATE 10 MG: 10 TABLET ORAL at 08:42

## 2018-12-19 ASSESSMENT — PAIN DESCRIPTION - PAIN TYPE
TYPE: ACUTE PAIN
TYPE: ACUTE PAIN

## 2018-12-19 ASSESSMENT — PAIN SCALES - GENERAL
PAINLEVEL_OUTOF10: 5
PAINLEVEL_OUTOF10: 3
PAINLEVEL_OUTOF10: 4
PAINLEVEL_OUTOF10: 6

## 2018-12-19 ASSESSMENT — PAIN DESCRIPTION - LOCATION
LOCATION: BACK
LOCATION: BACK

## 2018-12-19 NOTE — PROGRESS NOTES
Physical Therapy  Facility/Department: [address] STEPDOWN  Daily Treatment Note  NAME: Wade Hall  : 1940  MRN: [id number]    Date of Service: 2018    Discharge Recommendations:  Continue to assess pending progress, Home with assist PRN, Home with Home health PT   PT Equipment Recommendations  Equipment Needed: No    Patient Diagnosis(es): The primary encounter diagnosis was Diarrhea of infectious origin. Diagnoses of COPD exacerbation ([facility] [address] General weakness, Hypomagnesemia, Pneumonia due to organism, and Hyperglycemia were also pertinent to this visit. has a past medical history of Anemia; Anxiety; Bronchitis; COPD (chronic obstructive pulmonary disease) ([facility] [address] Diabetes ([facility] [address] Former smoker; Hyperlipidemia; Oxygen dependent; Respiratory distress; and Type 2 diabetes mellitus ([facility] [address] has a past surgical history that includes AAA repair, open; Pancreas surgery; Finger surgery; and Foot surgery. Restrictions  Restrictions/Precautions  Restrictions/Precautions: Fall Risk  Required Braces or Orthoses?: No  Position Activity Restriction  Other position/activity restrictions: up with assist, up as tolerated   Subjective   General  Response To Previous Treatment: Patient with no complaints from previous session. Family / Caregiver Present: No  Subjective  Subjective: RN and pt agreeable to PT.  Pt alert in bed upon arrival.   Pain Screening  Patient Currently in Pain: Yes  Pain Assessment  Pain Assessment: 0-10  Pain Level: 4  Pain Type: Acute pain  Pain Location: Back  Pain Intervention(s): Repositioned  Response to Pain Intervention: Patient Satisfied  Vital Signs  Patient Currently in Pain: Yes       Orientation  Orientation  Overall Orientation Status: Within Normal Limits  Cognition      Objective   Bed mobility  Rolling to Left: Modified independent  Supine to Sit: Modified independent  Scooting: Modified independent  Transfers  Sit to Stand: Stand by assistance  Stand to sit: Stand by assistance  Bed to Chair: Stand by assistance  Ambulation  Ambulation?: Yes  Ambulation 1  Surface: level tile  Device: No Device  Assistance: Stand by assistance  Quality of Gait: Demo flexed posture, otherwise normalized gait pattern  Distance: ~8 ft  Comments: Limited by high breanna. Pt O2 remained >90% throughout while on 25L via hi breanna  Stairs/Curb  Stairs?: No     Balance  Posture: Fair  Sitting - Static: Good  Sitting - Dynamic: Good  Standing - Static: Good;-  Standing - Dynamic: Good;-  Comments: no AD    Exercise  Upper extremity exercises: Bicep curl, shoulder flexion/extension, punches, tricep curl, shoulder abduction/adduction. Reps: 15x  Seated LE exercise program: Long Arc Quads, hip abduction/adduction, heel/toe raises, and marches. Reps: 15x                         Assessment   Body structures, Functions, Activity limitations: Decreased functional mobility ; Decreased strength;Decreased balance  Assessment: Pt amb 8 ft without deivce and SBA, likley ok to d/c home pending improved respiratory status.    Prognosis: Good  REQUIRES PT FOLLOW UP: Yes  Activity Tolerance  Activity Tolerance: Patient limited by endurance     Goals  Short term goals  Time Frame for Short term goals: 14 visits  Short term goal 1: Pt will be I with bed mobility  Short term goal 2: Pt will be I with transfers  Short term goal 3: Pt will be I with amb 150'  Short term goal 4: Pt will navigate 4 steps without rail David    Plan    Plan  Times per week: 5-6x/wk  Current Treatment Recommendations: Strengthening, Balance Training, Endurance Training, Functional Mobility Training, Gait Training, Transfer Training, Stair training, Safety Education & Training, Patient/Caregiver Education & Training, Equipment Evaluation, Education, & procurement  Safety Devices  Type of devices: Call light within reach, Nurse notified, Patient at risk for falls, Left in chair  Restraints  Initially in place: No     Therapy Time   Individual Concurrent

## 2018-12-19 NOTE — PROGRESS NOTES
[facility]  Internal Medicine Residency Program  Inpatient Daily Progress Note  ______________________________________________________________________________    Patient: Juliet Andrews  YOB: 1940   MRN: [id number]    Acct: [de-identified]     Admit date: 12/16/2018  Today's date: 12/19/18  Number of days in the hospital: 3  Expected Discharge Date: 12/21/18    Admitting Diagnosis: C. difficile colitis    Subjective:   Pt seen and Chart reviewed. Needed high flow oxygen over night. Currently on 50% of high flow nasal cannula. ABGs ordered, will repeat chest x-ray. Patient otherwise asymptomatic with no chest pain , 7 episodes of diarrhea yesterday. The episodes since a.m. Objective:   Vital Sign:  /79   Pulse 81   Temp 99.4 °F (37.4 °C) (Temporal)   Resp 28   Ht 5' 8.11\" (1.73 m)   Wt 169 lb 15.6 oz (77.1 kg)   SpO2 98%   BMI 25.76 kg/m²       Physical Exam:    CONSTITUTIONAL:  awake, alert, cooperative, no apparent distress, and appears stated age  EYES:  Lids and lashes normal, pupils equal, round and reactive to light, extra ocular muscles intact, sclera clear, conjunctiva normal  HEMATOLOGIC/LYMPHATICS:  no cervical lymphadenopathy and no supraclavicular lymphadenopathy  BACK:  symmetric and no curvature  LUNGS: Bilateral decreased breath sounds at bases, rest chest clear to auscultation, no wheezes or crackles, expansion symmetrical bilaterally.     CARDIOVASCULAR:  normal apical pulses and normal S1 and S2  ABDOMEN:  No scars, normal bowel sounds, soft, non-distended, non-tender, no masses palpated, no hepatosplenomegally  MUSCULOSKELETAL:  full range of motion noted  motor strength is 5 out of 5 all extremities bilaterally  NEUROLOGIC:  Awake, alert, oriented to name, place and time.  Cranial nerves II-XII are grossly intact.  Motor is 5 out of 5 bilaterally.  Cerebellar finger to nose, heel to shin intact.  Sensory is intact. [facility] down going, Romberg negative, and gait is normal.  SKIN:  no bruising or bleeding and normal skin color, texture, turgor    Medications:  Scheduled Medications   magnesium sulfate  1 g Intravenous Once    calcium gluconate IVPB  1 g Intravenous Once    potassium chloride  20 mEq Oral Once    insulin glargine  25 Units Subcutaneous Nightly    insulin lispro  0-18 Units Subcutaneous TID WC    insulin lispro  0-9 Units Subcutaneous Nightly    vitamin D  50,000 Units Oral Weekly    ipratropium-albuterol  1 ampule Inhalation Q4H While awake    vancomycin  125 mg Oral 4x Daily    sodium chloride flush  10 mL Intravenous 2 times per day    enoxaparin  40 mg Subcutaneous Daily    aspirin  81 mg Oral Daily    mometasone-formoterol  2 puff Inhalation BID    escitalopram  10 mg Oral Daily    tamsulosin  0.4 mg Oral Daily       PRN Medications  sodium chloride flush 10 mL PRN   magnesium hydroxide 30 mL Daily PRN   ondansetron 4 mg Q6H PRN   glucose 15 g PRN   dextrose 12.5 g PRN   glucagon (rDNA) 1 mg PRN   dextrose 100 mL/hr PRN   albuterol 2.5 mg Q6H PRN   acetaminophen 650 mg Q6H PRN       Diagnostic Labs and Imaging:  CBC:  Recent Labs      12/17/18   0715  12/18/18   0543  12/19/18   0549   WBC  16.0*  17.0*  14.0*   HGB  10.7*  10.2*  9.9*   PLT  157  187  156     BMP: Recent Labs      12/17/18   0715  12/18/18   0543  12/19/18   0549   NA  134*  135  136   K  4.2  3.9  3.5*   CL  96*  99  101   CO2  26  25  27   BUN  15  14  10   CREATININE  0.68*  0.58*  0.59*   GLUCOSE  260*  152*  162*     Hepatic: Recent Labs      12/16/18   1619   AST  8   ALT  11   BILITOT  0.43   ALKPHOS  88       Assessment and Plan:   1. Sepsis due to Chronic C. diff diarrhea. resolved, leukocytosis improving, 12,000 from 14,000 yesterday. Patient on vancomycin 120 mg capsule 4 times daily: We will need 3 times a day until December end, twice a day till 15th Jan and once a day till 31st Jan. Blood cultures negative.

## 2018-12-19 NOTE — PROGRESS NOTES
Occupational Therapy  Facility/Department: [facility] 4B STEPDOWN  Daily Treatment Note  NAME: Randell Perez  : 1940  MRN: [id number]    Date of Service: 2018    Discharge Recommendations:  Home with Home health OT, Home with nursing aide, Continue to assess pending progress (LTACH vs. home with HHA and OT. Pt would be safe to return home with assistance if able to withstand activity without high flow. )     Patient Diagnosis(es): The primary encounter diagnosis was Diarrhea of infectious origin. Diagnoses of COPD exacerbation ([address] General weakness, Hypomagnesemia, Pneumonia due to organism, and Hyperglycemia were also pertinent to this visit. has a past medical history of Anemia; Anxiety; Bronchitis; COPD (chronic obstructive pulmonary disease) ([address] Diabetes ([address] Former smoker; Hyperlipidemia; Oxygen dependent; Respiratory distress; and Type 2 diabetes mellitus ([address] has a past surgical history that includes AAA repair, open; Pancreas surgery; Finger surgery; and Foot surgery. Restrictions  Restrictions/Precautions  Restrictions/Precautions: Fall Risk  Required Braces or Orthoses?: No  Position Activity Restriction  Other position/activity restrictions: up with assist, up as tolerated      Subjective   General  Chart Reviewed: No  Patient assessed for rehabilitation services?: Yes  Family / Caregiver Present: No  Diagnosis: SOB   Subjective  Subjective: pt shared medical complications regarding wife and how wife is in nursing home and possibly being put on hospice. Pt became tearful while talking about wife and worrying about possibly not being able to see wife again. Writer provided emotional support and active listening   General Comment  Comments: RN ok'd for therapy this afternoon.  Pt agreeable to participate in session and cooperative/pleasant throughout   Pain Assessment  Patient Currently in Pain: Yes  Pain Assessment: 0-10  Pain Level: 6  Pain Type: Acute pain  Pain Location:

## 2018-12-20 LAB
ABSOLUTE EOS #: 0.55 K/UL (ref 0–0.44)
ABSOLUTE IMMATURE GRANULOCYTE: 0.07 K/UL (ref 0–0.3)
ABSOLUTE LYMPH #: 1.45 K/UL (ref 1.1–3.7)
ABSOLUTE MONO #: 0.47 K/UL (ref 0.1–1.2)
ANION GAP SERPL CALCULATED.3IONS-SCNC: 11 MMOL/L (ref 9–17)
BASOPHILS # BLD: 0 % (ref 0–2)
BASOPHILS ABSOLUTE: <0.03 K/UL (ref 0–0.2)
BUN BLDV-MCNC: 7 MG/DL (ref 8–23)
BUN/CREAT BLD: ABNORMAL (ref 9–20)
CALCIUM SERPL-MCNC: 7.8 MG/DL (ref 8.6–10.4)
CHLORIDE BLD-SCNC: 100 MMOL/L (ref 98–107)
CO2: 27 MMOL/L (ref 20–31)
CREAT SERPL-MCNC: 0.57 MG/DL (ref 0.7–1.2)
DIFFERENTIAL TYPE: ABNORMAL
EOSINOPHILS RELATIVE PERCENT: 5 % (ref 1–4)
GFR AFRICAN AMERICAN: >60 ML/MIN
GFR NON-AFRICAN AMERICAN: >60 ML/MIN
GFR SERPL CREATININE-BSD FRML MDRD: ABNORMAL ML/MIN/{1.73_M2}
GFR SERPL CREATININE-BSD FRML MDRD: ABNORMAL ML/MIN/{1.73_M2}
GLUCOSE BLD-MCNC: 104 MG/DL (ref 75–110)
GLUCOSE BLD-MCNC: 200 MG/DL (ref 75–110)
GLUCOSE BLD-MCNC: 216 MG/DL (ref 75–110)
GLUCOSE BLD-MCNC: 277 MG/DL (ref 75–110)
GLUCOSE BLD-MCNC: 67 MG/DL (ref 75–110)
GLUCOSE BLD-MCNC: 74 MG/DL (ref 70–99)
HCT VFR BLD CALC: 33.6 % (ref 40.7–50.3)
HEMOGLOBIN: 11 G/DL (ref 13–17)
IMMATURE GRANULOCYTES: 1 %
LYMPHOCYTES # BLD: 14 % (ref 24–43)
MCH RBC QN AUTO: 31.5 PG (ref 25.2–33.5)
MCHC RBC AUTO-ENTMCNC: 32.7 G/DL (ref 28.4–34.8)
MCV RBC AUTO: 96.3 FL (ref 82.6–102.9)
MONOCYTES # BLD: 4 % (ref 3–12)
NRBC AUTOMATED: 0 PER 100 WBC
PDW BLD-RTO: 14.8 % (ref 11.8–14.4)
PLATELET # BLD: 182 K/UL (ref 138–453)
PLATELET ESTIMATE: ABNORMAL
PMV BLD AUTO: 10.2 FL (ref 8.1–13.5)
POTASSIUM SERPL-SCNC: 3.6 MMOL/L (ref 3.7–5.3)
RBC # BLD: 3.49 M/UL (ref 4.21–5.77)
RBC # BLD: ABNORMAL 10*6/UL
SEG NEUTROPHILS: 76 % (ref 36–65)
SEGMENTED NEUTROPHILS ABSOLUTE COUNT: 8.18 K/UL (ref 1.5–8.1)
SODIUM BLD-SCNC: 138 MMOL/L (ref 135–144)
WBC # BLD: 10.7 K/UL (ref 3.5–11.3)
WBC # BLD: ABNORMAL 10*3/UL

## 2018-12-20 PROCEDURE — 36415 COLL VENOUS BLD VENIPUNCTURE: CPT

## 2018-12-20 PROCEDURE — 94640 AIRWAY INHALATION TREATMENT: CPT

## 2018-12-20 PROCEDURE — 6360000002 HC RX W HCPCS: Performed by: STUDENT IN AN ORGANIZED HEALTH CARE EDUCATION/TRAINING PROGRAM

## 2018-12-20 PROCEDURE — 6370000000 HC RX 637 (ALT 250 FOR IP): Performed by: NURSE PRACTITIONER

## 2018-12-20 PROCEDURE — 6370000000 HC RX 637 (ALT 250 FOR IP): Performed by: STUDENT IN AN ORGANIZED HEALTH CARE EDUCATION/TRAINING PROGRAM

## 2018-12-20 PROCEDURE — 99232 SBSQ HOSP IP/OBS MODERATE 35: CPT | Performed by: INTERNAL MEDICINE

## 2018-12-20 PROCEDURE — 2700000000 HC OXYGEN THERAPY PER DAY

## 2018-12-20 PROCEDURE — 82947 ASSAY GLUCOSE BLOOD QUANT: CPT

## 2018-12-20 PROCEDURE — 85025 COMPLETE CBC W/AUTO DIFF WBC: CPT

## 2018-12-20 PROCEDURE — 6370000000 HC RX 637 (ALT 250 FOR IP): Performed by: HOSPITALIST

## 2018-12-20 PROCEDURE — 2060000000 HC ICU INTERMEDIATE R&B

## 2018-12-20 PROCEDURE — 94762 N-INVAS EAR/PLS OXIMTRY CONT: CPT

## 2018-12-20 PROCEDURE — 97116 GAIT TRAINING THERAPY: CPT

## 2018-12-20 PROCEDURE — 2580000003 HC RX 258: Performed by: STUDENT IN AN ORGANIZED HEALTH CARE EDUCATION/TRAINING PROGRAM

## 2018-12-20 PROCEDURE — 80048 BASIC METABOLIC PNL TOTAL CA: CPT

## 2018-12-20 RX ORDER — FUROSEMIDE 10 MG/ML
40 INJECTION INTRAMUSCULAR; INTRAVENOUS ONCE
Status: COMPLETED | OUTPATIENT
Start: 2018-12-20 | End: 2018-12-20

## 2018-12-20 RX ORDER — INSULIN GLARGINE 100 [IU]/ML
20 INJECTION, SOLUTION SUBCUTANEOUS NIGHTLY
Status: DISCONTINUED | OUTPATIENT
Start: 2018-12-20 | End: 2018-12-21

## 2018-12-20 RX ADMIN — ASPIRIN 81 MG: 81 TABLET ORAL at 08:02

## 2018-12-20 RX ADMIN — ENOXAPARIN SODIUM 40 MG: 40 INJECTION SUBCUTANEOUS at 08:02

## 2018-12-20 RX ADMIN — Medication 10 ML: at 19:41

## 2018-12-20 RX ADMIN — FUROSEMIDE 40 MG: 10 INJECTION, SOLUTION INTRAMUSCULAR; INTRAVENOUS at 09:35

## 2018-12-20 RX ADMIN — MOMETASONE FUROATE AND FORMOTEROL FUMARATE DIHYDRATE 2 PUFF: 100; 5 AEROSOL RESPIRATORY (INHALATION) at 20:35

## 2018-12-20 RX ADMIN — ESCITALOPRAM OXALATE 10 MG: 10 TABLET ORAL at 08:02

## 2018-12-20 RX ADMIN — ACETAMINOPHEN 650 MG: 325 TABLET ORAL at 20:48

## 2018-12-20 RX ADMIN — INSULIN GLARGINE 20 UNITS: 100 INJECTION, SOLUTION SUBCUTANEOUS at 21:50

## 2018-12-20 RX ADMIN — IPRATROPIUM BROMIDE AND ALBUTEROL SULFATE 1 AMPULE: .5; 3 SOLUTION RESPIRATORY (INHALATION) at 16:51

## 2018-12-20 RX ADMIN — IPRATROPIUM BROMIDE AND ALBUTEROL SULFATE 1 AMPULE: .5; 3 SOLUTION RESPIRATORY (INHALATION) at 08:47

## 2018-12-20 RX ADMIN — INSULIN LISPRO 1 UNITS: 100 INJECTION, SOLUTION INTRAVENOUS; SUBCUTANEOUS at 21:50

## 2018-12-20 RX ADMIN — IPRATROPIUM BROMIDE AND ALBUTEROL SULFATE 1 AMPULE: .5; 3 SOLUTION RESPIRATORY (INHALATION) at 11:44

## 2018-12-20 RX ADMIN — TAMSULOSIN HYDROCHLORIDE 0.4 MG: 0.4 CAPSULE ORAL at 08:02

## 2018-12-20 RX ADMIN — VANCOMYCIN HYDROCHLORIDE 125 MG: 125 CAPSULE ORAL at 17:10

## 2018-12-20 RX ADMIN — INSULIN LISPRO 2 UNITS: 100 INJECTION, SOLUTION INTRAVENOUS; SUBCUTANEOUS at 17:09

## 2018-12-20 RX ADMIN — VANCOMYCIN HYDROCHLORIDE 125 MG: 125 CAPSULE ORAL at 12:32

## 2018-12-20 RX ADMIN — MOMETASONE FUROATE AND FORMOTEROL FUMARATE DIHYDRATE 2 PUFF: 100; 5 AEROSOL RESPIRATORY (INHALATION) at 08:48

## 2018-12-20 RX ADMIN — INSULIN LISPRO 3 UNITS: 100 INJECTION, SOLUTION INTRAVENOUS; SUBCUTANEOUS at 12:32

## 2018-12-20 RX ADMIN — IPRATROPIUM BROMIDE AND ALBUTEROL SULFATE 1 AMPULE: .5; 3 SOLUTION RESPIRATORY (INHALATION) at 20:35

## 2018-12-20 RX ADMIN — VANCOMYCIN HYDROCHLORIDE 125 MG: 125 CAPSULE ORAL at 08:03

## 2018-12-20 ASSESSMENT — PAIN SCALES - GENERAL: PAINLEVEL_OUTOF10: 5

## 2018-12-20 NOTE — PROGRESS NOTES
[address]  Internal Medicine Residency Program  Inpatient Daily Progress Note  ______________________________________________________________________________    Patient: Melita Rivera  YOB: 1940   MRN: [id number]    Acct: [de-identified]     Admit date: 12/16/2018  Today's date: 12/20/18  Number of days in the hospital: 4  Expected Discharge Date: 12/21/18    Admitting Diagnosis: C. difficile colitis    Subjective:   Pt seen and Chart reviewed. No acute issues overnight. On nasal high flow oxygen still. Will start working on LTAC placement.       Objective:   Vital Sign:  /73   Pulse 100   Temp 97.7 °F (36.5 °C) (Axillary)   Resp 18   Ht 5' 8.11\" (1.73 m)   Wt 169 lb 15.6 oz (77.1 kg)   SpO2 91%   BMI 25.76 kg/m²       Physical Exam:  CONSTITUTIONAL: Wadell Maul, alert, cooperative, no apparent distress, and appears stated age  EYES:  Lids and lashes normal, pupils equal, round and reactive to light, extra ocular muscles intact, sclera clear, conjunctiva normal  HEMATOLOGIC/LYMPHATICS:  no cervical lymphadenopathy and no supraclavicular lymphadenopathy  BACK:  symmetric and no curvature  LUNGS: Bilateral end inspiratory crackles at left lung bases with decreased breath sounds bilaterally at lung buses, expansion symmetrical.   CARDIOVASCULAR:  normal apical pulses and normal S1 and S2  ABDOMEN:  No scars, normal bowel sounds, soft, non-distended, non-tender, no masses palpated, no hepatosplenomegally  MUSCULOSKELETAL:  full range of motion noted  motor strength is 5 out of 5 all extremities bilaterally  NEUROLOGIC:  Awake, alert, oriented to name, place and time.  Cranial nerves II-XII are grossly intact.  Motor is 5 out of 5 bilaterally.  Cerebellar finger to nose, heel to shin intact.  Sensory is intact.  Babinski down going, Romberg negative, and gait is normal.  SKIN:  no bruising or bleeding and normal skin color, texture,

## 2018-12-20 NOTE — PROGRESS NOTES
Physical Therapy  Facility/Department: [address] STEPDOWN  Daily Treatment Note  NAME: Jori Chauhan  : 1940  MRN: [id number]    Date of Service: 2018    Discharge Recommendations:  Continue to assess pending progress, Home with assist PRN, Home with Home health PT   PT Equipment Recommendations  Equipment Needed: No    Patient Diagnosis(es): The primary encounter diagnosis was Diarrhea of infectious origin. Diagnoses of COPD exacerbation ([facility] [address] General weakness, Hypomagnesemia, Pneumonia due to organism, and Hyperglycemia were also pertinent to this visit. has a past medical history of Anemia; Anxiety; Bronchitis; COPD (chronic obstructive pulmonary disease) ([facility] [address] Diabetes ([facility] [address] Former smoker; Hyperlipidemia; Oxygen dependent; Respiratory distress; and Type 2 diabetes mellitus ([facility] [address] has a past surgical history that includes AAA repair, open; Pancreas surgery; Finger surgery; and Foot surgery. Restrictions  Restrictions/Precautions  Restrictions/Precautions: Fall Risk  Required Braces or Orthoses?: No  Position Activity Restriction  Other position/activity restrictions: up with assist, up as tolerated   Subjective   General  Response To Previous Treatment: Patient with no complaints from previous session. Family / Caregiver Present: No  Subjective  Subjective: RN and pt agreeable to PT. Pt alert in bed upon arrival, reports needing to use bathroom. Continues on hi breanna at [address] [place] [address].   Pain Screening  Patient Currently in Pain: Denies  Vital Signs  Patient Currently in Pain: Denies       Orientation  Orientation  Overall Orientation Status: Within Normal Limits  Cognition      Objective   Bed mobility  Rolling to Left: Modified independent  Supine to Sit: Modified independent  Scooting: Modified independent  Comment: With HOB raised  Transfers  Sit to Stand: Stand by assistance  Stand to sit: Stand by assistance  Stand Pivot Transfers: Stand by assistance  Comment: Pt down to 83% after transfer to commode, return to WNL ~1-2 min. Ambulation  Ambulation?: Yes  Ambulation 1  Surface: level tile  Device: No Device  Assistance: Stand by assistance  Quality of Gait: Demo flexed posture, decreased imer, otherwise normalized gait pattern  Distance: ~5 ft  Comments: Limited by hi breanna and need to use commode  Stairs/Curb  Stairs?: No     Balance  Posture: Fair  Sitting - Static: Good  Sitting - Dynamic: Good  Standing - Static: Good;-  Standing - Dynamic: Fair;+  Comments: no AD    Exercise  Deferred due to pt requesting extra time for BM                       Assessment   Body structures, Functions, Activity limitations: Decreased functional mobility ; Decreased strength;Decreased balance  Assessment: Pt amb 5 ft without deivce and SBAdory ok to d/c home pending improved respiratory status. Prognosis: Good  REQUIRES PT FOLLOW UP: Yes  Activity Tolerance  Activity Tolerance: Patient limited by endurance     G-Code     OutComes Score                                                  AM-PAC Score             Goals  Short term goals  Time Frame for Short term goals: 14 visits  Short term goal 1: Pt will be I with bed mobility  Short term goal 2: Pt will be I with transfers  Short term goal 3: Pt will be I with amb 150'  Short term goal 4: Pt will navigate 4 steps without rail David    Plan    Plan  Times per week: 5-6x/wk  Current Treatment Recommendations: Strengthening, Balance Training, Endurance Training, Functional Mobility Training, Gait Training, Transfer Training, Stair training, Safety Education & Training, Patient/Caregiver Education & Training, Equipment Evaluation, Education, & procurement  Safety Devices  Type of devices:  All fall risk precautions in place  Restraints  Initially in place: No     Therapy Time   Individual Concurrent Group Co-treatment   Time In 0938         Time Out 0952         Minutes Eliane Dom[address]nguez[address]

## 2018-12-20 NOTE — PROGRESS NOTES
Predicted       FEV1     FEV1 % Predicted   FVC   IS volume   IBW     RR 20  Breath Sounds: diminished      · Bronchodilator assessment at level  3  · Hyperinflation assessment at level   · Secretion Management assessment at level    ·   · []    Bronchodilator Assessment  BRONCHODILATOR ASSESSMENT SCORE  Score 0 1 2 3 4 5   Breath Sounds   []  Patient Baseline []  No Wheeze good aeration []  Faint, scattered wheezing, good aeration [x]  Expiratory Wheezing and or moderately diminished []  Insp/Exp wheeze and/or very diminished []  Insp/Exp and/ or marked distress   Respiratory Rate   []  Patient Baseline []  Less than 20 []  Less than 20 []  20-25 []  Greater than 25 []  Greater than 25   Peak flow % of Pred or PB []  NA   []  Greater than 90%  []  81-90% []  71-80% []  Less than or equal to 70%  or unable to perform []  Unable due to Respiratory Distress   Dyspnea re []  Patient Baseline []  No SOB []  No SOB [x]  SOB on exertion []  SOB min activity []  At rest/acute   e FEV% Predicted       []  NA []  Above 69%  []  Unable []  Above 60-69%  []  Unable []  Above 50-59%  []  Unable []  Above 35-49%  []  Unable []  Less than 35%  []  Unable                 []  Hyperinflation Assessment  Score 1 2 3   CXR and Breath Sounds   []  Clear []  No atelectasis  Basilar aeration []  Atelectasis or absent basilar breath sounds   Incentive Spirometry Volume  (Per IBW)   []  Greater than or equal to 15ml/Kg []  less than 15ml/Kg []  less than 15ml/Kg   Surgery within last 2 weeks []  None or general   []  Abdominal or thoracic surgery  []  Abdominal or thoracic   Chronic Pulmonary Historyre []  No []  Yes []  Yes     []  Secretion Management Assessment  Score 1 2 3   Bilateral Breath Sounds   []  Occasional Rhonchi []  Scattered Rhonchi []  Course Rhonchi and/or poor aeration   Sputum    []  Small amount of thin secretions []  Moderate amount of viscous secretions []  Copius, Viscious Yellow/ Secretions   CXR as reported by 22 [id number] [phone] 372 400 [phone]   80 [phone] 296 312 327 342 356 80 335 [phone] 448 [phone]

## 2018-12-20 NOTE — CARE COORDINATION
Discussed transition plan with patient - pt on  Hi-flow O2, IV lasix and c-diff with significant stooling - referral to [facility], [facility] suggested - pt has been to [facility] in the past and would return there if accepted. Referral sent    5 call for Andres Noel at [facility], patient is accepted and they will submit for precert as soon as they receive rev-code confirmation.

## 2018-12-21 LAB
ABSOLUTE EOS #: 0.54 K/UL (ref 0–0.44)
ABSOLUTE IMMATURE GRANULOCYTE: 0.1 K/UL (ref 0–0.3)
ABSOLUTE LYMPH #: 1.2 K/UL (ref 1.1–3.7)
ABSOLUTE MONO #: 0.53 K/UL (ref 0.1–1.2)
ANION GAP SERPL CALCULATED.3IONS-SCNC: 11 MMOL/L (ref 9–17)
BASOPHILS # BLD: 0 % (ref 0–2)
BASOPHILS ABSOLUTE: 0.04 K/UL (ref 0–0.2)
BUN BLDV-MCNC: 8 MG/DL (ref 8–23)
BUN/CREAT BLD: ABNORMAL (ref 9–20)
CALCIUM SERPL-MCNC: 7.9 MG/DL (ref 8.6–10.4)
CHLORIDE BLD-SCNC: 98 MMOL/L (ref 98–107)
CO2: 29 MMOL/L (ref 20–31)
CREAT SERPL-MCNC: 0.53 MG/DL (ref 0.7–1.2)
DIFFERENTIAL TYPE: ABNORMAL
EKG ATRIAL RATE: 64 BPM
EKG ATRIAL RATE: 70 BPM
EKG P AXIS: 43 DEGREES
EKG P AXIS: 47 DEGREES
EKG P-R INTERVAL: 126 MS
EKG P-R INTERVAL: 130 MS
EKG Q-T INTERVAL: 414 MS
EKG Q-T INTERVAL: 422 MS
EKG QRS DURATION: 82 MS
EKG QRS DURATION: 94 MS
EKG QTC CALCULATION (BAZETT): 435 MS
EKG QTC CALCULATION (BAZETT): 447 MS
EKG R AXIS: 40 DEGREES
EKG R AXIS: 58 DEGREES
EKG T AXIS: -43 DEGREES
EKG T AXIS: 23 DEGREES
EKG VENTRICULAR RATE: 64 BPM
EKG VENTRICULAR RATE: 70 BPM
EOSINOPHILS RELATIVE PERCENT: 5 % (ref 1–4)
GFR AFRICAN AMERICAN: >60 ML/MIN
GFR NON-AFRICAN AMERICAN: >60 ML/MIN
GFR SERPL CREATININE-BSD FRML MDRD: ABNORMAL ML/MIN/{1.73_M2}
GFR SERPL CREATININE-BSD FRML MDRD: ABNORMAL ML/MIN/{1.73_M2}
GLUCOSE BLD-MCNC: 264 MG/DL (ref 75–110)
GLUCOSE BLD-MCNC: 269 MG/DL (ref 75–110)
GLUCOSE BLD-MCNC: 278 MG/DL (ref 75–110)
GLUCOSE BLD-MCNC: 325 MG/DL (ref 75–110)
GLUCOSE BLD-MCNC: 67 MG/DL (ref 70–99)
GLUCOSE BLD-MCNC: 74 MG/DL (ref 75–110)
HCT VFR BLD CALC: 34.4 % (ref 40.7–50.3)
HEMOGLOBIN: 10.9 G/DL (ref 13–17)
IMMATURE GRANULOCYTES: 1 %
LYMPHOCYTES # BLD: 11 % (ref 24–43)
MAGNESIUM: 1.6 MG/DL (ref 1.6–2.6)
MCH RBC QN AUTO: 31.2 PG (ref 25.2–33.5)
MCHC RBC AUTO-ENTMCNC: 31.7 G/DL (ref 28.4–34.8)
MCV RBC AUTO: 98.6 FL (ref 82.6–102.9)
MONOCYTES # BLD: 5 % (ref 3–12)
NRBC AUTOMATED: 0 PER 100 WBC
PDW BLD-RTO: 14.8 % (ref 11.8–14.4)
PHOSPHORUS: 3.1 MG/DL (ref 2.5–4.5)
PLATELET # BLD: 196 K/UL (ref 138–453)
PLATELET ESTIMATE: ABNORMAL
PMV BLD AUTO: 10 FL (ref 8.1–13.5)
POTASSIUM SERPL-SCNC: 3.6 MMOL/L (ref 3.7–5.3)
POTASSIUM SERPL-SCNC: 5.1 MMOL/L (ref 3.7–5.3)
RBC # BLD: 3.49 M/UL (ref 4.21–5.77)
RBC # BLD: ABNORMAL 10*6/UL
SEG NEUTROPHILS: 78 % (ref 36–65)
SEGMENTED NEUTROPHILS ABSOLUTE COUNT: 8.43 K/UL (ref 1.5–8.1)
SODIUM BLD-SCNC: 138 MMOL/L (ref 135–144)
WBC # BLD: 10.8 K/UL (ref 3.5–11.3)
WBC # BLD: ABNORMAL 10*3/UL

## 2018-12-21 PROCEDURE — 99232 SBSQ HOSP IP/OBS MODERATE 35: CPT | Performed by: INTERNAL MEDICINE

## 2018-12-21 PROCEDURE — 94762 N-INVAS EAR/PLS OXIMTRY CONT: CPT

## 2018-12-21 PROCEDURE — 36415 COLL VENOUS BLD VENIPUNCTURE: CPT

## 2018-12-21 PROCEDURE — 99233 SBSQ HOSP IP/OBS HIGH 50: CPT | Performed by: INTERNAL MEDICINE

## 2018-12-21 PROCEDURE — 84100 ASSAY OF PHOSPHORUS: CPT

## 2018-12-21 PROCEDURE — 97110 THERAPEUTIC EXERCISES: CPT

## 2018-12-21 PROCEDURE — 80048 BASIC METABOLIC PNL TOTAL CA: CPT

## 2018-12-21 PROCEDURE — 6370000000 HC RX 637 (ALT 250 FOR IP): Performed by: STUDENT IN AN ORGANIZED HEALTH CARE EDUCATION/TRAINING PROGRAM

## 2018-12-21 PROCEDURE — 6360000002 HC RX W HCPCS: Performed by: STUDENT IN AN ORGANIZED HEALTH CARE EDUCATION/TRAINING PROGRAM

## 2018-12-21 PROCEDURE — 2580000003 HC RX 258: Performed by: STUDENT IN AN ORGANIZED HEALTH CARE EDUCATION/TRAINING PROGRAM

## 2018-12-21 PROCEDURE — 84132 ASSAY OF SERUM POTASSIUM: CPT

## 2018-12-21 PROCEDURE — 76937 US GUIDE VASCULAR ACCESS: CPT

## 2018-12-21 PROCEDURE — 2060000000 HC ICU INTERMEDIATE R&B

## 2018-12-21 PROCEDURE — 97116 GAIT TRAINING THERAPY: CPT

## 2018-12-21 PROCEDURE — 94640 AIRWAY INHALATION TREATMENT: CPT

## 2018-12-21 PROCEDURE — 2700000000 HC OXYGEN THERAPY PER DAY

## 2018-12-21 PROCEDURE — 93005 ELECTROCARDIOGRAM TRACING: CPT

## 2018-12-21 PROCEDURE — 6370000000 HC RX 637 (ALT 250 FOR IP): Performed by: HOSPITALIST

## 2018-12-21 PROCEDURE — 82947 ASSAY GLUCOSE BLOOD QUANT: CPT

## 2018-12-21 PROCEDURE — 97535 SELF CARE MNGMENT TRAINING: CPT

## 2018-12-21 PROCEDURE — 85025 COMPLETE CBC W/AUTO DIFF WBC: CPT

## 2018-12-21 PROCEDURE — 83735 ASSAY OF MAGNESIUM: CPT

## 2018-12-21 RX ORDER — INSULIN GLARGINE 100 [IU]/ML
15 INJECTION, SOLUTION SUBCUTANEOUS NIGHTLY
Status: DISCONTINUED | OUTPATIENT
Start: 2018-12-21 | End: 2018-12-25

## 2018-12-21 RX ORDER — VANCOMYCIN HYDROCHLORIDE 125 MG/1
CAPSULE ORAL
Qty: 1 CAPSULE | Refills: 0 | Status: ON HOLD | OUTPATIENT
Start: 2018-12-21 | End: 2019-01-29 | Stop reason: HOSPADM

## 2018-12-21 RX ORDER — VANCOMYCIN HYDROCHLORIDE 125 MG/1
125 CAPSULE ORAL 4 TIMES DAILY
Qty: 1 CAPSULE | Refills: 0 | Status: CANCELLED | OUTPATIENT
Start: 2018-12-21 | End: 2018-12-25

## 2018-12-21 RX ORDER — ERGOCALCIFEROL (VITAMIN D2) 1250 MCG
50000 CAPSULE ORAL WEEKLY
Qty: 30 CAPSULE | Refills: 3 | Status: SHIPPED | OUTPATIENT
Start: 2018-12-24

## 2018-12-21 RX ORDER — MIDODRINE HYDROCHLORIDE 2.5 MG/1
2.5 TABLET ORAL
Status: DISCONTINUED | OUTPATIENT
Start: 2018-12-21 | End: 2018-12-22

## 2018-12-21 RX ORDER — POTASSIUM CHLORIDE 7.45 MG/ML
20 INJECTION INTRAVENOUS ONCE
Status: COMPLETED | OUTPATIENT
Start: 2018-12-21 | End: 2018-12-21

## 2018-12-21 RX ADMIN — TAMSULOSIN HYDROCHLORIDE 0.4 MG: 0.4 CAPSULE ORAL at 09:56

## 2018-12-21 RX ADMIN — INSULIN LISPRO 3 UNITS: 100 INJECTION, SOLUTION INTRAVENOUS; SUBCUTANEOUS at 16:40

## 2018-12-21 RX ADMIN — CARBIDOPA AND LEVODOPA 2.5 MG: 50; 200 TABLET, EXTENDED RELEASE ORAL at 14:21

## 2018-12-21 RX ADMIN — ESCITALOPRAM OXALATE 10 MG: 10 TABLET ORAL at 09:56

## 2018-12-21 RX ADMIN — Medication 10 ML: at 09:56

## 2018-12-21 RX ADMIN — MOMETASONE FUROATE AND FORMOTEROL FUMARATE DIHYDRATE 2 PUFF: 100; 5 AEROSOL RESPIRATORY (INHALATION) at 08:27

## 2018-12-21 RX ADMIN — INSULIN LISPRO 2 UNITS: 100 INJECTION, SOLUTION INTRAVENOUS; SUBCUTANEOUS at 21:35

## 2018-12-21 RX ADMIN — INSULIN GLARGINE 15 UNITS: 100 INJECTION, SOLUTION SUBCUTANEOUS at 21:35

## 2018-12-21 RX ADMIN — IPRATROPIUM BROMIDE AND ALBUTEROL SULFATE 1 AMPULE: .5; 3 SOLUTION RESPIRATORY (INHALATION) at 19:53

## 2018-12-21 RX ADMIN — CARBIDOPA AND LEVODOPA 2.5 MG: 50; 200 TABLET, EXTENDED RELEASE ORAL at 09:56

## 2018-12-21 RX ADMIN — MOMETASONE FUROATE AND FORMOTEROL FUMARATE DIHYDRATE 2 PUFF: 100; 5 AEROSOL RESPIRATORY (INHALATION) at 19:53

## 2018-12-21 RX ADMIN — INSULIN LISPRO 3 UNITS: 100 INJECTION, SOLUTION INTRAVENOUS; SUBCUTANEOUS at 12:55

## 2018-12-21 RX ADMIN — IPRATROPIUM BROMIDE AND ALBUTEROL SULFATE 1 AMPULE: .5; 3 SOLUTION RESPIRATORY (INHALATION) at 16:40

## 2018-12-21 RX ADMIN — IPRATROPIUM BROMIDE AND ALBUTEROL SULFATE 1 AMPULE: .5; 3 SOLUTION RESPIRATORY (INHALATION) at 13:22

## 2018-12-21 RX ADMIN — IPRATROPIUM BROMIDE AND ALBUTEROL SULFATE 1 AMPULE: .5; 3 SOLUTION RESPIRATORY (INHALATION) at 08:27

## 2018-12-21 RX ADMIN — Medication 10 ML: at 21:38

## 2018-12-21 RX ADMIN — ASPIRIN 81 MG: 81 TABLET ORAL at 09:56

## 2018-12-21 RX ADMIN — POTASSIUM CHLORIDE 20 MEQ: 7.46 INJECTION, SOLUTION INTRAVENOUS at 10:53

## 2018-12-21 RX ADMIN — ENOXAPARIN SODIUM 40 MG: 40 INJECTION SUBCUTANEOUS at 09:56

## 2018-12-21 ASSESSMENT — ENCOUNTER SYMPTOMS
COLOR CHANGE: 0
CHEST TIGHTNESS: 0
VOMITING: 0
SINUS PRESSURE: 0
DIARRHEA: 1
ABDOMINAL PAIN: 0
CONSTIPATION: 0
TROUBLE SWALLOWING: 0
ANAL BLEEDING: 0
APNEA: 0
EYE ITCHING: 0
CHOKING: 0
EYE DISCHARGE: 0
BACK PAIN: 0
SORE THROAT: 0
ABDOMINAL DISTENTION: 0
NAUSEA: 1
RECTAL PAIN: 0
BLOOD IN STOOL: 0

## 2018-12-21 NOTE — PROGRESS NOTES
PATIENT REFUSES TO WEAR BIPAP     [x] Risks and benefits explained to patient   [x] Patient refuses to wear Bipap stating \"I couldn't sleep at all when I tried to wear it\"  [x] Patient verbalizes understanding of information presented.

## 2018-12-21 NOTE — PROGRESS NOTES
ULTRA TEST strip STRIP MISC ONCE A DAY 7/1/15   Dominique Jensen PA-C   .      RR 20  Breath Sounds: diminished      · Bronchodilator assessment at level  3 home medsHyperinflation assessment at level   · Secretion Management assessment at level    ·   · [x]    Bronchodilator Assessment  BRONCHODILATOR ASSESSMENT SCORE  Score 0 1 2 3 4 5   Breath Sounds   [x]  Patient Baseline []  No Wheeze good aeration []  Faint, scattered wheezing, good aeration []  Expiratory Wheezing and or moderately diminished []  Insp/Exp wheeze and/or very diminished []  Insp/Exp and/ or marked distress   Respiratory Rate   [x]  Patient Baseline []  Less than 20 []  Less than 20 []  20-25 []  Greater than 25 []  Greater than 25   Peak flow % of Pred or PB [x]  NA   []  Greater than 90%  []  81-90% []  71-80% []  Less than or equal to 70%  or unable to perform []  Unable due to Respiratory Distress   Dyspnea re [x]  Patient Baseline []  No SOB []  No SOB []  SOB on exertion []  SOB min activity []  At rest/acute   e FEV% Predicted       [x]  NA []  Above 69%  []  Unable []  Above 60-69%  []  Unable []  Above 50-59%  []  Unable []  Above 35-49%  []  Unable []  Less than 35%  []  Unable                 []  Hyperinflation Assessment  Score 1 2 3   CXR and Breath Sounds   []  Clear []  No atelectasis  Basilar aeration []  Atelectasis or absent basilar breath sounds   Incentive Spirometry Volume  (Per IBW)   []  Greater than or equal to 15ml/Kg []  less than 15ml/Kg []  less than 15ml/Kg   Surgery within last 2 weeks []  None or general   []  Abdominal or thoracic surgery  []  Abdominal or thoracic   Chronic Pulmonary Historyre []  No []  Yes []  Yes     []  Secretion Management Assessment  Score 1 2 3   Bilateral Breath Sounds   []  Occasional Rhonchi []  Scattered Rhonchi []  Course Rhonchi and/or poor aeration   Sputum    []  Small amount of thin secretions []  Moderate amount of viscous secretions []  Copius, Viscious Yellow/ Secretions   CXR

## 2018-12-21 NOTE — CARE COORDINATION
Care Transition  Called [facility] liaison at [place] to inquire on precert, left vm with call back number. [facility] return call and precert was started today.

## 2018-12-21 NOTE — PROGRESS NOTES
[facility] - Progress Note  Today's Date and Time: 12/21/2018, 12:50 PM    Impression :   1. Recurrent C Difficile colitis  2. ES COPD  3. IPF  4. Pulmonary HTN  5. DM II    Recommendations:   · Continue Oral Vancomycin 125 mg QID x 14 days 12/17-12/31, then BID x 14 days 1/1-1/14 then daily x 2 weeks 1/15-1/29 because of relapsing C diff infection  · Pulmonary toilet  · Supportive care  · Dr Nora Negrete will assume ID follow up starting 12-21-18 as I will be away. Medical Decision Making/Summary/Discussion:   · 71 yo gentleman with ES COPD on home O2 of 4L, also with IPF, Pulmonary HTN and DMII  · Reports that he has had diarrhea (16-20 BM's per day) for the past 6 weeks  · He states that he was discharged to an LTAC at the end of Oct and developed diarrhea shortly after arriving there. He was treated with oral vancomycin and discharged home. · His diarrhea returned after he got home. ·  He presented to the ER on 11/27 and was treated for a COPD exacerbation and CAP. He states that the diarrhea was not addressed. He was discharged home on 11/30  · Since arriving home he has gotten weaker and weaker, lost weight, and his breathing has gotten worse. · On 12-16 he presented to the ER because of low oxygen saturations. His stool showed C diff. He was admitted for COPD exacerbation and C diff colitis  · Pt states that he hs not taken any medication for the diarrhea since October  · In the ER his O2 saturation was 80% on 6L NC  · Pt is basically home bound and states that it is very difficult for him to get to physician appointments  · 12/19 Reports that he continues to have some watery stools and has had 7 diarrheal bowel movements today  · He is making overall slow progress.   · Transfer to [facility] in progress  Infection Control Recommendations   · [place] Precautions  · Contact Isolation - Enteric    Antimicrobial Stewardship Recommendations     · IV to oral conversion - rubs, or gallops. Abdomen: Soft, non tender. Bowel sounds normal. No organomegaly  All four Extremities: No cyanosis, clubbing, edema, or effusions. Neurologic: No gross sensory or motor deficits. Skin: Warm and dry with good turgor. No signs of peripheral arterial or venous insufficiency. No ulcerations. No open wounds. Medical Decision Making -Laboratory:   I have independently reviewed/ordered the following labs:    CBC with Differential:   Recent Labs      18   0625  18   0559   WBC  10.7  10.8   HGB  11.0*  10.9*   HCT  33.6*  34.4*   PLT  182  196   LYMPHOPCT  14*  11*   MONOPCT  4  5     BMP:   Recent Labs      18   1832  18   0625  18   0559   NA   --   138  138   K   --   3.6*  3.6*   CL   --   100  98   CO2   --   27  29   BUN   --   7*  8   CREATININE   --   0.57*  0.53*   MG  1.8   --   1.6     Hepatic Function Panel:   No results for input(s): PROT, LABALBU, BILIDIR, IBILI, BILITOT, ALKPHOS, ALT, AST in the last 72 hours.   Lab Results   Component Value Date    MUCUS NOT REPORTED 2018    RBC 3.49 2018    RBC 4.72 2012    TRICHOMONAS NOT REPORTED 2018    WBC 10.8 2018    YEAST NOT REPORTED 2018    TURBIDITY CLEAR 2018     Lab Results   Component Value Date    CREATININE 0.53 2018    GLUCOSE 67 2018    GLUCOSE 151 2012       Medical Decision Making-Imagin/19 CXR      Narrative   EXAMINATION:   SINGLE XRAY VIEW OF THE CHEST       2018 8:58 am       COMPARISON:   Chest radiograph 2018       HISTORY:   ORDERING SYSTEM PROVIDED HISTORY: new onset SOB   TECHNOLOGIST PROVIDED HISTORY:   new onset SOB       FINDINGS:   Stable mild cardiomegaly.  Bilateral interstitial opacities are not   significantly changed.  There has been increase in size of small to moderate   bilateral pleural effusions.  Worsening bibasilar airspace opacities.  No   evidence of pneumothorax.           Impression   Bilateral interstitial opacities, which may reflect a combination of   interstitial edema and pulmonary fibrosis, are not significantly changed.       Increase in size of small to moderate bilateral pleural effusions and   worsening bibasilar airspace opacities.  Findings may reflect worsening edema   and/or pneumonia. Medical Decision Making-Other: Thank you for allowing us to participate in the care of this patient. Please call with questions.     Tim Renteria MD  Pager: [phone] - Office: [phone]

## 2018-12-21 NOTE — PROGRESS NOTES
Occupational Therapy  Facility/Department: [facility] 4B STEPDOWN  Daily Treatment Note  NAME: Aamir Negrete  : 1940  MRN: [id number]    Date of Service: 2018    Discharge Recommendations:  Continue to assess pending progress, Home with assist PRN, Home with nursing aide (Pt suitable to return home w/assist when respiratory condition improves and pt is off hi-flow O2.)       Patient Diagnosis(es): The primary encounter diagnosis was Diarrhea of infectious origin. Diagnoses of COPD exacerbation ([facility] [address] General weakness, Hypomagnesemia, Pneumonia due to organism, and Hyperglycemia were also pertinent to this visit. has a past medical history of Anemia; Anxiety; Bronchitis; COPD (chronic obstructive pulmonary disease) ([address] Diabetes ([facility] [address] Former smoker; Hyperlipidemia; Oxygen dependent; Respiratory distress; and Type 2 diabetes mellitus ([facility] [address] has a past surgical history that includes AAA repair, open; Pancreas surgery; Finger surgery; and Foot surgery. Restrictions  Restrictions/Precautions  Restrictions/Precautions: Fall Risk, Contact Precautions  Required Braces or Orthoses?: No  Position Activity Restriction  Other position/activity restrictions: up with assist, up as tolerated, hi-flow O2 30 Lm. Subjective   General  Chart Reviewed: No  Patient assessed for rehabilitation services?: Yes  Family / Caregiver Present: No  Diagnosis: SOB   Subjective  Pain Assessment  Patient Currently in Pain: Denies  Vital Signs  Patient Currently in Pain: Denies   Orientation  Orientation  Overall Orientation Status: Within Functional Limits  Objective    ADL  UE Bathing: Setup; Increased time to complete;Stand by assistance  LE Bathing: Setup; Increased time to complete;Minimal assistance (Pericare/bottom care in standing EOB)  UE Dressing: Contact guard assistance (To manage gown.)  LE Dressing: Minimal assistance (To christiane socks.)  Toileting: Minimal assistance  Additional Comments: Pt completed ADL care seated

## 2018-12-22 PROBLEM — J61 ASBESTOSIS (HCC): Status: ACTIVE | Noted: 2018-12-22

## 2018-12-22 LAB
ABSOLUTE EOS #: 0.48 K/UL (ref 0–0.44)
ABSOLUTE IMMATURE GRANULOCYTE: 0.06 K/UL (ref 0–0.3)
ABSOLUTE LYMPH #: 1.21 K/UL (ref 1.1–3.7)
ABSOLUTE MONO #: 0.53 K/UL (ref 0.1–1.2)
ANION GAP SERPL CALCULATED.3IONS-SCNC: 7 MMOL/L (ref 9–17)
BASOPHILS # BLD: 0 % (ref 0–2)
BASOPHILS ABSOLUTE: 0.03 K/UL (ref 0–0.2)
BUN BLDV-MCNC: 11 MG/DL (ref 8–23)
BUN/CREAT BLD: ABNORMAL (ref 9–20)
CALCIUM SERPL-MCNC: 8 MG/DL (ref 8.6–10.4)
CHLORIDE BLD-SCNC: 97 MMOL/L (ref 98–107)
CO2: 27 MMOL/L (ref 20–31)
CREAT SERPL-MCNC: 0.58 MG/DL (ref 0.7–1.2)
CULTURE: NORMAL
CULTURE: NORMAL
DIFFERENTIAL TYPE: ABNORMAL
EOSINOPHILS RELATIVE PERCENT: 6 % (ref 1–4)
GFR AFRICAN AMERICAN: >60 ML/MIN
GFR NON-AFRICAN AMERICAN: >60 ML/MIN
GFR SERPL CREATININE-BSD FRML MDRD: ABNORMAL ML/MIN/{1.73_M2}
GFR SERPL CREATININE-BSD FRML MDRD: ABNORMAL ML/MIN/{1.73_M2}
GLUCOSE BLD-MCNC: 109 MG/DL (ref 70–99)
GLUCOSE BLD-MCNC: 109 MG/DL (ref 75–110)
GLUCOSE BLD-MCNC: 109 MG/DL (ref 75–110)
GLUCOSE BLD-MCNC: 217 MG/DL (ref 75–110)
GLUCOSE BLD-MCNC: 318 MG/DL (ref 75–110)
GLUCOSE BLD-MCNC: 84 MG/DL (ref 75–110)
HCT VFR BLD CALC: 32.6 % (ref 40.7–50.3)
HEMOGLOBIN: 10.3 G/DL (ref 13–17)
IMMATURE GRANULOCYTES: 1 %
LYMPHOCYTES # BLD: 15 % (ref 24–43)
Lab: NORMAL
Lab: NORMAL
MCH RBC QN AUTO: 31.2 PG (ref 25.2–33.5)
MCHC RBC AUTO-ENTMCNC: 31.6 G/DL (ref 28.4–34.8)
MCV RBC AUTO: 98.8 FL (ref 82.6–102.9)
MONOCYTES # BLD: 7 % (ref 3–12)
NRBC AUTOMATED: 0 PER 100 WBC
PDW BLD-RTO: 14.6 % (ref 11.8–14.4)
PLATELET # BLD: 203 K/UL (ref 138–453)
PLATELET ESTIMATE: ABNORMAL
PMV BLD AUTO: 9.8 FL (ref 8.1–13.5)
POTASSIUM SERPL-SCNC: 3.9 MMOL/L (ref 3.7–5.3)
POTASSIUM SERPL-SCNC: 4.7 MMOL/L (ref 3.7–5.3)
RBC # BLD: 3.3 M/UL (ref 4.21–5.77)
RBC # BLD: ABNORMAL 10*6/UL
SEG NEUTROPHILS: 71 % (ref 36–65)
SEGMENTED NEUTROPHILS ABSOLUTE COUNT: 5.68 K/UL (ref 1.5–8.1)
SODIUM BLD-SCNC: 131 MMOL/L (ref 135–144)
SPECIMEN DESCRIPTION: NORMAL
SPECIMEN DESCRIPTION: NORMAL
STATUS: NORMAL
STATUS: NORMAL
WBC # BLD: 8 K/UL (ref 3.5–11.3)
WBC # BLD: ABNORMAL 10*3/UL

## 2018-12-22 PROCEDURE — 6370000000 HC RX 637 (ALT 250 FOR IP): Performed by: STUDENT IN AN ORGANIZED HEALTH CARE EDUCATION/TRAINING PROGRAM

## 2018-12-22 PROCEDURE — 85025 COMPLETE CBC W/AUTO DIFF WBC: CPT

## 2018-12-22 PROCEDURE — 6360000002 HC RX W HCPCS: Performed by: STUDENT IN AN ORGANIZED HEALTH CARE EDUCATION/TRAINING PROGRAM

## 2018-12-22 PROCEDURE — 6370000000 HC RX 637 (ALT 250 FOR IP): Performed by: HOSPITALIST

## 2018-12-22 PROCEDURE — 2060000000 HC ICU INTERMEDIATE R&B

## 2018-12-22 PROCEDURE — 2580000003 HC RX 258: Performed by: STUDENT IN AN ORGANIZED HEALTH CARE EDUCATION/TRAINING PROGRAM

## 2018-12-22 PROCEDURE — 36415 COLL VENOUS BLD VENIPUNCTURE: CPT

## 2018-12-22 PROCEDURE — 80048 BASIC METABOLIC PNL TOTAL CA: CPT

## 2018-12-22 PROCEDURE — 94762 N-INVAS EAR/PLS OXIMTRY CONT: CPT

## 2018-12-22 PROCEDURE — 99233 SBSQ HOSP IP/OBS HIGH 50: CPT | Performed by: INTERNAL MEDICINE

## 2018-12-22 PROCEDURE — 2700000000 HC OXYGEN THERAPY PER DAY

## 2018-12-22 PROCEDURE — 82947 ASSAY GLUCOSE BLOOD QUANT: CPT

## 2018-12-22 PROCEDURE — 97110 THERAPEUTIC EXERCISES: CPT

## 2018-12-22 PROCEDURE — 97530 THERAPEUTIC ACTIVITIES: CPT

## 2018-12-22 PROCEDURE — 99232 SBSQ HOSP IP/OBS MODERATE 35: CPT | Performed by: INTERNAL MEDICINE

## 2018-12-22 PROCEDURE — 94640 AIRWAY INHALATION TREATMENT: CPT

## 2018-12-22 PROCEDURE — 84132 ASSAY OF SERUM POTASSIUM: CPT

## 2018-12-22 RX ORDER — LACTOBACILLUS RHAMNOSUS GG 10B CELL
1 CAPSULE ORAL
Status: DISCONTINUED | OUTPATIENT
Start: 2018-12-22 | End: 2018-12-25 | Stop reason: HOSPADM

## 2018-12-22 RX ADMIN — TAMSULOSIN HYDROCHLORIDE 0.4 MG: 0.4 CAPSULE ORAL at 09:27

## 2018-12-22 RX ADMIN — MOMETASONE FUROATE AND FORMOTEROL FUMARATE DIHYDRATE 2 PUFF: 100; 5 AEROSOL RESPIRATORY (INHALATION) at 08:30

## 2018-12-22 RX ADMIN — ASPIRIN 81 MG: 81 TABLET ORAL at 09:27

## 2018-12-22 RX ADMIN — Medication 1 CAPSULE: at 12:47

## 2018-12-22 RX ADMIN — INSULIN LISPRO 4 UNITS: 100 INJECTION, SOLUTION INTRAVENOUS; SUBCUTANEOUS at 12:46

## 2018-12-22 RX ADMIN — ACETAMINOPHEN 650 MG: 325 TABLET ORAL at 20:33

## 2018-12-22 RX ADMIN — MOMETASONE FUROATE AND FORMOTEROL FUMARATE DIHYDRATE 2 PUFF: 100; 5 AEROSOL RESPIRATORY (INHALATION) at 19:19

## 2018-12-22 RX ADMIN — IPRATROPIUM BROMIDE AND ALBUTEROL SULFATE 1 AMPULE: .5; 3 SOLUTION RESPIRATORY (INHALATION) at 19:19

## 2018-12-22 RX ADMIN — Medication 10 ML: at 09:27

## 2018-12-22 RX ADMIN — IPRATROPIUM BROMIDE AND ALBUTEROL SULFATE 1 AMPULE: .5; 3 SOLUTION RESPIRATORY (INHALATION) at 08:29

## 2018-12-22 RX ADMIN — IPRATROPIUM BROMIDE AND ALBUTEROL SULFATE 1 AMPULE: .5; 3 SOLUTION RESPIRATORY (INHALATION) at 15:23

## 2018-12-22 RX ADMIN — INSULIN LISPRO 8 UNITS: 100 INJECTION, SOLUTION INTRAVENOUS; SUBCUTANEOUS at 17:00

## 2018-12-22 RX ADMIN — IPRATROPIUM BROMIDE AND ALBUTEROL SULFATE 1 AMPULE: .5; 3 SOLUTION RESPIRATORY (INHALATION) at 12:40

## 2018-12-22 RX ADMIN — Medication 10 ML: at 20:32

## 2018-12-22 RX ADMIN — ESCITALOPRAM OXALATE 10 MG: 10 TABLET ORAL at 09:27

## 2018-12-22 RX ADMIN — ENOXAPARIN SODIUM 40 MG: 40 INJECTION SUBCUTANEOUS at 09:27

## 2018-12-22 ASSESSMENT — PAIN SCALES - GENERAL
PAINLEVEL_OUTOF10: 3
PAINLEVEL_OUTOF10: 4
PAINLEVEL_OUTOF10: 0

## 2018-12-22 NOTE — PROGRESS NOTES
Vital Sign:  /70   Pulse 78   Temp 97.6 °F (36.4 °C) (Oral)   Resp 25   Ht 5' 8.11\" (1.73 m)   Wt 157 lb 3 oz (71.3 kg)   SpO2 97%   BMI 23.82 kg/m²       Physical Exam:  CONSTITUTIONAL:  awake, alert, cooperative, no apparent distress, and appears stated age  EYES:  Lids and lashes normal, pupils equal, round and reactive to light, extra ocular muscles intact, sclera clear, conjunctiva normal  HEMATOLOGIC/LYMPHATICS:  no cervical lymphadenopathy and no supraclavicular lymphadenopathy  BACK:  symmetric and no curvature  LUNGS: Bilateral end inspiratory crackles at left lung bases with decreased breath sounds bilaterally at lung buses, expansion symmetrical.   CARDIOVASCULAR:  normal apical pulses and normal S1 and S2  ABDOMEN:  No scars, normal bowel sounds, soft, non-distended, non-tender, no masses palpated, no hepatosplenomegally  MUSCULOSKELETAL:  full range of motion noted  motor strength is 5 out of 5 all extremities bilaterally  NEUROLOGIC:  Awake, alert, oriented to name, place and time.  Cranial nerves II-XII are grossly intact.  Motor is 5 out of 5 bilaterally.  Cerebellar finger to nose, heel to shin intact.  Sensory is intact.  Babinski down going, Romberg negative, and gait is normal.  SKIN:  no bruising or bleeding and normal skin color, texture, turgor      Medications:  Scheduled Medications   insulin lispro  0-12 Units Subcutaneous TID WC    insulin lispro  0-6 Units Subcutaneous Nightly    insulin glargine  15 Units Subcutaneous Nightly    vitamin D  50,000 Units Oral Weekly    ipratropium-albuterol  1 ampule Inhalation Q4H While awake    sodium chloride flush  10 mL Intravenous 2 times per day    enoxaparin  40 mg Subcutaneous Daily    aspirin  81 mg Oral Daily    mometasone-formoterol  2 puff Inhalation BID    escitalopram  10 mg Oral Daily    tamsulosin  0.4 mg Oral Daily       PRN Medications  sodium chloride flush 10 mL PRN   magnesium hydroxide 30 mL Daily PRN 12/22/2018, 12:03 PM

## 2018-12-22 NOTE — PROGRESS NOTES
Physical Therapy  Facility/Department: [address] STEPDOWN  Daily Treatment Note  NAME: Nena Ayala  : 1940  MRN: [id number]    Date of Service: 2018    Discharge Recommendations:  Home with assist PRN, Home with Home health PT   PT Equipment Recommendations  Equipment Needed: No    Patient Diagnosis(es): The primary encounter diagnosis was Diarrhea of infectious origin. Diagnoses of COPD exacerbation ([facility] [address] General weakness, Hypomagnesemia, Pneumonia due to organism, and Hyperglycemia were also pertinent to this visit. has a past medical history of Anemia; Anxiety; Bronchitis; COPD (chronic obstructive pulmonary disease) ([facility] [address] Diabetes ([facility] [address] Former smoker; Hyperlipidemia; Oxygen dependent; Respiratory distress; and Type 2 diabetes mellitus ([facility] has a past surgical history that includes AAA repair, open; Pancreas surgery; Finger surgery; and Foot surgery. Restrictions  Restrictions/Precautions  Restrictions/Precautions: Fall Risk, Contact Precautions  Required Braces or Orthoses?: No  Position Activity Restriction  Other position/activity restrictions: up with assist, up as tolerated, hi-flow O2 30 Lm. Subjective   General  Chart Reviewed: Yes  Response To Previous Treatment: Patient with no complaints from previous session. Family / Caregiver Present: No  Subjective  Subjective: RN and pt agreeable to PT. Pt alert in bed upon arrival, states he is frusterated he feels he is not getting better  Pain Screening  Patient Currently in Pain: Denies  Vital Signs  Patient Currently in Pain: Denies       Orientation  Orientation  Overall Orientation Status: Within Normal Limits  Cognition   Cognition  Overall Cognitive Status: WNL  Objective                  Exercises  Comments: Pt on Hi-[place] O2. SpO2% at rest supine: 92%. Pursed lip breathing x10 reps: SpO2% up to 94%  Other exercises  Other exercises?: Yes  Other exercises 1: Supine: Bilat UE elbow flexion.  chest press, Horiz ABD, and overhead

## 2018-12-23 LAB
ANION GAP SERPL CALCULATED.3IONS-SCNC: 12 MMOL/L (ref 9–17)
BUN BLDV-MCNC: 10 MG/DL (ref 8–23)
BUN/CREAT BLD: ABNORMAL (ref 9–20)
CALCIUM SERPL-MCNC: 8.3 MG/DL (ref 8.6–10.4)
CHLORIDE BLD-SCNC: 98 MMOL/L (ref 98–107)
CO2: 25 MMOL/L (ref 20–31)
CREAT SERPL-MCNC: 0.67 MG/DL (ref 0.7–1.2)
EKG ATRIAL RATE: 72 BPM
EKG P AXIS: 55 DEGREES
EKG P-R INTERVAL: 122 MS
EKG Q-T INTERVAL: 396 MS
EKG QRS DURATION: 88 MS
EKG QTC CALCULATION (BAZETT): 433 MS
EKG R AXIS: 54 DEGREES
EKG T AXIS: 30 DEGREES
EKG VENTRICULAR RATE: 72 BPM
GFR AFRICAN AMERICAN: >60 ML/MIN
GFR NON-AFRICAN AMERICAN: >60 ML/MIN
GFR SERPL CREATININE-BSD FRML MDRD: ABNORMAL ML/MIN/{1.73_M2}
GFR SERPL CREATININE-BSD FRML MDRD: ABNORMAL ML/MIN/{1.73_M2}
GLUCOSE BLD-MCNC: 169 MG/DL (ref 75–110)
GLUCOSE BLD-MCNC: 210 MG/DL (ref 70–99)
GLUCOSE BLD-MCNC: 250 MG/DL (ref 75–110)
GLUCOSE BLD-MCNC: 252 MG/DL (ref 75–110)
GLUCOSE BLD-MCNC: 260 MG/DL (ref 75–110)
POTASSIUM SERPL-SCNC: 4.7 MMOL/L (ref 3.7–5.3)
SODIUM BLD-SCNC: 135 MMOL/L (ref 135–144)

## 2018-12-23 PROCEDURE — 2580000003 HC RX 258: Performed by: STUDENT IN AN ORGANIZED HEALTH CARE EDUCATION/TRAINING PROGRAM

## 2018-12-23 PROCEDURE — 94762 N-INVAS EAR/PLS OXIMTRY CONT: CPT

## 2018-12-23 PROCEDURE — 99233 SBSQ HOSP IP/OBS HIGH 50: CPT | Performed by: INTERNAL MEDICINE

## 2018-12-23 PROCEDURE — 99232 SBSQ HOSP IP/OBS MODERATE 35: CPT | Performed by: INTERNAL MEDICINE

## 2018-12-23 PROCEDURE — 6370000000 HC RX 637 (ALT 250 FOR IP): Performed by: INTERNAL MEDICINE

## 2018-12-23 PROCEDURE — 2700000000 HC OXYGEN THERAPY PER DAY

## 2018-12-23 PROCEDURE — 82947 ASSAY GLUCOSE BLOOD QUANT: CPT

## 2018-12-23 PROCEDURE — 80048 BASIC METABOLIC PNL TOTAL CA: CPT

## 2018-12-23 PROCEDURE — 2500000003 HC RX 250 WO HCPCS: Performed by: INTERNAL MEDICINE

## 2018-12-23 PROCEDURE — 2060000000 HC ICU INTERMEDIATE R&B

## 2018-12-23 PROCEDURE — 6370000000 HC RX 637 (ALT 250 FOR IP): Performed by: STUDENT IN AN ORGANIZED HEALTH CARE EDUCATION/TRAINING PROGRAM

## 2018-12-23 PROCEDURE — 6370000000 HC RX 637 (ALT 250 FOR IP): Performed by: HOSPITALIST

## 2018-12-23 PROCEDURE — 36415 COLL VENOUS BLD VENIPUNCTURE: CPT

## 2018-12-23 PROCEDURE — 6360000002 HC RX W HCPCS: Performed by: STUDENT IN AN ORGANIZED HEALTH CARE EDUCATION/TRAINING PROGRAM

## 2018-12-23 PROCEDURE — 94640 AIRWAY INHALATION TREATMENT: CPT

## 2018-12-23 RX ORDER — VANCOMYCIN HYDROCHLORIDE 125 MG/1
125 CAPSULE ORAL 4 TIMES DAILY
Status: DISCONTINUED | OUTPATIENT
Start: 2018-12-23 | End: 2018-12-23

## 2018-12-23 RX ORDER — VANCOMYCIN HYDROCHLORIDE 250 MG/1
250 CAPSULE ORAL 4 TIMES DAILY
Status: DISCONTINUED | OUTPATIENT
Start: 2018-12-23 | End: 2018-12-25 | Stop reason: HOSPADM

## 2018-12-23 RX ADMIN — IPRATROPIUM BROMIDE AND ALBUTEROL SULFATE 1 AMPULE: .5; 3 SOLUTION RESPIRATORY (INHALATION) at 23:04

## 2018-12-23 RX ADMIN — ENOXAPARIN SODIUM 40 MG: 40 INJECTION SUBCUTANEOUS at 09:49

## 2018-12-23 RX ADMIN — ACETAMINOPHEN 650 MG: 325 TABLET ORAL at 09:48

## 2018-12-23 RX ADMIN — VANCOMYCIN HYDROCHLORIDE 125 MG: 125 CAPSULE ORAL at 09:49

## 2018-12-23 RX ADMIN — Medication 1 CAPSULE: at 09:49

## 2018-12-23 RX ADMIN — MOMETASONE FUROATE AND FORMOTEROL FUMARATE DIHYDRATE 2 PUFF: 100; 5 AEROSOL RESPIRATORY (INHALATION) at 08:18

## 2018-12-23 RX ADMIN — INSULIN LISPRO 3 UNITS: 100 INJECTION, SOLUTION INTRAVENOUS; SUBCUTANEOUS at 22:52

## 2018-12-23 RX ADMIN — ESCITALOPRAM OXALATE 10 MG: 10 TABLET ORAL at 09:48

## 2018-12-23 RX ADMIN — VANCOMYCIN HYDROCHLORIDE 125 MG: 125 CAPSULE ORAL at 13:34

## 2018-12-23 RX ADMIN — IPRATROPIUM BROMIDE AND ALBUTEROL SULFATE 1 AMPULE: .5; 3 SOLUTION RESPIRATORY (INHALATION) at 08:17

## 2018-12-23 RX ADMIN — VANCOMYCIN HYDROCHLORIDE 250 MG: 250 CAPSULE ORAL at 20:38

## 2018-12-23 RX ADMIN — VANCOMYCIN HYDROCHLORIDE 125 MG: 125 CAPSULE ORAL at 16:47

## 2018-12-23 RX ADMIN — MOMETASONE FUROATE AND FORMOTEROL FUMARATE DIHYDRATE 2 PUFF: 100; 5 AEROSOL RESPIRATORY (INHALATION) at 19:32

## 2018-12-23 RX ADMIN — METRONIDAZOLE 500 MG: 500 INJECTION, SOLUTION INTRAVENOUS at 20:37

## 2018-12-23 RX ADMIN — INSULIN LISPRO 6 UNITS: 100 INJECTION, SOLUTION INTRAVENOUS; SUBCUTANEOUS at 07:25

## 2018-12-23 RX ADMIN — ACETAMINOPHEN 650 MG: 325 TABLET ORAL at 18:36

## 2018-12-23 RX ADMIN — INSULIN LISPRO 3 UNITS: 100 INJECTION, SOLUTION INTRAVENOUS; SUBCUTANEOUS at 12:05

## 2018-12-23 RX ADMIN — TAMSULOSIN HYDROCHLORIDE 0.4 MG: 0.4 CAPSULE ORAL at 09:49

## 2018-12-23 RX ADMIN — INSULIN LISPRO 9 UNITS: 100 INJECTION, SOLUTION INTRAVENOUS; SUBCUTANEOUS at 16:41

## 2018-12-23 RX ADMIN — IPRATROPIUM BROMIDE AND ALBUTEROL SULFATE 1 AMPULE: .5; 3 SOLUTION RESPIRATORY (INHALATION) at 15:56

## 2018-12-23 RX ADMIN — IPRATROPIUM BROMIDE AND ALBUTEROL SULFATE 1 AMPULE: .5; 3 SOLUTION RESPIRATORY (INHALATION) at 12:23

## 2018-12-23 RX ADMIN — INSULIN GLARGINE 15 UNITS: 100 INJECTION, SOLUTION SUBCUTANEOUS at 22:17

## 2018-12-23 RX ADMIN — Medication 10 ML: at 09:49

## 2018-12-23 RX ADMIN — ASPIRIN 81 MG: 81 TABLET ORAL at 09:48

## 2018-12-23 RX ADMIN — IPRATROPIUM BROMIDE AND ALBUTEROL SULFATE 1 AMPULE: .5; 3 SOLUTION RESPIRATORY (INHALATION) at 19:32

## 2018-12-23 ASSESSMENT — ENCOUNTER SYMPTOMS
ABDOMINAL PAIN: 1
ABDOMINAL DISTENTION: 1
EYES NEGATIVE: 1
ALLERGIC/IMMUNOLOGIC NEGATIVE: 1
DIARRHEA: 1
SHORTNESS OF BREATH: 1

## 2018-12-23 ASSESSMENT — PAIN SCALES - GENERAL
PAINLEVEL_OUTOF10: 4
PAINLEVEL_OUTOF10: 5

## 2018-12-23 NOTE — PROGRESS NOTES
Component Value Date    CREATININE 0.67 12/23/2018    GLUCOSE 210 12/23/2018    GLUCOSE 151 03/26/2012       Detailed results: Thank you for allowing us to participate in the care of this patient. Please call with questions. This note is created with the assistance of a speech recognition program.  While intending to generate adocument that actually reflects the content of the visit, the document can still have some errors including those of syntax and sound a like substitutions which may escape proof reading. It such instances, actual meaningcan be extrapolated by contextual diversion.     Jaqueline Hayes MD  Office: [phone]

## 2018-12-24 PROBLEM — Z71.89 GOALS OF CARE, COUNSELING/DISCUSSION: Status: ACTIVE | Noted: 2018-12-24

## 2018-12-24 LAB
C-REACTIVE PROTEIN: 27.1 MG/L (ref 0–5)
GLUCOSE BLD-MCNC: 229 MG/DL (ref 75–110)
GLUCOSE BLD-MCNC: 259 MG/DL (ref 75–110)
GLUCOSE BLD-MCNC: 98 MG/DL (ref 75–110)

## 2018-12-24 PROCEDURE — 99233 SBSQ HOSP IP/OBS HIGH 50: CPT | Performed by: INTERNAL MEDICINE

## 2018-12-24 PROCEDURE — 6370000000 HC RX 637 (ALT 250 FOR IP): Performed by: STUDENT IN AN ORGANIZED HEALTH CARE EDUCATION/TRAINING PROGRAM

## 2018-12-24 PROCEDURE — 2500000003 HC RX 250 WO HCPCS: Performed by: INTERNAL MEDICINE

## 2018-12-24 PROCEDURE — 6370000000 HC RX 637 (ALT 250 FOR IP): Performed by: INTERNAL MEDICINE

## 2018-12-24 PROCEDURE — 2700000000 HC OXYGEN THERAPY PER DAY

## 2018-12-24 PROCEDURE — 99232 SBSQ HOSP IP/OBS MODERATE 35: CPT | Performed by: INTERNAL MEDICINE

## 2018-12-24 PROCEDURE — 86140 C-REACTIVE PROTEIN: CPT

## 2018-12-24 PROCEDURE — 2060000000 HC ICU INTERMEDIATE R&B

## 2018-12-24 PROCEDURE — 6360000002 HC RX W HCPCS: Performed by: STUDENT IN AN ORGANIZED HEALTH CARE EDUCATION/TRAINING PROGRAM

## 2018-12-24 PROCEDURE — 2580000003 HC RX 258: Performed by: STUDENT IN AN ORGANIZED HEALTH CARE EDUCATION/TRAINING PROGRAM

## 2018-12-24 PROCEDURE — 82947 ASSAY GLUCOSE BLOOD QUANT: CPT

## 2018-12-24 PROCEDURE — 94640 AIRWAY INHALATION TREATMENT: CPT

## 2018-12-24 PROCEDURE — 36415 COLL VENOUS BLD VENIPUNCTURE: CPT

## 2018-12-24 PROCEDURE — 94762 N-INVAS EAR/PLS OXIMTRY CONT: CPT

## 2018-12-24 PROCEDURE — 6370000000 HC RX 637 (ALT 250 FOR IP): Performed by: HOSPITALIST

## 2018-12-24 PROCEDURE — 97530 THERAPEUTIC ACTIVITIES: CPT

## 2018-12-24 RX ORDER — LACTOBACILLUS RHAMNOSUS GG 10B CELL
1 CAPSULE ORAL
Qty: 15 CAPSULE | Refills: 0 | Status: SHIPPED | OUTPATIENT
Start: 2018-12-25 | End: 2019-01-09

## 2018-12-24 RX ADMIN — ASPIRIN 81 MG: 81 TABLET ORAL at 10:36

## 2018-12-24 RX ADMIN — Medication 10 ML: at 10:39

## 2018-12-24 RX ADMIN — METRONIDAZOLE 500 MG: 500 INJECTION, SOLUTION INTRAVENOUS at 21:41

## 2018-12-24 RX ADMIN — INSULIN LISPRO 9 UNITS: 100 INJECTION, SOLUTION INTRAVENOUS; SUBCUTANEOUS at 17:39

## 2018-12-24 RX ADMIN — VANCOMYCIN HYDROCHLORIDE 250 MG: 250 CAPSULE ORAL at 21:41

## 2018-12-24 RX ADMIN — INSULIN GLARGINE 15 UNITS: 100 INJECTION, SOLUTION SUBCUTANEOUS at 21:41

## 2018-12-24 RX ADMIN — IPRATROPIUM BROMIDE AND ALBUTEROL SULFATE 1 AMPULE: .5; 3 SOLUTION RESPIRATORY (INHALATION) at 20:27

## 2018-12-24 RX ADMIN — METRONIDAZOLE 500 MG: 500 INJECTION, SOLUTION INTRAVENOUS at 03:41

## 2018-12-24 RX ADMIN — MOMETASONE FUROATE AND FORMOTEROL FUMARATE DIHYDRATE 2 PUFF: 100; 5 AEROSOL RESPIRATORY (INHALATION) at 20:27

## 2018-12-24 RX ADMIN — ESCITALOPRAM OXALATE 10 MG: 10 TABLET ORAL at 10:36

## 2018-12-24 RX ADMIN — METRONIDAZOLE 500 MG: 500 INJECTION, SOLUTION INTRAVENOUS at 12:59

## 2018-12-24 RX ADMIN — VANCOMYCIN HYDROCHLORIDE 250 MG: 250 CAPSULE ORAL at 17:39

## 2018-12-24 RX ADMIN — TAMSULOSIN HYDROCHLORIDE 0.4 MG: 0.4 CAPSULE ORAL at 10:36

## 2018-12-24 RX ADMIN — INSULIN LISPRO 9 UNITS: 100 INJECTION, SOLUTION INTRAVENOUS; SUBCUTANEOUS at 12:59

## 2018-12-24 RX ADMIN — VANCOMYCIN HYDROCHLORIDE 250 MG: 250 CAPSULE ORAL at 12:59

## 2018-12-24 RX ADMIN — ERGOCALCIFEROL 50000 UNITS: 1.25 CAPSULE ORAL at 10:36

## 2018-12-24 RX ADMIN — Medication 1 CAPSULE: at 10:36

## 2018-12-24 RX ADMIN — ACETAMINOPHEN 650 MG: 325 TABLET ORAL at 00:36

## 2018-12-24 RX ADMIN — IPRATROPIUM BROMIDE AND ALBUTEROL SULFATE 1 AMPULE: .5; 3 SOLUTION RESPIRATORY (INHALATION) at 15:47

## 2018-12-24 RX ADMIN — IPRATROPIUM BROMIDE AND ALBUTEROL SULFATE 1 AMPULE: .5; 3 SOLUTION RESPIRATORY (INHALATION) at 07:21

## 2018-12-24 RX ADMIN — VANCOMYCIN HYDROCHLORIDE 250 MG: 250 CAPSULE ORAL at 10:36

## 2018-12-24 RX ADMIN — INSULIN LISPRO 3 UNITS: 100 INJECTION, SOLUTION INTRAVENOUS; SUBCUTANEOUS at 21:41

## 2018-12-24 RX ADMIN — MOMETASONE FUROATE AND FORMOTEROL FUMARATE DIHYDRATE 2 PUFF: 100; 5 AEROSOL RESPIRATORY (INHALATION) at 07:21

## 2018-12-24 RX ADMIN — Medication 10 ML: at 21:41

## 2018-12-24 RX ADMIN — IPRATROPIUM BROMIDE AND ALBUTEROL SULFATE 1 AMPULE: .5; 3 SOLUTION RESPIRATORY (INHALATION) at 12:31

## 2018-12-24 RX ADMIN — ACETAMINOPHEN 650 MG: 325 TABLET ORAL at 17:47

## 2018-12-24 RX ADMIN — ENOXAPARIN SODIUM 40 MG: 40 INJECTION SUBCUTANEOUS at 10:36

## 2018-12-24 ASSESSMENT — ENCOUNTER SYMPTOMS
ABDOMINAL PAIN: 0
EYES NEGATIVE: 1
DIARRHEA: 1
ABDOMINAL DISTENTION: 0
ALLERGIC/IMMUNOLOGIC NEGATIVE: 1
SHORTNESS OF BREATH: 1
RESPIRATORY NEGATIVE: 1

## 2018-12-24 ASSESSMENT — PAIN SCALES - GENERAL
PAINLEVEL_OUTOF10: 6
PAINLEVEL_OUTOF10: 5

## 2018-12-25 ENCOUNTER — HOSPITAL ENCOUNTER (OUTPATIENT)
Dept: MEDSURG UNIT | Age: 78
Discharge: HOME | End: 2019-01-10

## 2018-12-25 VITALS
RESPIRATION RATE: 22 BRPM | TEMPERATURE: 97.6 F | HEIGHT: 68 IN | OXYGEN SATURATION: 94 % | HEART RATE: 88 BPM | BODY MASS INDEX: 22.45 KG/M2 | SYSTOLIC BLOOD PRESSURE: 115 MMHG | DIASTOLIC BLOOD PRESSURE: 66 MMHG | WEIGHT: 148.15 LBS

## 2018-12-25 LAB
ABSOLUTE EOS #: 0.5 K/UL (ref 0–0.44)
ABSOLUTE IMMATURE GRANULOCYTE: 0.16 K/UL (ref 0–0.3)
ABSOLUTE LYMPH #: 1.16 K/UL (ref 1.1–3.7)
ABSOLUTE MONO #: 0.67 K/UL (ref 0.1–1.2)
ANION GAP SERPL CALCULATED.3IONS-SCNC: 10 MMOL/L (ref 9–17)
BASOPHILS # BLD: 1 % (ref 0–2)
BASOPHILS ABSOLUTE: 0.05 K/UL (ref 0–0.2)
BUN BLDV-MCNC: 12 MG/DL (ref 8–23)
BUN/CREAT BLD: ABNORMAL (ref 9–20)
C-REACTIVE PROTEIN: 32.8 MG/L (ref 0–5)
CALCIUM SERPL-MCNC: 8.4 MG/DL (ref 8.6–10.4)
CHLORIDE BLD-SCNC: 100 MMOL/L (ref 98–107)
CO2: 27 MMOL/L (ref 20–31)
CREAT SERPL-MCNC: 0.66 MG/DL (ref 0.7–1.2)
DIFFERENTIAL TYPE: ABNORMAL
EOSINOPHILS RELATIVE PERCENT: 6 % (ref 1–4)
GFR AFRICAN AMERICAN: >60 ML/MIN
GFR NON-AFRICAN AMERICAN: >60 ML/MIN
GFR SERPL CREATININE-BSD FRML MDRD: ABNORMAL ML/MIN/{1.73_M2}
GFR SERPL CREATININE-BSD FRML MDRD: ABNORMAL ML/MIN/{1.73_M2}
GLUCOSE BLD-MCNC: 195 MG/DL (ref 75–110)
GLUCOSE BLD-MCNC: 218 MG/DL (ref 70–99)
GLUCOSE BLD-MCNC: 278 MG/DL (ref 75–110)
HCT VFR BLD CALC: 31.5 % (ref 40.7–50.3)
HEMOGLOBIN: 10.5 G/DL (ref 13–17)
IMMATURE GRANULOCYTES: 2 %
LYMPHOCYTES # BLD: 13 % (ref 24–43)
MCH RBC QN AUTO: 31.6 PG (ref 25.2–33.5)
MCHC RBC AUTO-ENTMCNC: 33.3 G/DL (ref 28.4–34.8)
MCV RBC AUTO: 94.9 FL (ref 82.6–102.9)
MONOCYTES # BLD: 7 % (ref 3–12)
NRBC AUTOMATED: 0 PER 100 WBC
PDW BLD-RTO: 14.6 % (ref 11.8–14.4)
PLATELET # BLD: 266 K/UL (ref 138–453)
PLATELET ESTIMATE: ABNORMAL
PMV BLD AUTO: 9.9 FL (ref 8.1–13.5)
POTASSIUM SERPL-SCNC: 4.8 MMOL/L (ref 3.7–5.3)
RBC # BLD: 3.32 M/UL (ref 4.21–5.77)
RBC # BLD: ABNORMAL 10*6/UL
SEG NEUTROPHILS: 72 % (ref 36–65)
SEGMENTED NEUTROPHILS ABSOLUTE COUNT: 6.63 K/UL (ref 1.5–8.1)
SODIUM BLD-SCNC: 137 MMOL/L (ref 135–144)
WBC # BLD: 9.2 K/UL (ref 3.5–11.3)
WBC # BLD: ABNORMAL 10*3/UL

## 2018-12-25 PROCEDURE — 6370000000 HC RX 637 (ALT 250 FOR IP): Performed by: STUDENT IN AN ORGANIZED HEALTH CARE EDUCATION/TRAINING PROGRAM

## 2018-12-25 PROCEDURE — 2580000003 HC RX 258: Performed by: STUDENT IN AN ORGANIZED HEALTH CARE EDUCATION/TRAINING PROGRAM

## 2018-12-25 PROCEDURE — 2700000000 HC OXYGEN THERAPY PER DAY

## 2018-12-25 PROCEDURE — 80048 BASIC METABOLIC PNL TOTAL CA: CPT

## 2018-12-25 PROCEDURE — 6360000002 HC RX W HCPCS: Performed by: STUDENT IN AN ORGANIZED HEALTH CARE EDUCATION/TRAINING PROGRAM

## 2018-12-25 PROCEDURE — 99233 SBSQ HOSP IP/OBS HIGH 50: CPT | Performed by: INTERNAL MEDICINE

## 2018-12-25 PROCEDURE — 2500000003 HC RX 250 WO HCPCS: Performed by: INTERNAL MEDICINE

## 2018-12-25 PROCEDURE — 85025 COMPLETE CBC W/AUTO DIFF WBC: CPT

## 2018-12-25 PROCEDURE — 94640 AIRWAY INHALATION TREATMENT: CPT

## 2018-12-25 PROCEDURE — 86140 C-REACTIVE PROTEIN: CPT

## 2018-12-25 PROCEDURE — 94762 N-INVAS EAR/PLS OXIMTRY CONT: CPT

## 2018-12-25 PROCEDURE — 6370000000 HC RX 637 (ALT 250 FOR IP): Performed by: INTERNAL MEDICINE

## 2018-12-25 PROCEDURE — 6370000000 HC RX 637 (ALT 250 FOR IP): Performed by: HOSPITALIST

## 2018-12-25 PROCEDURE — 36415 COLL VENOUS BLD VENIPUNCTURE: CPT

## 2018-12-25 RX ORDER — INSULIN GLARGINE 100 [IU]/ML
20 INJECTION, SOLUTION SUBCUTANEOUS NIGHTLY
Status: DISCONTINUED | OUTPATIENT
Start: 2018-12-25 | End: 2018-12-25 | Stop reason: HOSPADM

## 2018-12-25 RX ADMIN — METRONIDAZOLE 500 MG: 500 INJECTION, SOLUTION INTRAVENOUS at 04:04

## 2018-12-25 RX ADMIN — ASPIRIN 81 MG: 81 TABLET ORAL at 07:44

## 2018-12-25 RX ADMIN — VANCOMYCIN HYDROCHLORIDE 250 MG: 250 CAPSULE ORAL at 07:46

## 2018-12-25 RX ADMIN — ESCITALOPRAM OXALATE 10 MG: 10 TABLET ORAL at 07:45

## 2018-12-25 RX ADMIN — INSULIN LISPRO 3 UNITS: 100 INJECTION, SOLUTION INTRAVENOUS; SUBCUTANEOUS at 07:35

## 2018-12-25 RX ADMIN — IPRATROPIUM BROMIDE AND ALBUTEROL SULFATE 1 AMPULE: .5; 3 SOLUTION RESPIRATORY (INHALATION) at 08:51

## 2018-12-25 RX ADMIN — MOMETASONE FUROATE AND FORMOTEROL FUMARATE DIHYDRATE 2 PUFF: 100; 5 AEROSOL RESPIRATORY (INHALATION) at 08:52

## 2018-12-25 RX ADMIN — Medication 1 CAPSULE: at 07:45

## 2018-12-25 RX ADMIN — TAMSULOSIN HYDROCHLORIDE 0.4 MG: 0.4 CAPSULE ORAL at 07:45

## 2018-12-25 RX ADMIN — Medication 10 ML: at 07:46

## 2018-12-25 RX ADMIN — ENOXAPARIN SODIUM 40 MG: 40 INJECTION SUBCUTANEOUS at 07:44

## 2018-12-25 NOTE — PROGRESS NOTES
[facility]  Internal Medicine Residency Program  Inpatient Daily Progress Note  ______________________________________________________________________________    Patient: Chula Elizondo  YOB: 1940   MRN: [id number]    Acct: [de-identified]     Admit date: 12/16/2018  Today's date: 12/25/18  Number of days in the hospital: 9  Expected Discharge Date: 12/21/18    Admitting Diagnosis: C. difficile colitis    Subjective:   Pt seen and Chart reviewed. Saturating on 40L of 30% NC oxygen. Dyspnea subjectively better with formed stool with 5 episodes of stool since 24 hours. Review of Systems   Constitutional: Negative for activity change, appetite change, chills and fever. HENT: Negative for sinus pressure, sneezing, sore throat, tinnitus and trouble swallowing.    Eyes: Negative for discharge and itching. Respiratory: Negative for apnea, choking and chest tightness.    Cardiovascular: Negative for chest pain and leg swelling. Gastrointestinal: Positive for diarrhea and nausea. Negative for abdominal distention, abdominal pain, anal bleeding, blood in stool, constipation, rectal pain and vomiting. Endocrine: Negative for cold intolerance and heat intolerance. Genitourinary: Negative for difficulty urinating, dysuria and enuresis. Musculoskeletal: Negative for arthralgias, back pain and gait problem. Skin: Negative for color change and pallor. Allergic/Immunologic: Negative for environmental allergies and food allergies. Neurological: Negative for dizziness and facial asymmetry. Hematological: Negative for adenopathy. Psychiatric/Behavioral: Negative for agitation, behavioral problems, confusion and decreased concentration.        Objective:   Vital Sign:  /73   Pulse 80   Temp 98.2 °F (36.8 °C)   Resp 18   Ht 5' 8.11\" (1.73 m)   Wt 148 lb 2.4 oz (67.2 kg)   SpO2 92%   BMI 22.45 kg/m²       Physical Exam:  CONSTITUTIONAL: Bam Both,

## 2018-12-25 NOTE — PLAN OF CARE
Problem: Falls - Risk of:  Goal: Will remain free from falls  Will remain free from falls   Outcome: Met This Shift  Patient assessed for fall risk and fall precautions initiated as needed. Patient instructed about safety devices and allowed to make decisions related to safey. Environment kept free of clutter and adequate lighting provided. Bed in lowest position with brakes locked. Call light in reach. Patient ID band correct and in place. Patient transferred with appropriate methods. Patient free of falls during shift. Will continue to monitor. Kendell Valencia RN
Problem: Falls - Risk of:  Goal: Will remain free from falls  Will remain free from falls   Outcome: Ongoing
Problem: Falls - Risk of:  Goal: Will remain free from falls  Will remain free from falls   Outcome: Ongoing  Pt remains free of falls at this time. Bed locked in lowest position, siderails x2, call light in reach. Non-skid footwear applied. Encouraged pt to call for assistance as needed for safety. Falling star posted outside of room. Will continue to monitor.
Problem: RESPIRATORY  Intervention: RESPIRATORY THERAPY  BRONCHOSPASM/BRONCHOCONSTRICTION     [x]         IMPROVE AERATION/BREATH SOUNDS  [x]   ADMINISTER BRONCHODILATOR THERAPY AS APPROPRIATE  [x]   ASSESS BREATH SOUNDS  [x]   IMPLEMENT AEROSOL/MDI PROTOCOL  [x]   PATIENT EDUCATION AS NEEDED    PROVIDE ADEQUATE OXYGENATION WITH ACCEPTABLE SP02/ABG'S    [x]  IDENTIFY APPROPRIATE OXYGEN THERAPY  [x]   MONITOR SP02/ABG'S AS NEEDED   [x]   PATIENT EDUCATION AS NEEDED
Problem: Risk for Impaired Skin Integrity  Goal: Tissue integrity - skin and mucous membranes  Structural intactness and normal physiological function of skin and  mucous membranes. Intervention: SKIN ASSESSMENT  Skin assessment complete. Patient repositioned with pillow support & checked for incontinence Q2H. Covidien dry pad in place. Sandi care PRN with incontinence cleanser. Problem: Falls - Risk of:  Goal: Will remain free from falls  Will remain free from falls   Outcome: Ongoing  No falls this shift. Precautions include posted falling star sign, nonskid footwear on, bed locked in lowest position, siderails up x2, table and call light within reach. Bed alarm on. Patient calls out appropriately for assistance. Hourly rounding to assess for needs. Problem: Gas Exchange - Impaired:  Goal: Levels of oxygenation will improve  Levels of oxygenation will improve   Outcome: Ongoing  Pt monitored on continuous pulse oximetry. Pt requiring high flow nasal cannula. Refusing overnight bi-pap. HOB elevated to promote optimal lung expansion. Coughing and deep breathing encouraged.
Problem: Risk for Impaired Skin Integrity  Goal: Tissue integrity - skin and mucous membranes  Structural intactness and normal physiological function of skin and  mucous membranes. Outcome: Ongoing  Pts skin maintains structural intactness and physiologic function. Pt able to reposition independently in bed and encouraging pt to turn every 2 hours. Heels elevated off bed. Linens remain clean and dry. Will continue to monitor.
[]  Copius, Viscious Yellow/ Secretions   CXR as reported by physician []  clear  []  Unavailable []  Infiltrates and/or consolidation  []  Unavailable []  Mucus Plugging and or lobar consolidation  []  Unavailable   Cough []  Strong, productive cough []  Weak productive cough []  No cough or weak non-productive cough   [facility]  8:34 AM                            FEMALE                                  MALE                            FEV1 Predicted Normal Values                        FEV1 Predicted Normal Values          Age                                     Height in Feet and Inches       Age                                     Height in Feet and Inches       4' 11\" 5' 1\" 5' 3\" 5' 5\" 5' 7\" 5' 9\" 5' 11\" 6' 1\"  4' 11\" 5' 1\" 5' 3\" 5' 5\" 5' 7\" 5' 9\" 5' 11\" 6' 1\"   42 - 45 2.49 2.66 2.84 3.03 3.22 3.42 3.62 3.83 42 - 45 2.82 3.03 3.26 3.49 3.72 3.96 4.22 4.47   46 - 49 2.40 2.57 2.76 2.94 3.14 3.33 3.54 3.75 46 - 49 2.70 2.92 3.14 3.37 3.61 3.85 4.10 4.36   50 - 53 2.31 2.48 2.66 2.85 3.04 3.24 3.45 3.66 50 - 53 2.58 2.80 3.02 3.25 3.49 3.73 3.98 4.24   54 - 57 2.21 2.38 2.57 2.75 2.95 3.14 3.35 3.56 54 - 57 2.46 2.67 2.89 3.12 3.36 3.60 3.85 4.11   58 - 61 2.10 2.28 2.46 2.65 2.84 3.04 3.24 3.45 58 - 61 2.32 2.54 2.76 2.99 3.23 3.47 3.72 3.98   62 - 65 1.99 2.17 2.35 2.54 2.73 2.93 3.13 3.34 62 - 65 2.19 2.40 2.62 2.85 3.09 3.33 3.58 3.84   66 - 69 1.88 2.05 2.23 2.42 2.61 2.81 3.02 3.23 66 - 69 2.04 2.26 2.48 2.71 2.95 3.19 3.44 3.70   70+ 1.82 1.99 2.17 2.36 2.55 2.75 2.95 3.16 70+ 1.97 2.19 2.41 2.64 2.87 3.12 3.37 3.62             Predicted Peak Expiratory Flow Rate                                       Height (in)  Female       Height (in) Male           Age 64 62 64 63 57 71 78 74 Age            21 [phone] 584 550  33 07 51 73 38 21 80 44 01   25 337 352 366 381 396 411 426 441 25 447 476 [phone] [phone]   30 329 344 359 374 389 404 419 434 30 437 466 494 [phone]

## 2018-12-25 NOTE — CARE COORDINATION
Discharge [facility] Case Management Department  Written by: Bernabe Lawrence RN    Patient Name: Susan Leonard  Attending Provider: Ritika Garcia MD  Admit Date: 2018  3:23 PM  MRN: [id number]  Account: [de-identified]                     : 1940  Discharge Date:  18       Disposition: Stacey Lobato RN

## 2018-12-27 ENCOUNTER — TELEPHONE (OUTPATIENT)
Dept: INFECTIOUS DISEASES | Age: 78
End: 2018-12-27

## 2018-12-27 LAB
ANION GAP SERPL CALCULATED.3IONS-SCNC: 12 MMOL/L (ref 9–17)
BUN BLDV-MCNC: 13 MG/DL (ref 8–23)
BUN/CREAT BLD: 21 (ref 9–20)
CALCIUM SERPL-MCNC: 8.2 MG/DL (ref 8.6–10.4)
CHLORIDE BLD-SCNC: 96 MMOL/L (ref 98–107)
CO2: 25 MMOL/L (ref 20–31)
CREAT SERPL-MCNC: 0.62 MG/DL (ref 0.7–1.2)
GFR AFRICAN AMERICAN: >60 ML/MIN
GFR NON-AFRICAN AMERICAN: >60 ML/MIN
GFR SERPL CREATININE-BSD FRML MDRD: ABNORMAL ML/MIN/{1.73_M2}
GFR SERPL CREATININE-BSD FRML MDRD: ABNORMAL ML/MIN/{1.73_M2}
GLUCOSE BLD-MCNC: 381 MG/DL (ref 70–99)
HCT VFR BLD CALC: 32.2 % (ref 41–53)
HEMOGLOBIN: 10.9 G/DL (ref 13.5–17.5)
MCH RBC QN AUTO: 32.2 PG (ref 26–34)
MCHC RBC AUTO-ENTMCNC: 34 G/DL (ref 31–37)
MCV RBC AUTO: 94.7 FL (ref 80–100)
NRBC AUTOMATED: ABNORMAL PER 100 WBC
PDW BLD-RTO: 15.6 % (ref 11.5–14.5)
PLATELET # BLD: 334 K/UL (ref 130–400)
PMV BLD AUTO: 7.6 FL (ref 6–12)
POTASSIUM SERPL-SCNC: 4.3 MMOL/L (ref 3.7–5.3)
RBC # BLD: 3.4 M/UL (ref 4.5–5.9)
SODIUM BLD-SCNC: 133 MMOL/L (ref 135–144)
WBC # BLD: 11.6 K/UL (ref 3.5–11)

## 2018-12-27 PROCEDURE — 80048 BASIC METABOLIC PNL TOTAL CA: CPT

## 2018-12-27 PROCEDURE — 85027 COMPLETE CBC AUTOMATED: CPT

## 2019-01-22 ENCOUNTER — APPOINTMENT (OUTPATIENT)
Dept: CT IMAGING | Age: 79
DRG: 190 | End: 2019-01-22
Payer: MEDICARE

## 2019-01-22 ENCOUNTER — APPOINTMENT (OUTPATIENT)
Dept: GENERAL RADIOLOGY | Age: 79
DRG: 190 | End: 2019-01-22
Payer: MEDICARE

## 2019-01-22 ENCOUNTER — HOSPITAL ENCOUNTER (INPATIENT)
Age: 79
LOS: 10 days | Discharge: NURSING, REHAB OR LONG-TERM CARE | DRG: 190 | End: 2019-02-01
Attending: EMERGENCY MEDICINE | Admitting: INTERNAL MEDICINE
Payer: MEDICARE

## 2019-01-22 DIAGNOSIS — R09.02 HYPOXIA: Primary | ICD-10-CM

## 2019-01-22 DIAGNOSIS — R06.00 DYSPNEA, UNSPECIFIED TYPE: ICD-10-CM

## 2019-01-22 LAB
ABSOLUTE EOS #: 0.26 K/UL (ref 0–0.44)
ABSOLUTE IMMATURE GRANULOCYTE: 0.05 K/UL (ref 0–0.3)
ABSOLUTE LYMPH #: 1.35 K/UL (ref 1.1–3.7)
ABSOLUTE MONO #: 0.79 K/UL (ref 0.1–1.2)
ANION GAP SERPL CALCULATED.3IONS-SCNC: 14 MMOL/L (ref 9–17)
BASOPHILS # BLD: 1 % (ref 0–2)
BASOPHILS ABSOLUTE: 0.09 K/UL (ref 0–0.2)
BNP INTERPRETATION: ABNORMAL
BUN BLDV-MCNC: 14 MG/DL (ref 8–23)
BUN/CREAT BLD: ABNORMAL (ref 9–20)
CALCIUM SERPL-MCNC: 8.8 MG/DL (ref 8.6–10.4)
CHLORIDE BLD-SCNC: 98 MMOL/L (ref 98–107)
CO2: 27 MMOL/L (ref 20–31)
CREAT SERPL-MCNC: 0.7 MG/DL (ref 0.7–1.2)
DIFFERENTIAL TYPE: ABNORMAL
EKG ATRIAL RATE: 87 BPM
EKG P AXIS: 62 DEGREES
EKG P-R INTERVAL: 110 MS
EKG Q-T INTERVAL: 352 MS
EKG QRS DURATION: 74 MS
EKG QTC CALCULATION (BAZETT): 423 MS
EKG R AXIS: 53 DEGREES
EKG T AXIS: -8 DEGREES
EKG VENTRICULAR RATE: 87 BPM
EOSINOPHILS RELATIVE PERCENT: 2 % (ref 1–4)
GFR AFRICAN AMERICAN: >60 ML/MIN
GFR NON-AFRICAN AMERICAN: >60 ML/MIN
GFR SERPL CREATININE-BSD FRML MDRD: ABNORMAL ML/MIN/{1.73_M2}
GFR SERPL CREATININE-BSD FRML MDRD: ABNORMAL ML/MIN/{1.73_M2}
GLUCOSE BLD-MCNC: 203 MG/DL (ref 70–99)
HCT VFR BLD CALC: 33 % (ref 40.7–50.3)
HEMOGLOBIN: 10.4 G/DL (ref 13–17)
IMMATURE GRANULOCYTES: 0 %
LYMPHOCYTES # BLD: 11 % (ref 24–43)
MCH RBC QN AUTO: 31.4 PG (ref 25.2–33.5)
MCHC RBC AUTO-ENTMCNC: 31.5 G/DL (ref 28.4–34.8)
MCV RBC AUTO: 99.7 FL (ref 82.6–102.9)
MONOCYTES # BLD: 6 % (ref 3–12)
NRBC AUTOMATED: 0 PER 100 WBC
PDW BLD-RTO: 14.4 % (ref 11.8–14.4)
PLATELET # BLD: 259 K/UL (ref 138–453)
PLATELET ESTIMATE: ABNORMAL
PMV BLD AUTO: 10.2 FL (ref 8.1–13.5)
POTASSIUM SERPL-SCNC: 4.1 MMOL/L (ref 3.7–5.3)
PRO-BNP: 812 PG/ML
RBC # BLD: 3.31 M/UL (ref 4.21–5.77)
RBC # BLD: ABNORMAL 10*6/UL
SEG NEUTROPHILS: 80 % (ref 36–65)
SEGMENTED NEUTROPHILS ABSOLUTE COUNT: 9.96 K/UL (ref 1.5–8.1)
SODIUM BLD-SCNC: 139 MMOL/L (ref 135–144)
TROPONIN INTERP: NORMAL
TROPONIN INTERP: NORMAL
TROPONIN T: NORMAL NG/ML
TROPONIN T: NORMAL NG/ML
TROPONIN, HIGH SENSITIVITY: 13 NG/L (ref 0–22)
TROPONIN, HIGH SENSITIVITY: 16 NG/L (ref 0–22)
WBC # BLD: 12.5 K/UL (ref 3.5–11.3)
WBC # BLD: ABNORMAL 10*3/UL

## 2019-01-22 PROCEDURE — 93005 ELECTROCARDIOGRAM TRACING: CPT

## 2019-01-22 PROCEDURE — 6360000002 HC RX W HCPCS: Performed by: EMERGENCY MEDICINE

## 2019-01-22 PROCEDURE — 2700000000 HC OXYGEN THERAPY PER DAY

## 2019-01-22 PROCEDURE — 84484 ASSAY OF TROPONIN QUANT: CPT

## 2019-01-22 PROCEDURE — 99285 EMERGENCY DEPT VISIT HI MDM: CPT

## 2019-01-22 PROCEDURE — 94660 CPAP INITIATION&MGMT: CPT

## 2019-01-22 PROCEDURE — 96372 THER/PROPH/DIAG INJ SC/IM: CPT

## 2019-01-22 PROCEDURE — 85025 COMPLETE CBC W/AUTO DIFF WBC: CPT

## 2019-01-22 PROCEDURE — 94762 N-INVAS EAR/PLS OXIMTRY CONT: CPT

## 2019-01-22 PROCEDURE — 80048 BASIC METABOLIC PNL TOTAL CA: CPT

## 2019-01-22 PROCEDURE — 71260 CT THORAX DX C+: CPT

## 2019-01-22 PROCEDURE — 83880 ASSAY OF NATRIURETIC PEPTIDE: CPT

## 2019-01-22 PROCEDURE — 96374 THER/PROPH/DIAG INJ IV PUSH: CPT

## 2019-01-22 PROCEDURE — 2060000000 HC ICU INTERMEDIATE R&B

## 2019-01-22 PROCEDURE — 6360000004 HC RX CONTRAST MEDICATION: Performed by: EMERGENCY MEDICINE

## 2019-01-22 PROCEDURE — 71045 X-RAY EXAM CHEST 1 VIEW: CPT

## 2019-01-22 RX ORDER — METHYLPREDNISOLONE SODIUM SUCCINATE 125 MG/2ML
125 INJECTION, POWDER, LYOPHILIZED, FOR SOLUTION INTRAMUSCULAR; INTRAVENOUS ONCE
Status: COMPLETED | OUTPATIENT
Start: 2019-01-22 | End: 2019-01-22

## 2019-01-22 RX ADMIN — IOVERSOL 75 ML: 741 INJECTION INTRA-ARTERIAL; INTRAVENOUS at 16:50

## 2019-01-22 RX ADMIN — METHYLPREDNISOLONE SODIUM SUCCINATE 125 MG: 125 INJECTION, POWDER, FOR SOLUTION INTRAMUSCULAR; INTRAVENOUS at 18:41

## 2019-01-22 ASSESSMENT — ENCOUNTER SYMPTOMS
NAUSEA: 0
DIARRHEA: 0
CONSTIPATION: 0
COUGH: 1
COLOR CHANGE: 0
ABDOMINAL PAIN: 0
SHORTNESS OF BREATH: 1
EYE REDNESS: 0
VOMITING: 0
TROUBLE SWALLOWING: 0

## 2019-01-23 PROBLEM — J96.90 RESPIRATORY FAILURE (HCC): Status: ACTIVE | Noted: 2019-01-23

## 2019-01-23 LAB
ABSOLUTE EOS #: 0 K/UL (ref 0–0.4)
ABSOLUTE IMMATURE GRANULOCYTE: 0 K/UL (ref 0–0.3)
ABSOLUTE LYMPH #: 0.86 K/UL (ref 1–4.8)
ABSOLUTE MONO #: 0.21 K/UL (ref 0.1–0.8)
ANION GAP SERPL CALCULATED.3IONS-SCNC: 12 MMOL/L (ref 9–17)
BASOPHILS # BLD: 0 % (ref 0–2)
BASOPHILS ABSOLUTE: 0 K/UL (ref 0–0.2)
BUN BLDV-MCNC: 17 MG/DL (ref 8–23)
BUN/CREAT BLD: ABNORMAL (ref 9–20)
CALCIUM SERPL-MCNC: 8.5 MG/DL (ref 8.6–10.4)
CHLORIDE BLD-SCNC: 97 MMOL/L (ref 98–107)
CO2: 26 MMOL/L (ref 20–31)
CREAT SERPL-MCNC: 0.67 MG/DL (ref 0.7–1.2)
DIFFERENTIAL TYPE: ABNORMAL
EOSINOPHILS RELATIVE PERCENT: 0 % (ref 1–4)
GFR AFRICAN AMERICAN: >60 ML/MIN
GFR NON-AFRICAN AMERICAN: >60 ML/MIN
GFR SERPL CREATININE-BSD FRML MDRD: ABNORMAL ML/MIN/{1.73_M2}
GFR SERPL CREATININE-BSD FRML MDRD: ABNORMAL ML/MIN/{1.73_M2}
GLUCOSE BLD-MCNC: 139 MG/DL (ref 75–110)
GLUCOSE BLD-MCNC: 204 MG/DL (ref 75–110)
GLUCOSE BLD-MCNC: 256 MG/DL (ref 75–110)
GLUCOSE BLD-MCNC: 323 MG/DL (ref 75–110)
GLUCOSE BLD-MCNC: 334 MG/DL (ref 70–99)
HCT VFR BLD CALC: 31.3 % (ref 40.7–50.3)
HEMOGLOBIN: 10.1 G/DL (ref 13–17)
IMMATURE GRANULOCYTES: 0 %
LYMPHOCYTES # BLD: 8 % (ref 24–44)
MCH RBC QN AUTO: 31.8 PG (ref 25.2–33.5)
MCHC RBC AUTO-ENTMCNC: 32.3 G/DL (ref 28.4–34.8)
MCV RBC AUTO: 98.4 FL (ref 82.6–102.9)
MONOCYTES # BLD: 2 % (ref 1–7)
MORPHOLOGY: NORMAL
NRBC AUTOMATED: 0 PER 100 WBC
PDW BLD-RTO: 14.1 % (ref 11.8–14.4)
PLATELET # BLD: 249 K/UL (ref 138–453)
PLATELET ESTIMATE: ABNORMAL
PMV BLD AUTO: 10 FL (ref 8.1–13.5)
POTASSIUM SERPL-SCNC: 4.3 MMOL/L (ref 3.7–5.3)
RBC # BLD: 3.18 M/UL (ref 4.21–5.77)
RBC # BLD: ABNORMAL 10*6/UL
SEG NEUTROPHILS: 90 % (ref 36–66)
SEGMENTED NEUTROPHILS ABSOLUTE COUNT: 9.63 K/UL (ref 1.8–7.7)
SODIUM BLD-SCNC: 135 MMOL/L (ref 135–144)
WBC # BLD: 10.7 K/UL (ref 3.5–11.3)
WBC # BLD: ABNORMAL 10*3/UL

## 2019-01-23 PROCEDURE — 99223 1ST HOSP IP/OBS HIGH 75: CPT | Performed by: INTERNAL MEDICINE

## 2019-01-23 PROCEDURE — 6360000002 HC RX W HCPCS: Performed by: STUDENT IN AN ORGANIZED HEALTH CARE EDUCATION/TRAINING PROGRAM

## 2019-01-23 PROCEDURE — 94640 AIRWAY INHALATION TREATMENT: CPT

## 2019-01-23 PROCEDURE — 2060000000 HC ICU INTERMEDIATE R&B

## 2019-01-23 PROCEDURE — 6370000000 HC RX 637 (ALT 250 FOR IP): Performed by: STUDENT IN AN ORGANIZED HEALTH CARE EDUCATION/TRAINING PROGRAM

## 2019-01-23 PROCEDURE — 99222 1ST HOSP IP/OBS MODERATE 55: CPT | Performed by: INTERNAL MEDICINE

## 2019-01-23 PROCEDURE — 82947 ASSAY GLUCOSE BLOOD QUANT: CPT

## 2019-01-23 PROCEDURE — 2580000003 HC RX 258: Performed by: STUDENT IN AN ORGANIZED HEALTH CARE EDUCATION/TRAINING PROGRAM

## 2019-01-23 PROCEDURE — 80048 BASIC METABOLIC PNL TOTAL CA: CPT

## 2019-01-23 PROCEDURE — 94660 CPAP INITIATION&MGMT: CPT

## 2019-01-23 PROCEDURE — 94762 N-INVAS EAR/PLS OXIMTRY CONT: CPT

## 2019-01-23 PROCEDURE — 96372 THER/PROPH/DIAG INJ SC/IM: CPT

## 2019-01-23 PROCEDURE — 2700000000 HC OXYGEN THERAPY PER DAY

## 2019-01-23 PROCEDURE — 85025 COMPLETE CBC W/AUTO DIFF WBC: CPT

## 2019-01-23 PROCEDURE — 96376 TX/PRO/DX INJ SAME DRUG ADON: CPT

## 2019-01-23 RX ORDER — LEVOFLOXACIN 500 MG/1
500 TABLET, FILM COATED ORAL DAILY
Status: DISCONTINUED | OUTPATIENT
Start: 2019-01-23 | End: 2019-01-26

## 2019-01-23 RX ORDER — ONDANSETRON 2 MG/ML
4 INJECTION INTRAMUSCULAR; INTRAVENOUS EVERY 6 HOURS PRN
Status: DISCONTINUED | OUTPATIENT
Start: 2019-01-23 | End: 2019-02-01 | Stop reason: HOSPADM

## 2019-01-23 RX ORDER — METHYLPREDNISOLONE SODIUM SUCCINATE 125 MG/2ML
40 INJECTION, POWDER, LYOPHILIZED, FOR SOLUTION INTRAMUSCULAR; INTRAVENOUS EVERY 12 HOURS
Status: DISCONTINUED | OUTPATIENT
Start: 2019-01-23 | End: 2019-01-24

## 2019-01-23 RX ORDER — BUDESONIDE AND FORMOTEROL FUMARATE DIHYDRATE 80; 4.5 UG/1; UG/1
2 AEROSOL RESPIRATORY (INHALATION) 2 TIMES DAILY
Status: DISCONTINUED | OUTPATIENT
Start: 2019-01-23 | End: 2019-01-23

## 2019-01-23 RX ORDER — FAMOTIDINE 20 MG/1
20 TABLET, FILM COATED ORAL 2 TIMES DAILY
Status: DISCONTINUED | OUTPATIENT
Start: 2019-01-23 | End: 2019-02-01 | Stop reason: HOSPADM

## 2019-01-23 RX ORDER — METHYLPREDNISOLONE SODIUM SUCCINATE 125 MG/2ML
40 INJECTION, POWDER, LYOPHILIZED, FOR SOLUTION INTRAMUSCULAR; INTRAVENOUS EVERY 8 HOURS
Status: DISCONTINUED | OUTPATIENT
Start: 2019-01-23 | End: 2019-01-23

## 2019-01-23 RX ORDER — ESCITALOPRAM OXALATE 10 MG/1
10 TABLET ORAL DAILY
Status: DISCONTINUED | OUTPATIENT
Start: 2019-01-23 | End: 2019-02-01 | Stop reason: HOSPADM

## 2019-01-23 RX ORDER — MAGNESIUM SULFATE 1 G/100ML
1 INJECTION INTRAVENOUS PRN
Status: DISCONTINUED | OUTPATIENT
Start: 2019-01-23 | End: 2019-02-01 | Stop reason: HOSPADM

## 2019-01-23 RX ORDER — INSULIN GLARGINE 100 [IU]/ML
20 INJECTION, SOLUTION SUBCUTANEOUS DAILY
Status: DISCONTINUED | OUTPATIENT
Start: 2019-01-23 | End: 2019-01-24

## 2019-01-23 RX ORDER — NICOTINE POLACRILEX 4 MG
15 LOZENGE BUCCAL PRN
Status: DISCONTINUED | OUTPATIENT
Start: 2019-01-23 | End: 2019-02-01 | Stop reason: HOSPADM

## 2019-01-23 RX ORDER — ACETAMINOPHEN 325 MG/1
650 TABLET ORAL EVERY 6 HOURS PRN
Status: DISCONTINUED | OUTPATIENT
Start: 2019-01-23 | End: 2019-02-01 | Stop reason: HOSPADM

## 2019-01-23 RX ORDER — POTASSIUM CHLORIDE 7.45 MG/ML
10 INJECTION INTRAVENOUS PRN
Status: DISCONTINUED | OUTPATIENT
Start: 2019-01-23 | End: 2019-02-01 | Stop reason: HOSPADM

## 2019-01-23 RX ORDER — DEXTROSE MONOHYDRATE 50 MG/ML
100 INJECTION, SOLUTION INTRAVENOUS PRN
Status: DISCONTINUED | OUTPATIENT
Start: 2019-01-23 | End: 2019-02-01 | Stop reason: HOSPADM

## 2019-01-23 RX ORDER — POTASSIUM CHLORIDE 20 MEQ/1
40 TABLET, EXTENDED RELEASE ORAL PRN
Status: DISCONTINUED | OUTPATIENT
Start: 2019-01-23 | End: 2019-02-01 | Stop reason: HOSPADM

## 2019-01-23 RX ORDER — INSULIN GLARGINE 100 [IU]/ML
20 INJECTION, SOLUTION SUBCUTANEOUS NIGHTLY
Status: DISCONTINUED | OUTPATIENT
Start: 2019-01-23 | End: 2019-01-26

## 2019-01-23 RX ORDER — SODIUM CHLORIDE 0.9 % (FLUSH) 0.9 %
10 SYRINGE (ML) INJECTION PRN
Status: DISCONTINUED | OUTPATIENT
Start: 2019-01-23 | End: 2019-02-01 | Stop reason: HOSPADM

## 2019-01-23 RX ORDER — SODIUM CHLORIDE 0.9 % (FLUSH) 0.9 %
10 SYRINGE (ML) INJECTION EVERY 12 HOURS SCHEDULED
Status: DISCONTINUED | OUTPATIENT
Start: 2019-01-23 | End: 2019-02-01 | Stop reason: HOSPADM

## 2019-01-23 RX ORDER — TAMSULOSIN HYDROCHLORIDE 0.4 MG/1
0.4 CAPSULE ORAL DAILY
Status: DISCONTINUED | OUTPATIENT
Start: 2019-01-23 | End: 2019-02-01 | Stop reason: HOSPADM

## 2019-01-23 RX ORDER — ASPIRIN 81 MG/1
81 TABLET, CHEWABLE ORAL DAILY
Status: DISCONTINUED | OUTPATIENT
Start: 2019-01-23 | End: 2019-02-01 | Stop reason: HOSPADM

## 2019-01-23 RX ORDER — POTASSIUM CHLORIDE 20MEQ/15ML
40 LIQUID (ML) ORAL PRN
Status: DISCONTINUED | OUTPATIENT
Start: 2019-01-23 | End: 2019-02-01 | Stop reason: HOSPADM

## 2019-01-23 RX ORDER — DEXTROSE MONOHYDRATE 25 G/50ML
12.5 INJECTION, SOLUTION INTRAVENOUS PRN
Status: DISCONTINUED | OUTPATIENT
Start: 2019-01-23 | End: 2019-02-01 | Stop reason: HOSPADM

## 2019-01-23 RX ORDER — ALBUTEROL SULFATE 90 UG/1
2 AEROSOL, METERED RESPIRATORY (INHALATION) EVERY 6 HOURS PRN
Status: DISCONTINUED | OUTPATIENT
Start: 2019-01-23 | End: 2019-02-01 | Stop reason: HOSPADM

## 2019-01-23 RX ADMIN — METHYLPREDNISOLONE SODIUM SUCCINATE 40 MG: 125 INJECTION, POWDER, FOR SOLUTION INTRAMUSCULAR; INTRAVENOUS at 13:49

## 2019-01-23 RX ADMIN — ASPIRIN 81 MG 81 MG: 81 TABLET ORAL at 17:46

## 2019-01-23 RX ADMIN — Medication 10 ML: at 21:35

## 2019-01-23 RX ADMIN — ENOXAPARIN SODIUM 40 MG: 40 INJECTION SUBCUTANEOUS at 17:47

## 2019-01-23 RX ADMIN — INSULIN GLARGINE 20 UNITS: 100 INJECTION, SOLUTION SUBCUTANEOUS at 21:35

## 2019-01-23 RX ADMIN — INSULIN LISPRO 4 UNITS: 100 INJECTION, SOLUTION INTRAVENOUS; SUBCUTANEOUS at 13:57

## 2019-01-23 RX ADMIN — INSULIN GLARGINE 20 UNITS: 100 INJECTION, SOLUTION SUBCUTANEOUS at 09:10

## 2019-01-23 RX ADMIN — TAMSULOSIN HYDROCHLORIDE 0.4 MG: 0.4 CAPSULE ORAL at 17:46

## 2019-01-23 RX ADMIN — MOMETASONE FUROATE AND FORMOTEROL FUMARATE DIHYDRATE 2 PUFF: 100; 5 AEROSOL RESPIRATORY (INHALATION) at 19:53

## 2019-01-23 RX ADMIN — ACETAMINOPHEN 650 MG: 325 TABLET ORAL at 13:48

## 2019-01-23 RX ADMIN — LEVOFLOXACIN 500 MG: 500 TABLET, FILM COATED ORAL at 06:21

## 2019-01-23 RX ADMIN — FAMOTIDINE 20 MG: 20 TABLET, FILM COATED ORAL at 21:34

## 2019-01-23 ASSESSMENT — ENCOUNTER SYMPTOMS
NAUSEA: 0
ABDOMINAL PAIN: 0
SHORTNESS OF BREATH: 1
WHEEZING: 0
COUGH: 1
CONSTIPATION: 0
EYES NEGATIVE: 1
ALLERGIC/IMMUNOLOGIC NEGATIVE: 1
DIARRHEA: 0

## 2019-01-23 ASSESSMENT — PAIN SCALES - GENERAL
PAINLEVEL_OUTOF10: 0
PAINLEVEL_OUTOF10: 5
PAINLEVEL_OUTOF10: 0

## 2019-01-24 LAB
GLUCOSE BLD-MCNC: 150 MG/DL (ref 75–110)
GLUCOSE BLD-MCNC: 282 MG/DL (ref 75–110)
GLUCOSE BLD-MCNC: 71 MG/DL (ref 75–110)
GLUCOSE BLD-MCNC: 80 MG/DL (ref 75–110)

## 2019-01-24 PROCEDURE — 94660 CPAP INITIATION&MGMT: CPT

## 2019-01-24 PROCEDURE — 6360000002 HC RX W HCPCS: Performed by: STUDENT IN AN ORGANIZED HEALTH CARE EDUCATION/TRAINING PROGRAM

## 2019-01-24 PROCEDURE — 2700000000 HC OXYGEN THERAPY PER DAY

## 2019-01-24 PROCEDURE — 94762 N-INVAS EAR/PLS OXIMTRY CONT: CPT

## 2019-01-24 PROCEDURE — 82947 ASSAY GLUCOSE BLOOD QUANT: CPT

## 2019-01-24 PROCEDURE — 6370000000 HC RX 637 (ALT 250 FOR IP): Performed by: STUDENT IN AN ORGANIZED HEALTH CARE EDUCATION/TRAINING PROGRAM

## 2019-01-24 PROCEDURE — 99232 SBSQ HOSP IP/OBS MODERATE 35: CPT | Performed by: INTERNAL MEDICINE

## 2019-01-24 PROCEDURE — 94640 AIRWAY INHALATION TREATMENT: CPT

## 2019-01-24 PROCEDURE — 99221 1ST HOSP IP/OBS SF/LOW 40: CPT | Performed by: INTERNAL MEDICINE

## 2019-01-24 PROCEDURE — 2060000000 HC ICU INTERMEDIATE R&B

## 2019-01-24 PROCEDURE — 99233 SBSQ HOSP IP/OBS HIGH 50: CPT | Performed by: INTERNAL MEDICINE

## 2019-01-24 PROCEDURE — 2580000003 HC RX 258: Performed by: STUDENT IN AN ORGANIZED HEALTH CARE EDUCATION/TRAINING PROGRAM

## 2019-01-24 RX ORDER — INSULIN GLARGINE 100 [IU]/ML
20 INJECTION, SOLUTION SUBCUTANEOUS NIGHTLY
Status: DISCONTINUED | OUTPATIENT
Start: 2019-01-24 | End: 2019-01-24

## 2019-01-24 RX ORDER — PREDNISONE 20 MG/1
40 TABLET ORAL DAILY
Status: DISCONTINUED | OUTPATIENT
Start: 2019-01-25 | End: 2019-02-01 | Stop reason: HOSPADM

## 2019-01-24 RX ORDER — FUROSEMIDE 10 MG/ML
20 INJECTION INTRAMUSCULAR; INTRAVENOUS ONCE
Status: COMPLETED | OUTPATIENT
Start: 2019-01-24 | End: 2019-01-24

## 2019-01-24 RX ORDER — SULFAMETHOXAZOLE AND TRIMETHOPRIM 800; 160 MG/1; MG/1
1 TABLET ORAL
Status: DISCONTINUED | OUTPATIENT
Start: 2019-01-25 | End: 2019-02-01 | Stop reason: HOSPADM

## 2019-01-24 RX ADMIN — METHYLPREDNISOLONE SODIUM SUCCINATE 40 MG: 125 INJECTION, POWDER, FOR SOLUTION INTRAMUSCULAR; INTRAVENOUS at 02:34

## 2019-01-24 RX ADMIN — MOMETASONE FUROATE AND FORMOTEROL FUMARATE DIHYDRATE 2 PUFF: 100; 5 AEROSOL RESPIRATORY (INHALATION) at 20:14

## 2019-01-24 RX ADMIN — Medication 10 ML: at 19:49

## 2019-01-24 RX ADMIN — ASPIRIN 81 MG 81 MG: 81 TABLET ORAL at 08:41

## 2019-01-24 RX ADMIN — FUROSEMIDE 20 MG: 10 INJECTION, SOLUTION INTRAMUSCULAR; INTRAVENOUS at 10:29

## 2019-01-24 RX ADMIN — TIOTROPIUM BROMIDE 18 MCG: 18 CAPSULE ORAL; RESPIRATORY (INHALATION) at 09:40

## 2019-01-24 RX ADMIN — FAMOTIDINE 20 MG: 20 TABLET, FILM COATED ORAL at 19:49

## 2019-01-24 RX ADMIN — INSULIN LISPRO 2 UNITS: 100 INJECTION, SOLUTION INTRAVENOUS; SUBCUTANEOUS at 16:50

## 2019-01-24 RX ADMIN — INSULIN LISPRO 3 UNITS: 100 INJECTION, SOLUTION INTRAVENOUS; SUBCUTANEOUS at 19:49

## 2019-01-24 RX ADMIN — TAMSULOSIN HYDROCHLORIDE 0.4 MG: 0.4 CAPSULE ORAL at 08:41

## 2019-01-24 RX ADMIN — FAMOTIDINE 20 MG: 20 TABLET, FILM COATED ORAL at 08:41

## 2019-01-24 RX ADMIN — ACETAMINOPHEN 650 MG: 325 TABLET ORAL at 01:29

## 2019-01-24 RX ADMIN — ENOXAPARIN SODIUM 40 MG: 40 INJECTION SUBCUTANEOUS at 08:41

## 2019-01-24 RX ADMIN — LEVOFLOXACIN 500 MG: 500 TABLET, FILM COATED ORAL at 08:41

## 2019-01-24 RX ADMIN — ESCITALOPRAM OXALATE 10 MG: 10 TABLET ORAL at 08:41

## 2019-01-24 RX ADMIN — INSULIN GLARGINE 20 UNITS: 100 INJECTION, SOLUTION SUBCUTANEOUS at 19:49

## 2019-01-24 RX ADMIN — METHYLPREDNISOLONE SODIUM SUCCINATE 40 MG: 125 INJECTION, POWDER, FOR SOLUTION INTRAMUSCULAR; INTRAVENOUS at 12:39

## 2019-01-24 RX ADMIN — Medication 10 ML: at 08:41

## 2019-01-24 RX ADMIN — MOMETASONE FUROATE AND FORMOTEROL FUMARATE DIHYDRATE 2 PUFF: 100; 5 AEROSOL RESPIRATORY (INHALATION) at 09:39

## 2019-01-24 ASSESSMENT — PAIN DESCRIPTION - PAIN TYPE: TYPE: ACUTE PAIN

## 2019-01-24 ASSESSMENT — PAIN SCALES - GENERAL
PAINLEVEL_OUTOF10: 6
PAINLEVEL_OUTOF10: 0

## 2019-01-24 ASSESSMENT — PAIN DESCRIPTION - FREQUENCY: FREQUENCY: INTERMITTENT

## 2019-01-24 ASSESSMENT — PAIN DESCRIPTION - ORIENTATION: ORIENTATION: RIGHT;LEFT

## 2019-01-24 ASSESSMENT — PAIN DESCRIPTION - LOCATION: LOCATION: FOOT

## 2019-01-25 LAB
ABSOLUTE EOS #: 0.03 K/UL (ref 0–0.44)
ABSOLUTE IMMATURE GRANULOCYTE: 0.06 K/UL (ref 0–0.3)
ABSOLUTE LYMPH #: 1.6 K/UL (ref 1.1–3.7)
ABSOLUTE MONO #: 0.79 K/UL (ref 0.1–1.2)
ANION GAP SERPL CALCULATED.3IONS-SCNC: 12 MMOL/L (ref 9–17)
BASOPHILS # BLD: 0 % (ref 0–2)
BASOPHILS ABSOLUTE: <0.03 K/UL (ref 0–0.2)
BUN BLDV-MCNC: 18 MG/DL (ref 8–23)
BUN/CREAT BLD: ABNORMAL (ref 9–20)
CALCIUM SERPL-MCNC: 8.5 MG/DL (ref 8.6–10.4)
CHLORIDE BLD-SCNC: 99 MMOL/L (ref 98–107)
CO2: 28 MMOL/L (ref 20–31)
CREAT SERPL-MCNC: 0.67 MG/DL (ref 0.7–1.2)
DIFFERENTIAL TYPE: ABNORMAL
EKG ATRIAL RATE: 65 BPM
EKG P AXIS: 51 DEGREES
EKG P-R INTERVAL: 130 MS
EKG Q-T INTERVAL: 402 MS
EKG QRS DURATION: 90 MS
EKG QTC CALCULATION (BAZETT): 418 MS
EKG R AXIS: 51 DEGREES
EKG T AXIS: 37 DEGREES
EKG VENTRICULAR RATE: 65 BPM
EOSINOPHILS RELATIVE PERCENT: 0 % (ref 1–4)
GFR AFRICAN AMERICAN: >60 ML/MIN
GFR NON-AFRICAN AMERICAN: >60 ML/MIN
GFR SERPL CREATININE-BSD FRML MDRD: ABNORMAL ML/MIN/{1.73_M2}
GFR SERPL CREATININE-BSD FRML MDRD: ABNORMAL ML/MIN/{1.73_M2}
GLUCOSE BLD-MCNC: 149 MG/DL (ref 75–110)
GLUCOSE BLD-MCNC: 305 MG/DL (ref 75–110)
GLUCOSE BLD-MCNC: 346 MG/DL (ref 75–110)
GLUCOSE BLD-MCNC: 84 MG/DL (ref 70–99)
GLUCOSE BLD-MCNC: 86 MG/DL (ref 75–110)
HCT VFR BLD CALC: 31.7 % (ref 40.7–50.3)
HEMOGLOBIN: 10.2 G/DL (ref 13–17)
IMMATURE GRANULOCYTES: 1 %
LYMPHOCYTES # BLD: 15 % (ref 24–43)
MCH RBC QN AUTO: 31.9 PG (ref 25.2–33.5)
MCHC RBC AUTO-ENTMCNC: 32.2 G/DL (ref 28.4–34.8)
MCV RBC AUTO: 99.1 FL (ref 82.6–102.9)
MONOCYTES # BLD: 7 % (ref 3–12)
NRBC AUTOMATED: 0 PER 100 WBC
PDW BLD-RTO: 14 % (ref 11.8–14.4)
PLATELET # BLD: 265 K/UL (ref 138–453)
PLATELET ESTIMATE: ABNORMAL
PMV BLD AUTO: 9.9 FL (ref 8.1–13.5)
POTASSIUM SERPL-SCNC: 3.8 MMOL/L (ref 3.7–5.3)
RBC # BLD: 3.2 M/UL (ref 4.21–5.77)
RBC # BLD: ABNORMAL 10*6/UL
SEG NEUTROPHILS: 77 % (ref 36–65)
SEGMENTED NEUTROPHILS ABSOLUTE COUNT: 8.21 K/UL (ref 1.5–8.1)
SODIUM BLD-SCNC: 139 MMOL/L (ref 135–144)
WBC # BLD: 10.7 K/UL (ref 3.5–11.3)
WBC # BLD: ABNORMAL 10*3/UL

## 2019-01-25 PROCEDURE — 2060000000 HC ICU INTERMEDIATE R&B

## 2019-01-25 PROCEDURE — 2580000003 HC RX 258: Performed by: STUDENT IN AN ORGANIZED HEALTH CARE EDUCATION/TRAINING PROGRAM

## 2019-01-25 PROCEDURE — 6370000000 HC RX 637 (ALT 250 FOR IP): Performed by: STUDENT IN AN ORGANIZED HEALTH CARE EDUCATION/TRAINING PROGRAM

## 2019-01-25 PROCEDURE — 6370000000 HC RX 637 (ALT 250 FOR IP): Performed by: INTERNAL MEDICINE

## 2019-01-25 PROCEDURE — 80048 BASIC METABOLIC PNL TOTAL CA: CPT

## 2019-01-25 PROCEDURE — 82947 ASSAY GLUCOSE BLOOD QUANT: CPT

## 2019-01-25 PROCEDURE — 93005 ELECTROCARDIOGRAM TRACING: CPT

## 2019-01-25 PROCEDURE — 2700000000 HC OXYGEN THERAPY PER DAY

## 2019-01-25 PROCEDURE — 99233 SBSQ HOSP IP/OBS HIGH 50: CPT | Performed by: INTERNAL MEDICINE

## 2019-01-25 PROCEDURE — 6360000002 HC RX W HCPCS: Performed by: STUDENT IN AN ORGANIZED HEALTH CARE EDUCATION/TRAINING PROGRAM

## 2019-01-25 PROCEDURE — 97162 PT EVAL MOD COMPLEX 30 MIN: CPT

## 2019-01-25 PROCEDURE — 97530 THERAPEUTIC ACTIVITIES: CPT

## 2019-01-25 PROCEDURE — 99226 PR SBSQ OBSERVATION CARE/DAY 35 MINUTES: CPT | Performed by: INTERNAL MEDICINE

## 2019-01-25 PROCEDURE — 85025 COMPLETE CBC W/AUTO DIFF WBC: CPT

## 2019-01-25 PROCEDURE — 36415 COLL VENOUS BLD VENIPUNCTURE: CPT

## 2019-01-25 PROCEDURE — 94640 AIRWAY INHALATION TREATMENT: CPT

## 2019-01-25 PROCEDURE — 94762 N-INVAS EAR/PLS OXIMTRY CONT: CPT

## 2019-01-25 RX ADMIN — INSULIN LISPRO 4 UNITS: 100 INJECTION, SOLUTION INTRAVENOUS; SUBCUTANEOUS at 20:28

## 2019-01-25 RX ADMIN — TAMSULOSIN HYDROCHLORIDE 0.4 MG: 0.4 CAPSULE ORAL at 10:19

## 2019-01-25 RX ADMIN — INSULIN LISPRO 8 UNITS: 100 INJECTION, SOLUTION INTRAVENOUS; SUBCUTANEOUS at 16:59

## 2019-01-25 RX ADMIN — Medication 10 ML: at 20:28

## 2019-01-25 RX ADMIN — TIOTROPIUM BROMIDE 18 MCG: 18 CAPSULE ORAL; RESPIRATORY (INHALATION) at 08:53

## 2019-01-25 RX ADMIN — LEVOFLOXACIN 500 MG: 500 TABLET, FILM COATED ORAL at 10:19

## 2019-01-25 RX ADMIN — ESCITALOPRAM OXALATE 10 MG: 10 TABLET ORAL at 10:19

## 2019-01-25 RX ADMIN — INSULIN GLARGINE 20 UNITS: 100 INJECTION, SOLUTION SUBCUTANEOUS at 20:28

## 2019-01-25 RX ADMIN — SULFAMETHOXAZOLE AND TRIMETHOPRIM 1 TABLET: 800; 160 TABLET ORAL at 10:19

## 2019-01-25 RX ADMIN — Medication 10 ML: at 10:20

## 2019-01-25 RX ADMIN — ASPIRIN 81 MG 81 MG: 81 TABLET ORAL at 10:19

## 2019-01-25 RX ADMIN — ENOXAPARIN SODIUM 40 MG: 40 INJECTION SUBCUTANEOUS at 10:19

## 2019-01-25 RX ADMIN — INSULIN LISPRO 2 UNITS: 100 INJECTION, SOLUTION INTRAVENOUS; SUBCUTANEOUS at 13:31

## 2019-01-25 RX ADMIN — ALBUTEROL SULFATE 2 PUFF: 90 AEROSOL, METERED RESPIRATORY (INHALATION) at 16:01

## 2019-01-25 RX ADMIN — FAMOTIDINE 20 MG: 20 TABLET, FILM COATED ORAL at 20:28

## 2019-01-25 RX ADMIN — MOMETASONE FUROATE AND FORMOTEROL FUMARATE DIHYDRATE 2 PUFF: 100; 5 AEROSOL RESPIRATORY (INHALATION) at 08:53

## 2019-01-25 RX ADMIN — MOMETASONE FUROATE AND FORMOTEROL FUMARATE DIHYDRATE 2 PUFF: 100; 5 AEROSOL RESPIRATORY (INHALATION) at 20:04

## 2019-01-25 RX ADMIN — PREDNISONE 40 MG: 20 TABLET ORAL at 10:19

## 2019-01-25 RX ADMIN — FAMOTIDINE 20 MG: 20 TABLET, FILM COATED ORAL at 10:19

## 2019-01-26 LAB
ABSOLUTE EOS #: 0.04 K/UL (ref 0–0.44)
ABSOLUTE IMMATURE GRANULOCYTE: 0.09 K/UL (ref 0–0.3)
ABSOLUTE LYMPH #: 1.61 K/UL (ref 1.1–3.7)
ABSOLUTE MONO #: 1.15 K/UL (ref 0.1–1.2)
ANION GAP SERPL CALCULATED.3IONS-SCNC: 10 MMOL/L (ref 9–17)
BASOPHILS # BLD: 0 % (ref 0–2)
BASOPHILS ABSOLUTE: <0.03 K/UL (ref 0–0.2)
BUN BLDV-MCNC: 17 MG/DL (ref 8–23)
BUN/CREAT BLD: ABNORMAL (ref 9–20)
CALCIUM SERPL-MCNC: 8.6 MG/DL (ref 8.6–10.4)
CHLORIDE BLD-SCNC: 98 MMOL/L (ref 98–107)
CO2: 27 MMOL/L (ref 20–31)
CREAT SERPL-MCNC: 0.78 MG/DL (ref 0.7–1.2)
DIFFERENTIAL TYPE: ABNORMAL
EOSINOPHILS RELATIVE PERCENT: 0 % (ref 1–4)
GFR AFRICAN AMERICAN: >60 ML/MIN
GFR NON-AFRICAN AMERICAN: >60 ML/MIN
GFR SERPL CREATININE-BSD FRML MDRD: ABNORMAL ML/MIN/{1.73_M2}
GFR SERPL CREATININE-BSD FRML MDRD: ABNORMAL ML/MIN/{1.73_M2}
GLUCOSE BLD-MCNC: 117 MG/DL (ref 75–110)
GLUCOSE BLD-MCNC: 246 MG/DL (ref 75–110)
GLUCOSE BLD-MCNC: 282 MG/DL (ref 70–99)
GLUCOSE BLD-MCNC: 326 MG/DL (ref 75–110)
GLUCOSE BLD-MCNC: 338 MG/DL (ref 75–110)
GLUCOSE BLD-MCNC: 53 MG/DL (ref 75–110)
HCT VFR BLD CALC: 33.1 % (ref 40.7–50.3)
HEMOGLOBIN: 10.7 G/DL (ref 13–17)
IMMATURE GRANULOCYTES: 1 %
LYMPHOCYTES # BLD: 12 % (ref 24–43)
MCH RBC QN AUTO: 31.8 PG (ref 25.2–33.5)
MCHC RBC AUTO-ENTMCNC: 32.3 G/DL (ref 28.4–34.8)
MCV RBC AUTO: 98.5 FL (ref 82.6–102.9)
MONOCYTES # BLD: 9 % (ref 3–12)
NRBC AUTOMATED: 0 PER 100 WBC
PDW BLD-RTO: 14.1 % (ref 11.8–14.4)
PLATELET # BLD: 278 K/UL (ref 138–453)
PLATELET ESTIMATE: ABNORMAL
PMV BLD AUTO: 9.8 FL (ref 8.1–13.5)
POTASSIUM SERPL-SCNC: 5 MMOL/L (ref 3.7–5.3)
RBC # BLD: 3.36 M/UL (ref 4.21–5.77)
RBC # BLD: ABNORMAL 10*6/UL
SEG NEUTROPHILS: 78 % (ref 36–65)
SEGMENTED NEUTROPHILS ABSOLUTE COUNT: 10.17 K/UL (ref 1.5–8.1)
SODIUM BLD-SCNC: 135 MMOL/L (ref 135–144)
WBC # BLD: 13.1 K/UL (ref 3.5–11.3)
WBC # BLD: ABNORMAL 10*3/UL

## 2019-01-26 PROCEDURE — 94640 AIRWAY INHALATION TREATMENT: CPT

## 2019-01-26 PROCEDURE — 85025 COMPLETE CBC W/AUTO DIFF WBC: CPT

## 2019-01-26 PROCEDURE — 2060000000 HC ICU INTERMEDIATE R&B

## 2019-01-26 PROCEDURE — 97166 OT EVAL MOD COMPLEX 45 MIN: CPT

## 2019-01-26 PROCEDURE — 80048 BASIC METABOLIC PNL TOTAL CA: CPT

## 2019-01-26 PROCEDURE — 99233 SBSQ HOSP IP/OBS HIGH 50: CPT | Performed by: INTERNAL MEDICINE

## 2019-01-26 PROCEDURE — 97535 SELF CARE MNGMENT TRAINING: CPT

## 2019-01-26 PROCEDURE — 2700000000 HC OXYGEN THERAPY PER DAY

## 2019-01-26 PROCEDURE — 2580000003 HC RX 258: Performed by: STUDENT IN AN ORGANIZED HEALTH CARE EDUCATION/TRAINING PROGRAM

## 2019-01-26 PROCEDURE — 6370000000 HC RX 637 (ALT 250 FOR IP): Performed by: STUDENT IN AN ORGANIZED HEALTH CARE EDUCATION/TRAINING PROGRAM

## 2019-01-26 PROCEDURE — 94762 N-INVAS EAR/PLS OXIMTRY CONT: CPT

## 2019-01-26 PROCEDURE — 82947 ASSAY GLUCOSE BLOOD QUANT: CPT

## 2019-01-26 PROCEDURE — 6370000000 HC RX 637 (ALT 250 FOR IP): Performed by: INTERNAL MEDICINE

## 2019-01-26 PROCEDURE — 36415 COLL VENOUS BLD VENIPUNCTURE: CPT

## 2019-01-26 PROCEDURE — 99232 SBSQ HOSP IP/OBS MODERATE 35: CPT | Performed by: INTERNAL MEDICINE

## 2019-01-26 PROCEDURE — 6360000002 HC RX W HCPCS: Performed by: STUDENT IN AN ORGANIZED HEALTH CARE EDUCATION/TRAINING PROGRAM

## 2019-01-26 RX ORDER — INSULIN GLARGINE 100 [IU]/ML
25 INJECTION, SOLUTION SUBCUTANEOUS NIGHTLY
Status: DISCONTINUED | OUTPATIENT
Start: 2019-01-26 | End: 2019-01-27

## 2019-01-26 RX ADMIN — PREDNISONE 40 MG: 20 TABLET ORAL at 08:32

## 2019-01-26 RX ADMIN — INSULIN LISPRO 4 UNITS: 100 INJECTION, SOLUTION INTRAVENOUS; SUBCUTANEOUS at 08:39

## 2019-01-26 RX ADMIN — FAMOTIDINE 20 MG: 20 TABLET, FILM COATED ORAL at 08:33

## 2019-01-26 RX ADMIN — TAMSULOSIN HYDROCHLORIDE 0.4 MG: 0.4 CAPSULE ORAL at 08:33

## 2019-01-26 RX ADMIN — ENOXAPARIN SODIUM 40 MG: 40 INJECTION SUBCUTANEOUS at 08:33

## 2019-01-26 RX ADMIN — LEVOFLOXACIN 500 MG: 500 TABLET, FILM COATED ORAL at 08:33

## 2019-01-26 RX ADMIN — INSULIN LISPRO 8 UNITS: 100 INJECTION, SOLUTION INTRAVENOUS; SUBCUTANEOUS at 16:55

## 2019-01-26 RX ADMIN — MOMETASONE FUROATE AND FORMOTEROL FUMARATE DIHYDRATE 2 PUFF: 100; 5 AEROSOL RESPIRATORY (INHALATION) at 19:32

## 2019-01-26 RX ADMIN — ASPIRIN 81 MG 81 MG: 81 TABLET ORAL at 08:33

## 2019-01-26 RX ADMIN — Medication 10 ML: at 21:05

## 2019-01-26 RX ADMIN — INSULIN GLARGINE 25 UNITS: 100 INJECTION, SOLUTION SUBCUTANEOUS at 21:05

## 2019-01-26 RX ADMIN — ESCITALOPRAM OXALATE 10 MG: 10 TABLET ORAL at 08:33

## 2019-01-26 RX ADMIN — TIOTROPIUM BROMIDE 18 MCG: 18 CAPSULE ORAL; RESPIRATORY (INHALATION) at 08:15

## 2019-01-26 RX ADMIN — FAMOTIDINE 20 MG: 20 TABLET, FILM COATED ORAL at 21:04

## 2019-01-26 RX ADMIN — INSULIN LISPRO 4 UNITS: 100 INJECTION, SOLUTION INTRAVENOUS; SUBCUTANEOUS at 21:05

## 2019-01-26 RX ADMIN — Medication 10 ML: at 08:32

## 2019-01-26 RX ADMIN — MOMETASONE FUROATE AND FORMOTEROL FUMARATE DIHYDRATE 2 PUFF: 100; 5 AEROSOL RESPIRATORY (INHALATION) at 08:10

## 2019-01-27 LAB
ABSOLUTE EOS #: 0.18 K/UL (ref 0–0.44)
ABSOLUTE IMMATURE GRANULOCYTE: 0.14 K/UL (ref 0–0.3)
ABSOLUTE LYMPH #: 1.86 K/UL (ref 1.1–3.7)
ABSOLUTE MONO #: 0.94 K/UL (ref 0.1–1.2)
ANION GAP SERPL CALCULATED.3IONS-SCNC: 9 MMOL/L (ref 9–17)
BASOPHILS # BLD: 0 % (ref 0–2)
BASOPHILS ABSOLUTE: 0.03 K/UL (ref 0–0.2)
BUN BLDV-MCNC: 20 MG/DL (ref 8–23)
BUN/CREAT BLD: ABNORMAL (ref 9–20)
CALCIUM SERPL-MCNC: 8.6 MG/DL (ref 8.6–10.4)
CHLORIDE BLD-SCNC: 96 MMOL/L (ref 98–107)
CO2: 30 MMOL/L (ref 20–31)
CREAT SERPL-MCNC: 0.77 MG/DL (ref 0.7–1.2)
DIFFERENTIAL TYPE: ABNORMAL
EOSINOPHILS RELATIVE PERCENT: 1 % (ref 1–4)
GFR AFRICAN AMERICAN: >60 ML/MIN
GFR NON-AFRICAN AMERICAN: >60 ML/MIN
GFR SERPL CREATININE-BSD FRML MDRD: ABNORMAL ML/MIN/{1.73_M2}
GFR SERPL CREATININE-BSD FRML MDRD: ABNORMAL ML/MIN/{1.73_M2}
GLUCOSE BLD-MCNC: 107 MG/DL (ref 75–110)
GLUCOSE BLD-MCNC: 239 MG/DL (ref 70–99)
GLUCOSE BLD-MCNC: 254 MG/DL (ref 75–110)
GLUCOSE BLD-MCNC: 259 MG/DL (ref 75–110)
GLUCOSE BLD-MCNC: 261 MG/DL (ref 75–110)
GLUCOSE BLD-MCNC: 318 MG/DL (ref 75–110)
HCT VFR BLD CALC: 32.8 % (ref 40.7–50.3)
HEMOGLOBIN: 10.7 G/DL (ref 13–17)
IMMATURE GRANULOCYTES: 1 %
LYMPHOCYTES # BLD: 15 % (ref 24–43)
MCH RBC QN AUTO: 31.7 PG (ref 25.2–33.5)
MCHC RBC AUTO-ENTMCNC: 32.6 G/DL (ref 28.4–34.8)
MCV RBC AUTO: 97 FL (ref 82.6–102.9)
MONOCYTES # BLD: 7 % (ref 3–12)
NRBC AUTOMATED: 0 PER 100 WBC
PDW BLD-RTO: 14 % (ref 11.8–14.4)
PLATELET # BLD: 289 K/UL (ref 138–453)
PLATELET ESTIMATE: ABNORMAL
PMV BLD AUTO: 9.9 FL (ref 8.1–13.5)
POTASSIUM SERPL-SCNC: 4.1 MMOL/L (ref 3.7–5.3)
RBC # BLD: 3.38 M/UL (ref 4.21–5.77)
RBC # BLD: ABNORMAL 10*6/UL
SEG NEUTROPHILS: 76 % (ref 36–65)
SEGMENTED NEUTROPHILS ABSOLUTE COUNT: 9.57 K/UL (ref 1.5–8.1)
SODIUM BLD-SCNC: 135 MMOL/L (ref 135–144)
WBC # BLD: 12.7 K/UL (ref 3.5–11.3)
WBC # BLD: ABNORMAL 10*3/UL

## 2019-01-27 PROCEDURE — 99232 SBSQ HOSP IP/OBS MODERATE 35: CPT | Performed by: INTERNAL MEDICINE

## 2019-01-27 PROCEDURE — 94762 N-INVAS EAR/PLS OXIMTRY CONT: CPT

## 2019-01-27 PROCEDURE — 94640 AIRWAY INHALATION TREATMENT: CPT

## 2019-01-27 PROCEDURE — 36415 COLL VENOUS BLD VENIPUNCTURE: CPT

## 2019-01-27 PROCEDURE — 85025 COMPLETE CBC W/AUTO DIFF WBC: CPT

## 2019-01-27 PROCEDURE — 2060000000 HC ICU INTERMEDIATE R&B

## 2019-01-27 PROCEDURE — 99233 SBSQ HOSP IP/OBS HIGH 50: CPT | Performed by: INTERNAL MEDICINE

## 2019-01-27 PROCEDURE — 6370000000 HC RX 637 (ALT 250 FOR IP): Performed by: STUDENT IN AN ORGANIZED HEALTH CARE EDUCATION/TRAINING PROGRAM

## 2019-01-27 PROCEDURE — 80048 BASIC METABOLIC PNL TOTAL CA: CPT

## 2019-01-27 PROCEDURE — 6360000002 HC RX W HCPCS: Performed by: STUDENT IN AN ORGANIZED HEALTH CARE EDUCATION/TRAINING PROGRAM

## 2019-01-27 PROCEDURE — 82947 ASSAY GLUCOSE BLOOD QUANT: CPT

## 2019-01-27 PROCEDURE — 2700000000 HC OXYGEN THERAPY PER DAY

## 2019-01-27 PROCEDURE — 6370000000 HC RX 637 (ALT 250 FOR IP): Performed by: INTERNAL MEDICINE

## 2019-01-27 PROCEDURE — 2580000003 HC RX 258: Performed by: STUDENT IN AN ORGANIZED HEALTH CARE EDUCATION/TRAINING PROGRAM

## 2019-01-27 RX ORDER — INSULIN GLARGINE 100 [IU]/ML
30 INJECTION, SOLUTION SUBCUTANEOUS NIGHTLY
Status: DISCONTINUED | OUTPATIENT
Start: 2019-01-27 | End: 2019-01-29

## 2019-01-27 RX ORDER — ECHINACEA PURPUREA EXTRACT 125 MG
1 TABLET ORAL PRN
Status: DISCONTINUED | OUTPATIENT
Start: 2019-01-27 | End: 2019-02-01 | Stop reason: HOSPADM

## 2019-01-27 RX ORDER — FLUTICASONE PROPIONATE 50 MCG
2 SPRAY, SUSPENSION (ML) NASAL DAILY
Status: DISCONTINUED | OUTPATIENT
Start: 2019-01-27 | End: 2019-02-01 | Stop reason: HOSPADM

## 2019-01-27 RX ADMIN — ESCITALOPRAM OXALATE 10 MG: 10 TABLET ORAL at 08:03

## 2019-01-27 RX ADMIN — ENOXAPARIN SODIUM 40 MG: 40 INJECTION SUBCUTANEOUS at 08:02

## 2019-01-27 RX ADMIN — PREDNISONE 40 MG: 20 TABLET ORAL at 08:03

## 2019-01-27 RX ADMIN — INSULIN LISPRO 6 UNITS: 100 INJECTION, SOLUTION INTRAVENOUS; SUBCUTANEOUS at 08:02

## 2019-01-27 RX ADMIN — INSULIN GLARGINE 30 UNITS: 100 INJECTION, SOLUTION SUBCUTANEOUS at 20:06

## 2019-01-27 RX ADMIN — FAMOTIDINE 20 MG: 20 TABLET, FILM COATED ORAL at 20:06

## 2019-01-27 RX ADMIN — INSULIN LISPRO 8 UNITS: 100 INJECTION, SOLUTION INTRAVENOUS; SUBCUTANEOUS at 18:14

## 2019-01-27 RX ADMIN — TAMSULOSIN HYDROCHLORIDE 0.4 MG: 0.4 CAPSULE ORAL at 08:02

## 2019-01-27 RX ADMIN — Medication 10 ML: at 20:07

## 2019-01-27 RX ADMIN — ACETAMINOPHEN 650 MG: 325 TABLET ORAL at 02:01

## 2019-01-27 RX ADMIN — ASPIRIN 81 MG 81 MG: 81 TABLET ORAL at 08:02

## 2019-01-27 RX ADMIN — INSULIN LISPRO 3 UNITS: 100 INJECTION, SOLUTION INTRAVENOUS; SUBCUTANEOUS at 20:06

## 2019-01-27 RX ADMIN — MOMETASONE FUROATE AND FORMOTEROL FUMARATE DIHYDRATE 2 PUFF: 100; 5 AEROSOL RESPIRATORY (INHALATION) at 07:59

## 2019-01-27 RX ADMIN — TIOTROPIUM BROMIDE 18 MCG: 18 CAPSULE ORAL; RESPIRATORY (INHALATION) at 07:59

## 2019-01-27 ASSESSMENT — PAIN SCALES - GENERAL: PAINLEVEL_OUTOF10: 5

## 2019-01-28 LAB
ABSOLUTE EOS #: 0.31 K/UL (ref 0–0.44)
ABSOLUTE IMMATURE GRANULOCYTE: 0.16 K/UL (ref 0–0.3)
ABSOLUTE LYMPH #: 2.27 K/UL (ref 1.1–3.7)
ABSOLUTE MONO #: 0.98 K/UL (ref 0.1–1.2)
ANION GAP SERPL CALCULATED.3IONS-SCNC: 11 MMOL/L (ref 9–17)
BASOPHILS # BLD: 0 % (ref 0–2)
BASOPHILS ABSOLUTE: 0.04 K/UL (ref 0–0.2)
BUN BLDV-MCNC: 19 MG/DL (ref 8–23)
BUN/CREAT BLD: ABNORMAL (ref 9–20)
CALCIUM SERPL-MCNC: 8.8 MG/DL (ref 8.6–10.4)
CHLORIDE BLD-SCNC: 100 MMOL/L (ref 98–107)
CO2: 26 MMOL/L (ref 20–31)
CREAT SERPL-MCNC: 0.62 MG/DL (ref 0.7–1.2)
DIFFERENTIAL TYPE: ABNORMAL
EOSINOPHILS RELATIVE PERCENT: 2 % (ref 1–4)
GFR AFRICAN AMERICAN: >60 ML/MIN
GFR NON-AFRICAN AMERICAN: >60 ML/MIN
GFR SERPL CREATININE-BSD FRML MDRD: ABNORMAL ML/MIN/{1.73_M2}
GFR SERPL CREATININE-BSD FRML MDRD: ABNORMAL ML/MIN/{1.73_M2}
GLUCOSE BLD-MCNC: 118 MG/DL (ref 75–110)
GLUCOSE BLD-MCNC: 163 MG/DL (ref 70–99)
GLUCOSE BLD-MCNC: 171 MG/DL (ref 75–110)
GLUCOSE BLD-MCNC: 278 MG/DL (ref 75–110)
GLUCOSE BLD-MCNC: 375 MG/DL (ref 75–110)
HCT VFR BLD CALC: 36.8 % (ref 40.7–50.3)
HEMOGLOBIN: 11.9 G/DL (ref 13–17)
IMMATURE GRANULOCYTES: 1 %
LYMPHOCYTES # BLD: 17 % (ref 24–43)
MCH RBC QN AUTO: 31.8 PG (ref 25.2–33.5)
MCHC RBC AUTO-ENTMCNC: 32.3 G/DL (ref 28.4–34.8)
MCV RBC AUTO: 98.4 FL (ref 82.6–102.9)
MONOCYTES # BLD: 7 % (ref 3–12)
NRBC AUTOMATED: 0 PER 100 WBC
PDW BLD-RTO: 14.4 % (ref 11.8–14.4)
PLATELET # BLD: 288 K/UL (ref 138–453)
PLATELET ESTIMATE: ABNORMAL
PMV BLD AUTO: 9.8 FL (ref 8.1–13.5)
POTASSIUM SERPL-SCNC: 3.8 MMOL/L (ref 3.7–5.3)
RBC # BLD: 3.74 M/UL (ref 4.21–5.77)
RBC # BLD: ABNORMAL 10*6/UL
SEG NEUTROPHILS: 73 % (ref 36–65)
SEGMENTED NEUTROPHILS ABSOLUTE COUNT: 9.81 K/UL (ref 1.5–8.1)
SODIUM BLD-SCNC: 137 MMOL/L (ref 135–144)
WBC # BLD: 13.6 K/UL (ref 3.5–11.3)
WBC # BLD: ABNORMAL 10*3/UL

## 2019-01-28 PROCEDURE — 76937 US GUIDE VASCULAR ACCESS: CPT

## 2019-01-28 PROCEDURE — 2580000003 HC RX 258: Performed by: STUDENT IN AN ORGANIZED HEALTH CARE EDUCATION/TRAINING PROGRAM

## 2019-01-28 PROCEDURE — 99233 SBSQ HOSP IP/OBS HIGH 50: CPT | Performed by: INTERNAL MEDICINE

## 2019-01-28 PROCEDURE — 82947 ASSAY GLUCOSE BLOOD QUANT: CPT

## 2019-01-28 PROCEDURE — 80048 BASIC METABOLIC PNL TOTAL CA: CPT

## 2019-01-28 PROCEDURE — 99232 SBSQ HOSP IP/OBS MODERATE 35: CPT | Performed by: INTERNAL MEDICINE

## 2019-01-28 PROCEDURE — 36415 COLL VENOUS BLD VENIPUNCTURE: CPT

## 2019-01-28 PROCEDURE — 94640 AIRWAY INHALATION TREATMENT: CPT

## 2019-01-28 PROCEDURE — 6370000000 HC RX 637 (ALT 250 FOR IP): Performed by: STUDENT IN AN ORGANIZED HEALTH CARE EDUCATION/TRAINING PROGRAM

## 2019-01-28 PROCEDURE — 94762 N-INVAS EAR/PLS OXIMTRY CONT: CPT

## 2019-01-28 PROCEDURE — 6370000000 HC RX 637 (ALT 250 FOR IP): Performed by: INTERNAL MEDICINE

## 2019-01-28 PROCEDURE — 2060000000 HC ICU INTERMEDIATE R&B

## 2019-01-28 PROCEDURE — 2700000000 HC OXYGEN THERAPY PER DAY

## 2019-01-28 PROCEDURE — 6360000002 HC RX W HCPCS: Performed by: STUDENT IN AN ORGANIZED HEALTH CARE EDUCATION/TRAINING PROGRAM

## 2019-01-28 PROCEDURE — 85025 COMPLETE CBC W/AUTO DIFF WBC: CPT

## 2019-01-28 RX ADMIN — ENOXAPARIN SODIUM 40 MG: 40 INJECTION SUBCUTANEOUS at 08:10

## 2019-01-28 RX ADMIN — FLUTICASONE PROPIONATE 2 SPRAY: 50 SPRAY, METERED NASAL at 08:11

## 2019-01-28 RX ADMIN — ESCITALOPRAM OXALATE 10 MG: 10 TABLET ORAL at 08:10

## 2019-01-28 RX ADMIN — FAMOTIDINE 20 MG: 20 TABLET, FILM COATED ORAL at 08:10

## 2019-01-28 RX ADMIN — Medication 10 ML: at 21:27

## 2019-01-28 RX ADMIN — INSULIN GLARGINE 30 UNITS: 100 INJECTION, SOLUTION SUBCUTANEOUS at 21:28

## 2019-01-28 RX ADMIN — MOMETASONE FUROATE AND FORMOTEROL FUMARATE DIHYDRATE 2 PUFF: 100; 5 AEROSOL RESPIRATORY (INHALATION) at 09:00

## 2019-01-28 RX ADMIN — INSULIN LISPRO 3 UNITS: 100 INJECTION, SOLUTION INTRAVENOUS; SUBCUTANEOUS at 21:29

## 2019-01-28 RX ADMIN — FAMOTIDINE 20 MG: 20 TABLET, FILM COATED ORAL at 21:27

## 2019-01-28 RX ADMIN — INSULIN LISPRO 2 UNITS: 100 INJECTION, SOLUTION INTRAVENOUS; SUBCUTANEOUS at 08:11

## 2019-01-28 RX ADMIN — PREDNISONE 40 MG: 20 TABLET ORAL at 08:10

## 2019-01-28 RX ADMIN — TIOTROPIUM BROMIDE 18 MCG: 18 CAPSULE ORAL; RESPIRATORY (INHALATION) at 09:00

## 2019-01-28 RX ADMIN — INSULIN LISPRO 10 UNITS: 100 INJECTION, SOLUTION INTRAVENOUS; SUBCUTANEOUS at 15:51

## 2019-01-28 RX ADMIN — TAMSULOSIN HYDROCHLORIDE 0.4 MG: 0.4 CAPSULE ORAL at 08:10

## 2019-01-28 RX ADMIN — MOMETASONE FUROATE AND FORMOTEROL FUMARATE DIHYDRATE 2 PUFF: 100; 5 AEROSOL RESPIRATORY (INHALATION) at 21:16

## 2019-01-28 RX ADMIN — SULFAMETHOXAZOLE AND TRIMETHOPRIM 1 TABLET: 800; 160 TABLET ORAL at 08:10

## 2019-01-28 RX ADMIN — ASPIRIN 81 MG 81 MG: 81 TABLET ORAL at 08:10

## 2019-01-29 LAB
ABSOLUTE EOS #: 0.2 K/UL (ref 0–0.44)
ABSOLUTE IMMATURE GRANULOCYTE: 0.17 K/UL (ref 0–0.3)
ABSOLUTE LYMPH #: 1.48 K/UL (ref 1.1–3.7)
ABSOLUTE MONO #: 1.07 K/UL (ref 0.1–1.2)
ANION GAP SERPL CALCULATED.3IONS-SCNC: 12 MMOL/L (ref 9–17)
BASOPHILS # BLD: 0 % (ref 0–2)
BASOPHILS ABSOLUTE: 0.03 K/UL (ref 0–0.2)
BUN BLDV-MCNC: 22 MG/DL (ref 8–23)
BUN/CREAT BLD: ABNORMAL (ref 9–20)
CALCIUM SERPL-MCNC: 8.4 MG/DL (ref 8.6–10.4)
CHLORIDE BLD-SCNC: 99 MMOL/L (ref 98–107)
CO2: 24 MMOL/L (ref 20–31)
CREAT SERPL-MCNC: 0.64 MG/DL (ref 0.7–1.2)
DIFFERENTIAL TYPE: ABNORMAL
EOSINOPHILS RELATIVE PERCENT: 1 % (ref 1–4)
GFR AFRICAN AMERICAN: >60 ML/MIN
GFR NON-AFRICAN AMERICAN: >60 ML/MIN
GFR SERPL CREATININE-BSD FRML MDRD: ABNORMAL ML/MIN/{1.73_M2}
GFR SERPL CREATININE-BSD FRML MDRD: ABNORMAL ML/MIN/{1.73_M2}
GLUCOSE BLD-MCNC: 213 MG/DL (ref 70–99)
GLUCOSE BLD-MCNC: 238 MG/DL (ref 75–110)
GLUCOSE BLD-MCNC: 290 MG/DL (ref 75–110)
GLUCOSE BLD-MCNC: 339 MG/DL (ref 75–110)
GLUCOSE BLD-MCNC: 53 MG/DL (ref 75–110)
GLUCOSE BLD-MCNC: 88 MG/DL (ref 75–110)
HCT VFR BLD CALC: 35.4 % (ref 40.7–50.3)
HEMOGLOBIN: 11.3 G/DL (ref 13–17)
IMMATURE GRANULOCYTES: 1 %
LYMPHOCYTES # BLD: 8 % (ref 24–43)
MCH RBC QN AUTO: 31.9 PG (ref 25.2–33.5)
MCHC RBC AUTO-ENTMCNC: 31.9 G/DL (ref 28.4–34.8)
MCV RBC AUTO: 100 FL (ref 82.6–102.9)
MONOCYTES # BLD: 6 % (ref 3–12)
NRBC AUTOMATED: 0 PER 100 WBC
PDW BLD-RTO: 14.3 % (ref 11.8–14.4)
PLATELET # BLD: 329 K/UL (ref 138–453)
PLATELET ESTIMATE: ABNORMAL
PMV BLD AUTO: 9.9 FL (ref 8.1–13.5)
POTASSIUM SERPL-SCNC: 3.8 MMOL/L (ref 3.7–5.3)
RBC # BLD: 3.54 M/UL (ref 4.21–5.77)
RBC # BLD: ABNORMAL 10*6/UL
SEG NEUTROPHILS: 84 % (ref 36–65)
SEGMENTED NEUTROPHILS ABSOLUTE COUNT: 15.29 K/UL (ref 1.5–8.1)
SODIUM BLD-SCNC: 135 MMOL/L (ref 135–144)
WBC # BLD: 18.2 K/UL (ref 3.5–11.3)
WBC # BLD: ABNORMAL 10*3/UL

## 2019-01-29 PROCEDURE — 6370000000 HC RX 637 (ALT 250 FOR IP): Performed by: STUDENT IN AN ORGANIZED HEALTH CARE EDUCATION/TRAINING PROGRAM

## 2019-01-29 PROCEDURE — 36415 COLL VENOUS BLD VENIPUNCTURE: CPT

## 2019-01-29 PROCEDURE — 82947 ASSAY GLUCOSE BLOOD QUANT: CPT

## 2019-01-29 PROCEDURE — 2060000000 HC ICU INTERMEDIATE R&B

## 2019-01-29 PROCEDURE — 99232 SBSQ HOSP IP/OBS MODERATE 35: CPT | Performed by: INTERNAL MEDICINE

## 2019-01-29 PROCEDURE — 6370000000 HC RX 637 (ALT 250 FOR IP): Performed by: INTERNAL MEDICINE

## 2019-01-29 PROCEDURE — 94762 N-INVAS EAR/PLS OXIMTRY CONT: CPT

## 2019-01-29 PROCEDURE — 6360000002 HC RX W HCPCS: Performed by: STUDENT IN AN ORGANIZED HEALTH CARE EDUCATION/TRAINING PROGRAM

## 2019-01-29 PROCEDURE — 2580000003 HC RX 258: Performed by: STUDENT IN AN ORGANIZED HEALTH CARE EDUCATION/TRAINING PROGRAM

## 2019-01-29 PROCEDURE — 97530 THERAPEUTIC ACTIVITIES: CPT

## 2019-01-29 PROCEDURE — 85025 COMPLETE CBC W/AUTO DIFF WBC: CPT

## 2019-01-29 PROCEDURE — 2700000000 HC OXYGEN THERAPY PER DAY

## 2019-01-29 PROCEDURE — 94640 AIRWAY INHALATION TREATMENT: CPT

## 2019-01-29 PROCEDURE — 80048 BASIC METABOLIC PNL TOTAL CA: CPT

## 2019-01-29 RX ORDER — INSULIN GLARGINE 100 [IU]/ML
20 INJECTION, SOLUTION SUBCUTANEOUS NIGHTLY
Status: DISCONTINUED | OUTPATIENT
Start: 2019-01-29 | End: 2019-01-30

## 2019-01-29 RX ORDER — PREDNISONE 10 MG/1
TABLET ORAL
Qty: 95 TABLET | Refills: 0 | Status: SHIPPED | OUTPATIENT
Start: 2019-01-29 | End: 2019-02-01

## 2019-01-29 RX ORDER — SULFAMETHOXAZOLE AND TRIMETHOPRIM 800; 160 MG/1; MG/1
1 TABLET ORAL
Qty: 3 TABLET | Refills: 0 | Status: SHIPPED | OUTPATIENT
Start: 2019-01-30 | End: 2019-02-09

## 2019-01-29 RX ORDER — ECHINACEA PURPUREA EXTRACT 125 MG
1 TABLET ORAL PRN
Qty: 1 BOTTLE | Refills: 3 | Status: SHIPPED | OUTPATIENT
Start: 2019-01-29

## 2019-01-29 RX ADMIN — MOMETASONE FUROATE AND FORMOTEROL FUMARATE DIHYDRATE 2 PUFF: 100; 5 AEROSOL RESPIRATORY (INHALATION) at 20:24

## 2019-01-29 RX ADMIN — ASPIRIN 81 MG 81 MG: 81 TABLET ORAL at 08:27

## 2019-01-29 RX ADMIN — Medication 10 ML: at 08:56

## 2019-01-29 RX ADMIN — FLUTICASONE PROPIONATE 2 SPRAY: 50 SPRAY, METERED NASAL at 08:44

## 2019-01-29 RX ADMIN — MOMETASONE FUROATE AND FORMOTEROL FUMARATE DIHYDRATE 2 PUFF: 100; 5 AEROSOL RESPIRATORY (INHALATION) at 08:30

## 2019-01-29 RX ADMIN — ESCITALOPRAM OXALATE 10 MG: 10 TABLET ORAL at 08:27

## 2019-01-29 RX ADMIN — ENOXAPARIN SODIUM 40 MG: 40 INJECTION SUBCUTANEOUS at 08:44

## 2019-01-29 RX ADMIN — PREDNISONE 40 MG: 20 TABLET ORAL at 08:28

## 2019-01-29 RX ADMIN — TIOTROPIUM BROMIDE 18 MCG: 18 CAPSULE ORAL; RESPIRATORY (INHALATION) at 08:30

## 2019-01-29 RX ADMIN — ACETAMINOPHEN 650 MG: 325 TABLET ORAL at 21:04

## 2019-01-29 RX ADMIN — FAMOTIDINE 20 MG: 20 TABLET, FILM COATED ORAL at 21:04

## 2019-01-29 RX ADMIN — INSULIN GLARGINE 20 UNITS: 100 INJECTION, SOLUTION SUBCUTANEOUS at 21:04

## 2019-01-29 RX ADMIN — TAMSULOSIN HYDROCHLORIDE 0.4 MG: 0.4 CAPSULE ORAL at 08:28

## 2019-01-29 RX ADMIN — FAMOTIDINE 20 MG: 20 TABLET, FILM COATED ORAL at 08:29

## 2019-01-29 RX ADMIN — INSULIN LISPRO 4 UNITS: 100 INJECTION, SOLUTION INTRAVENOUS; SUBCUTANEOUS at 08:46

## 2019-01-29 RX ADMIN — Medication 10 ML: at 21:04

## 2019-01-29 RX ADMIN — INSULIN LISPRO 12 UNITS: 100 INJECTION, SOLUTION INTRAVENOUS; SUBCUTANEOUS at 17:13

## 2019-01-29 RX ADMIN — INSULIN LISPRO 5 UNITS: 100 INJECTION, SOLUTION INTRAVENOUS; SUBCUTANEOUS at 21:04

## 2019-01-29 ASSESSMENT — PAIN SCALES - GENERAL: PAINLEVEL_OUTOF10: 4

## 2019-01-30 ENCOUNTER — APPOINTMENT (OUTPATIENT)
Dept: GENERAL RADIOLOGY | Age: 79
DRG: 190 | End: 2019-01-30
Payer: MEDICARE

## 2019-01-30 LAB
ABSOLUTE EOS #: 0 K/UL (ref 0–0.4)
ABSOLUTE IMMATURE GRANULOCYTE: 0 K/UL (ref 0–0.3)
ABSOLUTE LYMPH #: 1.87 K/UL (ref 1–4.8)
ABSOLUTE MONO #: 1.87 K/UL (ref 0.1–0.8)
ANION GAP SERPL CALCULATED.3IONS-SCNC: 14 MMOL/L (ref 9–17)
BASOPHILS # BLD: 0 % (ref 0–2)
BASOPHILS ABSOLUTE: 0 K/UL (ref 0–0.2)
BILIRUBIN URINE: NEGATIVE
BUN BLDV-MCNC: 22 MG/DL (ref 8–23)
BUN/CREAT BLD: ABNORMAL (ref 9–20)
CALCIUM SERPL-MCNC: 8.2 MG/DL (ref 8.6–10.4)
CAMPYLOBACTER PCR: NORMAL
CHLORIDE BLD-SCNC: 96 MMOL/L (ref 98–107)
CO2: 24 MMOL/L (ref 20–31)
COLOR: YELLOW
COMMENT UA: ABNORMAL
CREAT SERPL-MCNC: 0.59 MG/DL (ref 0.7–1.2)
DIFFERENTIAL TYPE: ABNORMAL
EOSINOPHILS RELATIVE PERCENT: 0 % (ref 1–4)
GFR AFRICAN AMERICAN: >60 ML/MIN
GFR NON-AFRICAN AMERICAN: >60 ML/MIN
GFR SERPL CREATININE-BSD FRML MDRD: ABNORMAL ML/MIN/{1.73_M2}
GFR SERPL CREATININE-BSD FRML MDRD: ABNORMAL ML/MIN/{1.73_M2}
GLUCOSE BLD-MCNC: 130 MG/DL (ref 75–110)
GLUCOSE BLD-MCNC: 149 MG/DL (ref 75–110)
GLUCOSE BLD-MCNC: 334 MG/DL (ref 75–110)
GLUCOSE BLD-MCNC: 343 MG/DL (ref 75–110)
GLUCOSE BLD-MCNC: 370 MG/DL (ref 75–110)
GLUCOSE BLD-MCNC: 46 MG/DL (ref 70–99)
GLUCOSE BLD-MCNC: 52 MG/DL (ref 75–110)
GLUCOSE BLD-MCNC: 56 MG/DL (ref 75–110)
GLUCOSE URINE: ABNORMAL
HCT VFR BLD CALC: 32.7 % (ref 40.7–50.3)
HEMOGLOBIN: 10.9 G/DL (ref 13–17)
IMMATURE GRANULOCYTES: 0 %
KETONES, URINE: NEGATIVE
LACTOFERRIN, QUAL: ABNORMAL
LEUKOCYTE ESTERASE, URINE: NEGATIVE
LYMPHOCYTES # BLD: 5 % (ref 24–44)
MAGNESIUM: 1.2 MG/DL (ref 1.6–2.6)
MCH RBC QN AUTO: 32.9 PG (ref 25.2–33.5)
MCHC RBC AUTO-ENTMCNC: 33.3 G/DL (ref 28.4–34.8)
MCV RBC AUTO: 98.8 FL (ref 82.6–102.9)
MONOCYTES # BLD: 5 % (ref 1–7)
MORPHOLOGY: ABNORMAL
NITRITE, URINE: NEGATIVE
NRBC AUTOMATED: 0.1 PER 100 WBC
PDW BLD-RTO: 14.5 % (ref 11.8–14.4)
PH UA: 5.5 (ref 5–8)
PLATELET # BLD: 346 K/UL (ref 138–453)
PLATELET ESTIMATE: ABNORMAL
PMV BLD AUTO: 10 FL (ref 8.1–13.5)
POTASSIUM SERPL-SCNC: 2.7 MMOL/L (ref 3.7–5.3)
POTASSIUM SERPL-SCNC: 3.9 MMOL/L (ref 3.7–5.3)
PROTEIN UA: NEGATIVE
RBC # BLD: 3.31 M/UL (ref 4.21–5.77)
RBC # BLD: ABNORMAL 10*6/UL
SALMONELLA PCR: NORMAL
SEG NEUTROPHILS: 90 % (ref 36–66)
SEGMENTED NEUTROPHILS ABSOLUTE COUNT: 33.56 K/UL (ref 1.8–7.7)
SHIGATOXIN GENE PCR: NORMAL
SHIGELLA SP PCR: NORMAL
SODIUM BLD-SCNC: 134 MMOL/L (ref 135–144)
SPECIFIC GRAVITY UA: 1.02 (ref 1–1.03)
SPECIMEN: NORMAL
TURBIDITY: CLEAR
URINE HGB: NEGATIVE
UROBILINOGEN, URINE: NORMAL
WBC # BLD: 37.3 K/UL (ref 3.5–11.3)
WBC # BLD: ABNORMAL 10*3/UL

## 2019-01-30 PROCEDURE — 87040 BLOOD CULTURE FOR BACTERIA: CPT

## 2019-01-30 PROCEDURE — 2060000000 HC ICU INTERMEDIATE R&B

## 2019-01-30 PROCEDURE — 80048 BASIC METABOLIC PNL TOTAL CA: CPT

## 2019-01-30 PROCEDURE — 99232 SBSQ HOSP IP/OBS MODERATE 35: CPT | Performed by: INTERNAL MEDICINE

## 2019-01-30 PROCEDURE — 94762 N-INVAS EAR/PLS OXIMTRY CONT: CPT

## 2019-01-30 PROCEDURE — 83630 LACTOFERRIN FECAL (QUAL): CPT

## 2019-01-30 PROCEDURE — 84132 ASSAY OF SERUM POTASSIUM: CPT

## 2019-01-30 PROCEDURE — 36415 COLL VENOUS BLD VENIPUNCTURE: CPT

## 2019-01-30 PROCEDURE — 2700000000 HC OXYGEN THERAPY PER DAY

## 2019-01-30 PROCEDURE — 81003 URINALYSIS AUTO W/O SCOPE: CPT

## 2019-01-30 PROCEDURE — 6370000000 HC RX 637 (ALT 250 FOR IP): Performed by: STUDENT IN AN ORGANIZED HEALTH CARE EDUCATION/TRAINING PROGRAM

## 2019-01-30 PROCEDURE — 87505 NFCT AGENT DETECTION GI: CPT

## 2019-01-30 PROCEDURE — 71045 X-RAY EXAM CHEST 1 VIEW: CPT

## 2019-01-30 PROCEDURE — 94640 AIRWAY INHALATION TREATMENT: CPT

## 2019-01-30 PROCEDURE — 2580000003 HC RX 258: Performed by: STUDENT IN AN ORGANIZED HEALTH CARE EDUCATION/TRAINING PROGRAM

## 2019-01-30 PROCEDURE — 82947 ASSAY GLUCOSE BLOOD QUANT: CPT

## 2019-01-30 PROCEDURE — 97110 THERAPEUTIC EXERCISES: CPT

## 2019-01-30 PROCEDURE — 6360000002 HC RX W HCPCS: Performed by: STUDENT IN AN ORGANIZED HEALTH CARE EDUCATION/TRAINING PROGRAM

## 2019-01-30 PROCEDURE — 83735 ASSAY OF MAGNESIUM: CPT

## 2019-01-30 PROCEDURE — 6370000000 HC RX 637 (ALT 250 FOR IP): Performed by: INTERNAL MEDICINE

## 2019-01-30 PROCEDURE — 85025 COMPLETE CBC W/AUTO DIFF WBC: CPT

## 2019-01-30 RX ORDER — VANCOMYCIN HYDROCHLORIDE 1 G/200ML
1000 INJECTION, SOLUTION INTRAVENOUS EVERY 12 HOURS
Status: DISCONTINUED | OUTPATIENT
Start: 2019-01-31 | End: 2019-01-31

## 2019-01-30 RX ORDER — MAGNESIUM SULFATE 1 G/100ML
1 INJECTION INTRAVENOUS
Status: DISPENSED | OUTPATIENT
Start: 2019-01-30 | End: 2019-01-30

## 2019-01-30 RX ORDER — INSULIN GLARGINE 100 [IU]/ML
15 INJECTION, SOLUTION SUBCUTANEOUS NIGHTLY
Status: DISCONTINUED | OUTPATIENT
Start: 2019-01-30 | End: 2019-02-01

## 2019-01-30 RX ORDER — INSULIN GLARGINE 100 [IU]/ML
10 INJECTION, SOLUTION SUBCUTANEOUS NIGHTLY
Status: DISCONTINUED | OUTPATIENT
Start: 2019-01-30 | End: 2019-01-30

## 2019-01-30 RX ORDER — GUAIFENESIN 600 MG/1
600 TABLET, EXTENDED RELEASE ORAL 2 TIMES DAILY
Status: DISCONTINUED | OUTPATIENT
Start: 2019-01-30 | End: 2019-02-01 | Stop reason: HOSPADM

## 2019-01-30 RX ORDER — POTASSIUM CHLORIDE 20 MEQ/1
60 TABLET, EXTENDED RELEASE ORAL ONCE
Status: COMPLETED | OUTPATIENT
Start: 2019-01-30 | End: 2019-01-30

## 2019-01-30 RX ADMIN — PIPERACILLIN AND TAZOBACTAM 3.38 G: 3; .375 INJECTION, POWDER, LYOPHILIZED, FOR SOLUTION INTRAVENOUS; PARENTERAL at 20:38

## 2019-01-30 RX ADMIN — ENOXAPARIN SODIUM 40 MG: 40 INJECTION SUBCUTANEOUS at 08:13

## 2019-01-30 RX ADMIN — Medication 10 ML: at 07:40

## 2019-01-30 RX ADMIN — MOMETASONE FUROATE AND FORMOTEROL FUMARATE DIHYDRATE 2 PUFF: 100; 5 AEROSOL RESPIRATORY (INHALATION) at 20:34

## 2019-01-30 RX ADMIN — POTASSIUM CHLORIDE 10 MEQ: 7.46 INJECTION, SOLUTION INTRAVENOUS at 07:40

## 2019-01-30 RX ADMIN — ASPIRIN 81 MG 81 MG: 81 TABLET ORAL at 08:12

## 2019-01-30 RX ADMIN — ESCITALOPRAM OXALATE 10 MG: 10 TABLET ORAL at 08:12

## 2019-01-30 RX ADMIN — VANCOMYCIN HYDROCHLORIDE 1500 MG: 10 INJECTION, POWDER, LYOPHILIZED, FOR SOLUTION INTRAVENOUS at 13:15

## 2019-01-30 RX ADMIN — FAMOTIDINE 20 MG: 20 TABLET, FILM COATED ORAL at 08:12

## 2019-01-30 RX ADMIN — GUAIFENESIN 600 MG: 600 TABLET, EXTENDED RELEASE ORAL at 15:39

## 2019-01-30 RX ADMIN — INSULIN GLARGINE 15 UNITS: 100 INJECTION, SOLUTION SUBCUTANEOUS at 20:40

## 2019-01-30 RX ADMIN — POTASSIUM CHLORIDE 60 MEQ: 20 TABLET, EXTENDED RELEASE ORAL at 08:12

## 2019-01-30 RX ADMIN — MAGNESIUM SULFATE HEPTAHYDRATE 1 G: 1 INJECTION, SOLUTION INTRAVENOUS at 10:04

## 2019-01-30 RX ADMIN — INSULIN LISPRO 10 UNITS: 100 INJECTION, SOLUTION INTRAVENOUS; SUBCUTANEOUS at 17:20

## 2019-01-30 RX ADMIN — FAMOTIDINE 20 MG: 20 TABLET, FILM COATED ORAL at 20:38

## 2019-01-30 RX ADMIN — TAMSULOSIN HYDROCHLORIDE 0.4 MG: 0.4 CAPSULE ORAL at 08:12

## 2019-01-30 RX ADMIN — ACETAMINOPHEN 650 MG: 325 TABLET ORAL at 08:12

## 2019-01-30 RX ADMIN — SULFAMETHOXAZOLE AND TRIMETHOPRIM 1 TABLET: 800; 160 TABLET ORAL at 08:12

## 2019-01-30 RX ADMIN — MAGNESIUM SULFATE HEPTAHYDRATE 1 G: 1 INJECTION, SOLUTION INTRAVENOUS at 11:30

## 2019-01-30 RX ADMIN — INSULIN LISPRO 4 UNITS: 100 INJECTION, SOLUTION INTRAVENOUS; SUBCUTANEOUS at 20:40

## 2019-01-30 RX ADMIN — GUAIFENESIN 600 MG: 600 TABLET, EXTENDED RELEASE ORAL at 20:40

## 2019-01-30 RX ADMIN — PIPERACILLIN AND TAZOBACTAM 3.38 G: 3; .375 INJECTION, POWDER, LYOPHILIZED, FOR SOLUTION INTRAVENOUS; PARENTERAL at 15:39

## 2019-01-30 RX ADMIN — PREDNISONE 40 MG: 20 TABLET ORAL at 08:12

## 2019-01-30 RX ADMIN — TIOTROPIUM BROMIDE 18 MCG: 18 CAPSULE ORAL; RESPIRATORY (INHALATION) at 08:59

## 2019-01-30 RX ADMIN — MOMETASONE FUROATE AND FORMOTEROL FUMARATE DIHYDRATE 2 PUFF: 100; 5 AEROSOL RESPIRATORY (INHALATION) at 08:59

## 2019-01-30 ASSESSMENT — PAIN SCALES - GENERAL: PAINLEVEL_OUTOF10: 1

## 2019-01-31 LAB
ABSOLUTE EOS #: 0.23 K/UL (ref 0–0.4)
ABSOLUTE IMMATURE GRANULOCYTE: 0 K/UL (ref 0–0.3)
ABSOLUTE LYMPH #: 1.82 K/UL (ref 1–4.8)
ABSOLUTE MONO #: 1.82 K/UL (ref 0.1–0.8)
ANION GAP SERPL CALCULATED.3IONS-SCNC: 11 MMOL/L (ref 9–17)
BASOPHILS # BLD: 0 % (ref 0–2)
BASOPHILS ABSOLUTE: 0 K/UL (ref 0–0.2)
BUN BLDV-MCNC: 20 MG/DL (ref 8–23)
BUN/CREAT BLD: ABNORMAL (ref 9–20)
CALCIUM SERPL-MCNC: 8.4 MG/DL (ref 8.6–10.4)
CHLORIDE BLD-SCNC: 100 MMOL/L (ref 98–107)
CO2: 22 MMOL/L (ref 20–31)
CREAT SERPL-MCNC: 0.72 MG/DL (ref 0.7–1.2)
DIFFERENTIAL TYPE: ABNORMAL
EOSINOPHILS RELATIVE PERCENT: 1 % (ref 1–4)
GFR AFRICAN AMERICAN: >60 ML/MIN
GFR NON-AFRICAN AMERICAN: >60 ML/MIN
GFR SERPL CREATININE-BSD FRML MDRD: ABNORMAL ML/MIN/{1.73_M2}
GFR SERPL CREATININE-BSD FRML MDRD: ABNORMAL ML/MIN/{1.73_M2}
GLUCOSE BLD-MCNC: 148 MG/DL (ref 75–110)
GLUCOSE BLD-MCNC: 161 MG/DL (ref 70–99)
GLUCOSE BLD-MCNC: 262 MG/DL (ref 75–110)
GLUCOSE BLD-MCNC: 365 MG/DL (ref 75–110)
GLUCOSE BLD-MCNC: 400 MG/DL (ref 75–110)
HCT VFR BLD CALC: 32.2 % (ref 40.7–50.3)
HEMOGLOBIN: 10.6 G/DL (ref 13–17)
IMMATURE GRANULOCYTES: 0 %
LYMPHOCYTES # BLD: 8 % (ref 24–44)
MCH RBC QN AUTO: 32.6 PG (ref 25.2–33.5)
MCHC RBC AUTO-ENTMCNC: 32.9 G/DL (ref 28.4–34.8)
MCV RBC AUTO: 99.1 FL (ref 82.6–102.9)
MONOCYTES # BLD: 8 % (ref 1–7)
MORPHOLOGY: NORMAL
NRBC AUTOMATED: 0 PER 100 WBC
PDW BLD-RTO: 14.4 % (ref 11.8–14.4)
PLATELET # BLD: 269 K/UL (ref 138–453)
PLATELET ESTIMATE: ABNORMAL
PMV BLD AUTO: 10.1 FL (ref 8.1–13.5)
POTASSIUM SERPL-SCNC: 3.8 MMOL/L (ref 3.7–5.3)
RBC # BLD: 3.25 M/UL (ref 4.21–5.77)
RBC # BLD: ABNORMAL 10*6/UL
SEG NEUTROPHILS: 83 % (ref 36–66)
SEGMENTED NEUTROPHILS ABSOLUTE COUNT: 18.93 K/UL (ref 1.8–7.7)
SODIUM BLD-SCNC: 133 MMOL/L (ref 135–144)
WBC # BLD: 22.8 K/UL (ref 3.5–11.3)
WBC # BLD: ABNORMAL 10*3/UL

## 2019-01-31 PROCEDURE — 94640 AIRWAY INHALATION TREATMENT: CPT

## 2019-01-31 PROCEDURE — 97530 THERAPEUTIC ACTIVITIES: CPT

## 2019-01-31 PROCEDURE — 2700000000 HC OXYGEN THERAPY PER DAY

## 2019-01-31 PROCEDURE — 6370000000 HC RX 637 (ALT 250 FOR IP): Performed by: STUDENT IN AN ORGANIZED HEALTH CARE EDUCATION/TRAINING PROGRAM

## 2019-01-31 PROCEDURE — 2580000003 HC RX 258: Performed by: STUDENT IN AN ORGANIZED HEALTH CARE EDUCATION/TRAINING PROGRAM

## 2019-01-31 PROCEDURE — 99232 SBSQ HOSP IP/OBS MODERATE 35: CPT | Performed by: INTERNAL MEDICINE

## 2019-01-31 PROCEDURE — 82947 ASSAY GLUCOSE BLOOD QUANT: CPT

## 2019-01-31 PROCEDURE — 36415 COLL VENOUS BLD VENIPUNCTURE: CPT

## 2019-01-31 PROCEDURE — 97110 THERAPEUTIC EXERCISES: CPT

## 2019-01-31 PROCEDURE — 85025 COMPLETE CBC W/AUTO DIFF WBC: CPT

## 2019-01-31 PROCEDURE — 6370000000 HC RX 637 (ALT 250 FOR IP): Performed by: INTERNAL MEDICINE

## 2019-01-31 PROCEDURE — 80048 BASIC METABOLIC PNL TOTAL CA: CPT

## 2019-01-31 PROCEDURE — 2060000000 HC ICU INTERMEDIATE R&B

## 2019-01-31 PROCEDURE — 6360000002 HC RX W HCPCS: Performed by: STUDENT IN AN ORGANIZED HEALTH CARE EDUCATION/TRAINING PROGRAM

## 2019-01-31 RX ORDER — METRONIDAZOLE 500 MG/1
500 TABLET ORAL EVERY 8 HOURS SCHEDULED
Status: DISCONTINUED | OUTPATIENT
Start: 2019-01-31 | End: 2019-02-01 | Stop reason: HOSPADM

## 2019-01-31 RX ORDER — LACTOBACILLUS RHAMNOSUS GG 10B CELL
1 CAPSULE ORAL
Status: DISCONTINUED | OUTPATIENT
Start: 2019-02-01 | End: 2019-02-01 | Stop reason: HOSPADM

## 2019-01-31 RX ORDER — ECHINACEA PURPUREA EXTRACT 125 MG
1 TABLET ORAL PRN
Status: DISCONTINUED | OUTPATIENT
Start: 2019-01-31 | End: 2019-02-01 | Stop reason: HOSPADM

## 2019-01-31 RX ORDER — CIPROFLOXACIN 500 MG/1
500 TABLET, FILM COATED ORAL EVERY 12 HOURS SCHEDULED
Status: DISCONTINUED | OUTPATIENT
Start: 2019-01-31 | End: 2019-02-01 | Stop reason: HOSPADM

## 2019-01-31 RX ADMIN — ASPIRIN 81 MG 81 MG: 81 TABLET ORAL at 09:03

## 2019-01-31 RX ADMIN — GUAIFENESIN 600 MG: 600 TABLET, EXTENDED RELEASE ORAL at 20:34

## 2019-01-31 RX ADMIN — PIPERACILLIN AND TAZOBACTAM 3.38 G: 3; .375 INJECTION, POWDER, LYOPHILIZED, FOR SOLUTION INTRAVENOUS; PARENTERAL at 11:45

## 2019-01-31 RX ADMIN — TAMSULOSIN HYDROCHLORIDE 0.4 MG: 0.4 CAPSULE ORAL at 09:02

## 2019-01-31 RX ADMIN — MOMETASONE FUROATE AND FORMOTEROL FUMARATE DIHYDRATE 2 PUFF: 100; 5 AEROSOL RESPIRATORY (INHALATION) at 20:01

## 2019-01-31 RX ADMIN — Medication 10 ML: at 20:35

## 2019-01-31 RX ADMIN — ENOXAPARIN SODIUM 40 MG: 40 INJECTION SUBCUTANEOUS at 09:02

## 2019-01-31 RX ADMIN — METRONIDAZOLE 500 MG: 500 TABLET, FILM COATED ORAL at 21:53

## 2019-01-31 RX ADMIN — INSULIN LISPRO 10 UNITS: 100 INJECTION, SOLUTION INTRAVENOUS; SUBCUTANEOUS at 17:17

## 2019-01-31 RX ADMIN — CIPROFLOXACIN 500 MG: 500 TABLET ORAL at 20:34

## 2019-01-31 RX ADMIN — PREDNISONE 40 MG: 20 TABLET ORAL at 09:02

## 2019-01-31 RX ADMIN — INSULIN LISPRO 5 UNITS: 100 INJECTION, SOLUTION INTRAVENOUS; SUBCUTANEOUS at 20:34

## 2019-01-31 RX ADMIN — INSULIN LISPRO 6 UNITS: 100 INJECTION, SOLUTION INTRAVENOUS; SUBCUTANEOUS at 11:45

## 2019-01-31 RX ADMIN — ACETAMINOPHEN 650 MG: 325 TABLET ORAL at 18:36

## 2019-01-31 RX ADMIN — FAMOTIDINE 20 MG: 20 TABLET, FILM COATED ORAL at 20:34

## 2019-01-31 RX ADMIN — ESCITALOPRAM OXALATE 10 MG: 10 TABLET ORAL at 09:03

## 2019-01-31 RX ADMIN — INSULIN LISPRO 2 UNITS: 100 INJECTION, SOLUTION INTRAVENOUS; SUBCUTANEOUS at 09:03

## 2019-01-31 RX ADMIN — PIPERACILLIN AND TAZOBACTAM 3.38 G: 3; .375 INJECTION, POWDER, LYOPHILIZED, FOR SOLUTION INTRAVENOUS; PARENTERAL at 05:09

## 2019-01-31 RX ADMIN — METRONIDAZOLE 500 MG: 500 TABLET, FILM COATED ORAL at 16:35

## 2019-01-31 RX ADMIN — INSULIN GLARGINE 15 UNITS: 100 INJECTION, SOLUTION SUBCUTANEOUS at 20:35

## 2019-01-31 RX ADMIN — FAMOTIDINE 20 MG: 20 TABLET, FILM COATED ORAL at 09:03

## 2019-01-31 RX ADMIN — ACETAMINOPHEN 650 MG: 325 TABLET ORAL at 03:16

## 2019-01-31 RX ADMIN — MOMETASONE FUROATE AND FORMOTEROL FUMARATE DIHYDRATE 2 PUFF: 100; 5 AEROSOL RESPIRATORY (INHALATION) at 08:33

## 2019-01-31 RX ADMIN — VANCOMYCIN HYDROCHLORIDE 1000 MG: 1 INJECTION, SOLUTION INTRAVENOUS at 03:11

## 2019-01-31 RX ADMIN — GUAIFENESIN 600 MG: 600 TABLET, EXTENDED RELEASE ORAL at 09:02

## 2019-01-31 RX ADMIN — TIOTROPIUM BROMIDE 18 MCG: 18 CAPSULE ORAL; RESPIRATORY (INHALATION) at 08:33

## 2019-01-31 ASSESSMENT — PAIN SCALES - GENERAL
PAINLEVEL_OUTOF10: 5
PAINLEVEL_OUTOF10: 6
PAINLEVEL_OUTOF10: 2

## 2019-02-01 ENCOUNTER — HOSPITAL ENCOUNTER (OUTPATIENT)
Dept: MEDSURG UNIT | Age: 79
Discharge: HOME | End: 2019-02-22

## 2019-02-01 VITALS
TEMPERATURE: 97.5 F | SYSTOLIC BLOOD PRESSURE: 125 MMHG | WEIGHT: 155.5 LBS | HEIGHT: 69 IN | BODY MASS INDEX: 23.03 KG/M2 | HEART RATE: 69 BPM | OXYGEN SATURATION: 94 % | RESPIRATION RATE: 18 BRPM | DIASTOLIC BLOOD PRESSURE: 77 MMHG

## 2019-02-01 LAB
ABSOLUTE EOS #: 0.2 K/UL (ref 0–0.44)
ABSOLUTE IMMATURE GRANULOCYTE: 0.17 K/UL (ref 0–0.3)
ABSOLUTE LYMPH #: 1.61 K/UL (ref 1.1–3.7)
ABSOLUTE MONO #: 0.84 K/UL (ref 0.1–1.2)
ANION GAP SERPL CALCULATED.3IONS-SCNC: 11 MMOL/L (ref 9–17)
BASOPHILS # BLD: 0 % (ref 0–2)
BASOPHILS ABSOLUTE: 0.03 K/UL (ref 0–0.2)
BUN BLDV-MCNC: 18 MG/DL (ref 8–23)
BUN/CREAT BLD: ABNORMAL (ref 9–20)
CALCIUM SERPL-MCNC: 8.5 MG/DL (ref 8.6–10.4)
CHLORIDE BLD-SCNC: 101 MMOL/L (ref 98–107)
CO2: 26 MMOL/L (ref 20–31)
CREAT SERPL-MCNC: 0.59 MG/DL (ref 0.7–1.2)
DIFFERENTIAL TYPE: ABNORMAL
EOSINOPHILS RELATIVE PERCENT: 1 % (ref 1–4)
GFR AFRICAN AMERICAN: >60 ML/MIN
GFR NON-AFRICAN AMERICAN: >60 ML/MIN
GFR SERPL CREATININE-BSD FRML MDRD: ABNORMAL ML/MIN/{1.73_M2}
GFR SERPL CREATININE-BSD FRML MDRD: ABNORMAL ML/MIN/{1.73_M2}
GLUCOSE BLD-MCNC: 101 MG/DL (ref 70–99)
GLUCOSE BLD-MCNC: 111 MG/DL (ref 75–110)
GLUCOSE BLD-MCNC: 258 MG/DL (ref 75–110)
HCT VFR BLD CALC: 32.6 % (ref 40.7–50.3)
HEMOGLOBIN: 10.6 G/DL (ref 13–17)
IMMATURE GRANULOCYTES: 1 %
LYMPHOCYTES # BLD: 10 % (ref 24–43)
MCH RBC QN AUTO: 32.2 PG (ref 25.2–33.5)
MCHC RBC AUTO-ENTMCNC: 32.5 G/DL (ref 28.4–34.8)
MCV RBC AUTO: 99.1 FL (ref 82.6–102.9)
MONOCYTES # BLD: 5 % (ref 3–12)
NRBC AUTOMATED: 0 PER 100 WBC
PDW BLD-RTO: 14.4 % (ref 11.8–14.4)
PLATELET # BLD: 286 K/UL (ref 138–453)
PLATELET ESTIMATE: ABNORMAL
PMV BLD AUTO: 9.9 FL (ref 8.1–13.5)
POTASSIUM SERPL-SCNC: 3.6 MMOL/L (ref 3.7–5.3)
RBC # BLD: 3.29 M/UL (ref 4.21–5.77)
RBC # BLD: ABNORMAL 10*6/UL
SEG NEUTROPHILS: 83 % (ref 36–65)
SEGMENTED NEUTROPHILS ABSOLUTE COUNT: 14.03 K/UL (ref 1.5–8.1)
SODIUM BLD-SCNC: 138 MMOL/L (ref 135–144)
VANCOMYCIN TROUGH DATE LAST DOSE: ABNORMAL
VANCOMYCIN TROUGH DOSE AMOUNT: ABNORMAL
VANCOMYCIN TROUGH TIME LAST DOSE: ABNORMAL
VANCOMYCIN TROUGH: <4 UG/ML (ref 10–20)
WBC # BLD: 16.9 K/UL (ref 3.5–11.3)
WBC # BLD: ABNORMAL 10*3/UL

## 2019-02-01 PROCEDURE — 6370000000 HC RX 637 (ALT 250 FOR IP): Performed by: STUDENT IN AN ORGANIZED HEALTH CARE EDUCATION/TRAINING PROGRAM

## 2019-02-01 PROCEDURE — 94640 AIRWAY INHALATION TREATMENT: CPT

## 2019-02-01 PROCEDURE — 97602 WOUND(S) CARE NON-SELECTIVE: CPT

## 2019-02-01 PROCEDURE — 99232 SBSQ HOSP IP/OBS MODERATE 35: CPT | Performed by: INTERNAL MEDICINE

## 2019-02-01 PROCEDURE — 6360000002 HC RX W HCPCS: Performed by: STUDENT IN AN ORGANIZED HEALTH CARE EDUCATION/TRAINING PROGRAM

## 2019-02-01 PROCEDURE — 6370000000 HC RX 637 (ALT 250 FOR IP): Performed by: HOSPITALIST

## 2019-02-01 PROCEDURE — 85025 COMPLETE CBC W/AUTO DIFF WBC: CPT

## 2019-02-01 PROCEDURE — 80048 BASIC METABOLIC PNL TOTAL CA: CPT

## 2019-02-01 PROCEDURE — 94762 N-INVAS EAR/PLS OXIMTRY CONT: CPT

## 2019-02-01 PROCEDURE — 82947 ASSAY GLUCOSE BLOOD QUANT: CPT

## 2019-02-01 PROCEDURE — 97116 GAIT TRAINING THERAPY: CPT

## 2019-02-01 PROCEDURE — 97530 THERAPEUTIC ACTIVITIES: CPT

## 2019-02-01 PROCEDURE — 2580000003 HC RX 258: Performed by: STUDENT IN AN ORGANIZED HEALTH CARE EDUCATION/TRAINING PROGRAM

## 2019-02-01 PROCEDURE — 2700000000 HC OXYGEN THERAPY PER DAY

## 2019-02-01 PROCEDURE — 6370000000 HC RX 637 (ALT 250 FOR IP): Performed by: INTERNAL MEDICINE

## 2019-02-01 PROCEDURE — 36415 COLL VENOUS BLD VENIPUNCTURE: CPT

## 2019-02-01 PROCEDURE — 80202 ASSAY OF VANCOMYCIN: CPT

## 2019-02-01 RX ORDER — INSULIN GLARGINE 100 [IU]/ML
20 INJECTION, SOLUTION SUBCUTANEOUS NIGHTLY
Status: DISCONTINUED | OUTPATIENT
Start: 2019-02-01 | End: 2019-02-01 | Stop reason: HOSPADM

## 2019-02-01 RX ORDER — POTASSIUM CHLORIDE 20 MEQ/1
40 TABLET, EXTENDED RELEASE ORAL ONCE
Status: COMPLETED | OUTPATIENT
Start: 2019-02-01 | End: 2019-02-01

## 2019-02-01 RX ORDER — ECHINACEA PURPUREA EXTRACT 125 MG
1 TABLET ORAL PRN
Qty: 1 BOTTLE | Refills: 3 | Status: ON HOLD | OUTPATIENT
Start: 2019-02-01 | End: 2019-03-19 | Stop reason: HOSPADM

## 2019-02-01 RX ORDER — PREDNISONE 10 MG/1
TABLET ORAL
Qty: 63 TABLET | Refills: 0 | Status: ON HOLD | OUTPATIENT
Start: 2019-02-01 | End: 2019-03-19 | Stop reason: HOSPADM

## 2019-02-01 RX ORDER — METRONIDAZOLE 500 MG/1
500 TABLET ORAL EVERY 8 HOURS SCHEDULED
Qty: 15 TABLET | Refills: 0 | Status: SHIPPED | OUTPATIENT
Start: 2019-02-01 | End: 2019-02-06

## 2019-02-01 RX ORDER — CIPROFLOXACIN 500 MG/1
500 TABLET, FILM COATED ORAL EVERY 12 HOURS SCHEDULED
Qty: 10 TABLET | Refills: 0 | Status: SHIPPED | OUTPATIENT
Start: 2019-02-01 | End: 2019-02-06

## 2019-02-01 RX ORDER — GUAIFENESIN 600 MG/1
600 TABLET, EXTENDED RELEASE ORAL 2 TIMES DAILY
Qty: 14 TABLET | Refills: 0 | Status: SHIPPED | OUTPATIENT
Start: 2019-02-01 | End: 2019-02-08

## 2019-02-01 RX ORDER — LACTOBACILLUS RHAMNOSUS GG 10B CELL
1 CAPSULE ORAL
Qty: 7 CAPSULE | Refills: 0 | Status: SHIPPED | OUTPATIENT
Start: 2019-02-02 | End: 2019-02-09

## 2019-02-01 RX ADMIN — Medication 10 ML: at 08:39

## 2019-02-01 RX ADMIN — TIOTROPIUM BROMIDE 18 MCG: 18 CAPSULE ORAL; RESPIRATORY (INHALATION) at 08:49

## 2019-02-01 RX ADMIN — MOMETASONE FUROATE AND FORMOTEROL FUMARATE DIHYDRATE 2 PUFF: 100; 5 AEROSOL RESPIRATORY (INHALATION) at 08:49

## 2019-02-01 RX ADMIN — ESCITALOPRAM OXALATE 10 MG: 10 TABLET ORAL at 08:38

## 2019-02-01 RX ADMIN — METRONIDAZOLE 500 MG: 500 TABLET, FILM COATED ORAL at 06:16

## 2019-02-01 RX ADMIN — INSULIN LISPRO 6 UNITS: 100 INJECTION, SOLUTION INTRAVENOUS; SUBCUTANEOUS at 17:41

## 2019-02-01 RX ADMIN — TAMSULOSIN HYDROCHLORIDE 0.4 MG: 0.4 CAPSULE ORAL at 08:38

## 2019-02-01 RX ADMIN — POTASSIUM CHLORIDE 40 MEQ: 20 TABLET, EXTENDED RELEASE ORAL at 08:37

## 2019-02-01 RX ADMIN — PREDNISONE 40 MG: 20 TABLET ORAL at 08:38

## 2019-02-01 RX ADMIN — FLUTICASONE PROPIONATE 2 SPRAY: 50 SPRAY, METERED NASAL at 08:39

## 2019-02-01 RX ADMIN — Medication 1 CAPSULE: at 08:38

## 2019-02-01 RX ADMIN — CIPROFLOXACIN 500 MG: 500 TABLET ORAL at 08:38

## 2019-02-01 RX ADMIN — SULFAMETHOXAZOLE AND TRIMETHOPRIM 1 TABLET: 800; 160 TABLET ORAL at 08:39

## 2019-02-01 RX ADMIN — FAMOTIDINE 20 MG: 20 TABLET, FILM COATED ORAL at 08:38

## 2019-02-01 RX ADMIN — GUAIFENESIN 600 MG: 600 TABLET, EXTENDED RELEASE ORAL at 08:38

## 2019-02-01 RX ADMIN — ENOXAPARIN SODIUM 40 MG: 40 INJECTION SUBCUTANEOUS at 08:39

## 2019-02-01 RX ADMIN — ASPIRIN 81 MG 81 MG: 81 TABLET ORAL at 08:38

## 2019-02-01 RX ADMIN — METRONIDAZOLE 500 MG: 500 TABLET, FILM COATED ORAL at 14:54

## 2019-02-19 LAB
HCT VFR BLD CALC: 34.6 % (ref 41–53)
HEMOGLOBIN: 11.8 G/DL (ref 13.5–17.5)
MCH RBC QN AUTO: 32.5 PG (ref 26–34)
MCHC RBC AUTO-ENTMCNC: 34.1 G/DL (ref 31–37)
MCV RBC AUTO: 95.3 FL (ref 80–100)
NRBC AUTOMATED: ABNORMAL PER 100 WBC
PDW BLD-RTO: 15 % (ref 11.5–14.5)
PLATELET # BLD: 250 K/UL (ref 130–400)
PMV BLD AUTO: ABNORMAL FL (ref 6–12)
RBC # BLD: 3.64 M/UL (ref 4.5–5.9)
WBC # BLD: 13.7 K/UL (ref 3.5–11)

## 2019-02-19 PROCEDURE — 85027 COMPLETE CBC AUTOMATED: CPT

## 2019-02-20 LAB — TSH SERPL DL<=0.05 MIU/L-ACNC: 2.94 MIU/L (ref 0.3–5)

## 2019-02-20 PROCEDURE — 84443 ASSAY THYROID STIM HORMONE: CPT

## 2019-03-03 ENCOUNTER — HOSPITAL ENCOUNTER (OUTPATIENT)
Age: 79
Setting detail: SPECIMEN
Discharge: HOME | End: 2019-03-03
Payer: MEDICARE

## 2019-03-03 LAB
BILIRUBIN URINE: NEGATIVE
COLOR: YELLOW
COMMENT UA: ABNORMAL
GLUCOSE URINE: ABNORMAL
KETONES, URINE: NEGATIVE
LEUKOCYTE ESTERASE, URINE: NEGATIVE
NITRITE, URINE: NEGATIVE
PH UA: 6.5 (ref 5–8)
PROTEIN UA: NEGATIVE
SPECIFIC GRAVITY UA: 1.01 (ref 1–1.03)
TURBIDITY: CLEAR
URINE HGB: NEGATIVE
UROBILINOGEN, URINE: NORMAL

## 2019-03-03 PROCEDURE — 81003 URINALYSIS AUTO W/O SCOPE: CPT

## 2019-03-03 PROCEDURE — 87086 URINE CULTURE/COLONY COUNT: CPT

## 2019-03-04 LAB
CULTURE: NO GROWTH
Lab: NORMAL
SPECIMEN DESCRIPTION: NORMAL

## 2019-03-07 ENCOUNTER — HOSPITAL ENCOUNTER (EMERGENCY)
Age: 79
Discharge: ANOTHER HOSPITAL | End: 2019-03-07
Attending: EMERGENCY MEDICINE
Payer: MEDICARE

## 2019-03-07 ENCOUNTER — HOSPITAL ENCOUNTER (INPATIENT)
Age: 79
LOS: 15 days | Discharge: HOME | DRG: 193 | End: 2019-03-22
Attending: INTERNAL MEDICINE | Admitting: FAMILY MEDICINE
Payer: MEDICARE

## 2019-03-07 ENCOUNTER — APPOINTMENT (OUTPATIENT)
Dept: GENERAL RADIOLOGY | Age: 79
End: 2019-03-07
Payer: MEDICARE

## 2019-03-07 VITALS
HEIGHT: 69 IN | WEIGHT: 155 LBS | BODY MASS INDEX: 22.96 KG/M2 | OXYGEN SATURATION: 98 % | HEART RATE: 84 BPM | TEMPERATURE: 98.4 F | DIASTOLIC BLOOD PRESSURE: 89 MMHG | SYSTOLIC BLOOD PRESSURE: 130 MMHG | RESPIRATION RATE: 16 BRPM

## 2019-03-07 DIAGNOSIS — A41.9 SEPTICEMIA (HCC): ICD-10-CM

## 2019-03-07 DIAGNOSIS — R06.03 ACUTE RESPIRATORY DISTRESS: ICD-10-CM

## 2019-03-07 DIAGNOSIS — J18.9 PNEUMONIA DUE TO ORGANISM: Primary | ICD-10-CM

## 2019-03-07 LAB
-: ABNORMAL
ABSOLUTE EOS #: 0 K/UL (ref 0–0.4)
ABSOLUTE IMMATURE GRANULOCYTE: ABNORMAL K/UL (ref 0–0.3)
ABSOLUTE LYMPH #: 1 K/UL (ref 1–4.8)
ABSOLUTE MONO #: 0.7 K/UL (ref 0.1–1.3)
ALBUMIN SERPL-MCNC: 4 G/DL (ref 3.5–5.2)
ALBUMIN/GLOBULIN RATIO: ABNORMAL (ref 1–2.5)
ALLEN TEST: ABNORMAL
ALP BLD-CCNC: 92 U/L (ref 40–129)
ALT SERPL-CCNC: 21 U/L (ref 5–41)
AMORPHOUS: ABNORMAL
ANION GAP SERPL CALCULATED.3IONS-SCNC: 13 MMOL/L (ref 9–17)
AST SERPL-CCNC: 14 U/L
BACTERIA: ABNORMAL
BASOPHILS # BLD: 1 % (ref 0–2)
BASOPHILS ABSOLUTE: 0.1 K/UL (ref 0–0.2)
BILIRUB SERPL-MCNC: 0.39 MG/DL (ref 0.3–1.2)
BILIRUBIN URINE: NEGATIVE
BNP INTERPRETATION: NORMAL
BUN BLDV-MCNC: 21 MG/DL (ref 8–23)
BUN/CREAT BLD: ABNORMAL (ref 9–20)
CALCIUM SERPL-MCNC: 10.8 MG/DL (ref 8.6–10.4)
CARBOXYHEMOGLOBIN: 2 % (ref 0–5)
CASTS UA: ABNORMAL /LPF
CHLORIDE BLD-SCNC: 90 MMOL/L (ref 98–107)
CO2: 30 MMOL/L (ref 20–31)
COLOR: YELLOW
COMMENT UA: ABNORMAL
CREAT SERPL-MCNC: 0.87 MG/DL (ref 0.7–1.2)
CRYSTALS, UA: ABNORMAL /HPF
CRYSTALS, UA: ABNORMAL /HPF
DIFFERENTIAL TYPE: ABNORMAL
DIRECT EXAM: NORMAL
EKG ATRIAL RATE: 153 BPM
EKG Q-T INTERVAL: 272 MS
EKG QRS DURATION: 66 MS
EKG QTC CALCULATION (BAZETT): 387 MS
EKG R AXIS: 119 DEGREES
EKG T AXIS: -40 DEGREES
EKG VENTRICULAR RATE: 122 BPM
EOSINOPHILS RELATIVE PERCENT: 0 % (ref 0–4)
EPITHELIAL CELLS UA: ABNORMAL /HPF
FIO2: ABNORMAL
GFR AFRICAN AMERICAN: >60 ML/MIN
GFR NON-AFRICAN AMERICAN: >60 ML/MIN
GFR SERPL CREATININE-BSD FRML MDRD: ABNORMAL ML/MIN/{1.73_M2}
GFR SERPL CREATININE-BSD FRML MDRD: ABNORMAL ML/MIN/{1.73_M2}
GLUCOSE BLD-MCNC: 166 MG/DL (ref 75–110)
GLUCOSE BLD-MCNC: 194 MG/DL (ref 70–99)
GLUCOSE BLD-MCNC: 260 MG/DL (ref 75–110)
GLUCOSE BLD-MCNC: 285 MG/DL (ref 75–110)
GLUCOSE URINE: ABNORMAL
HCO3 VENOUS: 33.1 MMOL/L (ref 24–30)
HCT VFR BLD CALC: 38.2 % (ref 41–53)
HEMOGLOBIN: 12.8 G/DL (ref 13.5–17.5)
IMMATURE GRANULOCYTES: ABNORMAL %
INR BLD: 1
KETONES, URINE: NEGATIVE
LACTIC ACID, SEPSIS WHOLE BLOOD: ABNORMAL MMOL/L (ref 0.5–1.9)
LACTIC ACID, SEPSIS WHOLE BLOOD: NORMAL MMOL/L (ref 0.5–1.9)
LACTIC ACID, SEPSIS: 1.4 MMOL/L (ref 0.5–1.9)
LACTIC ACID, SEPSIS: 2.7 MMOL/L (ref 0.5–1.9)
LEUKOCYTE ESTERASE, URINE: NEGATIVE
LYMPHOCYTES # BLD: 9 % (ref 24–44)
Lab: NORMAL
MCH RBC QN AUTO: 31.6 PG (ref 26–34)
MCHC RBC AUTO-ENTMCNC: 33.5 G/DL (ref 31–37)
MCV RBC AUTO: 94.5 FL (ref 80–100)
METHEMOGLOBIN: 0.6 % (ref 0–1.9)
MODE: ABNORMAL
MONOCYTES # BLD: 6 % (ref 1–7)
MUCUS: ABNORMAL
NEGATIVE BASE EXCESS, VEN: ABNORMAL MMOL/L (ref 0–2)
NITRITE, URINE: NEGATIVE
NOTIFICATION TIME: ABNORMAL
NOTIFICATION: ABNORMAL
NRBC AUTOMATED: ABNORMAL PER 100 WBC
O2 DEVICE/FLOW/%: ABNORMAL
O2 SAT, VEN: 32.8 % (ref 60–85)
OTHER OBSERVATIONS UA: ABNORMAL
OXYHEMOGLOBIN: ABNORMAL % (ref 95–98)
PARTIAL THROMBOPLASTIN TIME: 23.8 SEC (ref 24–36)
PATIENT TEMP: 37
PCO2, VEN, TEMP ADJ: ABNORMAL MMHG (ref 39–55)
PCO2, VEN: 48.4 (ref 39–55)
PDW BLD-RTO: 16.5 % (ref 11.5–14.9)
PEEP/CPAP: ABNORMAL
PH UA: 5.5 (ref 5–8)
PH VENOUS: 7.44 (ref 7.32–7.42)
PH, VEN, TEMP ADJ: ABNORMAL (ref 7.32–7.42)
PLATELET # BLD: 221 K/UL (ref 150–450)
PLATELET ESTIMATE: ABNORMAL
PMV BLD AUTO: 7.7 FL (ref 6–12)
PO2, VEN, TEMP ADJ: ABNORMAL MMHG (ref 30–50)
PO2, VEN: 21.1 (ref 30–50)
POSITIVE BASE EXCESS, VEN: 9 MMOL/L (ref 0–2)
POTASSIUM SERPL-SCNC: 3.8 MMOL/L (ref 3.7–5.3)
PRO-BNP: 246 PG/ML
PROTEIN UA: NEGATIVE
PROTHROMBIN TIME: 12.8 SEC (ref 11.8–14.6)
PSV: ABNORMAL
PT. POSITION: ABNORMAL
RBC # BLD: 4.05 M/UL (ref 4.5–5.9)
RBC # BLD: ABNORMAL 10*6/UL
RBC UA: ABNORMAL /HPF
RENAL EPITHELIAL, UA: ABNORMAL /HPF
RESPIRATORY RATE: ABNORMAL
SAMPLE SITE: ABNORMAL
SEG NEUTROPHILS: 84 % (ref 36–66)
SEGMENTED NEUTROPHILS ABSOLUTE COUNT: 9.6 K/UL (ref 1.3–9.1)
SET RATE: ABNORMAL
SODIUM BLD-SCNC: 133 MMOL/L (ref 135–144)
SPECIFIC GRAVITY UA: 1.02 (ref 1–1.03)
SPECIMEN DESCRIPTION: NORMAL
TEXT FOR RESPIRATORY: ABNORMAL
TOTAL HB: ABNORMAL G/DL (ref 12–16)
TOTAL PROTEIN: 7.3 G/DL (ref 6.4–8.3)
TOTAL RATE: ABNORMAL
TRICHOMONAS: ABNORMAL
TROPONIN INTERP: ABNORMAL
TROPONIN INTERP: ABNORMAL
TROPONIN T: ABNORMAL NG/ML
TROPONIN T: ABNORMAL NG/ML
TROPONIN, HIGH SENSITIVITY: 23 NG/L (ref 0–22)
TROPONIN, HIGH SENSITIVITY: 24 NG/L (ref 0–22)
TURBIDITY: ABNORMAL
URINE HGB: NEGATIVE
UROBILINOGEN, URINE: NORMAL
VT: ABNORMAL
WBC # BLD: 11.5 K/UL (ref 3.5–11)
WBC # BLD: ABNORMAL 10*3/UL
WBC UA: ABNORMAL /HPF
YEAST: ABNORMAL

## 2019-03-07 PROCEDURE — 71045 X-RAY EXAM CHEST 1 VIEW: CPT

## 2019-03-07 PROCEDURE — 84484 ASSAY OF TROPONIN QUANT: CPT

## 2019-03-07 PROCEDURE — 2580000003 HC RX 258: Performed by: EMERGENCY MEDICINE

## 2019-03-07 PROCEDURE — 87040 BLOOD CULTURE FOR BACTERIA: CPT

## 2019-03-07 PROCEDURE — 2700000000 HC OXYGEN THERAPY PER DAY

## 2019-03-07 PROCEDURE — 99285 EMERGENCY DEPT VISIT HI MDM: CPT

## 2019-03-07 PROCEDURE — 87804 INFLUENZA ASSAY W/OPTIC: CPT

## 2019-03-07 PROCEDURE — 6370000000 HC RX 637 (ALT 250 FOR IP): Performed by: NURSE PRACTITIONER

## 2019-03-07 PROCEDURE — 82800 BLOOD PH: CPT

## 2019-03-07 PROCEDURE — 6370000000 HC RX 637 (ALT 250 FOR IP): Performed by: EMERGENCY MEDICINE

## 2019-03-07 PROCEDURE — 85025 COMPLETE CBC W/AUTO DIFF WBC: CPT

## 2019-03-07 PROCEDURE — 94640 AIRWAY INHALATION TREATMENT: CPT

## 2019-03-07 PROCEDURE — 80053 COMPREHEN METABOLIC PANEL: CPT

## 2019-03-07 PROCEDURE — 83605 ASSAY OF LACTIC ACID: CPT

## 2019-03-07 PROCEDURE — 36415 COLL VENOUS BLD VENIPUNCTURE: CPT

## 2019-03-07 PROCEDURE — 87070 CULTURE OTHR SPECIMN AEROBIC: CPT

## 2019-03-07 PROCEDURE — 96375 TX/PRO/DX INJ NEW DRUG ADDON: CPT

## 2019-03-07 PROCEDURE — 6360000002 HC RX W HCPCS: Performed by: EMERGENCY MEDICINE

## 2019-03-07 PROCEDURE — 96365 THER/PROPH/DIAG IV INF INIT: CPT

## 2019-03-07 PROCEDURE — 82947 ASSAY GLUCOSE BLOOD QUANT: CPT

## 2019-03-07 PROCEDURE — 96367 TX/PROPH/DG ADDL SEQ IV INF: CPT

## 2019-03-07 PROCEDURE — 82805 BLOOD GASES W/O2 SATURATION: CPT

## 2019-03-07 PROCEDURE — 87205 SMEAR GRAM STAIN: CPT

## 2019-03-07 PROCEDURE — 83036 HEMOGLOBIN GLYCOSYLATED A1C: CPT

## 2019-03-07 PROCEDURE — 81001 URINALYSIS AUTO W/SCOPE: CPT

## 2019-03-07 PROCEDURE — 96366 THER/PROPH/DIAG IV INF ADDON: CPT

## 2019-03-07 PROCEDURE — 2580000003 HC RX 258: Performed by: NURSE PRACTITIONER

## 2019-03-07 PROCEDURE — 6360000002 HC RX W HCPCS: Performed by: NURSE PRACTITIONER

## 2019-03-07 PROCEDURE — 83880 ASSAY OF NATRIURETIC PEPTIDE: CPT

## 2019-03-07 PROCEDURE — 94762 N-INVAS EAR/PLS OXIMTRY CONT: CPT

## 2019-03-07 PROCEDURE — 85610 PROTHROMBIN TIME: CPT

## 2019-03-07 PROCEDURE — 2060000000 HC ICU INTERMEDIATE R&B

## 2019-03-07 PROCEDURE — 85730 THROMBOPLASTIN TIME PARTIAL: CPT

## 2019-03-07 PROCEDURE — 87641 MR-STAPH DNA AMP PROBE: CPT

## 2019-03-07 PROCEDURE — 96372 THER/PROPH/DIAG INJ SC/IM: CPT

## 2019-03-07 PROCEDURE — 94660 CPAP INITIATION&MGMT: CPT

## 2019-03-07 PROCEDURE — 93005 ELECTROCARDIOGRAM TRACING: CPT

## 2019-03-07 RX ORDER — DEXTROSE MONOHYDRATE 50 MG/ML
100 INJECTION, SOLUTION INTRAVENOUS PRN
Status: DISCONTINUED | OUTPATIENT
Start: 2019-03-07 | End: 2019-03-22 | Stop reason: HOSPADM

## 2019-03-07 RX ORDER — DEXTROSE MONOHYDRATE 25 G/50ML
12.5 INJECTION, SOLUTION INTRAVENOUS PRN
Status: DISCONTINUED | OUTPATIENT
Start: 2019-03-07 | End: 2019-03-22 | Stop reason: HOSPADM

## 2019-03-07 RX ORDER — 0.9 % SODIUM CHLORIDE 0.9 %
30 INTRAVENOUS SOLUTION INTRAVENOUS ONCE
Status: COMPLETED | OUTPATIENT
Start: 2019-03-07 | End: 2019-03-07

## 2019-03-07 RX ORDER — ONDANSETRON 2 MG/ML
4 INJECTION INTRAMUSCULAR; INTRAVENOUS EVERY 6 HOURS PRN
Status: DISCONTINUED | OUTPATIENT
Start: 2019-03-07 | End: 2019-03-22 | Stop reason: HOSPADM

## 2019-03-07 RX ORDER — SODIUM CHLORIDE 0.9 % (FLUSH) 0.9 %
10 SYRINGE (ML) INJECTION EVERY 12 HOURS SCHEDULED
Status: DISCONTINUED | OUTPATIENT
Start: 2019-03-07 | End: 2019-03-22 | Stop reason: HOSPADM

## 2019-03-07 RX ORDER — NICOTINE POLACRILEX 4 MG
15 LOZENGE BUCCAL PRN
Status: DISCONTINUED | OUTPATIENT
Start: 2019-03-07 | End: 2019-03-22 | Stop reason: HOSPADM

## 2019-03-07 RX ORDER — IPRATROPIUM BROMIDE AND ALBUTEROL SULFATE 2.5; .5 MG/3ML; MG/3ML
3 SOLUTION RESPIRATORY (INHALATION) ONCE
Status: COMPLETED | OUTPATIENT
Start: 2019-03-07 | End: 2019-03-07

## 2019-03-07 RX ORDER — ACETAMINOPHEN 325 MG/1
650 TABLET ORAL EVERY 4 HOURS PRN
Status: DISCONTINUED | OUTPATIENT
Start: 2019-03-07 | End: 2019-03-22 | Stop reason: HOSPADM

## 2019-03-07 RX ORDER — METHYLPREDNISOLONE SODIUM SUCCINATE 125 MG/2ML
125 INJECTION, POWDER, LYOPHILIZED, FOR SOLUTION INTRAMUSCULAR; INTRAVENOUS ONCE
Status: COMPLETED | OUTPATIENT
Start: 2019-03-07 | End: 2019-03-07

## 2019-03-07 RX ORDER — IPRATROPIUM BROMIDE AND ALBUTEROL SULFATE 2.5; .5 MG/3ML; MG/3ML
1 SOLUTION RESPIRATORY (INHALATION)
Status: DISCONTINUED | OUTPATIENT
Start: 2019-03-07 | End: 2019-03-07 | Stop reason: HOSPADM

## 2019-03-07 RX ORDER — NICOTINE 21 MG/24HR
1 PATCH, TRANSDERMAL 24 HOURS TRANSDERMAL DAILY PRN
Status: DISCONTINUED | OUTPATIENT
Start: 2019-03-07 | End: 2019-03-22 | Stop reason: HOSPADM

## 2019-03-07 RX ORDER — SODIUM CHLORIDE 0.9 % (FLUSH) 0.9 %
10 SYRINGE (ML) INJECTION PRN
Status: DISCONTINUED | OUTPATIENT
Start: 2019-03-07 | End: 2019-03-22 | Stop reason: HOSPADM

## 2019-03-07 RX ORDER — ACETAMINOPHEN 500 MG
1000 TABLET ORAL ONCE
Status: COMPLETED | OUTPATIENT
Start: 2019-03-07 | End: 2019-03-07

## 2019-03-07 RX ORDER — OSELTAMIVIR PHOSPHATE 75 MG/1
75 CAPSULE ORAL ONCE
Status: COMPLETED | OUTPATIENT
Start: 2019-03-07 | End: 2019-03-07

## 2019-03-07 RX ORDER — SODIUM CHLORIDE 9 MG/ML
INJECTION, SOLUTION INTRAVENOUS CONTINUOUS
Status: DISCONTINUED | OUTPATIENT
Start: 2019-03-07 | End: 2019-03-08

## 2019-03-07 RX ORDER — ASPIRIN 81 MG/1
324 TABLET, CHEWABLE ORAL ONCE
Status: COMPLETED | OUTPATIENT
Start: 2019-03-07 | End: 2019-03-07

## 2019-03-07 RX ORDER — ALBUTEROL SULFATE 2.5 MG/3ML
2.5 SOLUTION RESPIRATORY (INHALATION)
Status: DISCONTINUED | OUTPATIENT
Start: 2019-03-07 | End: 2019-03-22 | Stop reason: HOSPADM

## 2019-03-07 RX ADMIN — IPRATROPIUM BROMIDE AND ALBUTEROL SULFATE 1 AMPULE: .5; 3 SOLUTION RESPIRATORY (INHALATION) at 16:27

## 2019-03-07 RX ADMIN — ASPIRIN 81 MG 324 MG: 81 TABLET ORAL at 07:58

## 2019-03-07 RX ADMIN — VANCOMYCIN HYDROCHLORIDE 1000 MG: 1 INJECTION, POWDER, LYOPHILIZED, FOR SOLUTION INTRAVENOUS at 18:45

## 2019-03-07 RX ADMIN — VANCOMYCIN HYDROCHLORIDE 1000 MG: 1 INJECTION, POWDER, LYOPHILIZED, FOR SOLUTION INTRAVENOUS at 06:11

## 2019-03-07 RX ADMIN — INSULIN LISPRO 6 UNITS: 100 INJECTION, SOLUTION INTRAVENOUS; SUBCUTANEOUS at 12:44

## 2019-03-07 RX ADMIN — PIPERACILLIN SODIUM AND TAZOBACTAM SODIUM 3.38 G: 3; .375 INJECTION, POWDER, LYOPHILIZED, FOR SOLUTION INTRAVENOUS at 13:58

## 2019-03-07 RX ADMIN — IPRATROPIUM BROMIDE AND ALBUTEROL SULFATE 1 AMPULE: .5; 3 SOLUTION RESPIRATORY (INHALATION) at 19:56

## 2019-03-07 RX ADMIN — METHYLPREDNISOLONE SODIUM SUCCINATE 125 MG: 125 INJECTION, POWDER, FOR SOLUTION INTRAMUSCULAR; INTRAVENOUS at 05:21

## 2019-03-07 RX ADMIN — IPRATROPIUM BROMIDE AND ALBUTEROL SULFATE 1 AMPULE: .5; 3 SOLUTION RESPIRATORY (INHALATION) at 12:54

## 2019-03-07 RX ADMIN — INSULIN LISPRO 6 UNITS: 100 INJECTION, SOLUTION INTRAVENOUS; SUBCUTANEOUS at 18:22

## 2019-03-07 RX ADMIN — SODIUM CHLORIDE 2109 ML: 9 INJECTION, SOLUTION INTRAVENOUS at 05:22

## 2019-03-07 RX ADMIN — INSULIN LISPRO 2 UNITS: 100 INJECTION, SOLUTION INTRAVENOUS; SUBCUTANEOUS at 22:11

## 2019-03-07 RX ADMIN — ACETAMINOPHEN 1000 MG: 500 TABLET, FILM COATED ORAL at 05:24

## 2019-03-07 RX ADMIN — OSELTAMIVIR PHOSPHATE 75 MG: 75 CAPSULE ORAL at 05:24

## 2019-03-07 RX ADMIN — IPRATROPIUM BROMIDE AND ALBUTEROL SULFATE 3 AMPULE: .5; 3 SOLUTION RESPIRATORY (INHALATION) at 05:39

## 2019-03-07 RX ADMIN — SODIUM CHLORIDE: 9 INJECTION, SOLUTION INTRAVENOUS at 23:58

## 2019-03-07 RX ADMIN — PIPERACILLIN SODIUM AND TAZOBACTAM SODIUM 3.38 G: 3; .375 INJECTION, POWDER, LYOPHILIZED, FOR SOLUTION INTRAVENOUS at 05:44

## 2019-03-07 ASSESSMENT — PAIN SCALES - GENERAL
PAINLEVEL_OUTOF10: 0
PAINLEVEL_OUTOF10: 0

## 2019-03-08 ENCOUNTER — APPOINTMENT (OUTPATIENT)
Dept: GENERAL RADIOLOGY | Age: 79
DRG: 193 | End: 2019-03-08
Attending: INTERNAL MEDICINE
Payer: MEDICARE

## 2019-03-08 ENCOUNTER — APPOINTMENT (OUTPATIENT)
Dept: CT IMAGING | Age: 79
DRG: 193 | End: 2019-03-08
Attending: INTERNAL MEDICINE
Payer: MEDICARE

## 2019-03-08 PROBLEM — J96.01 ACUTE RESPIRATORY FAILURE WITH HYPOXIA (HCC): Status: ACTIVE | Noted: 2019-01-23

## 2019-03-08 PROBLEM — I48.92 ATRIAL FLUTTER (HCC): Status: ACTIVE | Noted: 2019-03-08

## 2019-03-08 LAB
ADENOVIRUS PCR: NOT DETECTED
ALLEN TEST: POSITIVE
ANION GAP SERPL CALCULATED.3IONS-SCNC: 14 MMOL/L (ref 9–17)
BORDETELLA PERTUSSIS PCR: NOT DETECTED
BUN BLDV-MCNC: 14 MG/DL (ref 8–23)
BUN/CREAT BLD: ABNORMAL (ref 9–20)
CALCIUM SERPL-MCNC: 9.2 MG/DL (ref 8.6–10.4)
CHLAMYDIA PNEUMONIAE BY PCR: NOT DETECTED
CHLORIDE BLD-SCNC: 98 MMOL/L (ref 98–107)
CO2: 26 MMOL/L (ref 20–31)
CORONAVIRUS 229E PCR: NOT DETECTED
CORONAVIRUS HKU1 PCR: NOT DETECTED
CORONAVIRUS NL63 PCR: NOT DETECTED
CORONAVIRUS OC43 PCR: NOT DETECTED
CREAT SERPL-MCNC: 0.71 MG/DL (ref 0.7–1.2)
DIRECT EXAM: NORMAL
FIO2: 45
GFR AFRICAN AMERICAN: >60 ML/MIN
GFR NON-AFRICAN AMERICAN: >60 ML/MIN
GFR SERPL CREATININE-BSD FRML MDRD: ABNORMAL ML/MIN/{1.73_M2}
GFR SERPL CREATININE-BSD FRML MDRD: ABNORMAL ML/MIN/{1.73_M2}
GLUCOSE BLD-MCNC: 124 MG/DL (ref 75–110)
GLUCOSE BLD-MCNC: 125 MG/DL (ref 70–99)
GLUCOSE BLD-MCNC: 238 MG/DL (ref 75–110)
GLUCOSE BLD-MCNC: 292 MG/DL (ref 75–110)
GLUCOSE BLD-MCNC: 406 MG/DL (ref 75–110)
GLUCOSE BLD-MCNC: 81 MG/DL (ref 75–110)
HCT VFR BLD CALC: 32.8 % (ref 40.7–50.3)
HEMOGLOBIN: 10.5 G/DL (ref 13–17)
HUMAN METAPNEUMOVIRUS PCR: NOT DETECTED
INFLUENZA A BY PCR: NOT DETECTED
INFLUENZA A H1 (2009) PCR: ABNORMAL
INFLUENZA A H1 PCR: ABNORMAL
INFLUENZA A H3 PCR: ABNORMAL
INFLUENZA B BY PCR: NOT DETECTED
Lab: NORMAL
MCH RBC QN AUTO: 31.5 PG (ref 25.2–33.5)
MCHC RBC AUTO-ENTMCNC: 32 G/DL (ref 28.4–34.8)
MCV RBC AUTO: 98.5 FL (ref 82.6–102.9)
MODE: ABNORMAL
MRSA, DNA, NASAL: ABNORMAL
MYCOPLASMA PNEUMONIAE PCR: NOT DETECTED
NEGATIVE BASE EXCESS, ART: ABNORMAL (ref 0–2)
NRBC AUTOMATED: 0 PER 100 WBC
O2 DEVICE/FLOW/%: ABNORMAL
PARAINFLUENZA 1 PCR: NOT DETECTED
PARAINFLUENZA 2 PCR: NOT DETECTED
PARAINFLUENZA 3 PCR: NOT DETECTED
PARAINFLUENZA 4 PCR: NOT DETECTED
PATIENT TEMP: ABNORMAL
PDW BLD-RTO: 15.1 % (ref 11.8–14.4)
PLATELET # BLD: 183 K/UL (ref 138–453)
PMV BLD AUTO: 9.8 FL (ref 8.1–13.5)
POC HCO3: 28.2 MMOL/L (ref 21–28)
POC O2 SATURATION: 95 % (ref 94–98)
POC PCO2 TEMP: ABNORMAL MM HG
POC PCO2: 38.9 MM HG (ref 35–48)
POC PH TEMP: ABNORMAL
POC PH: 7.47 (ref 7.35–7.45)
POC PO2 TEMP: ABNORMAL MM HG
POC PO2: 73 MM HG (ref 83–108)
POSITIVE BASE EXCESS, ART: 4 (ref 0–3)
POTASSIUM SERPL-SCNC: 4.3 MMOL/L (ref 3.7–5.3)
RBC # BLD: 3.33 M/UL (ref 4.21–5.77)
RESP SYNCYTIAL VIRUS PCR: DETECTED
RHINO/ENTEROVIRUS PCR: NOT DETECTED
SAMPLE SITE: ABNORMAL
SODIUM BLD-SCNC: 138 MMOL/L (ref 135–144)
SPECIMEN DESCRIPTION: ABNORMAL
SPECIMEN DESCRIPTION: ABNORMAL
SPECIMEN DESCRIPTION: NORMAL
TCO2 (CALC), ART: 29 MMOL/L (ref 22–29)
VANCOMYCIN TROUGH DATE LAST DOSE: ABNORMAL
VANCOMYCIN TROUGH DOSE AMOUNT: ABNORMAL
VANCOMYCIN TROUGH TIME LAST DOSE: ABNORMAL
VANCOMYCIN TROUGH: 9.3 UG/ML (ref 10–20)
WBC # BLD: 10.1 K/UL (ref 3.5–11.3)

## 2019-03-08 PROCEDURE — 36600 WITHDRAWAL OF ARTERIAL BLOOD: CPT

## 2019-03-08 PROCEDURE — 2580000003 HC RX 258: Performed by: INTERNAL MEDICINE

## 2019-03-08 PROCEDURE — 87798 DETECT AGENT NOS DNA AMP: CPT

## 2019-03-08 PROCEDURE — 2060000000 HC ICU INTERMEDIATE R&B

## 2019-03-08 PROCEDURE — 99291 CRITICAL CARE FIRST HOUR: CPT | Performed by: INTERNAL MEDICINE

## 2019-03-08 PROCEDURE — 94660 CPAP INITIATION&MGMT: CPT

## 2019-03-08 PROCEDURE — 82947 ASSAY GLUCOSE BLOOD QUANT: CPT

## 2019-03-08 PROCEDURE — 74176 CT ABD & PELVIS W/O CONTRAST: CPT

## 2019-03-08 PROCEDURE — 99223 1ST HOSP IP/OBS HIGH 75: CPT | Performed by: INTERNAL MEDICINE

## 2019-03-08 PROCEDURE — 6360000002 HC RX W HCPCS: Performed by: INTERNAL MEDICINE

## 2019-03-08 PROCEDURE — 80048 BASIC METABOLIC PNL TOTAL CA: CPT

## 2019-03-08 PROCEDURE — 6370000000 HC RX 637 (ALT 250 FOR IP): Performed by: NURSE PRACTITIONER

## 2019-03-08 PROCEDURE — 85027 COMPLETE CBC AUTOMATED: CPT

## 2019-03-08 PROCEDURE — 6360000002 HC RX W HCPCS: Performed by: NURSE PRACTITIONER

## 2019-03-08 PROCEDURE — 2580000003 HC RX 258: Performed by: NURSE PRACTITIONER

## 2019-03-08 PROCEDURE — 87633 RESP VIRUS 12-25 TARGETS: CPT

## 2019-03-08 PROCEDURE — 82803 BLOOD GASES ANY COMBINATION: CPT

## 2019-03-08 PROCEDURE — 36415 COLL VENOUS BLD VENIPUNCTURE: CPT

## 2019-03-08 PROCEDURE — 87581 M.PNEUMON DNA AMP PROBE: CPT

## 2019-03-08 PROCEDURE — 94640 AIRWAY INHALATION TREATMENT: CPT

## 2019-03-08 PROCEDURE — 6370000000 HC RX 637 (ALT 250 FOR IP): Performed by: INTERNAL MEDICINE

## 2019-03-08 PROCEDURE — 87486 CHLMYD PNEUM DNA AMP PROBE: CPT

## 2019-03-08 PROCEDURE — 80202 ASSAY OF VANCOMYCIN: CPT

## 2019-03-08 PROCEDURE — 71045 X-RAY EXAM CHEST 1 VIEW: CPT

## 2019-03-08 RX ORDER — ACETAMINOPHEN 325 MG/1
650 TABLET ORAL EVERY 6 HOURS PRN
Status: DISCONTINUED | OUTPATIENT
Start: 2019-03-08 | End: 2019-03-22 | Stop reason: HOSPADM

## 2019-03-08 RX ORDER — METHYLPREDNISOLONE SODIUM SUCCINATE 40 MG/ML
40 INJECTION, POWDER, LYOPHILIZED, FOR SOLUTION INTRAMUSCULAR; INTRAVENOUS EVERY 12 HOURS
Status: DISCONTINUED | OUTPATIENT
Start: 2019-03-08 | End: 2019-03-08

## 2019-03-08 RX ORDER — INSULIN GLARGINE 100 [IU]/ML
20 INJECTION, SOLUTION SUBCUTANEOUS NIGHTLY
Status: DISCONTINUED | OUTPATIENT
Start: 2019-03-08 | End: 2019-03-08

## 2019-03-08 RX ORDER — FAMOTIDINE 20 MG/1
20 TABLET, FILM COATED ORAL 2 TIMES DAILY
Status: DISCONTINUED | OUTPATIENT
Start: 2019-03-08 | End: 2019-03-22 | Stop reason: HOSPADM

## 2019-03-08 RX ORDER — ECHINACEA PURPUREA EXTRACT 125 MG
1 TABLET ORAL PRN
Status: DISCONTINUED | OUTPATIENT
Start: 2019-03-08 | End: 2019-03-18

## 2019-03-08 RX ORDER — VANCOMYCIN HYDROCHLORIDE 1 G/200ML
1000 INJECTION, SOLUTION INTRAVENOUS EVERY 12 HOURS
Status: DISCONTINUED | OUTPATIENT
Start: 2019-03-09 | End: 2019-03-09

## 2019-03-08 RX ORDER — ESCITALOPRAM OXALATE 10 MG/1
10 TABLET ORAL DAILY
Status: DISCONTINUED | OUTPATIENT
Start: 2019-03-08 | End: 2019-03-22 | Stop reason: HOSPADM

## 2019-03-08 RX ORDER — METHYLPREDNISOLONE SODIUM SUCCINATE 40 MG/ML
40 INJECTION, POWDER, LYOPHILIZED, FOR SOLUTION INTRAMUSCULAR; INTRAVENOUS EVERY 8 HOURS
Status: DISCONTINUED | OUTPATIENT
Start: 2019-03-08 | End: 2019-03-15

## 2019-03-08 RX ORDER — ALBUTEROL SULFATE 2.5 MG/3ML
2.5 SOLUTION RESPIRATORY (INHALATION)
Status: DISCONTINUED | OUTPATIENT
Start: 2019-03-08 | End: 2019-03-08

## 2019-03-08 RX ORDER — INSULIN GLARGINE 100 [IU]/ML
25 INJECTION, SOLUTION SUBCUTANEOUS NIGHTLY
Status: DISCONTINUED | OUTPATIENT
Start: 2019-03-08 | End: 2019-03-09

## 2019-03-08 RX ORDER — VANCOMYCIN HYDROCHLORIDE 125 MG/1
125 CAPSULE ORAL 4 TIMES DAILY
Status: DISCONTINUED | OUTPATIENT
Start: 2019-03-08 | End: 2019-03-09

## 2019-03-08 RX ORDER — LEVOFLOXACIN 5 MG/ML
500 INJECTION, SOLUTION INTRAVENOUS EVERY 24 HOURS
Status: DISCONTINUED | OUTPATIENT
Start: 2019-03-08 | End: 2019-03-09

## 2019-03-08 RX ORDER — ASPIRIN 81 MG/1
81 TABLET, CHEWABLE ORAL DAILY
Status: DISCONTINUED | OUTPATIENT
Start: 2019-03-08 | End: 2019-03-22 | Stop reason: HOSPADM

## 2019-03-08 RX ORDER — BISACODYL 10 MG
10 SUPPOSITORY, RECTAL RECTAL ONCE
Status: DISCONTINUED | OUTPATIENT
Start: 2019-03-08 | End: 2019-03-22 | Stop reason: HOSPADM

## 2019-03-08 RX ORDER — TAMSULOSIN HYDROCHLORIDE 0.4 MG/1
0.4 CAPSULE ORAL DAILY
Status: DISCONTINUED | OUTPATIENT
Start: 2019-03-08 | End: 2019-03-22 | Stop reason: HOSPADM

## 2019-03-08 RX ORDER — LEVALBUTEROL INHALATION SOLUTION 1.25 MG/3ML
1.25 SOLUTION RESPIRATORY (INHALATION)
Status: DISCONTINUED | OUTPATIENT
Start: 2019-03-08 | End: 2019-03-22 | Stop reason: HOSPADM

## 2019-03-08 RX ADMIN — FAMOTIDINE 20 MG: 20 TABLET, FILM COATED ORAL at 21:45

## 2019-03-08 RX ADMIN — TAMSULOSIN HYDROCHLORIDE 0.4 MG: 0.4 CAPSULE ORAL at 09:55

## 2019-03-08 RX ADMIN — METHYLPREDNISOLONE SODIUM SUCCINATE 40 MG: 40 INJECTION, POWDER, FOR SOLUTION INTRAMUSCULAR; INTRAVENOUS at 09:55

## 2019-03-08 RX ADMIN — INSULIN LISPRO 4 UNITS: 100 INJECTION, SOLUTION INTRAVENOUS; SUBCUTANEOUS at 11:17

## 2019-03-08 RX ADMIN — VANCOMYCIN HYDROCHLORIDE 125 MG: 125 CAPSULE ORAL at 17:09

## 2019-03-08 RX ADMIN — VANCOMYCIN HYDROCHLORIDE 125 MG: 125 CAPSULE ORAL at 14:25

## 2019-03-08 RX ADMIN — PIPERACILLIN AND TAZOBACTAM 3.38 G: 3; .375 INJECTION, POWDER, LYOPHILIZED, FOR SOLUTION INTRAVENOUS; PARENTERAL at 21:44

## 2019-03-08 RX ADMIN — ENOXAPARIN SODIUM 40 MG: 40 INJECTION SUBCUTANEOUS at 09:55

## 2019-03-08 RX ADMIN — ESCITALOPRAM OXALATE 10 MG: 10 TABLET ORAL at 09:55

## 2019-03-08 RX ADMIN — LEVALBUTEROL HYDROCHLORIDE 1.25 MG: 1.25 SOLUTION RESPIRATORY (INHALATION) at 16:13

## 2019-03-08 RX ADMIN — VANCOMYCIN HYDROCHLORIDE 125 MG: 125 CAPSULE ORAL at 21:57

## 2019-03-08 RX ADMIN — INSULIN LISPRO 6 UNITS: 100 INJECTION, SOLUTION INTRAVENOUS; SUBCUTANEOUS at 17:09

## 2019-03-08 RX ADMIN — INSULIN LISPRO 6 UNITS: 100 INJECTION, SOLUTION INTRAVENOUS; SUBCUTANEOUS at 21:44

## 2019-03-08 RX ADMIN — VANCOMYCIN HYDROCHLORIDE 750 MG: 10 INJECTION, POWDER, LYOPHILIZED, FOR SOLUTION INTRAVENOUS at 18:56

## 2019-03-08 RX ADMIN — IPRATROPIUM BROMIDE 0.5 MG: 0.5 SOLUTION RESPIRATORY (INHALATION) at 16:13

## 2019-03-08 RX ADMIN — ALBUTEROL SULFATE 2.5 MG: 2.5 SOLUTION RESPIRATORY (INHALATION) at 17:46

## 2019-03-08 RX ADMIN — PIPERACILLIN AND TAZOBACTAM 3.38 G: 3; .375 INJECTION, POWDER, LYOPHILIZED, FOR SOLUTION INTRAVENOUS; PARENTERAL at 11:52

## 2019-03-08 RX ADMIN — VANCOMYCIN HYDROCHLORIDE 750 MG: 10 INJECTION, POWDER, LYOPHILIZED, FOR SOLUTION INTRAVENOUS at 06:51

## 2019-03-08 RX ADMIN — METHYLPREDNISOLONE SODIUM SUCCINATE 40 MG: 40 INJECTION, POWDER, FOR SOLUTION INTRAMUSCULAR; INTRAVENOUS at 18:36

## 2019-03-08 RX ADMIN — FAMOTIDINE 20 MG: 20 TABLET, FILM COATED ORAL at 09:55

## 2019-03-08 RX ADMIN — LEVOFLOXACIN 500 MG: 5 INJECTION, SOLUTION INTRAVENOUS at 09:56

## 2019-03-08 RX ADMIN — Medication 10 ML: at 00:03

## 2019-03-08 RX ADMIN — INSULIN GLARGINE 25 UNITS: 100 INJECTION, SOLUTION SUBCUTANEOUS at 21:44

## 2019-03-08 RX ADMIN — ASPIRIN 81 MG: 81 TABLET, CHEWABLE ORAL at 14:23

## 2019-03-08 NOTE — PLAN OF CARE
Problem: Falls - Risk of:  Goal: Will remain free from falls  Description  Will remain free from falls  Outcome: Ongoing  Note:   Pt remains free of falls at this time. Bed locked in lowest position, siderails x2, call light in reach. Non-skid footwear applied. Pt ambulates in room with steady gait. Encouraged pt to call for assistance as needed for safety. Falling star posted outside of room. Will continue to monitor.

## 2019-03-08 NOTE — CONSULTS
FINGER SURGERY      FOOT SURGERY      PANCREAS SURGERY       FAMILY HISTORY:   No family history on file. SOCIAL HISTORY:   TOBACCO:   reports that he has quit smoking. His smoking use included cigarettes. He has never used smokeless tobacco.  ETOH:  reports that he does not drink alcohol. DRUGS: reports that he does not use drugs. ALLERGIES:    No Known Allergies      HOME MEDICATIONS:  Prior to Admission medications    Medication Sig Start Date End Date Taking?  Authorizing Provider   predniSONE (DELTASONE) 10 MG tablet 40 mg once daily for 7 days, then 30 mg OD for 7 days, then 20 mg OD for 7 days, then follow up with pulmonologist 2/1/19   Carlos Razo MD   sodium chloride (OCEAN) 0.65 % nasal spray 1 spray by Nasal route as needed for Congestion 2/1/19   Carlos Razo MD   sodium chloride (OCEAN) 0.65 % nasal spray 1 spray by Nasal route as needed for Congestion 1/29/19   Gabriele Cosby MD   ergocalciferol (ERGOCALCIFEROL) 84244 units capsule Take 1 capsule by mouth once a week 12/24/18   Nathalie Anderson MD   furosemide (LASIX) 20 MG tablet Take 1 tablet by mouth every other day 12/1/18   Brennan Aaron MD   insulin glargine (LANTUS) 100 UNIT/ML injection vial Inject 20 Units into the skin nightly 11/30/18   Brennan Aaron MD   tamsulosin (FLOMAX) 0.4 MG capsule Take 0.4 mg by mouth daily    Historical Provider, MD   famotidine (PEPCID) 20 MG tablet Take 20 mg by mouth 2 times daily    Historical Provider, MD   acetaminophen (TYLENOL) 325 MG tablet Take 650 mg by mouth every 6 hours as needed for Pain    Historical Provider, MD   aclidinium (TUDORZA PRESSAIR) 400 MCG/ACT AEPB inhaler Inhale 1 puff into the lungs 2 times daily 6/22/18 7/22/19  Estela Campoverde MD   albuterol sulfate HFA (VENTOLIN HFA) 108 (90 Base) MCG/ACT inhaler Inhale 2 puffs into the lungs every 6 hours as needed for Wheezing 6/22/18   Estela Campoverde MD   budesonide-formoterol (SYMBICORT) 80-4.5 MCG/ACT AERO Inhale 2 puffs into the lungs 2 times daily 5/25/18   Jake Arias MD   metFORMIN ER (GLUCOPHAGE-XR) 500 MG XR tablet take 2 tablets by mouth twice a day  Patient taking differently: take 2 tablet by mouth twice a day 8/1/16   Rashard Cotton PA-C   aspirin 81 MG tablet Take 81 mg by mouth daily    Historical Provider, MD   escitalopram (LEXAPRO) 10 MG tablet Take 1 tablet by mouth daily 11/10/15   Para Tariq Jensen PA-C   ONE TOUCH ULTRA TEST strip STRIP MISC ONCE A DAY 7/1/15   Dominique Jensen PA-C     IMMUNIZATIONS:  Most Recent Immunizations   Administered Date(s) Administered    Influenza, High Dose (Fluzone 65 yrs and older) 11/10/2015    Influenza, Kermitt Plaster, 6 mo and older, IM, PF (Flulaval, Fluarix) 10/23/2018    Pneumococcal 13-valent Conjugate (Goldie Ysabel) 11/10/2015       REVIEW OF SYSTEMS:  General: negative for chills, fatigue or fever  ENT: negative for headaches, nasal congestion, sore throat or visual changes  Allergy and Immunology: negative for postnasal drip or seasonal allergies  Hematological and Lymphatic: negative for bleeding problems, swollen lymph nodes  Respiratory: positive for cough and shortness of breath negative for hemoptysis or wheezing  Cardiovascular: negative for edema or palpitations  Gastrointestinal: negative for abdominal pain, change in bowel habits or nausea/vomiting  Genito-Urinary: negative for dysuria or urinary frequency/urgency  Musculoskeletal: negative for joint pain or joint swelling  Neurological: negative for numbness/tingling, seizures or weakness  Dermatological: negative for pruritus or rash    PHYSICAL EXAMINATION     VITAL SIGNS:   BP (!) 147/83   Pulse 89   Temp 99.7 °F (37.6 °C) (Axillary)   Resp 29   Ht 5' 8.9\" (1.75 m)   Wt 154 lb 15.7 oz (70.3 kg)   SpO2 100%   BMI 22.95 kg/m²     SYSTEMIC EXAMINATION:   General appearance -  ill appearing, not in any acute distress  Mental status - alert, oriented to person, place, and time, alert, luan ented to person, place, and time  Eyes - pupils equal and reactive, extraocular eye movements intact  Mouth - mucous membranes moist, pharynx normal without lesions  Neck - supple, no significant adenopathy  Chest -coarse breath sounds bilaterally. There were no wheezes, rhonchi or rales. There is mild use of accessory muscles  Heart - normal rate, regular rhythm, normal S1, S2, no murmurs, rubs, clicks or gallops  Abdomen - soft, nontender, nondistended, no masses or organomegaly  Neurological - alert, oriented, normal speech, no focal findings or movement disorder noted}  Extremities - peripheral pulses normal, no pedal edema, no clubbing or cyanosis  Skin - normal coloration and turgor, no rashes, no suspicious skin lesions noted     DATA REVIEW     Medications: Current Inpatient  Scheduled Meds:   levofloxacin  500 mg Intravenous Q24H    aspirin  81 mg Oral Daily    mometasone-formoterol  2 puff Inhalation BID    escitalopram  10 mg Oral Daily    famotidine  20 mg Oral BID    tamsulosin  0.4 mg Oral Daily    vancomycin  125 mg Oral 4x Daily    piperacillin-tazobactam  3.375 g Intravenous Q8H    methylPREDNISolone  40 mg Intravenous Q8H    levalbuterol  1.25 mg Nebulization Q4H WA    ipratropium  0.5 mg Nebulization Q4H WA    bisacodyl  10 mg Rectal Once    insulin glargine  25 Units Subcutaneous Nightly    enoxaparin  40 mg Subcutaneous Daily    sodium chloride flush  10 mL Intravenous 2 times per day    vancomycin  750 mg Intravenous Q12H    vancomycin (VANCOCIN) intermittent dosing (placeholder)   Other RX Placeholder    insulin lispro  0-12 Units Subcutaneous TID WC    insulin lispro  0-6 Units Subcutaneous Nightly     Continuous Infusions:   dextrose      dextrose       INPUT/OUTPUT:  In: 577 [P.O.:250;  I.V.:327]  Out: 870 [Urine:870]    Ventilator Settings:  Vent Information  Skin Assessment: Clean, dry, & intact  FiO2 : (S) 40 %  Additional Respiratory  Assessments  Pulse: 89  Resp: 29  SpO2: 100 %  Lab Results   Component Value Date    MODE PC/PS 03/08/2019       LABS:-  ABGs:   No results found for: PH, PCO2, PO2, HCO3, O2SAT  No results for input(s): PHART, PO2ART, CPG5JND, PUR1PLA, BEART, T7BEGBUI in the last 72 hours. Lab Results   Component Value Date    POCPH 7.468 (H) 03/08/2019    POCPCO2 38.9 03/08/2019    POCPO2 73.0 (L) 03/08/2019    POCHCO3 28.2 (H) 03/08/2019    TSYP0NVC 95 03/08/2019     CBC:   Recent Labs     03/07/19  0510 03/08/19  0631   WBC 11.5* 10.1   HGB 12.8* 10.5*   HCT 38.2* 32.8*   MCV 94.5 98.5    183   LYMPHOPCT 9*  --    RBC 4.05* 3.33*   MCH 31.6 31.5   MCHC 33.5 32.0   RDW 16.5* 15.1*     BMP:   Recent Labs     03/07/19  0510 03/08/19  0631   * 138   K 3.8 4.3   CL 90* 98   CO2 30 26   BUN 21 14   CREATININE 0.87 0.71   GLUCOSE 194* 125*     Liver Function Test:   Recent Labs     03/07/19  0510   PROT 7.3   LABALBU 4.0   ALT 21   AST 14   ALKPHOS 92   BILITOT 0.39     Amylase/Lipase:  No results for input(s): AMYLASE, LIPASE in the last 72 hours. Coagulation Profile:   Recent Labs     03/07/19  0510   INR 1.0   PROTIME 12.8   APTT 23.8*     Cardiac Enzymes:  No results for input(s): CKTOTAL, CKMB, CKMBINDEX, TROPONINI in the last 72 hours.   Lactic Acid:  No results found for: LACTA  BNP:   No results found for: BNP  D-Dimer:  No results found for: DDIMER  Others:   Lab Results   Component Value Date    TSH 2.94 02/20/2019     Lab Results   Component Value Date    CRP 32.8 (H) 12/25/2018     No results found for: Biju Clement  Lab Results   Component Value Date    IRON 26 (L) 10/22/2018    TIBC 240 (L) 10/22/2018    FERRITIN 109 10/22/2018     No results found for: SPEP, UPEP  No results found for: PSA, CEA, , LR0771,   Microbiology:    Pathology:    Radiology:    Chest X-ray:     FINDINGS:   There is chronic pulmonary change.  There are bibasilar effusions with   adjacent infiltrates.  There is no pneumothorax.  The mediastinal structures   are stable.  The upper abdomen is unremarkable.  The extrathoracic soft   tissues are unremarkable.           Impression   Bibasilar effusions with adjacent infiltrates representing pneumonia versus   atelectasis/scarring. CT Scans:  Impression   Large amount stool burden throughout the colon with rectal distention,   suggestive of impaction.       Pulmonary fibrosis and calcified pleural plaques.       Status post endovascular aortic aneurysm repair.  The aneurysm sac measures   up to 3.2 cm.       Chronic appearing L2 compression deformity.       Diverticulosis.       Mild bladder distension and bladder wall thickening, raising the possibility   of chronic bladder outlet obstruction. US:    Pulmonary Function test:    Polysomnogram:    Echocardiogram: 5/23/18  Summary  Normal left ventricle size, wall thickness and function with an estimated EF  55%. No segmental wall motion abnormalities seen. Aortic valve is trileaflet, sclerotic without restriction of motion. Trivial mitral regurgitation. Moderate pulmonary hypertension. Estimated right ventricular systolic pressure is 26-09 mmHg. ASSESSMENT AND PLAN     Assessment:    // Acute on chronic hypoxemic respiratory failure  // Acute exacerbation of COPD/pulmonary fibrosis  // Pulmonary hypertension  // Chronic pleural thickening/plaques    Plan:    I personally interviewed/examined the patient; reviewed interval history, interpreted all available radiographic and laboratory data at the time of service.     Patient remains hemodynamically stable and is currently requiring high flow nasal cannula  Continue supplemental oxygen to keep oxygen saturation greater than 90%  Continue nocturnal and as needed noninvasive mechanical ventilation (BiPAP), we will try to wean BiPAP as tolerated  Encourage incentive spirometry  Continue pulmonary toilet, aspiration precautions and bronchodilators  Continue to monitor I/O with a goal of even/negative fluid balance  Antimicrobials reviewed; continue vancomycin and Zosyn for now, send sputum Gram stain and culture  Continue  IV Solu-Medrol at current dose  DVT and stress ulcer prophylaxis  Physical/occupational therapy; increase activity as tolerated. It was my pleasure to evaluate Saba Youssef today. We will continue to follow. I would like to thank you for allowing me to participate in the care of this patient. Please feel free to call with any further questions or concerns. Iza Damon M.D. Pulmonary and Critical Care Medicine           3/8/2019, 3:49 PM    Please note that this chart was generated using voice recognition Dragon dictation software. Although every effort was made to ensure the accuracy of this automated transcription, some errors in transcription may have occurred.

## 2019-03-08 NOTE — PROGRESS NOTES
NONINVASIVE VENTILATION    PROVIDE OPTIMAL VENTILATION/ACCEPTABLE SPO2   IMPLEMENT NONINVASIVE VENTILATION PROTOCOL   MAINTAIN ACCEPTABLE SPO2   ASSESS SKIN INTEGRITY/BREAKDOWN SCORE   PATIENT EDUCATION AS NEEDED   BIPAP AS NEEDED    Beat Cea, RCPPatient Assessment complete. Pneumonia [J18.9] . Vitals:    03/07/19 2257   BP: (!) 141/78   Pulse: 91   Resp: 30   Temp: 96.6 °F (35.9 °C)   SpO2: 100%     Pt has been here before for resp distress and hypoxia and usually is on HFNC. Pt transferred from [address] [place] [address] for resp distress and was placed on bipap. Pt tachypneic, however breath sounds are clear throughout. Pt stated he is coughing up a lot of yellow secretions. Has a hx of COPD, does not take any treatments.      RR 30  Breath Sounds: clear t/o      · Bronchodilator assessment at level  1  · [x]    Bronchodilator Assessment  BRONCHODILATOR ASSESSMENT SCORE  Score 0 1 2 3 4 5   Breath Sounds   []  Patient Baseline [x]  No Wheeze good aeration []  Faint, scattered wheezing, good aeration []  Expiratory Wheezing and or moderately diminished []  Insp/Exp wheeze and/or very diminished []  Insp/Exp and/ or marked distress   Respiratory Rate   []  Patient Baseline []  Less than 20 []  Less than 20 []  20-25 [x]  Greater than 25 []  Greater than 25   Peak flow % of Pred or PB []  NA   []  Greater than 90%  []  81-90% []  71-80% []  Less than or equal to 70%  or unable to perform []  Unable due to Respiratory Distress   Dyspnea re []  Patient Baseline []  No SOB []  No SOB [x]  SOB on exertion []  SOB min activity []  At rest/acute   e FEV% Predicted       []  NA []  Above 69%  []  Unable []  Above 60-69%  []  Unable []  Above 50-59%  []  Unable []  Above 35-49%  []  Unable []  Less than 35%  []  Unable

## 2019-03-08 NOTE — PROGRESS NOTES
Physical Therapy  DATE: 3/8/2019    NAME: Clarisse Woodward  MRN: [id number]   : 1940    Patient not seen this date for Physical Therapy due to:  [] Blood transfusion in progress  [] Hemodialysis  []  Patient Declined  [] Spine Precautions   [] Strict Bedrest  [] Surgery/ Procedure  [x] Testing       CT      [] Other        [] PT being discontinued at this time. Patient independent. No further needs. [] PT being discontinued at this time as the patient has been transferred to palliative care. No further needs.     Domingo Dhillon, PT

## 2019-03-08 NOTE — PROGRESS NOTES
Smoking Cessation - topics covered   []  Health Risks  []  Benefits of Quitting   []  Smoking Cessation  []  Patient has no history of tobacco use  [x]  Patient is former smoker. [x]  No need for tobacco cessation education. []  Booklet given  []  Patient verbalizes understanding. []  Patient denies need for tobacco cessation education. []  Unable to meet with patient today. Will follow up as able.   Michelle Jerome  9:14 AM

## 2019-03-08 NOTE — CARE COORDINATION
Case Management Initial Discharge Plan  Coleman Daily,             Met with:patient to discuss discharge plans. Information verified: address, contacts, phone number, , insurance Yes  PCP: Kealyn Payan PA-C  Date of last visit: past year    Insurance Provider: [facility]    Discharge Planning    Living Arrangements:  Other (Comment)(Nursing home)   Support Systems:  Family Members    Home has 1 stories  2 stairs to climb to get into front door,   Location of bedroom/bathroom in home main    Patient able to perform ADL's:Independent    Current Services (outpatient & in home) [facility]  DME equipment: Cpap, walker,O2, commode  DME provider: advisor[address]CT [address]: [place] Medications:  No  Does patient want to participate in local refill/ meds to beds program?  No    Potential Assistance Needed:  N/A    Patient agreeable to home care: Yes  Freedom of choice provided:  yes    Prior SNF/Rehab Placement and Facility:  [facility]  Agreeable to SNF/Rehab: Yes  [place] of choice provided: yes   Evaluation: n/a    Expected Discharge date:  03/10/19  Patient expects to be discharged to: [facility]  Follow Up Appointment: Best Day/ Time: Monday AM    Transportation provider: friends  Transportation arrangements needed for discharge: Yes    Readmission Risk              Risk of Unplanned Readmission:        38             Does patient have a readmission risk score greater than 14?: Yes  If yes, follow-up appointment must be made within 7 days of discharge. Discharge Plan: pt admitted from [facility], pt was d/c from [facility] to [address] [place] [address] last admission and then to [facility] where he admitted from. Patient states that he was supposed to discharge to home today. Patient goal remains to return home with [facility] and have therapy at home.  Currently on high flow O2, is normally on 4L per NC at home.          Electronically signed by Catina Mai RN on 3/8/19 at 2:47 PM

## 2019-03-08 NOTE — H&P
[facility]    HISTORY AND PHYSICAL EXAMINATION            Date:   3/8/2019  Patient name:  Sanam Cunningham  Date of admission:  3/7/2019 10:40 PM  MRN:   [id number]  Account:  [de-identified]  YOB: 1940  PCP:    Bailey Jennings PA-C  Room:   180/4516-03  Code Status:    Full Code    Chief Complaint:     SOB    History Obtained From:     patient, electronic medical record    History of Present Illness: This is 70years old male with history of COPD living in a nursing home currently, transferred to us from [facility] after he presented there with shortness of breath. The patient says that he suddenly started having shortness of breath. He did not have any chest pain. He was having coughing with phlegm production. He also had fevers and chills. He was found to be in acute respiratory failure. He was put on BiPAP and started on vancomycin and Zosyn. Patient had improvement in his symptoms and he was transferred over here. This morning upon my evaluation patient was in respiratory distress, his oxygen saturations were in 70s on high flow oxygen, he was on BiPAP overnight though, patient was tachycardic. Place the patient on BiPAP with significant improvement in his symptomatology. Patient's saturations improved to high 90s. His heart rate improved. Patient otherwise denies chest pain, he denies palpitations, EKG was concerning for new onset A. fib flutter. Patient denies any other acute issues.       Past Medical History:     Past Medical History:   Diagnosis Date    Anemia     Anxiety     Bronchitis     [facility]     Diabetes ([facility]     Former smoker     Hyperlipidemia     Oxygen dependent     Respiratory distress     Type 2 diabetes mellitus ([facility]         Past Surgical History:     Past Surgical History:   Procedure Laterality Date    ABDOMINAL AORTIC ANEURYSM REPAIR, OPEN      FINGER SURGERY      FOOT SURGERY      PANCREAS SURGERY          Medications Prior to Admission:     Prior to Admission medications    Medication Sig Start Date End Date Taking?  Authorizing Provider   predniSONE (DELTASONE) 10 MG tablet 40 mg once daily for 7 days, then 30 mg OD for 7 days, then 20 mg OD for 7 days, then follow up with pulmonologist 2/1/19   Pankaj Mcneil MD   sodium chloride (OCEAN) 0.65 % nasal spray 1 spray by Nasal route as needed for Congestion 2/1/19   Pankaj Mcneil MD   sodium chloride (OCEAN) 0.65 % nasal spray 1 spray by Nasal route as needed for Congestion 1/29/19   Von Carty MD   ergocalciferol (ERGOCALCIFEROL) 94576 units capsule Take 1 capsule by mouth once a week 12/24/18   Clarisa Trujillo MD   furosemide (LASIX) 20 MG tablet Take 1 tablet by mouth every other day 12/1/18   Brant Middleton MD   insulin glargine (LANTUS) 100 UNIT/ML injection vial Inject 20 Units into the skin nightly 11/30/18   Brant Middleton MD   tamsulosin (FLOMAX) 0.4 MG capsule Take 0.4 mg by mouth daily    Historical Provider, MD   famotidine (PEPCID) 20 MG tablet Take 20 mg by mouth 2 times daily    Historical Provider, MD   acetaminophen (TYLENOL) 325 MG tablet Take 650 mg by mouth every 6 hours as needed for Pain    Historical Provider, MD   aclidinium (TUDORZA PRESSAIR) 400 MCG/ACT AEPB inhaler Inhale 1 puff into the lungs 2 times daily 6/22/18 7/22/19  Gregory Corado MD   albuterol sulfate HFA (VENTOLIN HFA) 108 (90 Base) MCG/ACT inhaler Inhale 2 puffs into the lungs every 6 hours as needed for Wheezing 6/22/18   Gregory Corado MD   budesonide-formoterol (SYMBICORT) 80-4.5 MCG/ACT AERO Inhale 2 puffs into the lungs 2 times daily 5/25/18   Graham Montoya MD   metFORMIN ER (GLUCOPHAGE-XR) 500 MG XR tablet take 2 tablets by mouth twice a day  Patient taking differently: take 2 tablet by mouth twice a day 8/1/16   [place] PA-C   aspirin 81 MG tablet Take 81 mg by mouth daily    Historical Provider, MD   escitalopram (LEXAPRO) 10 MG tablet Take 1 tablet by mouth daily 11/10/15   Dominique Jensen PA-C   ONE TOUCH ULTRA TEST strip STRIP MISC ONCE A DAY 7/1/15   Dominique Jensen PA-C        Allergies:     Patient has no known allergies. Social History:     Tobacco:    reports that he has quit smoking. His smoking use included cigarettes. He has never used smokeless tobacco.  Alcohol:      reports that he does not drink alcohol. Drug Use:  reports that he does not use drugs. Family History:     No family history on file. Review of Systems:     Positive and Negative as described in HPI.     CONSTITUTIONAL:  negative for fevers, chills, sweats, fatigue, weight loss  HEENT:  negative for vision, hearing changes, runny nose, throat pain  RESPIRATORY:  Positive for shortness of breath, cough, wheezing  CARDIOVASCULAR:  negative for chest pain, palpitations  GASTROINTESTINAL:  negative for nausea, vomiting, diarrhea, constipation, change in bowel habits, abdominal pain   GENITOURINARY:  negative for difficulty of urination, burning with urination, frequency   INTEGUMENT:  negative for rash, skin lesions, easy bruising   HEMATOLOGIC/LYMPHATIC:  negative for swelling/edema   ALLERGIC/IMMUNOLOGIC:  negative for urticaria , itching  ENDOCRINE:  negative increase in drinking, increase in urination, hot or cold intolerance  MUSCULOSKELETAL:  negative joint pains, muscle aches, swelling of joints  NEUROLOGICAL:  negative for headaches, dizziness, lightheadedness, numbness, pain, tingling extremities  BEHAVIOR/PSYCH:  negative for depression, anxiety    Physical Exam:   BP (!) 147/83   Pulse 89   Temp 99.7 °F (37.6 °C) (Axillary)   Resp 29   Ht 5' 8.9\" (1.75 m)   Wt 154 lb 15.7 oz (70.3 kg)   SpO2 100%   BMI 22.95 kg/m²   Temp (24hrs), Av.5 °F (36.9 °C), Min:96.6 °F (35.9 °C), Max:100.1 °F (37.8 °C)    Recent Labs     19  2204 19  0002 19  9787 03/08/19  1100   POCGLU 166* 81 124* 238*       Intake/Output Summary (Last 24 hours) at 3/8/2019 1546  Last data filed at 3/8/2019 0559  Gross per 24 hour   Intake 577 ml   Output 870 ml   Net -293 ml       General Appearance:  alert, well appearing, and in no acute distress  Mental status: oriented to person, place, and time with normal affect  Head:  normocephalic, atraumatic. Eye: no icterus, redness, pupils equal and reactive, extraocular eye movements intact, conjunctiva clear  Ear: normal external ear, no discharge, hearing intact  Nose:  no drainage noted  Mouth: mucous membranes moist  Neck: supple, no carotid bruits, thyroid not palpable  Lungs: Bilateral equal air entry, expiratory wheezes, rhonchi at the bases  Cardiovascular: S1 and S2, tachycardia  Abdomen: Soft, mild diffuse tenderness, no rebound, no guarding  Neurologic: There are no new focal motor or sensory deficits, normal muscle tone and bulk, no abnormal sensation, normal speech, cranial nerves II through XII grossly intact  Skin: No gross lesions, rashes, bruising or bleeding on exposed skin area  Extremities:  peripheral pulses palpable, no pedal edema or calf pain with palpation  Psych: normal affect    Investigations:      Laboratory Testing:  Recent Results (from the past 24 hour(s))   POC Glucose Fingerstick    Collection Time: 03/07/19  5:44 PM   Result Value Ref Range    POC Glucose 260 (H) 75 - 110 mg/dL   POC Glucose Fingerstick    Collection Time: 03/07/19 10:04 PM   Result Value Ref Range    POC Glucose 166 (H) 75 - 110 mg/dL   POC Glucose Fingerstick    Collection Time: 03/08/19 12:02 AM   Result Value Ref Range    POC Glucose 81 75 - 110 mg/dL   CULTURE BLOOD #1    Collection Time: 03/08/19 12:25 AM   Result Value Ref Range    Specimen Description . BLOOD     Special Requests RT AC 20 ML     Culture NO GROWTH 5 HOURS    CULTURE BLOOD #2    Collection Time: 03/08/19 12:29 AM   Result Value Ref Range    Specimen Description . BLOOD Special Requests RT LOWER ARM 10ML     Culture NO GROWTH 13 HOURS    Basic Metabolic Panel w/ Reflex to MG    Collection Time: 03/08/19  6:31 AM   Result Value Ref Range    Glucose 125 (H) 70 - 99 mg/dL    BUN 14 8 - 23 mg/dL    CREATININE 0.71 0.70 - 1.20 mg/dL    Bun/Cre Ratio NOT REPORTED 9 - 20    Calcium 9.2 8.6 - 10.4 mg/dL    Sodium 138 135 - 144 mmol/L    Potassium 4.3 3.7 - 5.3 mmol/L    Chloride 98 98 - 107 mmol/L    CO2 26 20 - 31 mmol/L    Anion Gap 14 9 - 17 mmol/L    GFR Non-African American >60 >60 mL/min    GFR African American >60 >60 mL/min    GFR Comment          GFR Staging NOT REPORTED    CBC    Collection Time: 03/08/19  6:31 AM   Result Value Ref Range    WBC 10.1 3.5 - 11.3 k/uL    RBC 3.33 (L) 4.21 - 5.77 m/uL    Hemoglobin 10.5 (L) 13.0 - 17.0 g/dL    Hematocrit 32.8 (L) 40.7 - 50.3 %    MCV 98.5 82.6 - 102.9 fL    MCH 31.5 25.2 - 33.5 pg    MCHC 32.0 28.4 - 34.8 g/dL    RDW 15.1 (H) 11.8 - 14.4 %    Platelets 727 705 - 384 k/uL    MPV 9.8 8.1 - 13.5 fL    NRBC Automated 0.0 0.0 per 100 WBC   POC Glucose Fingerstick    Collection Time: 03/08/19  7:02 AM   Result Value Ref Range    POC Glucose 124 (H) 75 - 110 mg/dL   Respiratory Virus PCR Panel    Collection Time: 03/08/19  9:22 AM   Result Value Ref Range    Specimen Description . NASOPHARYNGEAL SWAB     Adenovirus PCR Not Detected Not Detected    Coronavirus 229E PCR Not Detected Not Detected    Coronavirus HKU1 PCR Not Detected Not Detected    Coronavirus NL63 PCR Not Detected Not Detected    Coronavirus OC43 PCR Not Detected Not Detected    Human Metapneumovirus PCR Not Detected Not Detected    Rhino/Enterovirus PCR Not Detected Not Detected    Influenza A by PCR Not Detected Not Detected    Influenza A H1 PCR NOT REPORTED Not Detected    Influenza A H1 (2009) PCR NOT REPORTED Not Detected    Influenza A H3 PCR NOT REPORTED Not Detected    Influenza B by PCR Not Detected Not Detected    Parainfluenza 1 PCR Not Detected Not Detected Parainfluenza 2 PCR Not Detected Not Detected    Parainfluenza 3 PCR Not Detected Not Detected    Parainfluenza 4 PCR Not Detected Not Detected    Resp Syncytial Virus PCR DETECTED (A) Not Detected    B Pertussis by PCR Not Detected Not Detected    Chlamydia pneumoniae By PCR Not Detected Not Detected    Mycoplasma pneumo by PCR Not Detected Not Detected   Arterial Blood Gas, POC    Collection Time: 03/08/19 10:50 AM   Result Value Ref Range    POC pH 7.468 (H) 7.350 - 7.450    POC pCO2 38.9 35.0 - 48.0 mm Hg    POC PO2 73.0 (L) 83.0 - 108.0 mm Hg    POC HCO3 28.2 (H) 21.0 - 28.0 mmol/L    TCO2 (calc), Art 29 22.0 - 29.0 mmol/L    Negative Base Excess, Art NOT REPORTED 0.0 - 2.0    Positive Base Excess, Art 4 (H) 0.0 - 3.0    POC O2 SAT 95 94.0 - 98.0 %    O2 Device/Flow/% BIPAP     Damián Test POSITIVE     Sample Site Right Radial Artery     Mode PC/PS     FIO2 45.0     Pt Temp NOT REPORTED     POC pH Temp NOT REPORTED     POC pCO2 Temp NOT REPORTED mm Hg    POC pO2 Temp NOT REPORTED mm Hg   POC Glucose Fingerstick    Collection Time: 03/08/19 11:00 AM   Result Value Ref Range    POC Glucose 238 (H) 75 - 110 mg/dL       Imaging/Diagnostics:    Ct Abdomen Pelvis Wo Contrast Additional Contrast? None    Result Date: 3/8/2019  EXAMINATION: CT OF THE ABDOMEN AND PELVIS WITHOUT CONTRAST 3/8/2019 1:05 pm TECHNIQUE: CT of the abdomen and pelvis was performed without the administration of intravenous contrast. Multiplanar reformatted images are provided for review. Dose modulation, iterative reconstruction, and/or weight based adjustment of the mA/kV was utilized to reduce the radiation dose to as low as reasonably achievable. COMPARISON: None. HISTORY: ORDERING SYSTEM PROVIDED HISTORY: abdominal pain TECHNOLOGIST PROVIDED HISTORY: FINDINGS: Lower Chest: Pulmonary fibrosis with mild traction bronchiectasis in the lung bases. Dependent atelectasis is noted.   Calcified pleural plaques and mild diffuse pleural thickening is noted. No effusion. Organs: Evaluation of the abdominal and pelvic viscera is limited in the absence of contrast.  Status post cholecystectomy. No biliary dilatation. The liver, pancreas, spleen, adrenals, and kidneys reveal no acute findings. Scattered calcifications throughout the pancreas are noted which may represent chronic calcific pancreatitis. GI/Bowel: Large amount stool burden throughout the colon. The rectum is distended with stool. Diverticulosis is noted. Fluid and small amount of air within the small bowel is noted suggestive of mild ileus. Pelvis: No acute findings. Mild bladder distension and bladder wall thickening suggestive of chronic bladder outlet obstruction. Mild enlargement of the median lobe of the prostate impressing upon the adjacent bladder. Rectal distention, as above. Peritoneum/Retroperitoneum: Status post endovascular aortic repair. The aneurysm sac measures up to 3.2 x 3.1 cm in the distal segment. Extensive calcified atheromatous plaque is present. No free air or free fluid. No lymphadenopathy. Bones/Soft Tissues: Chronic appearing mild compression deformity and degenerative endplate changes at L2. Multilevel degenerative disc disease and advanced facet arthropathy. Osteopenia is noted. Large amount stool burden throughout the colon with rectal distention, suggestive of impaction. Pulmonary fibrosis and calcified pleural plaques. Status post endovascular aortic aneurysm repair. The aneurysm sac measures up to 3.2 cm. Chronic appearing L2 compression deformity. Diverticulosis. Mild bladder distension and bladder wall thickening, raising the possibility of chronic bladder outlet obstruction. Xr Chest (single View Frontal)    Result Date: 3/8/2019  EXAMINATION: SINGLE XRAY VIEW OF THE CHEST 3/8/2019 10:39 am COMPARISON: Chest radiograph performed 03/07/2019.  HISTORY: ORDERING SYSTEM PROVIDED HISTORY: sob TECHNOLOGIST PROVIDED HISTORY: sob FINDINGS: There is chronic pulmonary change. There are bibasilar effusions with adjacent infiltrates. There is no pneumothorax. The mediastinal structures are stable. The upper abdomen is unremarkable. The extrathoracic soft tissues are unremarkable. Bibasilar effusions with adjacent infiltrates representing pneumonia versus atelectasis/scarring. Xr Chest Portable    Result Date: 3/7/2019  EXAMINATION: SINGLE XRAY VIEW OF THE CHEST 3/7/2019 5:26 am COMPARISON: January 30, 2019. HISTORY: ORDERING SYSTEM PROVIDED HISTORY: sob TECHNOLOGIST PROVIDED HISTORY: sob Ordering Physician Provided Reason for Exam: sob Acuity: Acute Type of Exam: Initial Additional signs and symptoms: Difficulty breathing Relevant Medical/Surgical History: Difficulty breathing FINDINGS: Low lung volume. Similar appearing bilateral heterogeneous opacities. Emphysema. Small bilateral pleural effusions. No definite pneumothorax. Grossly stable cardiomediastinal silhouette and great vessels. Stable regional skeleton. Similar-appearing bilateral heterogeneous opacities. Differential considerations include asymmetric pulmonary edema and multifocal pneumonia. Small bilateral pleural effusions. Assessment :      Primary Problem  Acute respiratory failure with hypoxia [facility])    Active Hospital Problems    Diagnosis Date Noted    Atrial flutter ([address] [I48.92] 03/08/2019    Acute respiratory failure with hypoxia ([address] [J96.01] 01/23/2019    Pneumonia due to infectious organism [J18.9] 12/16/2018    COPD exacerbation ([address] [J44.1] 11/27/2018    Pulmonary artery hypertension (HCC) [I27.21] 05/23/2018    Iron deficiency anemia [D50.9] 05/23/2018    Type 2 diabetes mellitus without complication, with long-term current use of insulin ([address] [E11.9, Z79.4] 10/30/2014    Hyperlipidemia [E78.5] 10/30/2014       Plan:     Patient status Admit as inpatient in the  Progressive Unit/Step down    1. Check stat ABG, chest x-ray  2.  Placed on BiPAP support, patient feels more comfortable with BiPAP with improvement in his respiratory status  3. Continue vancomycin, add Zosyn, he is on Levaquin as well, has history of C. diff, on oral vancomycin, we will try to taper antibiotics soon because of his history of C. diff  4. Pulmonology evaluation  5. Start Solu-Medrol, duo nebs, dulera  6. Check respiratory PCR panel  7. Resume home insulin, monitor sugars closely with being on steroids  8. Monitor closely might need transfer to ICU if condition does not improve  9. New onset A. fib flutter, discussed with cardiology on the floor, they will evaluate the patient  10. Check echocardiogram  11. CT scan concerning for fecal impaction, we will give Dulcolax suppository, if no improvement might need disimpaction      Critical care time excluding any procedures: 35 minutes    Consultations:   IP CONSULT TO PULMONOLOGY  IP CONSULT TO PHARMACY  IP CONSULT TO CRITICAL CARE  IP CONSULT TO CARDIOLOGY     Patient is admitted as inpatient status because of co-morbidities listed above, severity of signs and symptoms as outlined, requirement for current medical therapies and most importantly because of direct risk to patient if care not provided in a hospital setting.     Drake Valencia MD  3/8/2019  3:46 PM    Copy sent to Dr. Shannon Willett PA-C

## 2019-03-08 NOTE — PROGRESS NOTES
Pharmacy Note  Vancomycin Consult    Mary Lowe is a 70 y.o. male started on Vancomycin for Pneumonia; consult received from Dr. Bethanie Corado  to manage therapy. .    Patient Active Problem List   Diagnosis    Type 2 diabetes mellitus with hyperglycemia, without long-term current use of insulin (HCC)    Hyperlipidemia    Anxiety    Respiratory distress    Acute exacerbation of chronic obstructive pulmonary disease (COPD) ([facility]    On home O2    Pulmonary artery hypertension (HCC)    Ex-smoker    Iron deficiency anemia    Community acquired pneumonia of right upper lobe of lung (HCC)    Lactic acidosis    Pulmonary fibrosis (HCC)    Dysuria    Acute on chronic respiratory failure (HCC)    Pleural plaque due to asbestos exposure    COPD exacerbation (HCC)    Pulmonary fibrosis (HCC)    Acute on chronic respiratory failure with hypoxia ([facility])    Hypomagnesemia    C. difficile colitis    Leukocytosis    HAP (hospital-acquired pneumonia)    Diarrhea of infectious origin    General weakness    Hyperglycemia    Asbestosis ([facility] [facility]    Goals of care, counseling/discussion    Respiratory failure ([facility]    Hypoxia       Allergies:  Patient has no known allergies. Temp max: 96.6    Recent Labs     19  0510   BUN 21       Recent Labs     19  0510   CREATININE 0.87       Recent Labs     19  0510   WBC 11.5*       No intake or output data in the 24 hours ending 19 2332    Culture Date      Source                       Results      Ht Readings from Last 1 Encounters:   19 5' 8.9\" (1.75 m)        Wt Readings from Last 1 Encounters:   19 154 lb 15.7 oz (70.3 kg)         Body mass index is 22.95 kg/m². Estimated Creatinine Clearance: 71 mL/min (based on SCr of 0.87 mg/dL). Goal Trough Level: 15-19 mcg/mL    Assessment/Plan:  Patient transferred from [address] [place] [address] where patient received vancomycin 1000 mg IV x2 doses. Will initiate vancomycin 750 mg IV every 12 hours.   Timing of trough level will be determined based on culture results, renal function, and clinical response. Thank you for the consult. Will continue to follow. Tildon Hashimoto Pharm. D.    3/7/2019  11:35 PM

## 2019-03-09 LAB
ABSOLUTE EOS #: 0 K/UL (ref 0–0.44)
ABSOLUTE IMMATURE GRANULOCYTE: 0.08 K/UL (ref 0–0.3)
ABSOLUTE LYMPH #: 0.4 K/UL (ref 1.1–3.7)
ABSOLUTE MONO #: 0.16 K/UL (ref 0.1–1.2)
ANION GAP SERPL CALCULATED.3IONS-SCNC: 11 MMOL/L (ref 9–17)
BASOPHILS # BLD: 0 % (ref 0–2)
BASOPHILS ABSOLUTE: 0 K/UL (ref 0–0.2)
BUN BLDV-MCNC: 14 MG/DL (ref 8–23)
BUN/CREAT BLD: ABNORMAL (ref 9–20)
CALCIUM SERPL-MCNC: 8.7 MG/DL (ref 8.6–10.4)
CHLORIDE BLD-SCNC: 97 MMOL/L (ref 98–107)
CO2: 28 MMOL/L (ref 20–31)
CREAT SERPL-MCNC: 0.66 MG/DL (ref 0.7–1.2)
CULTURE: ABNORMAL
DIFFERENTIAL TYPE: ABNORMAL
DIRECT EXAM: ABNORMAL
EOSINOPHILS RELATIVE PERCENT: 0 % (ref 1–4)
ESTIMATED AVERAGE GLUCOSE: 203 MG/DL
GFR AFRICAN AMERICAN: >60 ML/MIN
GFR NON-AFRICAN AMERICAN: >60 ML/MIN
GFR SERPL CREATININE-BSD FRML MDRD: ABNORMAL ML/MIN/{1.73_M2}
GFR SERPL CREATININE-BSD FRML MDRD: ABNORMAL ML/MIN/{1.73_M2}
GLUCOSE BLD-MCNC: 178 MG/DL (ref 75–110)
GLUCOSE BLD-MCNC: 181 MG/DL (ref 70–99)
GLUCOSE BLD-MCNC: 300 MG/DL (ref 75–110)
GLUCOSE BLD-MCNC: 316 MG/DL (ref 75–110)
GLUCOSE BLD-MCNC: 355 MG/DL (ref 75–110)
GLUCOSE BLD-MCNC: 368 MG/DL (ref 75–110)
HBA1C MFR BLD: 8.7 % (ref 4–6)
HCT VFR BLD CALC: 30.1 % (ref 40.7–50.3)
HEMOGLOBIN: 9.9 G/DL (ref 13–17)
IMMATURE GRANULOCYTES: 1 %
LYMPHOCYTES # BLD: 5 % (ref 24–43)
Lab: ABNORMAL
MCH RBC QN AUTO: 31.4 PG (ref 25.2–33.5)
MCHC RBC AUTO-ENTMCNC: 32.9 G/DL (ref 28.4–34.8)
MCV RBC AUTO: 95.6 FL (ref 82.6–102.9)
MONOCYTES # BLD: 2 % (ref 3–12)
MORPHOLOGY: ABNORMAL
NRBC AUTOMATED: 0 PER 100 WBC
PDW BLD-RTO: 14.6 % (ref 11.8–14.4)
PLATELET # BLD: 184 K/UL (ref 138–453)
PLATELET ESTIMATE: ABNORMAL
PMV BLD AUTO: 9.9 FL (ref 8.1–13.5)
POTASSIUM SERPL-SCNC: 4.2 MMOL/L (ref 3.7–5.3)
RBC # BLD: 3.15 M/UL (ref 4.21–5.77)
RBC # BLD: ABNORMAL 10*6/UL
SEG NEUTROPHILS: 92 % (ref 36–65)
SEGMENTED NEUTROPHILS ABSOLUTE COUNT: 7.26 K/UL (ref 1.5–8.1)
SODIUM BLD-SCNC: 136 MMOL/L (ref 135–144)
SPECIMEN DESCRIPTION: ABNORMAL
WBC # BLD: 7.9 K/UL (ref 3.5–11.3)
WBC # BLD: ABNORMAL 10*3/UL

## 2019-03-09 PROCEDURE — 94762 N-INVAS EAR/PLS OXIMTRY CONT: CPT

## 2019-03-09 PROCEDURE — 2060000000 HC ICU INTERMEDIATE R&B

## 2019-03-09 PROCEDURE — 6370000000 HC RX 637 (ALT 250 FOR IP): Performed by: NURSE PRACTITIONER

## 2019-03-09 PROCEDURE — 36415 COLL VENOUS BLD VENIPUNCTURE: CPT

## 2019-03-09 PROCEDURE — 6360000002 HC RX W HCPCS: Performed by: INTERNAL MEDICINE

## 2019-03-09 PROCEDURE — 6360000002 HC RX W HCPCS: Performed by: NURSE PRACTITIONER

## 2019-03-09 PROCEDURE — 2580000003 HC RX 258: Performed by: INTERNAL MEDICINE

## 2019-03-09 PROCEDURE — 6370000000 HC RX 637 (ALT 250 FOR IP): Performed by: INTERNAL MEDICINE

## 2019-03-09 PROCEDURE — 99232 SBSQ HOSP IP/OBS MODERATE 35: CPT | Performed by: INTERNAL MEDICINE

## 2019-03-09 PROCEDURE — 80048 BASIC METABOLIC PNL TOTAL CA: CPT

## 2019-03-09 PROCEDURE — 99233 SBSQ HOSP IP/OBS HIGH 50: CPT | Performed by: INTERNAL MEDICINE

## 2019-03-09 PROCEDURE — 94640 AIRWAY INHALATION TREATMENT: CPT

## 2019-03-09 PROCEDURE — 82947 ASSAY GLUCOSE BLOOD QUANT: CPT

## 2019-03-09 PROCEDURE — 85025 COMPLETE CBC W/AUTO DIFF WBC: CPT

## 2019-03-09 PROCEDURE — 2700000000 HC OXYGEN THERAPY PER DAY

## 2019-03-09 PROCEDURE — 94660 CPAP INITIATION&MGMT: CPT

## 2019-03-09 RX ORDER — INSULIN GLARGINE 100 [IU]/ML
30 INJECTION, SOLUTION SUBCUTANEOUS NIGHTLY
Status: DISCONTINUED | OUTPATIENT
Start: 2019-03-09 | End: 2019-03-10

## 2019-03-09 RX ADMIN — MOMETASONE FUROATE AND FORMOTEROL FUMARATE DIHYDRATE 2 PUFF: 200; 5 AEROSOL RESPIRATORY (INHALATION) at 08:25

## 2019-03-09 RX ADMIN — METHYLPREDNISOLONE SODIUM SUCCINATE 40 MG: 40 INJECTION, POWDER, FOR SOLUTION INTRAMUSCULAR; INTRAVENOUS at 09:22

## 2019-03-09 RX ADMIN — PIPERACILLIN AND TAZOBACTAM 3.38 G: 3; .375 INJECTION, POWDER, LYOPHILIZED, FOR SOLUTION INTRAVENOUS; PARENTERAL at 11:35

## 2019-03-09 RX ADMIN — MOMETASONE FUROATE AND FORMOTEROL FUMARATE DIHYDRATE 2 PUFF: 200; 5 AEROSOL RESPIRATORY (INHALATION) at 21:58

## 2019-03-09 RX ADMIN — INSULIN LISPRO 8 UNITS: 100 INJECTION, SOLUTION INTRAVENOUS; SUBCUTANEOUS at 17:49

## 2019-03-09 RX ADMIN — FAMOTIDINE 20 MG: 20 TABLET, FILM COATED ORAL at 20:33

## 2019-03-09 RX ADMIN — INSULIN LISPRO 6 UNITS: 100 INJECTION, SOLUTION INTRAVENOUS; SUBCUTANEOUS at 01:06

## 2019-03-09 RX ADMIN — LEVOFLOXACIN 500 MG: 5 INJECTION, SOLUTION INTRAVENOUS at 09:22

## 2019-03-09 RX ADMIN — METHYLPREDNISOLONE SODIUM SUCCINATE 40 MG: 40 INJECTION, POWDER, FOR SOLUTION INTRAMUSCULAR; INTRAVENOUS at 17:48

## 2019-03-09 RX ADMIN — VANCOMYCIN HYDROCHLORIDE 125 MG: 125 CAPSULE ORAL at 20:33

## 2019-03-09 RX ADMIN — IPRATROPIUM BROMIDE 0.5 MG: 0.5 SOLUTION RESPIRATORY (INHALATION) at 16:02

## 2019-03-09 RX ADMIN — LEVALBUTEROL HYDROCHLORIDE 1.25 MG: 1.25 SOLUTION RESPIRATORY (INHALATION) at 21:58

## 2019-03-09 RX ADMIN — INSULIN LISPRO 2 UNITS: 100 INJECTION, SOLUTION INTRAVENOUS; SUBCUTANEOUS at 09:23

## 2019-03-09 RX ADMIN — INSULIN LISPRO 5 UNITS: 100 INJECTION, SOLUTION INTRAVENOUS; SUBCUTANEOUS at 20:34

## 2019-03-09 RX ADMIN — VANCOMYCIN HYDROCHLORIDE 125 MG: 125 CAPSULE ORAL at 17:48

## 2019-03-09 RX ADMIN — LEVALBUTEROL HYDROCHLORIDE 1.25 MG: 1.25 SOLUTION RESPIRATORY (INHALATION) at 08:24

## 2019-03-09 RX ADMIN — VANCOMYCIN HYDROCHLORIDE 125 MG: 125 CAPSULE ORAL at 13:28

## 2019-03-09 RX ADMIN — TAMSULOSIN HYDROCHLORIDE 0.4 MG: 0.4 CAPSULE ORAL at 09:22

## 2019-03-09 RX ADMIN — IPRATROPIUM BROMIDE 0.5 MG: 0.5 SOLUTION RESPIRATORY (INHALATION) at 08:24

## 2019-03-09 RX ADMIN — PIPERACILLIN AND TAZOBACTAM 3.38 G: 3; .375 INJECTION, POWDER, LYOPHILIZED, FOR SOLUTION INTRAVENOUS; PARENTERAL at 20:33

## 2019-03-09 RX ADMIN — ASPIRIN 81 MG: 81 TABLET, CHEWABLE ORAL at 09:22

## 2019-03-09 RX ADMIN — INSULIN LISPRO 10 UNITS: 100 INJECTION, SOLUTION INTRAVENOUS; SUBCUTANEOUS at 13:28

## 2019-03-09 RX ADMIN — ENOXAPARIN SODIUM 40 MG: 40 INJECTION SUBCUTANEOUS at 09:22

## 2019-03-09 RX ADMIN — VANCOMYCIN HYDROCHLORIDE 1000 MG: 1 INJECTION, SOLUTION INTRAVENOUS at 06:51

## 2019-03-09 RX ADMIN — LEVALBUTEROL HYDROCHLORIDE 1.25 MG: 1.25 SOLUTION RESPIRATORY (INHALATION) at 16:02

## 2019-03-09 RX ADMIN — VANCOMYCIN HYDROCHLORIDE 125 MG: 125 CAPSULE ORAL at 09:22

## 2019-03-09 RX ADMIN — ESCITALOPRAM OXALATE 10 MG: 10 TABLET ORAL at 09:22

## 2019-03-09 RX ADMIN — METHYLPREDNISOLONE SODIUM SUCCINATE 40 MG: 40 INJECTION, POWDER, FOR SOLUTION INTRAMUSCULAR; INTRAVENOUS at 01:06

## 2019-03-09 RX ADMIN — IPRATROPIUM BROMIDE 0.5 MG: 0.5 SOLUTION RESPIRATORY (INHALATION) at 21:58

## 2019-03-09 RX ADMIN — PIPERACILLIN AND TAZOBACTAM 3.38 G: 3; .375 INJECTION, POWDER, LYOPHILIZED, FOR SOLUTION INTRAVENOUS; PARENTERAL at 02:47

## 2019-03-09 RX ADMIN — FAMOTIDINE 20 MG: 20 TABLET, FILM COATED ORAL at 09:22

## 2019-03-09 RX ADMIN — INSULIN GLARGINE 30 UNITS: 100 INJECTION, SOLUTION SUBCUTANEOUS at 20:34

## 2019-03-09 RX ADMIN — VANCOMYCIN HYDROCHLORIDE 1000 MG: 1 INJECTION, SOLUTION INTRAVENOUS at 19:10

## 2019-03-09 NOTE — PROGRESS NOTES
vomiting  Neurological:  negative for dizziness, headache    Medications: Allergies:  No Known Allergies    Current Meds:   Scheduled Meds:    levofloxacin  500 mg Intravenous Q24H    aspirin  81 mg Oral Daily    mometasone-formoterol  2 puff Inhalation BID    escitalopram  10 mg Oral Daily    famotidine  20 mg Oral BID    tamsulosin  0.4 mg Oral Daily    vancomycin  125 mg Oral 4x Daily    piperacillin-tazobactam  3.375 g Intravenous Q8H    methylPREDNISolone  40 mg Intravenous Q8H    levalbuterol  1.25 mg Nebulization Q4H WA    ipratropium  0.5 mg Nebulization Q4H WA    bisacodyl  10 mg Rectal Once    insulin glargine  25 Units Subcutaneous Nightly    vancomycin  1,000 mg Intravenous Q12H    enoxaparin  40 mg Subcutaneous Daily    sodium chloride flush  10 mL Intravenous 2 times per day    vancomycin (VANCOCIN) intermittent dosing (placeholder)   Other RX Placeholder    insulin lispro  0-12 Units Subcutaneous TID WC    insulin lispro  0-6 Units Subcutaneous Nightly     Continuous Infusions:    dextrose      dextrose       PRN Meds: acetaminophen, sodium chloride, acetaminophen, albuterol, magnesium hydroxide, nicotine, ondansetron, sodium chloride flush, dextrose, dextrose, glucagon (rDNA), glucose, glucose, dextrose, glucagon (rDNA), dextrose    Data:     Past Medical History:   has a past medical history of Anemia, Anxiety, Bronchitis, COPD (chronic obstructive pulmonary disease) ([facility] Diabetes ([facility] Former smoker, Hyperlipidemia, Oxygen dependent, Respiratory distress, and [facility] Social History:   reports that he has quit smoking. His smoking use included cigarettes. He has never used smokeless tobacco. He reports that he does not drink alcohol or use drugs. Family History: No family history on file.     Vitals:  /71   Pulse 79   Temp 99.9 °F (37.7 °C) (Temporal)   Resp 25   Ht 5' 8.9\" (1.75 m)   Wt 154 lb 15.7 oz (70.3 kg)   SpO2 94%   BMI 03/08/2019 12:29 AM       Lab Results   Component Value Date    POCPH 7.468 03/08/2019    POCPCO2 38.9 03/08/2019    POCPO2 73.0 03/08/2019    POCHCO3 28.2 03/08/2019    NBEA NOT REPORTED 03/08/2019    PBEA 4 03/08/2019    BII9RRB 29 03/08/2019    PQLQ5UBG 95 03/08/2019    FIO2 45.0 03/08/2019       Radiology:    Ct Abdomen Pelvis Wo Contrast Additional Contrast? None    Result Date: 3/8/2019  EXAMINATION: CT OF THE ABDOMEN AND PELVIS WITHOUT CONTRAST 3/8/2019 1:05 pm TECHNIQUE: CT of the abdomen and pelvis was performed without the administration of intravenous contrast. Multiplanar reformatted images are provided for review. Dose modulation, iterative reconstruction, and/or weight based adjustment of the mA/kV was utilized to reduce the radiation dose to as low as reasonably achievable. COMPARISON: None. HISTORY: ORDERING SYSTEM PROVIDED HISTORY: abdominal pain TECHNOLOGIST PROVIDED HISTORY: FINDINGS: Lower Chest: Pulmonary fibrosis with mild traction bronchiectasis in the lung bases. Dependent atelectasis is noted. Calcified pleural plaques and mild diffuse pleural thickening is noted. No effusion. Organs: Evaluation of the abdominal and pelvic viscera is limited in the absence of contrast.  Status post cholecystectomy. No biliary dilatation. The liver, pancreas, spleen, adrenals, and kidneys reveal no acute findings. Scattered calcifications throughout the pancreas are noted which may represent chronic calcific pancreatitis. GI/Bowel: Large amount stool burden throughout the colon. The rectum is distended with stool. Diverticulosis is noted. Fluid and small amount of air within the small bowel is noted suggestive of mild ileus. Pelvis: No acute findings. Mild bladder distension and bladder wall thickening suggestive of chronic bladder outlet obstruction. Mild enlargement of the median lobe of the prostate impressing upon the adjacent bladder. Rectal distention, as above.  Peritoneum/Retroperitoneum: Status post endovascular aortic repair. The aneurysm sac measures up to 3.2 x 3.1 cm in the distal segment. Extensive calcified atheromatous plaque is present. No free air or free fluid. No lymphadenopathy. Bones/Soft Tissues: Chronic appearing mild compression deformity and degenerative endplate changes at L2. Multilevel degenerative disc disease and advanced facet arthropathy. Osteopenia is noted. Large amount stool burden throughout the colon with rectal distention, suggestive of impaction. Pulmonary fibrosis and calcified pleural plaques. Status post endovascular aortic aneurysm repair. The aneurysm sac measures up to 3.2 cm. Chronic appearing L2 compression deformity. Diverticulosis. Mild bladder distension and bladder wall thickening, raising the possibility of chronic bladder outlet obstruction. Xr Chest (single View Frontal)    Result Date: 3/8/2019  EXAMINATION: SINGLE XRAY VIEW OF THE CHEST 3/8/2019 10:39 am COMPARISON: Chest radiograph performed 03/07/2019. HISTORY: ORDERING SYSTEM PROVIDED HISTORY: sob TECHNOLOGIST PROVIDED HISTORY: sob FINDINGS: There is chronic pulmonary change. There are bibasilar effusions with adjacent infiltrates. There is no pneumothorax. The mediastinal structures are stable. The upper abdomen is unremarkable. The extrathoracic soft tissues are unremarkable. Bibasilar effusions with adjacent infiltrates representing pneumonia versus atelectasis/scarring. Xr Chest Portable    Result Date: 3/7/2019  EXAMINATION: SINGLE XRAY VIEW OF THE CHEST 3/7/2019 5:26 am COMPARISON: January 30, 2019. HISTORY: ORDERING SYSTEM PROVIDED HISTORY: sob TECHNOLOGIST PROVIDED HISTORY: sob Ordering Physician Provided Reason for Exam: sob Acuity: Acute Type of Exam: Initial Additional signs and symptoms: Difficulty breathing Relevant Medical/Surgical History: Difficulty breathing FINDINGS: Low lung volume. Similar appearing bilateral heterogeneous opacities. Emphysema.   Small bilateral pleural effusions. No definite pneumothorax. Grossly stable cardiomediastinal silhouette and great vessels. Stable regional skeleton. Similar-appearing bilateral heterogeneous opacities. Differential considerations include asymmetric pulmonary edema and multifocal pneumonia. Small bilateral pleural effusions. Physical Examination:        General appearance:  alert, cooperative and no distress  Mental Status:  oriented to person, place and time and normal affect  Lungs: Bilateral air entry, rhonchi  Heart:  regular rate and rhythm, no murmur  Abdomen:  soft, nontender, nondistended, normal bowel sounds, no masses, hepatomegaly, splenomegaly  Extremities:  no edema, redness, tenderness in the calves  Skin:  no gross lesions, rashes, induration    Assessment:        Primary Problem  Acute respiratory failure with hypoxia [facility])    Active Hospital Problems    Diagnosis Date Noted    Atrial flutter ([facility] [I48.92] 03/08/2019    Acute respiratory failure with hypoxia (HCC) [J96.01] 01/23/2019    Pneumonia due to infectious organism [J18.9] 12/16/2018    COPD exacerbation ([facility] [J44.1] 11/27/2018    Acute on chronic respiratory failure with hypoxemia (HCC) [J96.21] 11/27/2018    Pulmonary artery hypertension (HCC) [I27.21] 05/23/2018    Iron deficiency anemia [D50.9] 05/23/2018    Type 2 diabetes mellitus without complication, with long-term current use of insulin ([facility] [E11.9, Z79.4] 10/30/2014    Hyperlipidemia [E78.5] 10/30/2014       Plan:        1. Doing much better today, continue aerosols,  2. Continue steroids and de-escalate soon  3. On broad-spectrum antibiotics, we'll start tapering down from tomorrow, follow pulmonology recommendations  4. Continue high flow oxygen and BiPAP as needed  5. PCR panel positive for RSV  6. Hemoglobin A1c is elevated, advised strict glycemic control  7. Continue insulin sliding scale and Lantus  8.  Discussed with cardiology, patient refused to see cardiology and cardiology appointment as this could just be PAT  9.  Follow-up on echocardiogram    Elaina Pickens MD  3/9/2019  3:00 PM

## 2019-03-09 NOTE — PROGRESS NOTES
Pharmacy Vancomycin Consult     Vancomycin Day: 2  Current Dosinmg q12h    Temp max:  100.1    Recent Labs     19  0510 19  0631   BUN 21 14       Recent Labs     19  0510 19  0631   CREATININE 0.87 0.71       Recent Labs     19  0510 19  0631   WBC 11.5* 10.1         Intake/Output Summary (Last 24 hours) at 3/8/2019 2016  Last data filed at 3/8/2019 1800  Gross per 24 hour   Intake 4245 ml   Output 870 ml   Net 3375 ml       Culture Date      Source                       Results  3/7                         sputum                      gm pos rods    Ht Readings from Last 1 Encounters:   19 5' 8.9\" (1.75 m)        Wt Readings from Last 1 Encounters:   19 154 lb 15.7 oz (70.3 kg)         Body mass index is 22.95 kg/m². Estimated Creatinine Clearance: 87 mL/min (based on SCr of 0.71 mg/dL). Trough: 9.3    Assessment/Plan:   Vancomycin trough was subtherapeutic at 9.3. Cultures are negative for staph to date. Will increase regimen to vancomycin 1gm q12h. Will continue to follow. [id number] SCOTTIE MARVIN, BCPS  3/8/2019  8:19 PM

## 2019-03-09 NOTE — PROGRESS NOTES
[facility]SO[facility] Pneumonia [J18.9] . Vitals:    03/09/19 0823   BP:    Pulse:    Resp: 27   Temp:    SpO2: 92%   . Patients home meds are   Prior to Admission medications    Medication Sig Start Date End Date Taking?  Authorizing Provider   predniSONE (DELTASONE) 10 MG tablet 40 mg once daily for 7 days, then 30 mg OD for 7 days, then 20 mg OD for 7 days, then follow up with pulmonologist 2/1/19   Brian Saucedo MD   sodium chloride (OCEAN) 0.65 % nasal spray 1 spray by Nasal route as needed for Congestion 2/1/19   Brian Saucedo MD   sodium chloride (OCEAN) 0.65 % nasal spray 1 spray by Nasal route as needed for Congestion 1/29/19   Ignacio Crowley MD   ergocalciferol (ERGOCALCIFEROL) 26825 units capsule Take 1 capsule by mouth once a week 12/24/18   Howard Mcneil MD   furosemide (LASIX) 20 MG tablet Take 1 tablet by mouth every other day 12/1/18   Yumiko Bazan MD   insulin glargine (LANTUS) 100 UNIT/ML injection vial Inject 20 Units into the skin nightly 11/30/18   Yumiko Bazan MD   tamsulosin (FLOMAX) 0.4 MG capsule Take 0.4 mg by mouth daily    Historical Provider, MD   famotidine (PEPCID) 20 MG tablet Take 20 mg by mouth 2 times daily    Historical Provider, MD   acetaminophen (TYLENOL) 325 MG tablet Take 650 mg by mouth every 6 hours as needed for Pain    Historical Provider, MD   aclidinium (TUDORZA PRESSAIR) 400 MCG/ACT AEPB inhaler Inhale 1 puff into the lungs 2 times daily 6/22/18 7/22/19  Lauren Kiser MD   albuterol sulfate HFA (VENTOLIN HFA) 108 (90 Base) MCG/ACT inhaler Inhale 2 puffs into the lungs every 6 hours as needed for Wheezing 6/22/18   Lauren Kiser MD   budesonide-formoterol (SYMBICORT) 80-4.5 MCG/ACT AERO Inhale 2 puffs into the lungs 2 times daily 5/25/18   Cary Garcia MD   metFORMIN ER (GLUCOPHAGE-XR) 500 MG XR tablet take 2 tablets by mouth twice a day  Patient taking differently: take 2 tablet by mouth twice a day 8/1/16 Daniela Deluca PA-C   aspirin 81 MG tablet Take 81 mg by mouth daily    Historical Provider, MD   escitalopram (LEXAPRO) 10 MG tablet Take 1 tablet by mouth daily 11/10/15   Dominique Jensen PA-C   ONE TOUCH ULTRA TEST strip STRIP MISC ONCE A DAY 7/1/15   Dominique Jensen PA-C   .   Recent Surgical History: None = 0     Assessment exacerbation of copd    RR 19   Breath Sounds: diminished throughout      · Bronchodilator assessment at level  3  · Hyperinflation assessment at level   · Secretion Management assessment at level    ·   · []    Bronchodilator Assessment  BRONCHODILATOR ASSESSMENT SCORE  Score 0 1 2 3 4 5   Breath Sounds   []  Patient Baseline []  No Wheeze good aeration []  Faint, scattered wheezing, good aeration [x]  Expiratory Wheezing and or moderately diminished []  Insp/Exp wheeze and/or very diminished []  Insp/Exp and/ or marked distress   Respiratory Rate   []  Patient Baseline []  Less than 20 []  Less than 20 [x]  20-25 []  Greater than 25 []  Greater than 25   Peak flow % of Pred or PB [x]  NA   []  Greater than 90%  []  81-90% []  71-80% []  Less than or equal to 70%  or unable to perform []  Unable due to Respiratory Distress   Dyspnea re []  Patient Baseline []  No SOB []  No SOB [x]  SOB on exertion []  SOB min activity []  At rest/acute   e FEV% Predicted       [x]  NA []  Above 69%  []  Unable []  Above 60-69%  []  Unable []  Above 50-59%  []  Unable []  Above 35-49%  []  Unable []  Less than 35%  []  Unable                 []  Hyperinflation Assessment  Score 1 2 3   CXR and Breath Sounds   []  Clear []  No atelectasis  Basilar aeration []  Atelectasis or absent basilar breath sounds   Incentive Spirometry Volume  (Per IBW)   []  Greater than or equal to 15ml/Kg []  less than 15ml/Kg []  less than 15ml/Kg   Surgery within last 2 weeks []  None or general   []  Abdominal or thoracic surgery  []  Abdominal or thoracic   Chronic Pulmonary Historyre []  No []  Yes []  Yes     [] Secretion Management Assessment  Score 1 2 3   Bilateral Breath Sounds   []  Occasional Rhonchi []  Scattered Rhonchi []  Course Rhonchi and/or poor aeration   Sputum    []  Small amount of thin secretions []  Moderate amount of viscous secretions []  Copius, Viscious Yellow/ Secretions   CXR as reported by physician []  clear  []  Unavailable []  Infiltrates and/or consolidation  []  Unavailable []  Mucus Plugging and or lobar consolidation  []  Unavailable   Cough []  Strong, productive cough []  Weak productive cough []  No cough or weak non-productive cough   YONIS ALONSO  8:32 AM                            FEMALE                                  MALE                            FEV1 Predicted Normal Values                        FEV1 Predicted Normal Values          Age                                     Height in Feet and Inches       Age                                     Height in Feet and Inches       4' 11\" 5' 1\" 5' 3\" 5' 5\" 5' 7\" 5' 9\" 5' 11\" 6' 1\"  4' 11\" 5' 1\" 5' 3\" 5' 5\" 5' 7\" 5' 9\" 5' 11\" 6' 1\"   42 - 45 2.49 2.66 2.84 3.03 3.22 3.42 3.62 3.83 42 - 45 2.82 3.03 3.26 3.49 3.72 3.96 4.22 4.47   46 - 49 2.40 2.57 2.76 2.94 3.14 3.33 3.54 3.75 46 - 49 2.70 2.92 3.14 3.37 3.61 3.85 4.10 4.36   50 - 53 2.31 2.48 2.66 2.85 3.04 3.24 3.45 3.66 50 - 53 2.58 2.80 3.02 3.25 3.49 3.73 3.98 4.24   54 - 57 2.21 2.38 2.57 2.75 2.95 3.14 3.35 3.56 54 - 57 2.46 2.67 2.89 3.12 3.36 3.60 3.85 4.11   58 - 61 2.10 2.28 2.46 2.65 2.84 3.04 3.24 3.45 58 - 61 2.32 2.54 2.76 2.99 3.23 3.47 3.72 3.98   62 - 65 1.99 2.17 2.35 2.54 2.73 2.93 3.13 3.34 62 - 65 2.19 2.40 2.62 2.85 3.09 3.33 3.58 3.84   66 - 69 1.88 2.05 2.23 2.42 2.61 2.81 3.02 3.23 66 - 69 2.04 2.26 2.48 2.71 2.95 3.19 3.44 3.70   70+ 1.82 1.99 2.17 2.36 2.55 2.75 2.95 3.16 70+ 1.97 2.19 2.41 2.64 2.87 3.12 3.37 3.62             Predicted Peak Expiratory Flow Rate                                       Height (in)  Female       Height (in) Male           Age 66

## 2019-03-09 NOTE — PLAN OF CARE
BRONCHOSPASM/BRONCHOCONSTRICTION     ? IMPROVE AERATION/BREATH SOUNDS  ? ADMINISTER BRONCHODILATOR THERAPY AS APPROPRIATE  ? ASSESS BREATH SOUNDS  ? IMPLEMENT AEROSOL/MDI PROTOCOL  ? PATIENT EDUCATION AS NEEDED   PROVIDE ADEQUATE OXYGENATION WITH ACCEPTABLE SP02/ABG'S    ? IDENTIFY APPROPRIATE OXYGEN THERAPY  ? MONITOR SP02/ABG'S AS NEEDED   ?    PATIENT EDUCATION AS NEEDED

## 2019-03-09 NOTE — PLAN OF CARE
Pt. Has remained free of falls throughout this shift. Bed locked and in lowest positioned, call light within reach, non skid socks on, fall sign posted. Patient calls out appropriately.

## 2019-03-09 NOTE — PLAN OF CARE
Pt remains free of falls at this time. Bed locked in lowest position, siderails x2, call light in reach. Non-skid footwear applied. Encouraged pt to call for assistance as needed for safety. Falling star posted outside of room. Will continue to monitor.

## 2019-03-10 LAB
ANION GAP SERPL CALCULATED.3IONS-SCNC: 7 MMOL/L (ref 9–17)
BUN BLDV-MCNC: 17 MG/DL (ref 8–23)
BUN/CREAT BLD: ABNORMAL (ref 9–20)
CALCIUM SERPL-MCNC: 8.5 MG/DL (ref 8.6–10.4)
CHLORIDE BLD-SCNC: 96 MMOL/L (ref 98–107)
CO2: 29 MMOL/L (ref 20–31)
CREAT SERPL-MCNC: 0.84 MG/DL (ref 0.7–1.2)
GFR AFRICAN AMERICAN: >60 ML/MIN
GFR NON-AFRICAN AMERICAN: >60 ML/MIN
GFR SERPL CREATININE-BSD FRML MDRD: ABNORMAL ML/MIN/{1.73_M2}
GFR SERPL CREATININE-BSD FRML MDRD: ABNORMAL ML/MIN/{1.73_M2}
GLUCOSE BLD-MCNC: 270 MG/DL (ref 75–110)
GLUCOSE BLD-MCNC: 359 MG/DL (ref 75–110)
GLUCOSE BLD-MCNC: 360 MG/DL (ref 75–110)
GLUCOSE BLD-MCNC: 372 MG/DL (ref 70–99)
GLUCOSE BLD-MCNC: 403 MG/DL (ref 75–110)
POTASSIUM SERPL-SCNC: 4.5 MMOL/L (ref 3.7–5.3)
SODIUM BLD-SCNC: 132 MMOL/L (ref 135–144)

## 2019-03-10 PROCEDURE — 6370000000 HC RX 637 (ALT 250 FOR IP): Performed by: NURSE PRACTITIONER

## 2019-03-10 PROCEDURE — 94762 N-INVAS EAR/PLS OXIMTRY CONT: CPT

## 2019-03-10 PROCEDURE — 6360000002 HC RX W HCPCS: Performed by: INTERNAL MEDICINE

## 2019-03-10 PROCEDURE — 2580000003 HC RX 258: Performed by: INTERNAL MEDICINE

## 2019-03-10 PROCEDURE — 80048 BASIC METABOLIC PNL TOTAL CA: CPT

## 2019-03-10 PROCEDURE — 2700000000 HC OXYGEN THERAPY PER DAY

## 2019-03-10 PROCEDURE — 36415 COLL VENOUS BLD VENIPUNCTURE: CPT

## 2019-03-10 PROCEDURE — 6360000002 HC RX W HCPCS: Performed by: NURSE PRACTITIONER

## 2019-03-10 PROCEDURE — 94640 AIRWAY INHALATION TREATMENT: CPT

## 2019-03-10 PROCEDURE — 99232 SBSQ HOSP IP/OBS MODERATE 35: CPT | Performed by: INTERNAL MEDICINE

## 2019-03-10 PROCEDURE — 2060000000 HC ICU INTERMEDIATE R&B

## 2019-03-10 PROCEDURE — 2580000003 HC RX 258: Performed by: NURSE PRACTITIONER

## 2019-03-10 PROCEDURE — 82947 ASSAY GLUCOSE BLOOD QUANT: CPT

## 2019-03-10 PROCEDURE — 6370000000 HC RX 637 (ALT 250 FOR IP): Performed by: INTERNAL MEDICINE

## 2019-03-10 RX ORDER — INSULIN GLARGINE 100 [IU]/ML
35 INJECTION, SOLUTION SUBCUTANEOUS NIGHTLY
Status: DISCONTINUED | OUTPATIENT
Start: 2019-03-10 | End: 2019-03-11

## 2019-03-10 RX ORDER — INSULIN GLARGINE 100 [IU]/ML
20 INJECTION, SOLUTION SUBCUTANEOUS
Status: DISCONTINUED | OUTPATIENT
Start: 2019-03-10 | End: 2019-03-21

## 2019-03-10 RX ADMIN — PIPERACILLIN AND TAZOBACTAM 3.38 G: 3; .375 INJECTION, POWDER, LYOPHILIZED, FOR SOLUTION INTRAVENOUS; PARENTERAL at 03:54

## 2019-03-10 RX ADMIN — ASPIRIN 81 MG: 81 TABLET, CHEWABLE ORAL at 08:34

## 2019-03-10 RX ADMIN — IPRATROPIUM BROMIDE 0.5 MG: 0.5 SOLUTION RESPIRATORY (INHALATION) at 21:57

## 2019-03-10 RX ADMIN — FAMOTIDINE 20 MG: 20 TABLET, FILM COATED ORAL at 08:34

## 2019-03-10 RX ADMIN — INSULIN LISPRO 6 UNITS: 100 INJECTION, SOLUTION INTRAVENOUS; SUBCUTANEOUS at 11:56

## 2019-03-10 RX ADMIN — ESCITALOPRAM OXALATE 10 MG: 10 TABLET ORAL at 08:34

## 2019-03-10 RX ADMIN — LEVALBUTEROL HYDROCHLORIDE 1.25 MG: 1.25 SOLUTION RESPIRATORY (INHALATION) at 08:52

## 2019-03-10 RX ADMIN — IPRATROPIUM BROMIDE 0.5 MG: 0.5 SOLUTION RESPIRATORY (INHALATION) at 08:52

## 2019-03-10 RX ADMIN — INSULIN LISPRO 5 UNITS: 100 INJECTION, SOLUTION INTRAVENOUS; SUBCUTANEOUS at 20:35

## 2019-03-10 RX ADMIN — INSULIN LISPRO 10 UNITS: 100 INJECTION, SOLUTION INTRAVENOUS; SUBCUTANEOUS at 17:39

## 2019-03-10 RX ADMIN — INSULIN GLARGINE 20 UNITS: 100 INJECTION, SOLUTION SUBCUTANEOUS at 09:44

## 2019-03-10 RX ADMIN — IPRATROPIUM BROMIDE 0.5 MG: 0.5 SOLUTION RESPIRATORY (INHALATION) at 15:09

## 2019-03-10 RX ADMIN — LEVALBUTEROL HYDROCHLORIDE 1.25 MG: 1.25 SOLUTION RESPIRATORY (INHALATION) at 11:11

## 2019-03-10 RX ADMIN — MOMETASONE FUROATE AND FORMOTEROL FUMARATE DIHYDRATE 2 PUFF: 200; 5 AEROSOL RESPIRATORY (INHALATION) at 08:52

## 2019-03-10 RX ADMIN — INSULIN LISPRO 12 UNITS: 100 INJECTION, SOLUTION INTRAVENOUS; SUBCUTANEOUS at 08:36

## 2019-03-10 RX ADMIN — TAMSULOSIN HYDROCHLORIDE 0.4 MG: 0.4 CAPSULE ORAL at 08:34

## 2019-03-10 RX ADMIN — ENOXAPARIN SODIUM 40 MG: 40 INJECTION SUBCUTANEOUS at 08:34

## 2019-03-10 RX ADMIN — PIPERACILLIN AND TAZOBACTAM 3.38 G: 3; .375 INJECTION, POWDER, LYOPHILIZED, FOR SOLUTION INTRAVENOUS; PARENTERAL at 11:56

## 2019-03-10 RX ADMIN — FAMOTIDINE 20 MG: 20 TABLET, FILM COATED ORAL at 20:35

## 2019-03-10 RX ADMIN — METHYLPREDNISOLONE SODIUM SUCCINATE 40 MG: 40 INJECTION, POWDER, FOR SOLUTION INTRAMUSCULAR; INTRAVENOUS at 17:39

## 2019-03-10 RX ADMIN — LEVALBUTEROL HYDROCHLORIDE 1.25 MG: 1.25 SOLUTION RESPIRATORY (INHALATION) at 15:10

## 2019-03-10 RX ADMIN — INSULIN GLARGINE 35 UNITS: 100 INJECTION, SOLUTION SUBCUTANEOUS at 20:35

## 2019-03-10 RX ADMIN — METHYLPREDNISOLONE SODIUM SUCCINATE 40 MG: 40 INJECTION, POWDER, FOR SOLUTION INTRAMUSCULAR; INTRAVENOUS at 09:45

## 2019-03-10 RX ADMIN — LEVALBUTEROL HYDROCHLORIDE 1.25 MG: 1.25 SOLUTION RESPIRATORY (INHALATION) at 21:57

## 2019-03-10 RX ADMIN — IPRATROPIUM BROMIDE 0.5 MG: 0.5 SOLUTION RESPIRATORY (INHALATION) at 11:11

## 2019-03-10 RX ADMIN — METHYLPREDNISOLONE SODIUM SUCCINATE 40 MG: 40 INJECTION, POWDER, FOR SOLUTION INTRAMUSCULAR; INTRAVENOUS at 04:43

## 2019-03-10 RX ADMIN — PIPERACILLIN AND TAZOBACTAM 3.38 G: 3; .375 INJECTION, POWDER, LYOPHILIZED, FOR SOLUTION INTRAVENOUS; PARENTERAL at 20:35

## 2019-03-10 RX ADMIN — Medication 10 ML: at 08:34

## 2019-03-10 RX ADMIN — MOMETASONE FUROATE AND FORMOTEROL FUMARATE DIHYDRATE 2 PUFF: 200; 5 AEROSOL RESPIRATORY (INHALATION) at 21:57

## 2019-03-10 NOTE — PLAN OF CARE
Pt remains free of falls at this time. Bed locked in lowest position, siderails x2, call light in reach. Encouraged pt to call for assistance as needed for safety. Falling star posted outside of room. Will continue to monitor.

## 2019-03-10 NOTE — FLOWSHEET NOTE
In  of his family members . He is at peace with his life     03/10/19 8500   Encounter Summary   Services provided to: Patient   Referral/Consult From: Nicolette   Continue Visiting   (3/10/19)   Complexity of Encounter Low   Length of Encounter 30 minutes   Spiritual Assessment Completed Yes   Routine   Type Initial   Assessment Calm; Approachable   Intervention Active listening;Explored feelings, thoughts, concerns;Prayer;Sustaining presence/ Ministry of presence   Outcome Comfort;Expressed gratitude;Engaged in conversation; Shared life review; Shared reminiscences

## 2019-03-10 NOTE — PROGRESS NOTES
Herber Fitzpatrick 21    Progress Note    3/10/2019    2:03 PM    Name:   Ely Bullock  MRN:     [id number]     Acct:      [de-identified]   Room:   [address] [place] [address] Day:  3  Admit Date:  3/7/2019 10:40 PM    PCP:   Deandra Swain PA-C  Code Status:  Full Code    Subjective:     C/C: No chief complaint on file. Interval History Status: improved. Patient continues to show improvement, still on 50% high flow oxygen at 30 L/m  Denies chest pain, no other acute issues overnight, blood sugars are elevated  Brief History: This is 70years old male with history of COPD living in a nursing home currently, transferred to us from [facility] after he presented there with shortness of breath. The patient says that he suddenly started having shortness of breath. He did not have any chest pain. He was having coughing with phlegm production. He also had fevers and chills. He was found to be in acute respiratory failure. He was put on BiPAP and started on vancomycin and Zosyn. Patient had improvement in his symptoms and he was transferred over here. This morning upon my evaluation patient was in respiratory distress, his oxygen saturations were in 70s on high flow oxygen, he was on BiPAP overnight though, patient was tachycardic. Place the patient on BiPAP with significant improvement in his symptomatology. Patient's saturations improved to high 90s. His heart rate improved. Patient otherwise denies chest pain, he denies palpitations, EKG was concerning for new onset A. fib flutter. Patient denies any other acute issues.         Review of Systems:     Constitutional:  negative for chills, fevers, sweats  Respiratory:  Positive for cough and shortness of breath  Cardiovascular:  negative for chest pain, chest pressure/discomfort, lower extremity edema, palpitations  Gastrointestinal:  negative for abdominal pain, constipation, diarrhea, 03/09/19  1959 03/10/19  0821 03/10/19  1149   POCGLU 300* 355* 403* 270*       I/O (24Hr): Intake/Output Summary (Last 24 hours) at 3/10/2019 1403  Last data filed at 3/10/2019 0444  Gross per 24 hour   Intake 1520 ml   Output 2050 ml   Net -530 ml       Labs:    Hematology:  Recent Labs     03/08/19  0631 03/09/19  0622   WBC 10.1 7.9   RBC 3.33* 3.15*   HGB 10.5* 9.9*   HCT 32.8* 30.1*   MCV 98.5 95.6   MCH 31.5 31.4   MCHC 32.0 32.9   RDW 15.1* 14.6*    184   MPV 9.8 9.9     Chemistry:  Recent Labs     03/08/19  0631 03/09/19  0622 03/10/19  0653    136 132*   K 4.3 4.2 4.5   CL 98 97* 96*   CO2 26 28 29   GLUCOSE 125* 181* 372*   BUN 14 14 17   CREATININE 0.71 0.66* 0.84   ANIONGAP 14 11 7*   LABGLOM >60 >60 >60   GFRAA >60 >60 >60   CALCIUM 9.2 8.7 8.5*     Recent Labs     03/08/19  0028  03/09/19  0617 03/09/19  1217 03/09/19  1716 03/09/19  1959 03/10/19  0821 03/10/19  1149   LABA1C 8.7*  --   --   --   --   --   --   --    POCGLU  --    < > 178* 368* 300* 355* 403* 270*    < > = values in this interval not displayed. Lab Results   Component Value Date/Time    SPECIAL RT LOWER ARM 10ML 03/08/2019 12:29 AM     Lab Results   Component Value Date/Time    CULTURE NO GROWTH 2 DAYS 03/08/2019 12:29 AM       Lab Results   Component Value Date    POCPH 7.468 03/08/2019    POCPCO2 38.9 03/08/2019    POCPO2 73.0 03/08/2019    POCHCO3 28.2 03/08/2019    NBEA NOT REPORTED 03/08/2019    PBEA 4 03/08/2019    QNL4WQK 29 03/08/2019    OAUN0NML 95 03/08/2019    FIO2 45.0 03/08/2019       Radiology:    Ct Abdomen Pelvis Wo Contrast Additional Contrast? None    Result Date: 3/8/2019  EXAMINATION: CT OF THE ABDOMEN AND PELVIS WITHOUT CONTRAST 3/8/2019 1:05 pm TECHNIQUE: CT of the abdomen and pelvis was performed without the administration of intravenous contrast. Multiplanar reformatted images are provided for review.  Dose modulation, iterative reconstruction, and/or weight based adjustment of the mA/kV was utilized to reduce the radiation dose to as low as reasonably achievable. COMPARISON: None. HISTORY: ORDERING SYSTEM PROVIDED HISTORY: abdominal pain TECHNOLOGIST PROVIDED HISTORY: FINDINGS: Lower Chest: Pulmonary fibrosis with mild traction bronchiectasis in the lung bases. Dependent atelectasis is noted. Calcified pleural plaques and mild diffuse pleural thickening is noted. No effusion. Organs: Evaluation of the abdominal and pelvic viscera is limited in the absence of contrast.  Status post cholecystectomy. No biliary dilatation. The liver, pancreas, spleen, adrenals, and kidneys reveal no acute findings. Scattered calcifications throughout the pancreas are noted which may represent chronic calcific pancreatitis. GI/Bowel: Large amount stool burden throughout the colon. The rectum is distended with stool. Diverticulosis is noted. Fluid and small amount of air within the small bowel is noted suggestive of mild ileus. Pelvis: No acute findings. Mild bladder distension and bladder wall thickening suggestive of chronic bladder outlet obstruction. Mild enlargement of the median lobe of the prostate impressing upon the adjacent bladder. Rectal distention, as above. Peritoneum/Retroperitoneum: Status post endovascular aortic repair. The aneurysm sac measures up to 3.2 x 3.1 cm in the distal segment. Extensive calcified atheromatous plaque is present. No free air or free fluid. No lymphadenopathy. Bones/Soft Tissues: Chronic appearing mild compression deformity and degenerative endplate changes at L2. Multilevel degenerative disc disease and advanced facet arthropathy. Osteopenia is noted. Large amount stool burden throughout the colon with rectal distention, suggestive of impaction. Pulmonary fibrosis and calcified pleural plaques. Status post endovascular aortic aneurysm repair. The aneurysm sac measures up to 3.2 cm. Chronic appearing L2 compression deformity. Diverticulosis.  Mild bladder distension and bladder wall thickening, raising the possibility of chronic bladder outlet obstruction. Xr Chest (single View Frontal)    Result Date: 3/8/2019  EXAMINATION: SINGLE XRAY VIEW OF THE CHEST 3/8/2019 10:39 am COMPARISON: Chest radiograph performed 03/07/2019. HISTORY: ORDERING SYSTEM PROVIDED HISTORY: sob TECHNOLOGIST PROVIDED HISTORY: sob FINDINGS: There is chronic pulmonary change. There are bibasilar effusions with adjacent infiltrates. There is no pneumothorax. The mediastinal structures are stable. The upper abdomen is unremarkable. The extrathoracic soft tissues are unremarkable. Bibasilar effusions with adjacent infiltrates representing pneumonia versus atelectasis/scarring. Xr Chest Portable    Result Date: 3/7/2019  EXAMINATION: SINGLE XRAY VIEW OF THE CHEST 3/7/2019 5:26 am COMPARISON: January 30, 2019. HISTORY: ORDERING SYSTEM PROVIDED HISTORY: sob TECHNOLOGIST PROVIDED HISTORY: sob Ordering Physician Provided Reason for Exam: sob Acuity: Acute Type of Exam: Initial Additional signs and symptoms: Difficulty breathing Relevant Medical/Surgical History: Difficulty breathing FINDINGS: Low lung volume. Similar appearing bilateral heterogeneous opacities. Emphysema. Small bilateral pleural effusions. No definite pneumothorax. Grossly stable cardiomediastinal silhouette and great vessels. Stable regional skeleton. Similar-appearing bilateral heterogeneous opacities. Differential considerations include asymmetric pulmonary edema and multifocal pneumonia. Small bilateral pleural effusions.      Physical Examination:        General appearance:  alert, cooperative and no distress  Mental Status:  oriented to person, place and time and normal affect  Lungs: Bilateral air entry, rhonchi  Heart:  regular rate and rhythm, no murmur  Abdomen:  soft, nontender, nondistended, normal bowel sounds, no masses, hepatomegaly, splenomegaly  Extremities:  no edema, redness, tenderness in the

## 2019-03-10 NOTE — PLAN OF CARE
Pt. Has not had any decreased skin integrity. Advanced gel surface bed in use; all linens kept dry, & patient turns himself in the bed.  Will continue to monitor

## 2019-03-10 NOTE — PROGRESS NOTES
Pulmonary Progress Note    CC:  Hypoxia   Subjective:  No significant change in oxygen requirement he continues to need 45-50% FiO2 off high flow. He denies any hemoptysis or expectoration. He is getting his bronchodilators and steroid therapy. Afebrile          Review of Systems -  General ROS: , fatigue  ENT ROS: negative for - headaches, oral lesions or sore throat  Cardiovascular ROS: no chest pain , orthopnea or pnd   Gastrointestinal ROS: no abdominal pain, change in bowel habits, or black or bloody stools  Skin - no rash   Neuro - no blurry vision , no loc . No focal weakness   msk - no jt tenderness or swelling    Vascular - no claudication , rest completed and negative   Lymphatic - complete and negative   Hematology - oncology - complete and negative       Immunization   Immunization History   Administered Date(s) Administered    Influenza, High Dose (Fluzone 65 yrs and older) 10/30/2014, 11/10/2015    Influenza, [place], 6 mo and older, IM, PF (Flulaval, Fluarix) 10/23/2018    Pneumococcal 13-valent Conjugate ([id number]) 11/10/2015        Pneumococcal Vaccine     [x] Up to date    [] Indicated   [] Refused  [] Contraindicated       Influenza Vaccine   [] Up to date    [] Indicated   [] Refused  [] Contraindicated     PAST MEDICAL HISTORY:       Diagnosis Date    Anemia     Anxiety     Bronchitis     [facility]     Diabetes [facility]     Former smoker     Hyperlipidemia     Oxygen dependent     Respiratory distress     Type 2 diabetes mellitus ([facility]          Family History:   No family history on file. SURGICAL HISTORY:   Past Surgical History:   Procedure Laterality Date    ABDOMINAL AORTIC ANEURYSM REPAIR, OPEN      FINGER SURGERY      FOOT SURGERY      PANCREAS SURGERY                TOBACCO:   reports that he has quit smoking. His smoking use included cigarettes.  He has never used smokeless tobacco.  ETOH:   reports that he does not drink alcohol. ALLERGIES:  No Known Allergies    Home Meds:   Prior to Admission medications    Medication Sig Start Date End Date Taking?  Authorizing Provider   predniSONE (DELTASONE) 10 MG tablet 40 mg once daily for 7 days, then 30 mg OD for 7 days, then 20 mg OD for 7 days, then follow up with pulmonologist 2/1/19   Delmar Woodward MD   sodium chloride (OCEAN) 0.65 % nasal spray 1 spray by Nasal route as needed for Congestion 2/1/19   Delmar Woodward MD   sodium chloride (OCEAN) 0.65 % nasal spray 1 spray by Nasal route as needed for Congestion 1/29/19   Yuliet Barboza MD   ergocalciferol (ERGOCALCIFEROL) 83082 units capsule Take 1 capsule by mouth once a week 12/24/18   Jordin Morrow MD   furosemide (LASIX) 20 MG tablet Take 1 tablet by mouth every other day 12/1/18   Giovanny Hester MD   insulin glargine (LANTUS) 100 UNIT/ML injection vial Inject 20 Units into the skin nightly 11/30/18   Giovanny Hester MD   tamsulosin (FLOMAX) 0.4 MG capsule Take 0.4 mg by mouth daily    Historical Provider, MD   famotidine (PEPCID) 20 MG tablet Take 20 mg by mouth 2 times daily    Historical Provider, MD   acetaminophen (TYLENOL) 325 MG tablet Take 650 mg by mouth every 6 hours as needed for Pain    Historical Provider, MD   aclidinium (TUDORZA PRESSAIR) 400 MCG/ACT AEPB inhaler Inhale 1 puff into the lungs 2 times daily 6/22/18 7/22/19  Kalin Ragsdale MD   albuterol sulfate HFA (VENTOLIN HFA) 108 (90 Base) MCG/ACT inhaler Inhale 2 puffs into the lungs every 6 hours as needed for Wheezing 6/22/18   Kalin Ragsdale MD   budesonide-formoterol (SYMBICORT) 80-4.5 MCG/ACT AERO Inhale 2 puffs into the lungs 2 times daily 5/25/18   Ramón Price MD   metFORMIN ER (GLUCOPHAGE-XR) 500 MG XR tablet take 2 tablets by mouth twice a day  Patient taking differently: take 2 tablet by mouth twice a day 8/1/16   Ronen Pitt PA-C   aspirin 81 MG tablet Take 81 mg by mouth daily    Historical Provider, MD escitalopram (LEXAPRO) 10 MG tablet Take 1 tablet by mouth daily 11/10/15   Dominique Jensen PA-C   ONE TOUCH ULTRA TEST strip STRIP MISC ONCE A DAY 7/1/15   Dominique Jensen PA-C         Intake/Output Summary (Last 24 hours) at 3/10/2019 1723  Last data filed at 3/10/2019 1658  Gross per 24 hour   Intake 1520 ml   Output 2225 ml   Net -705 ml         Diet   DIET CARB CONTROL;    Vitals:   /65   Pulse 100   Temp 98.7 °F (37.1 °C) (Oral)   Resp 28   Ht 5' 8.9\" (1.75 m)   Wt 154 lb 15.7 oz (70.3 kg)   SpO2 91%   BMI 22.95 kg/m²  on         I/O (24 Hours)    Patient Vitals for the past 8 hrs:   BP Temp Temp src Pulse Resp SpO2   03/10/19 1658 123/65 98.7 °F (37.1 °C) Oral 100 28 91 %   03/10/19 1514 -- -- -- -- 22 95 %   03/10/19 1237 114/67 97.3 °F (36.3 °C) Oral 99 25 93 %   03/10/19 1149 -- -- -- -- -- 93 %   03/10/19 1115 -- -- -- -- 24 93 %       Intake/Output Summary (Last 24 hours) at 3/10/2019 1723  Last data filed at 3/10/2019 1658  Gross per 24 hour   Intake 1520 ml   Output 2225 ml   Net -705 ml     I/O last 3 completed shifts: In: 1520 [P.O.:800; I.V.:720]  Out: 2050 [Urine:2050]   Date 03/10/19 0000 - 03/10/19 2359   Shift 7034-6824 0335-1349 9707-1701 24 Hour Total   INTAKE   P.O.(mL/kg/hr) 800(1.4)   800   I. V.(mL/kg) 720(10.2)   720(10.2)   Shift Total(mL/kg) 1520(21.6)   1520(21.6)   OUTPUT   Urine(mL/kg/hr) 850(1.5)  175 1025   Shift Total(mL/kg) 850(12.1)  175(2.5) 1025(14.6)   Weight (kg) 70.3 70.3 70.3 70.3     Patient Vitals for the past 96 hrs (Last 3 readings):   Weight   03/07/19 2303 154 lb 15.7 oz (70.3 kg)   03/07/19 2257 159 lb 9.8 oz (72.4 kg)          PHYSICAL EXAMINATION:  General Appearance:    Alert, cooperative, no distress, appears stated age   Head:    Normocephalic, without obvious abnormality, atraumatic                  :    Neck:   Supple, symmetrical, trachea midline, no adenopathy;     thyroid:  no enlargement/tenderness/nodules; no carotid    bruit no JVP , no HJR   Back:     Symmetric, no curvature, ROM normal, no CVA tenderness   Lungs:       B/l velcro rales dull bases dec bs bases    Chest Wall:    No tenderness or deformity      Heart:    Tachy split s2                           Abdomen:                                                 Pulses:                                            Lymph nodes:                    Neurologic:                  Soft, non-tender, bowel sounds active all four quadrants,     no masses, no organomegaly         2+ and symmetric all extremities            Cervical, supraclavicular not enlarged or matted or tender      CNII-XII intact, normal strength 5/5 .   Sensation grossly normal  and reflexes normal 2+  throughout     Clubbing No  Lower ext edema Yes1+   [] , 2 +  [] , 3+   []  Upper ext edema No       Musculoskeletal - no joint swelling or tenderness or synovitis            Medications:    Scheduled Meds:   insulin glargine  35 Units Subcutaneous Nightly    insulin glargine  20 Units Subcutaneous Daily with breakfast    aspirin  81 mg Oral Daily    mometasone-formoterol  2 puff Inhalation BID    escitalopram  10 mg Oral Daily    famotidine  20 mg Oral BID    tamsulosin  0.4 mg Oral Daily    piperacillin-tazobactam  3.375 g Intravenous Q8H    methylPREDNISolone  40 mg Intravenous Q8H    levalbuterol  1.25 mg Nebulization Q4H WA    ipratropium  0.5 mg Nebulization Q4H WA    bisacodyl  10 mg Rectal Once    enoxaparin  40 mg Subcutaneous Daily    sodium chloride flush  10 mL Intravenous 2 times per day    insulin lispro  0-12 Units Subcutaneous TID WC    insulin lispro  0-6 Units Subcutaneous Nightly       Continuous Infusions:   dextrose      dextrose         PRN Meds:  acetaminophen, sodium chloride, acetaminophen, albuterol, magnesium hydroxide, nicotine, ondansetron, sodium chloride flush, dextrose, dextrose, glucagon (rDNA), glucose, glucose, dextrose, glucagon (rDNA), dextrose    Labs:  CBC:   Recent Labs

## 2019-03-10 NOTE — PROGRESS NOTES
Pulmonary Progress Note    CC:  Hypoxia   Subjective: Well known to me from repeat admission due to pulmonary fibrosis   Resistant to palliative discussion   Presently on high flow 45 % 33 l   Sob with minimal exertion   Afebrile           Review of Systems -  General ROS: , fatigue  ENT ROS: negative for - headaches, oral lesions or sore throat  Cardiovascular ROS: no chest pain , orthopnea or pnd   Gastrointestinal ROS: no abdominal pain, change in bowel habits, or black or bloody stools  Skin - no rash   Neuro - no blurry vision , no loc . No focal weakness   msk - no jt tenderness or swelling    Vascular - no claudication , rest completed and negative   Lymphatic - complete and negative   Hematology - oncology - complete and negative       Immunization   Immunization History   Administered Date(s) Administered    Influenza, High Dose (Fluzone 65 yrs and older) 10/30/2014, 11/10/2015    Influenza, [place], 6 mo and older, IM, PF (Flulaval, Fluarix) 10/23/2018    Pneumococcal 13-valent Conjugate ([id number]) 11/10/2015        Pneumococcal Vaccine     [x] Up to date    [] Indicated   [] Refused  [] Contraindicated       Influenza Vaccine   [] Up to date    [] Indicated   [] Refused  [] Contraindicated     PAST MEDICAL HISTORY:       Diagnosis Date    Anemia     Anxiety     Bronchitis     [facility]     Diabetes [facility]     Former smoker     Hyperlipidemia     Oxygen dependent     Respiratory distress     [facility]          Family History:   No family history on file. SURGICAL HISTORY:   Past Surgical History:   Procedure Laterality Date    ABDOMINAL AORTIC ANEURYSM REPAIR, OPEN      FINGER SURGERY      FOOT SURGERY      PANCREAS SURGERY                TOBACCO:   reports that he has quit smoking. His smoking use included cigarettes. He has never used smokeless tobacco.  ETOH:   reports that he does not drink alcohol.           ALLERGIES:  No Take 1 tablet by mouth daily 11/10/15   Dominique Jensen PA-C   ONE TOUCH ULTRA TEST strip STRIP MISC ONCE A DAY 7/1/15   Ressie Cogan Rohrs, PA-C         Intake/Output Summary (Last 24 hours) at 3/9/2019 2245  Last data filed at 3/9/2019 1841  Gross per 24 hour   Intake 1226 ml   Output 2380 ml   Net -1154 ml         Diet   DIET CARB CONTROL;    Vitals:   /64   Pulse 93   Temp 97.6 °F (36.4 °C) (Oral)   Resp 30   Ht 5' 8.9\" (1.75 m)   Wt 154 lb 15.7 oz (70.3 kg)   SpO2 95%   BMI 22.95 kg/m²  on         I/O (24 Hours)    Patient Vitals for the past 8 hrs:   BP Temp Temp src Pulse Resp SpO2   03/09/19 2204 -- -- -- -- 30 95 %   03/09/19 1951 111/64 97.6 °F (36.4 °C) Oral -- -- --   03/09/19 1714 127/72 99.2 °F (37.3 °C) Temporal 93 29 92 %   03/09/19 1602 -- -- -- -- 24 95 %       Intake/Output Summary (Last 24 hours) at 3/9/2019 2245  Last data filed at 3/9/2019 1841  Gross per 24 hour   Intake 1226 ml   Output 2380 ml   Net -1154 ml     I/O last 3 completed shifts: In: 2278 [P.O.:2050; I.V.:2844]  Out: 1180 [Urine:1180]   Date 03/09/19 0000 - 03/09/19 2359   Shift 0261-2196 4140-2558 0884-4091 24 Hour Total   INTAKE   P.O.(mL/kg/hr) 850(1.5)   850   I. V.(mL/kg) 376(5.3)   376(5.3)   Shift Total(mL/kg) 7034(15.0)   8840(99.7)   OUTPUT   Urine(mL/kg/hr) 1180(2.1)  1200 2380   Shift Total(mL/kg) 8884(53.8)  1200(17.1) 1012(79.9)   Weight (kg) 70.3 70.3 70.3 70.3     Patient Vitals for the past 96 hrs (Last 3 readings):   Weight   03/07/19 2303 154 lb 15.7 oz (70.3 kg)   03/07/19 2257 159 lb 9.8 oz (72.4 kg)          PHYSICAL EXAMINATION:  General Appearance:    Alert, cooperative, no distress, appears stated age   Head:    Normocephalic, without obvious abnormality, atraumatic                  :    Neck:   Supple, symmetrical, trachea midline, no adenopathy;     thyroid:  no enlargement/tenderness/nodules; no carotid    bruit no JVP , no HJR   Back:     Symmetric, no curvature, ROM normal, no CVA tenderness Lungs:       B/l velcro rales dull bases dec bs bases    Chest Wall:    No tenderness or deformity      Heart:    Tachy split s2                           Abdomen:                                                 Pulses:                                            Lymph nodes:                    Neurologic:                  Soft, non-tender, bowel sounds active all four quadrants,     no masses, no organomegaly         2+ and symmetric all extremities            Cervical, supraclavicular not enlarged or matted or tender      CNII-XII intact, normal strength 5/5 .   Sensation grossly normal  and reflexes normal 2+  throughout     Clubbing No  Lower ext edema Yes1+   [] , 2 +  [] , 3+   []  Upper ext edema No       Musculoskeletal - no joint swelling or tenderness or synovitis            Medications:    Scheduled Meds:   insulin glargine  30 Units Subcutaneous Nightly    aspirin  81 mg Oral Daily    mometasone-formoterol  2 puff Inhalation BID    escitalopram  10 mg Oral Daily    famotidine  20 mg Oral BID    tamsulosin  0.4 mg Oral Daily    piperacillin-tazobactam  3.375 g Intravenous Q8H    methylPREDNISolone  40 mg Intravenous Q8H    levalbuterol  1.25 mg Nebulization Q4H WA    ipratropium  0.5 mg Nebulization Q4H WA    bisacodyl  10 mg Rectal Once    enoxaparin  40 mg Subcutaneous Daily    sodium chloride flush  10 mL Intravenous 2 times per day    insulin lispro  0-12 Units Subcutaneous TID WC    insulin lispro  0-6 Units Subcutaneous Nightly       Continuous Infusions:   dextrose      dextrose         PRN Meds:  acetaminophen, sodium chloride, acetaminophen, albuterol, magnesium hydroxide, nicotine, ondansetron, sodium chloride flush, dextrose, dextrose, glucagon (rDNA), glucose, glucose, dextrose, glucagon (rDNA), dextrose    Labs:  CBC:   Recent Labs     03/07/19  0510 03/08/19  0631 03/09/19  0622   WBC 11.5* 10.1 7.9   HGB 12.8* 10.5* 9.9*   HCT 38.2* 32.8* 30.1*   MCV 94.5 98.5 95.6   PLT 221 183 184     BMP:   Recent Labs     03/07/19  0510 03/08/19  0631 03/09/19  0622   * 138 136   K 3.8 4.3 4.2   CL 90* 98 97*   CO2 30 26 28   BUN 21 14 14   CREATININE 0.87 0.71 0.66*     LIVER PROFILE:   Recent Labs     03/07/19  0510   AST 14   ALT 21   BILITOT 0.39   ALKPHOS 92     PT/INR:   Recent Labs     03/07/19  0510   PROTIME 12.8   INR 1.0     APTT:   Recent Labs     03/07/19  0510   APTT 23.8*     UA:  Recent Labs     03/07/19  0800   COLORU YELLOW   PHUR 5.5   WBCUA 0 TO 2   RBCUA 0 TO 2   MUCUS NOT REPORTED   TRICHOMONAS NOT REPORTED   YEAST NOT REPORTED   BACTERIA None   SPECGRAV 1.025   LEUKOCYTESUR NEGATIVE   UROBILINOGEN Normal   BILIRUBINUR NEGATIVE   GLUCOSEU 1+*   AMORPHOUS NOT REPORTED     No results for input(s): PHART, FOH6BBY, PO2ART in the last 72 hours.     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

## 2019-03-10 NOTE — PROGRESS NOTES
BRONCHOSPASM/BRONCHOCONSTRICTION     [x]         IMPROVE AERATION/BREATH SOUNDS  [x]   ADMINISTER BRONCHODILATOR THERAPY AS APPROPRIATE  [x]   ASSESS BREATH SOUNDS  [x]   IMPLEMENT AEROSOL/MDI PROTOCOL  [x]   PATIENT EDUCATION AS NEEDED    PROVIDE ADEQUATE OXYGENATION WITH ACCEPTABLE SP02/ABG'S    [x]  IDENTIFY APPROPRIATE OXYGEN THERAPY  [x]   MONITOR SP02/ABG'S AS NEEDED   [x]   PATIENT EDUCATION AS NEEDED    PATIENT REFUSES TO WEAR BIPAP     [x] Risks and benefits explained to patient   [x] Patient refuses to wear Bipap stating  No I cant take it it hurts me  [x] Patient verbalizes understanding of information presented.

## 2019-03-11 LAB
ANION GAP SERPL CALCULATED.3IONS-SCNC: 10 MMOL/L (ref 9–17)
BUN BLDV-MCNC: 16 MG/DL (ref 8–23)
BUN/CREAT BLD: ABNORMAL (ref 9–20)
CALCIUM SERPL-MCNC: 8.3 MG/DL (ref 8.6–10.4)
CHLORIDE BLD-SCNC: 99 MMOL/L (ref 98–107)
CO2: 28 MMOL/L (ref 20–31)
CREAT SERPL-MCNC: 0.64 MG/DL (ref 0.7–1.2)
GFR AFRICAN AMERICAN: >60 ML/MIN
GFR NON-AFRICAN AMERICAN: >60 ML/MIN
GFR SERPL CREATININE-BSD FRML MDRD: ABNORMAL ML/MIN/{1.73_M2}
GFR SERPL CREATININE-BSD FRML MDRD: ABNORMAL ML/MIN/{1.73_M2}
GLUCOSE BLD-MCNC: 224 MG/DL (ref 75–110)
GLUCOSE BLD-MCNC: 225 MG/DL (ref 70–99)
GLUCOSE BLD-MCNC: 282 MG/DL (ref 75–110)
GLUCOSE BLD-MCNC: 293 MG/DL (ref 75–110)
GLUCOSE BLD-MCNC: 310 MG/DL (ref 75–110)
POTASSIUM SERPL-SCNC: 3.8 MMOL/L (ref 3.7–5.3)
SODIUM BLD-SCNC: 137 MMOL/L (ref 135–144)

## 2019-03-11 PROCEDURE — 94640 AIRWAY INHALATION TREATMENT: CPT

## 2019-03-11 PROCEDURE — 80048 BASIC METABOLIC PNL TOTAL CA: CPT

## 2019-03-11 PROCEDURE — 94660 CPAP INITIATION&MGMT: CPT

## 2019-03-11 PROCEDURE — 2700000000 HC OXYGEN THERAPY PER DAY

## 2019-03-11 PROCEDURE — 6370000000 HC RX 637 (ALT 250 FOR IP): Performed by: NURSE PRACTITIONER

## 2019-03-11 PROCEDURE — 2060000000 HC ICU INTERMEDIATE R&B

## 2019-03-11 PROCEDURE — 6360000002 HC RX W HCPCS: Performed by: INTERNAL MEDICINE

## 2019-03-11 PROCEDURE — 99232 SBSQ HOSP IP/OBS MODERATE 35: CPT | Performed by: INTERNAL MEDICINE

## 2019-03-11 PROCEDURE — 6370000000 HC RX 637 (ALT 250 FOR IP): Performed by: INTERNAL MEDICINE

## 2019-03-11 PROCEDURE — 99233 SBSQ HOSP IP/OBS HIGH 50: CPT | Performed by: INTERNAL MEDICINE

## 2019-03-11 PROCEDURE — 6360000002 HC RX W HCPCS: Performed by: NURSE PRACTITIONER

## 2019-03-11 PROCEDURE — 82947 ASSAY GLUCOSE BLOOD QUANT: CPT

## 2019-03-11 PROCEDURE — 94762 N-INVAS EAR/PLS OXIMTRY CONT: CPT

## 2019-03-11 PROCEDURE — 2580000003 HC RX 258: Performed by: INTERNAL MEDICINE

## 2019-03-11 PROCEDURE — 36415 COLL VENOUS BLD VENIPUNCTURE: CPT

## 2019-03-11 RX ORDER — INSULIN GLARGINE 100 [IU]/ML
40 INJECTION, SOLUTION SUBCUTANEOUS NIGHTLY
Status: DISCONTINUED | OUTPATIENT
Start: 2019-03-11 | End: 2019-03-16

## 2019-03-11 RX ADMIN — MOMETASONE FUROATE AND FORMOTEROL FUMARATE DIHYDRATE 2 PUFF: 200; 5 AEROSOL RESPIRATORY (INHALATION) at 20:25

## 2019-03-11 RX ADMIN — TAMSULOSIN HYDROCHLORIDE 0.4 MG: 0.4 CAPSULE ORAL at 08:38

## 2019-03-11 RX ADMIN — PIPERACILLIN AND TAZOBACTAM 3.38 G: 3; .375 INJECTION, POWDER, LYOPHILIZED, FOR SOLUTION INTRAVENOUS; PARENTERAL at 02:51

## 2019-03-11 RX ADMIN — LEVALBUTEROL HYDROCHLORIDE 1.25 MG: 1.25 SOLUTION RESPIRATORY (INHALATION) at 09:01

## 2019-03-11 RX ADMIN — LEVALBUTEROL HYDROCHLORIDE 1.25 MG: 1.25 SOLUTION RESPIRATORY (INHALATION) at 11:38

## 2019-03-11 RX ADMIN — INSULIN LISPRO 4 UNITS: 100 INJECTION, SOLUTION INTRAVENOUS; SUBCUTANEOUS at 07:56

## 2019-03-11 RX ADMIN — LEVALBUTEROL HYDROCHLORIDE 1.25 MG: 1.25 SOLUTION RESPIRATORY (INHALATION) at 15:02

## 2019-03-11 RX ADMIN — METHYLPREDNISOLONE SODIUM SUCCINATE 40 MG: 40 INJECTION, POWDER, FOR SOLUTION INTRAMUSCULAR; INTRAVENOUS at 02:51

## 2019-03-11 RX ADMIN — PIPERACILLIN AND TAZOBACTAM 3.38 G: 3; .375 INJECTION, POWDER, LYOPHILIZED, FOR SOLUTION INTRAVENOUS; PARENTERAL at 18:37

## 2019-03-11 RX ADMIN — INSULIN LISPRO 3 UNITS: 100 INJECTION, SOLUTION INTRAVENOUS; SUBCUTANEOUS at 20:58

## 2019-03-11 RX ADMIN — METHYLPREDNISOLONE SODIUM SUCCINATE 40 MG: 40 INJECTION, POWDER, FOR SOLUTION INTRAMUSCULAR; INTRAVENOUS at 09:52

## 2019-03-11 RX ADMIN — IPRATROPIUM BROMIDE 0.5 MG: 0.5 SOLUTION RESPIRATORY (INHALATION) at 11:38

## 2019-03-11 RX ADMIN — METHYLPREDNISOLONE SODIUM SUCCINATE 40 MG: 40 INJECTION, POWDER, FOR SOLUTION INTRAMUSCULAR; INTRAVENOUS at 18:37

## 2019-03-11 RX ADMIN — LEVALBUTEROL HYDROCHLORIDE 1.25 MG: 1.25 SOLUTION RESPIRATORY (INHALATION) at 20:24

## 2019-03-11 RX ADMIN — INSULIN LISPRO 8 UNITS: 100 INJECTION, SOLUTION INTRAVENOUS; SUBCUTANEOUS at 12:25

## 2019-03-11 RX ADMIN — MOMETASONE FUROATE AND FORMOTEROL FUMARATE DIHYDRATE 2 PUFF: 200; 5 AEROSOL RESPIRATORY (INHALATION) at 09:01

## 2019-03-11 RX ADMIN — IPRATROPIUM BROMIDE 0.5 MG: 0.5 SOLUTION RESPIRATORY (INHALATION) at 15:02

## 2019-03-11 RX ADMIN — ESCITALOPRAM OXALATE 10 MG: 10 TABLET ORAL at 08:38

## 2019-03-11 RX ADMIN — FAMOTIDINE 20 MG: 20 TABLET, FILM COATED ORAL at 08:39

## 2019-03-11 RX ADMIN — PIPERACILLIN AND TAZOBACTAM 3.38 G: 3; .375 INJECTION, POWDER, LYOPHILIZED, FOR SOLUTION INTRAVENOUS; PARENTERAL at 10:58

## 2019-03-11 RX ADMIN — IPRATROPIUM BROMIDE 0.5 MG: 0.5 SOLUTION RESPIRATORY (INHALATION) at 09:01

## 2019-03-11 RX ADMIN — ENOXAPARIN SODIUM 40 MG: 40 INJECTION SUBCUTANEOUS at 08:39

## 2019-03-11 RX ADMIN — INSULIN LISPRO 6 UNITS: 100 INJECTION, SOLUTION INTRAVENOUS; SUBCUTANEOUS at 16:50

## 2019-03-11 RX ADMIN — FAMOTIDINE 20 MG: 20 TABLET, FILM COATED ORAL at 20:58

## 2019-03-11 RX ADMIN — IPRATROPIUM BROMIDE 0.5 MG: 0.5 SOLUTION RESPIRATORY (INHALATION) at 20:24

## 2019-03-11 RX ADMIN — INSULIN GLARGINE 40 UNITS: 100 INJECTION, SOLUTION SUBCUTANEOUS at 20:59

## 2019-03-11 RX ADMIN — INSULIN GLARGINE 20 UNITS: 100 INJECTION, SOLUTION SUBCUTANEOUS at 07:57

## 2019-03-11 RX ADMIN — ASPIRIN 81 MG: 81 TABLET, CHEWABLE ORAL at 08:39

## 2019-03-11 ASSESSMENT — PAIN SCALES - GENERAL: PAINLEVEL_OUTOF10: 0

## 2019-03-11 NOTE — PROGRESS NOTES
Herber Fitzpatrick 19    Progress Note    3/11/2019    1:48 PM    Name:   Zheng Rodriges  MRN:     [id number]     Acct:      [de-identified]   Room:   [address] [place] [address]  IP Day:  4  Admit Date:  3/7/2019 10:40 PM    PCP:   Sue Zamora PA-C  Code Status:  Full Code    Subjective:     C/C: No chief complaint on file. Interval History Status: improved. Patient continues to show improvement, still on45% high flow oxygen   Denies chest pain, no other acute issues overnight, blood sugars are elevated  Brief History: This is 70years old male with history of COPD living in a nursing home currently, transferred to us from [facility] after he presented there with shortness of breath. The patient says that he suddenly started having shortness of breath. He did not have any chest pain. He was having coughing with phlegm production. He also had fevers and chills. He was found to be in acute respiratory failure. He was put on BiPAP and started on vancomycin and Zosyn. Patient had improvement in his symptoms and he was transferred over here. This morning upon my evaluation patient was in respiratory distress, his oxygen saturations were in 70s on high flow oxygen, he was on BiPAP overnight though, patient was tachycardic. Place the patient on BiPAP with significant improvement in his symptomatology. Patient's saturations improved to high 90s. His heart rate improved. Patient otherwise denies chest pain, he denies palpitations, EKG was concerning for new onset A. fib flutter. Patient denies any other acute issues.         Review of Systems:     Constitutional:  negative for chills, fevers, sweats  Respiratory:  Positive for cough and shortness of breath  Cardiovascular:  negative for chest pain, chest pressure/discomfort, lower extremity edema, palpitations  Gastrointestinal:  negative for abdominal pain, constipation, diarrhea, nausea, vomiting  Neurological:  negative for dizziness, headache    Medications: Allergies:  No Known Allergies    Current Meds:   Scheduled Meds:    insulin glargine  35 Units Subcutaneous Nightly    insulin glargine  20 Units Subcutaneous Daily with breakfast    aspirin  81 mg Oral Daily    mometasone-formoterol  2 puff Inhalation BID    escitalopram  10 mg Oral Daily    famotidine  20 mg Oral BID    tamsulosin  0.4 mg Oral Daily    piperacillin-tazobactam  3.375 g Intravenous Q8H    methylPREDNISolone  40 mg Intravenous Q8H    levalbuterol  1.25 mg Nebulization Q4H WA    ipratropium  0.5 mg Nebulization Q4H WA    bisacodyl  10 mg Rectal Once    enoxaparin  40 mg Subcutaneous Daily    sodium chloride flush  10 mL Intravenous 2 times per day    insulin lispro  0-12 Units Subcutaneous TID WC    insulin lispro  0-6 Units Subcutaneous Nightly     Continuous Infusions:    dextrose      dextrose       PRN Meds: acetaminophen, sodium chloride, acetaminophen, albuterol, magnesium hydroxide, nicotine, ondansetron, sodium chloride flush, dextrose, dextrose, glucagon (rDNA), glucose, glucose, dextrose, glucagon (rDNA), dextrose    Data:     Past Medical History:   has a past medical history of Anemia, Anxiety, Bronchitis, COPD (chronic obstructive pulmonary disease) ([facility] Diabetes ([facility] Former smoker, Hyperlipidemia, Oxygen dependent, Respiratory distress, and Type [facility] diabetes mellitus ([facility] Social History:   reports that he has quit smoking. His smoking use included cigarettes. He has never used smokeless tobacco. He reports that he does not drink alcohol or use drugs. Family History: No family history on file.     Vitals:  /73   Pulse 96   Temp 98.3 °F (36.8 °C) (Oral)   Resp (!) 33   Ht 5' 8.9\" (1.75 m)   Wt 154 lb 15.7 oz (70.3 kg)   SpO2 (!) 87%   BMI 22.95 kg/m²   Temp (24hrs), Av.2 °F (36.8 °C), Min:97.8 °F (36.6 °C), Max:98.7 °F (37.1 °C)    Recent Labs     03/10/19  1678 03/10/19  2033 03/11/19  0702 03/11/19  1052   POCGLU 360* 359* 224* 310*       I/O (24Hr): Intake/Output Summary (Last 24 hours) at 3/11/2019 1348  Last data filed at 3/11/2019 1230  Gross per 24 hour   Intake 1367 ml   Output 2825 ml   Net -1458 ml       Labs:    Hematology:  Recent Labs     03/09/19  0622   WBC 7.9   RBC 3.15*   HGB 9.9*   HCT 30.1*   MCV 95.6   MCH 31.4   MCHC 32.9   RDW 14.6*      MPV 9.9     Chemistry:  Recent Labs     03/09/19  0622 03/10/19  0653 03/11/19  0724    132* 137   K 4.2 4.5 3.8   CL 97* 96* 99   CO2 28 29 28   GLUCOSE 181* 372* 225*   BUN 14 17 16   CREATININE 0.66* 0.84 0.64*   ANIONGAP 11 7* 10   LABGLOM >60 >60 >60   GFRAA >60 >60 >60   CALCIUM 8.7 8.5* 8.3*     Recent Labs     03/10/19  0821 03/10/19  1149 03/10/19  1634 03/10/19  2033 03/11/19  0702 03/11/19  1052   POCGLU 403* 270* 360* 359* 224* 310*         Lab Results   Component Value Date/Time    SPECIAL RT LOWER ARM 10ML 03/08/2019 12:29 AM     Lab Results   Component Value Date/Time    CULTURE NO GROWTH 3 DAYS 03/08/2019 12:29 AM       Lab Results   Component Value Date    POCPH 7.468 03/08/2019    POCPCO2 38.9 03/08/2019    POCPO2 73.0 03/08/2019    POCHCO3 28.2 03/08/2019    NBEA NOT REPORTED 03/08/2019    PBEA 4 03/08/2019    OBD6GQO 29 03/08/2019    AWYW7YSB 95 03/08/2019    FIO2 45.0 03/08/2019       Radiology:    Ct Abdomen Pelvis Wo Contrast Additional Contrast? None    Result Date: 3/8/2019  EXAMINATION: CT OF THE ABDOMEN AND PELVIS WITHOUT CONTRAST 3/8/2019 1:05 pm TECHNIQUE: CT of the abdomen and pelvis was performed without the administration of intravenous contrast. Multiplanar reformatted images are provided for review. Dose modulation, iterative reconstruction, and/or weight based adjustment of the mA/kV was utilized to reduce the radiation dose to as low as reasonably achievable. COMPARISON: None.  HISTORY: ORDERING SYSTEM PROVIDED HISTORY: abdominal pain TECHNOLOGIST PROVIDED HISTORY: FINDINGS: Lower Chest: Pulmonary fibrosis with mild traction bronchiectasis in the lung bases. Dependent atelectasis is noted. Calcified pleural plaques and mild diffuse pleural thickening is noted. No effusion. Organs: Evaluation of the abdominal and pelvic viscera is limited in the absence of contrast.  Status post cholecystectomy. No biliary dilatation. The liver, pancreas, spleen, adrenals, and kidneys reveal no acute findings. Scattered calcifications throughout the pancreas are noted which may represent chronic calcific pancreatitis. GI/Bowel: Large amount stool burden throughout the colon. The rectum is distended with stool. Diverticulosis is noted. Fluid and small amount of air within the small bowel is noted suggestive of mild ileus. Pelvis: No acute findings. Mild bladder distension and bladder wall thickening suggestive of chronic bladder outlet obstruction. Mild enlargement of the median lobe of the prostate impressing upon the adjacent bladder. Rectal distention, as above. Peritoneum/Retroperitoneum: Status post endovascular aortic repair. The aneurysm sac measures up to 3.2 x 3.1 cm in the distal segment. Extensive calcified atheromatous plaque is present. No free air or free fluid. No lymphadenopathy. Bones/Soft Tissues: Chronic appearing mild compression deformity and degenerative endplate changes at L2. Multilevel degenerative disc disease and advanced facet arthropathy. Osteopenia is noted. Large amount stool burden throughout the colon with rectal distention, suggestive of impaction. Pulmonary fibrosis and calcified pleural plaques. Status post endovascular aortic aneurysm repair. The aneurysm sac measures up to 3.2 cm. Chronic appearing L2 compression deformity. Diverticulosis. Mild bladder distension and bladder wall thickening, raising the possibility of chronic bladder outlet obstruction.      Xr Chest (single View Frontal)    Result Date: 3/8/2019  EXAMINATION: SINGLE XRAY VIEW OF THE CHEST 3/8/2019 10:39 am COMPARISON: Chest radiograph performed 03/07/2019. HISTORY: ORDERING SYSTEM PROVIDED HISTORY: sob TECHNOLOGIST PROVIDED HISTORY: sob FINDINGS: There is chronic pulmonary change. There are bibasilar effusions with adjacent infiltrates. There is no pneumothorax. The mediastinal structures are stable. The upper abdomen is unremarkable. The extrathoracic soft tissues are unremarkable. Bibasilar effusions with adjacent infiltrates representing pneumonia versus atelectasis/scarring. Xr Chest Portable    Result Date: 3/7/2019  EXAMINATION: SINGLE XRAY VIEW OF THE CHEST 3/7/2019 5:26 am COMPARISON: January 30, 2019. HISTORY: ORDERING SYSTEM PROVIDED HISTORY: sob TECHNOLOGIST PROVIDED HISTORY: sob Ordering Physician Provided Reason for Exam: sob Acuity: Acute Type of Exam: Initial Additional signs and symptoms: Difficulty breathing Relevant Medical/Surgical History: Difficulty breathing FINDINGS: Low lung volume. Similar appearing bilateral heterogeneous opacities. Emphysema. Small bilateral pleural effusions. No definite pneumothorax. Grossly stable cardiomediastinal silhouette and great vessels. Stable regional skeleton. Similar-appearing bilateral heterogeneous opacities. Differential considerations include asymmetric pulmonary edema and multifocal pneumonia. Small bilateral pleural effusions.      Physical Examination:        General appearance:  alert, cooperative and no distress  Mental Status:  oriented to person, place and time and normal affect  Lungs: Bilateral air entry, rhonchi  Heart:  regular rate and rhythm, no murmur  Abdomen:  soft, nontender, nondistended, normal bowel sounds, no masses, hepatomegaly, splenomegaly  Extremities:  no edema, redness, tenderness in the calves  Skin:  no gross lesions, rashes, induration    Assessment:        Primary Problem  Acute respiratory failure with hypoxia [facility])    Active Hospital Problems    Diagnosis Date Noted  Atrial flutter ([facility] [I48.92] 03/08/2019    Acute respiratory failure with hypoxia ([facility] [J96.01] 01/23/2019    Pneumonia due to infectious organism [J18.9] 12/16/2018    [facility] ([facility] [J44.1] 11/27/2018    Acute on chronic respiratory failure with hypoxemia (HCC) [J96.21] 11/27/2018    Pulmonary artery hypertension (HCC) [I27.21] 05/23/2018    Iron deficiency anemia [D50.9] 05/23/2018    Type 2 diabetes mellitus without complication, with long-term current use of insulin ([facility] [E11.9, Z79.4] 10/30/2014    Hyperlipidemia [E78.5] 10/30/2014       Plan:        1. Doing much better today, continue aerosols,  2. Continue steroids   3. On Zosyn, appreciate pulmonology input  4. Continue high flow oxygen and BiPAP as needed  5. PCR panel positive for RSV  6. Hemoglobin A1c is elevated, advised strict glycemic control  7. Continue insulin sliding scale and Lantus, blood sugars are high from steroids, we will add Lantus in the morning as well, increase his nighttime insulin as well  8.  Follow-up on echocardiogram    Kristy Ramos MD  3/11/2019  1:48 PM

## 2019-03-11 NOTE — PROGRESS NOTES
ALMA Blackmanatient Assessment complete. Pneumonia [J18.9] . Vitals:    03/11/19 0905   BP:    Pulse:    Resp: 24   Temp:    SpO2: 95%   . Patients home meds are   Prior to Admission medications    Medication Sig Start Date End Date Taking?  Authorizing Provider   predniSONE (DELTASONE) 10 MG tablet 40 mg once daily for 7 days, then 30 mg OD for 7 days, then 20 mg OD for 7 days, then follow up with pulmonologist 2/1/19   Brien Zurita MD   sodium chloride (OCEAN) 0.65 % nasal spray 1 spray by Nasal route as needed for Congestion 2/1/19   Brien Zurita MD   sodium chloride (OCEAN) 0.65 % nasal spray 1 spray by Nasal route as needed for Congestion 1/29/19   Harlan Joyce MD   ergocalciferol (ERGOCALCIFEROL) 66360 units capsule Take 1 capsule by mouth once a week 12/24/18   Valdo Taylor MD   furosemide (LASIX) 20 MG tablet Take 1 tablet by mouth every other day 12/1/18   Syed George MD   insulin glargine (LANTUS) 100 UNIT/ML injection vial Inject 20 Units into the skin nightly 11/30/18   Syed George MD   tamsulosin (FLOMAX) 0.4 MG capsule Take 0.4 mg by mouth daily    Historical Provider, MD   famotidine (PEPCID) 20 MG tablet Take 20 mg by mouth 2 times daily    Historical Provider, MD   acetaminophen (TYLENOL) 325 MG tablet Take 650 mg by mouth every 6 hours as needed for Pain    Historical Provider, MD   aclidinium (TUDORZA PRESSAIR) 400 MCG/ACT AEPB inhaler Inhale 1 puff into the lungs 2 times daily 6/22/18 7/22/19  Neda Gee MD   albuterol sulfate HFA (VENTOLIN HFA) 108 (90 Base) MCG/ACT inhaler Inhale 2 puffs into the lungs every 6 hours as needed for Wheezing 6/22/18   Neda Gee MD   budesonide-formoterol (SYMBICORT) 80-4.5 MCG/ACT AERO Inhale 2 puffs into the lungs 2 times daily 5/25/18   Shelba Cheadle, MD   metFORMIN ER (GLUCOPHAGE-XR) 500 MG XR tablet take 2 tablets by mouth twice a day  Patient taking differently: take 2 tablet by mouth twice a day 8/1/16 Maykel Echeverria PA-C   aspirin 81 MG tablet Take 81 mg by mouth daily    Historical Provider, MD   escitalopram (LEXAPRO) 10 MG tablet Take 1 tablet by mouth daily 11/10/15   Dominique Jensen PA-C   ONE TOUCH ULTRA TEST strip STRIP MISC ONCE A DAY 7/1/15   Dominique Jensen PA-C   .  RR 23  Breath Sounds:diminished    Patient states that he takes these breathing treatments at home 4X a day       · Bronchodilator assessment at level  3  · Hyperinflation assessment at level   · Secretion Management assessment at level    ·   · [x]    Bronchodilator Assessment  BRONCHODILATOR ASSESSMENT SCORE  Score 0 1 2 3 4 5   Breath Sounds   []  Patient Baseline []  No Wheeze good aeration []  Faint, scattered wheezing, good aeration [x]  Expiratory Wheezing and or moderately diminished []  Insp/Exp wheeze and/or very diminished []  Insp/Exp and/ or marked distress   Respiratory Rate   []  Patient Baseline []  Less than 20 []  Less than 20 [x]  20-25 []  Greater than 25 []  Greater than 25   Peak flow % of Pred or PB [x]  NA   []  Greater than 90%  []  81-90% []  71-80% []  Less than or equal to 70%  or unable to perform []  Unable due to Respiratory Distress   Dyspnea re []  Patient Baseline []  No SOB []  No SOB [x]  SOB on exertion []  SOB min activity []  At rest/acute   e FEV% Predicted       [x]  NA []  Above 69%  []  Unable []  Above 60-69%  []  Unable []  Above 50-59%  []  Unable []  Above 35-49%  []  Unable []  Less than 35%  []  Unable                 []  Hyperinflation Assessment  Score 1 2 3   CXR and Breath Sounds   []  Clear []  No atelectasis  Basilar aeration []  Atelectasis or absent basilar breath sounds   Incentive Spirometry Volume  (Per IBW)   []  Greater than or equal to 15ml/Kg []  less than 15ml/Kg []  less than 15ml/Kg   Surgery within last 2 weeks []  None or general   []  Abdominal or thoracic surgery  []  Abdominal or thoracic   Chronic Pulmonary Historyre []  No []  Yes []  Yes     []  Secretion Management Assessment  Score 1 2 3   Bilateral Breath Sounds   []  Occasional Rhonchi []  Scattered Rhonchi []  Course Rhonchi and/or poor aeration   Sputum    []  Small amount of thin secretions []  Moderate amount of viscous secretions []  Copius, Viscious Yellow/ Secretions   CXR as reported by physician []  clear  []  Unavailable []  Infiltrates and/or consolidation  []  Unavailable []  Mucus Plugging and or lobar consolidation  []  Unavailable   Cough []  Strong, productive cough []  Weak productive cough []  No cough or weak non-productive cough   Nohemy Arango  9:06 AM                            FEMALE                                  MALE                            FEV1 Predicted Normal Values                        FEV1 Predicted Normal Values          Age                                     Height in Feet and Inches       Age                                     Height in Feet and Inches       4' 11\" 5' 1\" 5' 3\" 5' 5\" 5' 7\" 5' 9\" 5' 11\" 6' 1\"  4' 11\" 5' 1\" 5' 3\" 5' 5\" 5' 7\" 5' 9\" 5' 11\" 6' 1\"   42 - 45 2.49 2.66 2.84 3.03 3.22 3.42 3.62 3.83 42 - 45 2.82 3.03 3.26 3.49 3.72 3.96 4.22 4.47   46 - 49 2.40 2.57 2.76 2.94 3.14 3.33 3.54 3.75 46 - 49 2.70 2.92 3.14 3.37 3.61 3.85 4.10 4.36   50 - 53 2.31 2.48 2.66 2.85 3.04 3.24 3.45 3.66 50 - 53 2.58 2.80 3.02 3.25 3.49 3.73 3.98 4.24   54 - 57 2.21 2.38 2.57 2.75 2.95 3.14 3.35 3.56 54 - 57 2.46 2.67 2.89 3.12 3.36 3.60 3.85 4.11   58 - 61 2.10 2.28 2.46 2.65 2.84 3.04 3.24 3.45 58 - 61 2.32 2.54 2.76 2.99 3.23 3.47 3.72 3.98   62 - 65 1.99 2.17 2.35 2.54 2.73 2.93 3.13 3.34 62 - 65 2.19 2.40 2.62 2.85 3.09 3.33 3.58 3.84   66 - 69 1.88 2.05 2.23 2.42 2.61 2.81 3.02 3.23 66 - 69 2.04 2.26 2.48 2.71 2.95 3.19 3.44 3.70   70+ 1.82 1.99 2.17 2.36 2.55 2.75 2.95 3.16 70+ 1.97 2.19 2.41 2.64 2.87 3.12 3.37 3.62             Predicted Peak Expiratory Flow Rate                                       Height (in)  Female       Height (in) Male           Age 58 63 61 63 56

## 2019-03-11 NOTE — PROGRESS NOTES
BRONCHOSPASM/BRONCHOCONSTRICTION     [x]         IMPROVE AERATION/BREATH SOUNDS  [x]   ADMINISTER BRONCHODILATOR THERAPY AS APPROPRIATE  [x]   ASSESS BREATH SOUNDS  [x]   IMPLEMENT AEROSOL/MDI PROTOCOL  [x]   PATIENT EDUCATION AS NEEDED    PROVIDE ADEQUATE OXYGENATION WITH ACCEPTABLE SP02/ABG'S    [x]  IDENTIFY APPROPRIATE OXYGEN THERAPY  [x]   MONITOR SP02/ABG'S AS NEEDED   [x]   PATIENT EDUCATION AS NEEDED    PATIENT REFUSES TO WEAR BIPAP     [x] Risks and benefits explained to patient   [x] Patient refuses to wear Bipap stating not unless I have to  [x] Patient verbalizes understanding of information presented.

## 2019-03-11 NOTE — CARE COORDINATION
Transitional Planning    1120 Met with patient to discuss transitional planning  Patient adamant about discharging to home  Stated \" I feel great and I have worked very hard to get home\"  Patient on HFNC 45% and 35LPM  Has been to both [facility] and [facility] of [address] care consult as patient has chronic end stage resp disease and multiple hospital admissions  Contacted Samuel Carr / Manolo Anton to discuss home oxygenation devices for patient use to transition to home with [address] at discharge.

## 2019-03-11 NOTE — CONSULTS
Palliative Care Inpatient Consult    NAME:  Oscar Paez  MEDICAL RECORD NUMBER:  [id number]  AGE: 70 y.o. GENDER: male  : 1940  TODAY'S DATE:  3/11/2019    Reasons for Consultation:    Provision of information regarding PC and/or hospice philosophies  Complex, time-intensive communication and interdisciplinary psychosocial support  Clarification of goals of care and/or assistance with difficult decision-making  Guidance in regards to resources and transition(s)    Members of PC team contributing to this consultation are :  Los Mahan RN BSN CHPN    History of Present Illness     The patient is a 70 y.o. Non-/non  male who presents with No chief complaint on file. Referred to Palliative Care by   [] Physician   [x] Nursing  [] Family Request   [x] Other:  Case Management     He was admitted to the Intermed service for Pneumonia [J18.9]. His hospital course has been associated with Acute respiratory failure with hypoxia ([address]  The patient has a complicated medical history and has been hospitalized since 3/7/2019 10:40 PM.    Active Hospital Problems    Diagnosis Date Noted    Atrial flutter ([address] [I48.92] 2019    Acute respiratory failure with hypoxia (HCC) [J96.01] 2019    Pneumonia due to infectious organism [J18.9] 2018    COPD exacerbation ([address] [J44.1] 2018    Acute on chronic respiratory failure with hypoxemia ([address] [J96.21] 2018    Pulmonary artery hypertension (HCC) [I27.21] 2018    Iron deficiency anemia [D50.9] 2018    Type 2 diabetes mellitus without complication, with long-term current use of insulin ([address] [E11.9, Z79.4] 10/30/2014    Hyperlipidemia [E78.5] 10/30/2014       Data        Code Status: Full Code     ADVANCED CARE PLANNING:  Patient has capacity for medical decisions: yes  Health Care Power of : no  Living Will: no     Personal, Social, and Family History  Marital Status:   Living situation:alone  Hanna/Spiritual History:   Not asked at this time  Psychological Distress: mild  Does patient understand diagnosis/treatment? yes  Does family/caregiver understand diagnosis/treatment? no family present at this time    Assessment        Palliative Performance Scale:    ___100% Full ambulation; normal activity and work; no evidence of disease; able to do own self care; normal intake; fully conscious  ___90% Full ambulation; normal activity and work; some evidence of disease; able to do own self care; normal intake; fully conscious  ___80% Full ambulation; normal activity with effort; some evidence of disease; able to do own self care; normal or reduced intake; fully conscious  ___70% Ambulation reduced; unable to perform normal job/work; significant disease; able to do own self care; normal or reduced intake; fully conscious  ___60%  Ambulation reduced; cannot do hobbies/housework; significant disease; occasional assist; intake normal or reduced; fully conscious/some confusion  ___50%  Mainly sit/lie; can't do any work; extensive disease; considerable assist; intake normal or reduced; fully conscious/some confusion  _X_40%  Mainly in bed; extensive disease; mainly assist; intake normal or reduced; fully conscious/ some confusion   ___30%  Bed bound; extensive disease; total care; intake reduced; fully conscious/some confusion  ___20%  Bed bound; extensive disease; total care; intake minimal; drowsy/coma  ___10%  Bed bound; extensive disease; total care; mouth care only; drowsy/coma  ___0       Death       Risk Assessments:    Readmission Risk Score: 42      1 Year Mortality Risk Score: 19.46     Plan        Palliative Interaction:  Palliative Care was consulted for goals of care. Writer met with patient today who was sitting up in bed, awake, alert, oriented and very open to conversation. Patient was very expressive and expressed how happy he was today and how much better he felt today.   Patient upon discharge. Emotional support provided. Education/support to patient  Discharge planning/helping to coordinate care  Providing support for coping/adaptation/distress of patient  Continue with current plan of care  Code status clarified: Full Code    Principle Problem/Diagnosis:  Pneumonia [J18.9]    Goals of care evaluation:  The patient goals of care are live longer, improve or maintain function/quality of life, remain at home and preserve independence/autonomy/control   Goals of care discussed with:    [x] Patient independently    [] Patient and Family    [] Family or Healthcare DPOA independently    [] Unable to discuss with patient, family/DPOA not present    Code Status  Full Code    Other recommendations:  Please call with any palliative questions or concerns. Palliative Care Team is available via perfect serve or via phone - [phone]. Palliative Care will continue to follow Mr. Lashon Clinton care as needed. Thank you for allowing Palliative Care to participate in the care of Mr. Waylon Mendez .     Electronically signed by   Rafa Foote RN  Palliative Care Team  on 3/11/2019 at 5:46 PM    Palliative care office: [phone]

## 2019-03-11 NOTE — PROGRESS NOTES
Pulmonary Progress Note    CC:  Hypoxia   Subjective:  No significant change or improvement in oxygen requirement   He continues to need 45-50% FiO2 off high flow. Currently he is on 45% and 35 L of high flow nasal cannula and saturating 88-95% pending upon activity and talking here  He claimed that he is feeling better, he does have cough with sputum production and his sputum is greenish in color. He denies any hemoptysis or pain. He has good appetite and denies weight loss. He was not able to tolerate BiPAP so far  Overnight he is afebrile          Review of Systems -  General ROS: fatigue  ENT ROS: negative for - headaches, oral lesions or sore throat  Respiratory ROS: As above  Cardiovascular ROS: no chest pain , orthopnea or pnd   Gastrointestinal ROS: no abdominal pain, change in bowel habits, or black or bloody stools  Skin - no rash   Neuro - no blurry vision , no loc . No focal weakness   msk - no jt tenderness or swelling    Vascular - no claudication , rest completed and negative   Lymphatic - complete and negative   Hematology - oncology - complete and negative       Immunization   Immunization History   Administered Date(s) Administered    Influenza, High Dose (Fluzone 65 yrs and older) 10/30/2014, 11/10/2015    Influenza, Orvis Ramses, 6 mo and older, IM, PF (Flulaval, Fluarix) 10/23/2018    Pneumococcal 13-valent Conjugate ([id number]) 11/10/2015        Pneumococcal Vaccine     [x] Up to date    [] Indicated   [] Refused  [] Contraindicated       Influenza Vaccine   [] Up to date    [] Indicated   [] Refused  [] Contraindicated     PAST MEDICAL HISTORY:       Diagnosis Date    Anemia     Anxiety     Bronchitis     [facility]     Diabetes ([facility]     Former smoker     Hyperlipidemia     Oxygen dependent     Respiratory distress     [facility]          Family History:   No family history on file.     SURGICAL HISTORY:   Past Surgical History:   Procedure Laterality Date    ABDOMINAL AORTIC ANEURYSM REPAIR, OPEN      FINGER SURGERY      FOOT SURGERY      PANCREAS SURGERY                TOBACCO:   reports that he has quit smoking. His smoking use included cigarettes. He has never used smokeless tobacco.  ETOH:   reports that he does not drink alcohol. ALLERGIES:  No Known Allergies    Home Meds:   Prior to Admission medications    Medication Sig Start Date End Date Taking?  Authorizing Provider   predniSONE (DELTASONE) 10 MG tablet 40 mg once daily for 7 days, then 30 mg OD for 7 days, then 20 mg OD for 7 days, then follow up with pulmonologist 2/1/19   Zeferino Ward MD   sodium chloride (OCEAN) 0.65 % nasal spray 1 spray by Nasal route as needed for Congestion 2/1/19   Zeferino Ward MD   sodium chloride (OCEAN) 0.65 % nasal spray 1 spray by Nasal route as needed for Congestion 1/29/19   Lori Frankel, MD   ergocalciferol (ERGOCALCIFEROL) 57575 units capsule Take 1 capsule by mouth once a week 12/24/18   Brandyn Quiroz MD   furosemide (LASIX) 20 MG tablet Take 1 tablet by mouth every other day 12/1/18   Elizabet Blancas MD   insulin glargine (LANTUS) 100 UNIT/ML injection vial Inject 20 Units into the skin nightly 11/30/18   Elizabet Blancas MD   tamsulosin (FLOMAX) 0.4 MG capsule Take 0.4 mg by mouth daily    Historical Provider, MD   famotidine (PEPCID) 20 MG tablet Take 20 mg by mouth 2 times daily    Historical Provider, MD   acetaminophen (TYLENOL) 325 MG tablet Take 650 mg by mouth every 6 hours as needed for Pain    Historical Provider, MD   aclidinium (TUDORZA PRESSAIR) 400 MCG/ACT AEPB inhaler Inhale 1 puff into the lungs 2 times daily 6/22/18 7/22/19  Myriam Richardson MD   albuterol sulfate HFA (VENTOLIN HFA) 108 (90 Base) MCG/ACT inhaler Inhale 2 puffs into the lungs every 6 hours as needed for Wheezing 6/22/18   Myriam Richardson MD   budesonide-formoterol (SYMBICORT) 80-4.5 MCG/ACT AERO Inhale 2 puffs into the lungs 2 times daily 5/25/18   Dona Oropeza MD   metFORMIN ER (GLUCOPHAGE-XR) 500 MG XR tablet take 2 tablets by mouth twice a day  Patient taking differently: take 2 tablet by mouth twice a day 8/1/16   Cyrus Young PA-C   aspirin 81 MG tablet Take 81 mg by mouth daily    Historical Provider, MD   escitalopram (LEXAPRO) 10 MG tablet Take 1 tablet by mouth daily 11/10/15   Dominique Jensen PA-C   ONE TOUCH ULTRA TEST strip STRIP MISC ONCE A DAY 7/1/15   Dominique Jensen PA-C         Intake/Output Summary (Last 24 hours) at 3/11/2019 1224  Last data filed at 3/11/2019 0509  Gross per 24 hour   Intake 1127 ml   Output 2825 ml   Net -1698 ml         Diet   DIET CARB CONTROL;    Vitals:   /73   Pulse 86   Temp 98.3 °F (36.8 °C) (Oral)   Resp 27   Ht 5' 8.9\" (1.75 m)   Wt 154 lb 15.7 oz (70.3 kg)   SpO2 93%   BMI 22.95 kg/m²  on         I/O (24 Hours)    Patient Vitals for the past 8 hrs:   BP Temp Temp src Pulse Resp SpO2   03/11/19 1140 -- -- -- -- 27 93 %   03/11/19 1139 -- -- -- -- 26 91 %   03/11/19 1055 133/73 98.3 °F (36.8 °C) Oral 86 -- --   03/11/19 1000 -- -- -- 91 18 --   03/11/19 0913 -- -- -- -- 20 98 %   03/11/19 0906 -- -- -- -- 24 96 %   03/11/19 0905 -- -- -- -- 24 95 %   03/11/19 0904 -- -- -- -- 22 91 %   03/11/19 0700 118/76 98 °F (36.7 °C) Oral 67 20 96 %   03/11/19 0457 -- -- -- -- 18 98 %       Intake/Output Summary (Last 24 hours) at 3/11/2019 1224  Last data filed at 3/11/2019 0509  Gross per 24 hour   Intake 1127 ml   Output 2825 ml   Net -1698 ml     I/O last 3 completed shifts: In: 1127 [P.O.:850; I.V.:277]  Out: 2825 [Urine:2825]   Date 03/11/19 0000 - 03/11/19 2359   Shift 6314-7764 8229-4560 1986-1738 24 Hour Total   INTAKE   P.O.(mL/kg/hr) 850(1.5)   850   I. V.(mL/kg) 277(3.9)   277(3.9)   Shift Total(mL/kg) 1896(44)   1127(16)   OUTPUT   Urine(mL/kg/hr) 900(1.6)   900   Shift Total(mL/kg) 900(12.8)   900(12.8)   Weight (kg) 70.3 70.3 70.3 70.3     Patient Vitals for the past 96 hrs (Last 3 readings):   Weight   03/07/19 2303 154 lb 15.7 oz (70.3 kg)   03/07/19 2257 159 lb 9.8 oz (72.4 kg)          PHYSICAL EXAMINATION:  General Appearance:    Alert, cooperative, no distress, appears stated age   Head:    Normocephalic, without obvious abnormality, atraumatic                  :    Neck:   Supple, symmetrical, trachea midline, no adenopathy;     thyroid:  no enlargement/tenderness/nodules; no carotid    bruit no JVP , no HJR   Back:     Symmetric, no curvature, ROM normal, no CVA tenderness   Lungs:       He is mildly tachypneic and in mild distress, no accessory muscles use at rest, B/l dry velcro rales bilateral breath sounds decreased and distant. No expiratory wheezing. Chest Wall:    No tenderness or deformity      Heart:    Tachy split s2                           Abdomen:                                                 Pulses:                                            Lymph nodes:                    Neurologic:                  Soft, non-tender, bowel sounds active all four quadrants,     no masses, no organomegaly         2+ and symmetric all extremities            Cervical, supraclavicular not enlarged or matted or tender      CNII-XII intact, normal strength 5/5 .   Sensation grossly normal  and reflexes normal 2+  throughout     Clubbing No  Lower ext edema Yes1+   [] , 2 +  [] , 3+   []  Upper ext edema No       Musculoskeletal - no joint swelling or tenderness or synovitis            Medications:    Scheduled Meds:   insulin glargine  35 Units Subcutaneous Nightly    insulin glargine  20 Units Subcutaneous Daily with breakfast    aspirin  81 mg Oral Daily    mometasone-formoterol  2 puff Inhalation BID    escitalopram  10 mg Oral Daily    famotidine  20 mg Oral BID    tamsulosin  0.4 mg Oral Daily    piperacillin-tazobactam  3.375 g Intravenous Q8H    methylPREDNISolone  40 mg Intravenous Q8H    levalbuterol  1.25 mg Nebulization Q4H WA    steroids. Continue with high flow nasal cannula and wean FiO2 to keep saturation 88% or above. Continue with Dulera and Atrovent/Xopenex. Encouraged to ambulate and physical therapy. Discussed with patient and all questions answered      Electronically signed by Ros Victoria MD on 3/11/2019 at 12:24 PM     Please note that this chart was generated using voice recognition Dragon dictation software. Although every effort was made to ensure the accuracy of this automated transcription, some errors in transcription may have occurred.

## 2019-03-11 NOTE — DISCHARGE INSTR - COC
Continuity of Care Form    Patient Name: Anna Marie Monterroso   :  1940  MRN:  [id number]    Admit date:  3/7/2019  Discharge date:  3/21/2019    Code Status Order: Full Code   Advance Directives:   Advance Care Flowsheet Documentation     Date/Time Healthcare Directive Type of Healthcare Directive Copy in [address] Agent's Name Healthcare Agent's Phone Number    [phone]  Yes, patient has an advance directive for healthcare treatment  Living will  No, copy requested from family  --  --  --          Admitting Physician:  Monica Jerome MD  PCP: Maryann Yeboah PA-C    Discharging Nurse: [facility] Unit/Room#: 1717/1254-86  Discharging Unit Phone Number: [phone]    Emergency Contact:   Extended Emergency Contact Information  Primary Emergency Contact: Gaviota Boothe   [address] Phone: [phone]  Mobile Phone: [phone]  Relation: Child    Past Surgical History:  Past Surgical History:   Procedure Laterality Date    ABDOMINAL AORTIC ANEURYSM REPAIR, OPEN      FINGER SURGERY      FOOT SURGERY      PANCREAS SURGERY         Immunization History:   Immunization History   Administered Date(s) Administered    Influenza, High Dose (Fluzone 65 yrs and older) 10/30/2014, 11/10/2015    Influenza, [place]rae Plain, 6 mo and older, IM, PF (Flulaval, Fluarix) 10/23/2018    Pneumococcal 13-valent Conjugate (Maeve Billow) 11/10/2015       Active Problems:  Patient Active Problem List   Diagnosis Code    Type 2 diabetes mellitus without complication, with long-term current use of insulin ([facility] E11.9, Z79.4    Hyperlipidemia E78.5    Anxiety F41.9    Respiratory distress R06.03    Acute exacerbation of chronic obstructive pulmonary disease (COPD) ([facility] J44.1    On home O2 Z99.81    Pulmonary artery hypertension ([address] I27.21    Ex-smoker Z87.891    Iron deficiency anemia D50.9    Community acquired pneumonia of right upper lobe of lung ([facility] J18.1    Lactic acidosis E87.2    Pulmonary fibrosis ([facility]) J84.10    Dysuria R30.0    Acute on chronic respiratory failure ([facility]) J96.20    Pleural plaque due to asbestos exposure J92.0    COPD exacerbation ([facility]) J44.1    Pulmonary fibrosis ([facility]) J84.10    Acute on chronic respiratory failure with hypoxemia ([facility]) J96.21    Hypomagnesemia E83.42    C. difficile colitis A04.72    Leukocytosis D72.829    Pneumonia due to infectious organism J18.9    Diarrhea of infectious origin A09    General weakness R53.1    Hyperglycemia R73.9    Asbestosis ([facility] J61    Goals of care, counseling/discussion Z71.89    Acute respiratory failure with hypoxia ([facility]) J96.01    Hypoxia R09.02    Atrial flutter ([facility]) I48.92       Isolation/Infection:   Isolation          Contact        Patient Infection Status     Infection Encounter Level? Onset Date Added Added By Resolved Resolved By Review Date    MRSA No  03/10/19 Marcy Daly, RN       3/2019 nasal swab          Nurse Assessment:  Last Vital Signs: /73   Pulse 86   Temp 98.3 °F (36.8 °C) (Oral)   Resp 18   Ht 5' 8.9\" (1.75 m)   Wt 154 lb 15.7 oz (70.3 kg)   SpO2 98%   BMI 22.95 kg/m²     Last documented pain score (0-10 scale): Pain Level: 0  Last Weight:   Wt Readings from Last 1 Encounters:   03/07/19 154 lb 15.7 oz (70.3 kg)     Mental Status:  oriented, alert, coherent, logical, thought processes intact and able to concentrate and follow conversation    IV Access:  - None    Nursing Mobility/ADLs:  Walking   Assisted  Transfer  Assisted  Bathing  Assisted  Dressing  Assisted  Toileting  Assisted  Feeding  [address]  Assisted  Med Delivery   whole    Wound Care Documentation and Therapy:        Elimination:  Continence:   · Bowel:  Yes  · Bladder: Yes  Urinary Catheter: None   Colostomy/Ileostomy/Ileal Conduit: No       Date of Last BM: 3/21/2019    Intake/Output Summary (Last 24 hours) at 3/11/2019 1134  Last data filed at 3/11/2019 8412  Gross per 24 hour   Intake 1127 ml   Output 2825 ml   Net -1698 ml     I/O last 3 completed shifts: In: 1127 [P.O.:850; I.V.:277]  Out: 2825 [Urine:2825]    Safety Concerns: At Risk for Falls    Impairments/Disabilities:      None    Nutrition Therapy:  Current Nutrition Therapy:   - Oral Diet:  Carb Control 5 carbs/meal (2000kcals/day)    Routes of Feeding: Oral  Liquids: No Restrictions  Daily Fluid Restriction: no  Last Modified Barium Swallow with Video (Video Swallowing Test): not done    Treatments at the Time of Hospital Discharge:   Respiratory Treatments: albuterol q2PRN, atrovent Q4, xopenex nebulizer BID, dulera 2 puff BID  Oxygen Therapy:  is on oxygen at 5 L/min per nasal cannula.   Ventilator:    - No ventilator support    Rehab Therapies: Physical Therapy and Occupational Therapy  Weight Bearing Status/Restrictions: No weight bearing restirctions  Other Medical Equipment (for information only, NOT a DME order):  walker  Other Treatments: NONE    Patient's personal belongings (please select all that are sent with patient):  Dentures upper and lower    RN SIGNATURE:  Electronically signed by Macy Atkins RN on 3/21/19 at 3:36 PM    CASE MANAGEMENT/SOCIAL WORK SECTION    Inpatient Status Date: 3/7/2019    Readmission Risk Assessment Score:  Readmission Risk              Risk of Unplanned Readmission:        42           Discharging to Facility/ [address] [place] [address] Road  [address] [facility] [address]        Phone: [phone]       Fax: [phone]            Dialysis Facility (if applicable)   · Name:  · Address:  · Dialysis Schedule:  · Phone:  · Fax:    / signature: Electronically signed by Guinevere Cogan, RN on 3/21/19 at 3:24 PM    PHYSICIAN SECTION    Prognosis: Poor    Condition at Discharge: Stable    Rehab Potential (if transferring to Rehab): Poor    Recommended Labs or Other Treatments After Discharge:     Physician Certification: I certify the above information and transfer of Marisa Nava  is necessary for the continuing treatment of the diagnosis listed and that he requires Home Care for greater 30 days.      Update Admission H&P: Changes in H&P as follows - as in DC summary    PHYSICIAN SIGNATURE:  Electronically signed by Noah Hardy MD on 3/21/19 at 3:55 PM

## 2019-03-12 LAB
ANION GAP SERPL CALCULATED.3IONS-SCNC: 11 MMOL/L (ref 9–17)
BUN BLDV-MCNC: 16 MG/DL (ref 8–23)
BUN/CREAT BLD: ABNORMAL (ref 9–20)
CALCIUM SERPL-MCNC: 8.7 MG/DL (ref 8.6–10.4)
CHLORIDE BLD-SCNC: 98 MMOL/L (ref 98–107)
CO2: 28 MMOL/L (ref 20–31)
CREAT SERPL-MCNC: 0.55 MG/DL (ref 0.7–1.2)
GFR AFRICAN AMERICAN: >60 ML/MIN
GFR NON-AFRICAN AMERICAN: >60 ML/MIN
GFR SERPL CREATININE-BSD FRML MDRD: ABNORMAL ML/MIN/{1.73_M2}
GFR SERPL CREATININE-BSD FRML MDRD: ABNORMAL ML/MIN/{1.73_M2}
GLUCOSE BLD-MCNC: 125 MG/DL (ref 75–110)
GLUCOSE BLD-MCNC: 169 MG/DL (ref 70–99)
GLUCOSE BLD-MCNC: 184 MG/DL (ref 75–110)
GLUCOSE BLD-MCNC: 266 MG/DL (ref 75–110)
LV EF: 55 %
LVEF MODALITY: NORMAL
POTASSIUM SERPL-SCNC: 4 MMOL/L (ref 3.7–5.3)
SODIUM BLD-SCNC: 137 MMOL/L (ref 135–144)

## 2019-03-12 PROCEDURE — 2580000003 HC RX 258: Performed by: INTERNAL MEDICINE

## 2019-03-12 PROCEDURE — 6360000002 HC RX W HCPCS: Performed by: INTERNAL MEDICINE

## 2019-03-12 PROCEDURE — 36415 COLL VENOUS BLD VENIPUNCTURE: CPT

## 2019-03-12 PROCEDURE — 97161 PT EVAL LOW COMPLEX 20 MIN: CPT

## 2019-03-12 PROCEDURE — 94640 AIRWAY INHALATION TREATMENT: CPT

## 2019-03-12 PROCEDURE — 80048 BASIC METABOLIC PNL TOTAL CA: CPT

## 2019-03-12 PROCEDURE — 2060000000 HC ICU INTERMEDIATE R&B

## 2019-03-12 PROCEDURE — 6370000000 HC RX 637 (ALT 250 FOR IP): Performed by: INTERNAL MEDICINE

## 2019-03-12 PROCEDURE — 97110 THERAPEUTIC EXERCISES: CPT

## 2019-03-12 PROCEDURE — 6370000000 HC RX 637 (ALT 250 FOR IP): Performed by: NURSE PRACTITIONER

## 2019-03-12 PROCEDURE — 2700000000 HC OXYGEN THERAPY PER DAY

## 2019-03-12 PROCEDURE — 82947 ASSAY GLUCOSE BLOOD QUANT: CPT

## 2019-03-12 PROCEDURE — 94762 N-INVAS EAR/PLS OXIMTRY CONT: CPT

## 2019-03-12 PROCEDURE — 93306 TTE W/DOPPLER COMPLETE: CPT

## 2019-03-12 PROCEDURE — 2580000003 HC RX 258: Performed by: NURSE PRACTITIONER

## 2019-03-12 PROCEDURE — 6360000002 HC RX W HCPCS: Performed by: NURSE PRACTITIONER

## 2019-03-12 PROCEDURE — 99232 SBSQ HOSP IP/OBS MODERATE 35: CPT | Performed by: INTERNAL MEDICINE

## 2019-03-12 PROCEDURE — 99233 SBSQ HOSP IP/OBS HIGH 50: CPT | Performed by: INTERNAL MEDICINE

## 2019-03-12 RX ADMIN — LEVALBUTEROL HYDROCHLORIDE 1.25 MG: 1.25 SOLUTION RESPIRATORY (INHALATION) at 11:40

## 2019-03-12 RX ADMIN — ENOXAPARIN SODIUM 40 MG: 40 INJECTION SUBCUTANEOUS at 08:30

## 2019-03-12 RX ADMIN — PIPERACILLIN AND TAZOBACTAM 3.38 G: 3; .375 INJECTION, POWDER, LYOPHILIZED, FOR SOLUTION INTRAVENOUS; PARENTERAL at 18:10

## 2019-03-12 RX ADMIN — MOMETASONE FUROATE AND FORMOTEROL FUMARATE DIHYDRATE 2 PUFF: 200; 5 AEROSOL RESPIRATORY (INHALATION) at 08:33

## 2019-03-12 RX ADMIN — INSULIN GLARGINE 20 UNITS: 100 INJECTION, SOLUTION SUBCUTANEOUS at 08:30

## 2019-03-12 RX ADMIN — METHYLPREDNISOLONE SODIUM SUCCINATE 40 MG: 40 INJECTION, POWDER, FOR SOLUTION INTRAMUSCULAR; INTRAVENOUS at 10:53

## 2019-03-12 RX ADMIN — IPRATROPIUM BROMIDE 0.5 MG: 0.5 SOLUTION RESPIRATORY (INHALATION) at 08:33

## 2019-03-12 RX ADMIN — FAMOTIDINE 20 MG: 20 TABLET, FILM COATED ORAL at 08:30

## 2019-03-12 RX ADMIN — ASPIRIN 81 MG: 81 TABLET, CHEWABLE ORAL at 08:30

## 2019-03-12 RX ADMIN — ESCITALOPRAM OXALATE 10 MG: 10 TABLET ORAL at 08:30

## 2019-03-12 RX ADMIN — IPRATROPIUM BROMIDE 0.5 MG: 0.5 SOLUTION RESPIRATORY (INHALATION) at 20:46

## 2019-03-12 RX ADMIN — Medication 5 DROP: at 23:29

## 2019-03-12 RX ADMIN — LEVALBUTEROL HYDROCHLORIDE 1.25 MG: 1.25 SOLUTION RESPIRATORY (INHALATION) at 08:34

## 2019-03-12 RX ADMIN — TAMSULOSIN HYDROCHLORIDE 0.4 MG: 0.4 CAPSULE ORAL at 08:30

## 2019-03-12 RX ADMIN — INSULIN LISPRO 2 UNITS: 100 INJECTION, SOLUTION INTRAVENOUS; SUBCUTANEOUS at 17:36

## 2019-03-12 RX ADMIN — METHYLPREDNISOLONE SODIUM SUCCINATE 40 MG: 40 INJECTION, POWDER, FOR SOLUTION INTRAMUSCULAR; INTRAVENOUS at 18:10

## 2019-03-12 RX ADMIN — INSULIN GLARGINE 40 UNITS: 100 INJECTION, SOLUTION SUBCUTANEOUS at 20:48

## 2019-03-12 RX ADMIN — IPRATROPIUM BROMIDE 0.5 MG: 0.5 SOLUTION RESPIRATORY (INHALATION) at 11:40

## 2019-03-12 RX ADMIN — MOMETASONE FUROATE AND FORMOTEROL FUMARATE DIHYDRATE 2 PUFF: 200; 5 AEROSOL RESPIRATORY (INHALATION) at 20:46

## 2019-03-12 RX ADMIN — FAMOTIDINE 20 MG: 20 TABLET, FILM COATED ORAL at 20:47

## 2019-03-12 RX ADMIN — INSULIN LISPRO 6 UNITS: 100 INJECTION, SOLUTION INTRAVENOUS; SUBCUTANEOUS at 12:18

## 2019-03-12 RX ADMIN — LEVALBUTEROL HYDROCHLORIDE 1.25 MG: 1.25 SOLUTION RESPIRATORY (INHALATION) at 20:46

## 2019-03-12 RX ADMIN — INSULIN LISPRO 1 UNITS: 100 INJECTION, SOLUTION INTRAVENOUS; SUBCUTANEOUS at 20:47

## 2019-03-12 RX ADMIN — Medication 10 ML: at 08:32

## 2019-03-12 RX ADMIN — PIPERACILLIN AND TAZOBACTAM 3.38 G: 3; .375 INJECTION, POWDER, LYOPHILIZED, FOR SOLUTION INTRAVENOUS; PARENTERAL at 10:53

## 2019-03-12 RX ADMIN — PIPERACILLIN AND TAZOBACTAM 3.38 G: 3; .375 INJECTION, POWDER, LYOPHILIZED, FOR SOLUTION INTRAVENOUS; PARENTERAL at 03:51

## 2019-03-12 RX ADMIN — Medication 5 DROP: at 15:47

## 2019-03-12 RX ADMIN — METHYLPREDNISOLONE SODIUM SUCCINATE 40 MG: 40 INJECTION, POWDER, FOR SOLUTION INTRAMUSCULAR; INTRAVENOUS at 01:35

## 2019-03-12 NOTE — PROGRESS NOTES
(36.9 °C) (Oral)   Resp 22   Ht 5' 8.9\" (1.75 m)   Wt 156 lb 4.9 oz (70.9 kg)   SpO2 96%   BMI 23.15 kg/m²   Temp (24hrs), Av.7 °F (36.5 °C), Min:96.9 °F (36.1 °C), Max:98.4 °F (36.9 °C)    Recent Labs     19  1624 19  0701 19  1131   POCGLU 293* 282* 125* 266*       I/O (24Hr): Intake/Output Summary (Last 24 hours) at 3/12/2019 1543  Last data filed at 3/12/2019 0630  Gross per 24 hour   Intake 503 ml   Output 3650 ml   Net -3147 ml       Labs:    Hematology:  No results for input(s): WBC, RBC, HGB, HCT, MCV, MCH, MCHC, RDW, PLT, MPV, SEDRATE, CRP, INR, DDIMER, [id number], LABABSO in the last 72 hours. Invalid input(s): PT  Chemistry:  Recent Labs     03/10/19  0653 19  0724 19  0557   * 137 137   K 4.5 3.8 4.0   CL 96* 99 98   CO2 29 28 28   GLUCOSE 372* 225* 169*   BUN 17 16 16   CREATININE 0.84 0.64* 0.55*   ANIONGAP 7* 10 11   LABGLOM >60 >60 >60   GFRAA >60 >60 >60   CALCIUM 8.5* 8.3* 8.7     Recent Labs     19  0702 19  1052 19  1624 19  0701 19  1131   POCGLU 224* 310* 293* 282* 125* 266*         Lab Results   Component Value Date/Time    SPECIAL RT LOWER ARM 10ML 2019 12:29 AM     Lab Results   Component Value Date/Time    CULTURE NO GROWTH 4 DAYS 2019 12:29 AM       Lab Results   Component Value Date    POCPH 7.468 2019    POCPCO2 38.9 2019    POCPO2 73.0 2019    POCHCO3 28.2 2019    NBEA NOT REPORTED 2019    PBEA 4 2019    VAD6NPP 29 2019    RNXG1QNS 95 2019    FIO2 45.0 2019       Radiology:    Ct Abdomen Pelvis Wo Contrast Additional Contrast? None    Result Date: 3/8/2019  EXAMINATION: CT OF THE ABDOMEN AND PELVIS WITHOUT CONTRAST 3/8/2019 1:05 pm TECHNIQUE: CT of the abdomen and pelvis was performed without the administration of intravenous contrast. Multiplanar reformatted images are provided for review.  Dose modulation, Chronic appearing L2 compression deformity. Diverticulosis. Mild bladder distension and bladder wall thickening, raising the possibility of chronic bladder outlet obstruction. Xr Chest (single View Frontal)    Result Date: 3/8/2019  EXAMINATION: SINGLE XRAY VIEW OF THE CHEST 3/8/2019 10:39 am COMPARISON: Chest radiograph performed 03/07/2019. HISTORY: ORDERING SYSTEM PROVIDED HISTORY: sob TECHNOLOGIST PROVIDED HISTORY: sob FINDINGS: There is chronic pulmonary change. There are bibasilar effusions with adjacent infiltrates. There is no pneumothorax. The mediastinal structures are stable. The upper abdomen is unremarkable. The extrathoracic soft tissues are unremarkable. Bibasilar effusions with adjacent infiltrates representing pneumonia versus atelectasis/scarring. Xr Chest Portable    Result Date: 3/7/2019  EXAMINATION: SINGLE XRAY VIEW OF THE CHEST 3/7/2019 5:26 am COMPARISON: January 30, 2019. HISTORY: ORDERING SYSTEM PROVIDED HISTORY: sob TECHNOLOGIST PROVIDED HISTORY: sob Ordering Physician Provided Reason for Exam: sob Acuity: Acute Type of Exam: Initial Additional signs and symptoms: Difficulty breathing Relevant Medical/Surgical History: Difficulty breathing FINDINGS: Low lung volume. Similar appearing bilateral heterogeneous opacities. Emphysema. Small bilateral pleural effusions. No definite pneumothorax. Grossly stable cardiomediastinal silhouette and great vessels. Stable regional skeleton. Similar-appearing bilateral heterogeneous opacities. Differential considerations include asymmetric pulmonary edema and multifocal pneumonia. Small bilateral pleural effusions.      Physical Examination:        General appearance:  alert, cooperative and no distress  Mental Status:  oriented to person, place and time and normal affect  Lungs: Bilateral air entry, clear  Heart:  regular rate and rhythm, no murmur  Abdomen:  soft, nontender, nondistended, normal bowel sounds, no masses,

## 2019-03-12 NOTE — PROGRESS NOTES
Known Allergies    Home Meds:   Prior to Admission medications    Medication Sig Start Date End Date Taking?  Authorizing Provider   predniSONE (DELTASONE) 10 MG tablet 40 mg once daily for 7 days, then 30 mg OD for 7 days, then 20 mg OD for 7 days, then follow up with pulmonologist 2/1/19   Vivi Caldwell MD   sodium chloride (OCEAN) 0.65 % nasal spray 1 spray by Nasal route as needed for Congestion 2/1/19   Vivi Caldwell MD   sodium chloride (OCEAN) 0.65 % nasal spray 1 spray by Nasal route as needed for Congestion 1/29/19   Sarah Henriquez MD   ergocalciferol (ERGOCALCIFEROL) 35440 units capsule Take 1 capsule by mouth once a week 12/24/18   Lila AnnamariaMD karina   furosemide (LASIX) 20 MG tablet Take 1 tablet by mouth every other day 12/1/18   Kiya Hancock MD   insulin glargine (LANTUS) 100 UNIT/ML injection vial Inject 20 Units into the skin nightly 11/30/18   Kiya Hancock MD   tamsulosin (FLOMAX) 0.4 MG capsule Take 0.4 mg by mouth daily    Historical Provider, MD   famotidine (PEPCID) 20 MG tablet Take 20 mg by mouth 2 times daily    Historical Provider, MD   acetaminophen (TYLENOL) 325 MG tablet Take 650 mg by mouth every 6 hours as needed for Pain    Historical Provider, MD   aclidinium (TUDORZA PRESSAIR) 400 MCG/ACT AEPB inhaler Inhale 1 puff into the lungs 2 times daily 6/22/18 7/22/19  Michael Huynh MD   albuterol sulfate HFA (VENTOLIN HFA) 108 (90 Base) MCG/ACT inhaler Inhale 2 puffs into the lungs every 6 hours as needed for Wheezing 6/22/18   Michael Huynh MD   budesonide-formoterol (SYMBICORT) 80-4.5 MCG/ACT AERO Inhale 2 puffs into the lungs 2 times daily 5/25/18   Kristian Ruiz MD   metFORMIN ER (GLUCOPHAGE-XR) 500 MG XR tablet take 2 tablets by mouth twice a day  Patient taking differently: take 2 tablet by mouth twice a day 8/1/16   Surjit Nava PA-C   aspirin 81 MG tablet Take 81 mg by mouth daily    Historical Provider, MD   escitalopram (LEXAPRO) 10 MG tablet Take 1 tablet by mouth daily 11/10/15   Dominique Jensen PA-C   ONE TOUCH ULTRA TEST strip STRIP MISC ONCE A DAY 7/1/15   Sandy Jensen PA-C         Intake/Output Summary (Last 24 hours) at 3/12/2019 1011  Last data filed at 3/12/2019 0630  Gross per 24 hour   Intake 743 ml   Output 3650 ml   Net -2907 ml         Diet   DIET CARB CONTROL;    Vitals:   /79   Pulse 74   Temp 98.3 °F (36.8 °C) (Temporal)   Resp 20   Ht 5' 8.9\" (1.75 m)   Wt 156 lb 4.9 oz (70.9 kg)   SpO2 97%   BMI 23.15 kg/m²  on         I/O (24 Hours)    Patient Vitals for the past 8 hrs:   BP Temp Temp src Pulse Resp SpO2 Weight   03/12/19 0700 109/79 98.3 °F (36.8 °C) Temporal 74 20 97 % --   03/12/19 0630 -- -- -- -- -- -- 156 lb 4.9 oz (70.9 kg)   03/12/19 0401 137/81 97.7 °F (36.5 °C) Oral 88 22 91 % --   03/12/19 0330 -- -- -- -- 23 96 % --       Intake/Output Summary (Last 24 hours) at 3/12/2019 1011  Last data filed at 3/12/2019 0630  Gross per 24 hour   Intake 743 ml   Output 3650 ml   Net -2907 ml     I/O last 3 completed shifts: In: 644 [P.O.:490; I.V.:253]  Out: 3650 [Urine:3650]   Date 03/12/19 0000 - 03/12/19 2359   Shift 2858-1715 9372-6390 8630-9230 24 Hour Total   INTAKE   P.O.(mL/kg/hr) 250(0.4)   250   I. V.(mL/kg) 122(1.7)   122(1.7)   Shift Total(mL/kg) 372(5.2)   372(5.2)   OUTPUT   Urine(mL/kg/hr) 2000(3.5)   2000   Shift Total(mL/kg) 5441(56.5)   0165(52.4)   Weight (kg) 70.9 70.9 70.9 70.9     Patient Vitals for the past 96 hrs (Last 3 readings):   Weight   03/12/19 0630 156 lb 4.9 oz (70.9 kg)          PHYSICAL EXAMINATION:  General Appearance:    Alert, cooperative, mild  distress, appears stated age   Head:    Normocephalic, without obvious abnormality, atraumatic           ent - otoscope exam shows rt cerumen impaction and left moderate        :    Neck:   Supple, symmetrical, trachea midline, no adenopathy;     thyroid:  no enlargement/tenderness/nodules; no carotid    bruit no JVP , no HJR   Back: Symmetric, no curvature, ROM normal, no CVA tenderness   Lungs:       Prolonged exp , barrel chest and post rales , no wheeze    Chest Wall:    No tenderness or deformity      Heart:    Regular rate and rhythm, S1 and S2 normal, no murmur, rub        or gallop no rvh                           Abdomen:                                                 Pulses:                                            Lymph nodes:                    Neurologic:                  Soft, non-tender, bowel sounds active all four quadrants,     no masses, no organomegaly         2+ and symmetric all extremities            Cervical, supraclavicular not enlarged or matted or tender      CNII-XII intact, normal strength 5/5 .   Sensation grossly normal  and reflexes normal 2+  throughout     Clubbing No  Lower ext edema trace 1+   [] , 2 +  [] , 3+   []  Upper ext edema No       Musculoskeletal - no joint swelling or tenderness or synovitis                   Medications:    Scheduled Meds:   insulin glargine  40 Units Subcutaneous Nightly    insulin glargine  20 Units Subcutaneous Daily with breakfast    aspirin  81 mg Oral Daily    mometasone-formoterol  2 puff Inhalation BID    escitalopram  10 mg Oral Daily    famotidine  20 mg Oral BID    tamsulosin  0.4 mg Oral Daily    piperacillin-tazobactam  3.375 g Intravenous Q8H    methylPREDNISolone  40 mg Intravenous Q8H    levalbuterol  1.25 mg Nebulization Q4H WA    ipratropium  0.5 mg Nebulization Q4H WA    bisacodyl  10 mg Rectal Once    enoxaparin  40 mg Subcutaneous Daily    sodium chloride flush  10 mL Intravenous 2 times per day    insulin lispro  0-12 Units Subcutaneous TID WC    insulin lispro  0-6 Units Subcutaneous Nightly       Continuous Infusions:   dextrose      dextrose         PRN Meds:  acetaminophen, sodium chloride, acetaminophen, albuterol, magnesium hydroxide, nicotine, ondansetron, sodium chloride flush, dextrose, dextrose, glucagon (rDNA), glucose, glucose, dextrose, glucagon (rDNA), dextrose    Labs:  CBC:   No results for input(s): WBC, HGB, HCT, MCV, PLT in the last 72 hours. BMP:   Recent Labs     03/10/19  0653 03/11/19  0724 03/12/19  0557   * 137 137   K 4.5 3.8 4.0   CL 96* 99 98   CO2 29 28 28   BUN 17 16 16   CREATININE 0.84 0.64* 0.55*     LIVER PROFILE:   No results for input(s): AST, ALT, LIPASE, BILIDIR, BILITOT, ALKPHOS in the last 72 hours. Invalid input(s): AMYLASE,  ALB  PT/INR:   No results for input(s): PROTIME, INR in the last 72 hours. APTT:   No results for input(s): APTT in the last 72 hours. UA:  No results for input(s): NITRITE, COLORU, PHUR, LABCAST, WBCUA, RBCUA, MUCUS, TRICHOMONAS, YEAST, BACTERIA, CLARITYU, SPECGRAV, LEUKOCYTESUR, UROBILINOGEN, BILIRUBINUR, BLOODU, GLUCOSEU, AMORPHOUS in the last 72 hours. Invalid input(s): KETONESU  No results for input(s): PHART, YZS1MFR, PO2ART in the last 72 hours. 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 His lung disease will continue to worsen . He asked me weather he will die from it and I explained him yes he will eventually die from his lung diease but I cannot predict when . Advise hospice to help him with comfort and provide care because his lung diease is incurable . I have had this discussion with him multiple times over the last 4 months  In his multiple admission here and at [facility] .    He is again agreeable to meet with hospice and I have placed consult But as I was leaving the room he \" Doc I will beat this ,get better and prove you wrong \"   Cont iv steroids and continue high flow   In my opinion if he refuses hospice then he should go to snf or ltach .          Electronically signed by Parisa Angulo MD on 3/12/2019 at 10:11 AM

## 2019-03-12 NOTE — PROGRESS NOTES
Pt changed his mind and requests bipap.       NONINVASIVE VENTILATION     PROVIDE OPTIMAL VENTILATION/ACCEPTABLE SPO2              IMPLEMENT NONINVASIVE VENTILATION PROTOCOL              MAINTAIN ACCEPTABLE SPO2              ASSESS SKIN INTEGRITY/BREAKDOWN SCORE              PATIENT EDUCATION AS NEEDED              BIPAP AS NEEDED

## 2019-03-12 NOTE — PROGRESS NOTES
Physical Therapy    Facility/Department: [facility] 4B STEPDOWN  Initial Assessment    NAME: Ryan Mccoy  : 1940  MRN: [id number]    Date of Service: 3/12/2019    Discharge Recommendations:  Home with assist PRN, Home with Home health PT   PT Equipment Recommendations  Equipment Needed: No    Per chart  This is 70years old male with history of COPD living in a nursing home currently, transferred to us from [facility] after he presented there with shortness of breath.  The patient says that he suddenly started having shortness of breath.  He did not have any chest pain. No significant change in oxygen requirement he continues to need 45-50% FiO2 off high flow. Assessment   Body structures, Functions, Activity limitations: Decreased functional mobility ; Decreased endurance;Decreased strength;Decreased balance  Assessment: Pt limited by hi breanna canula for amb distances. Recommend home with assist as needed  Prognosis: Guarded  Decision Making: Low Complexity  Clinical Presentation: PT POC, safety, rests  Patient Education: PT POC, safety  REQUIRES PT FOLLOW UP: Yes  Activity Tolerance  Activity Tolerance: Patient limited by endurance; Patient limited by fatigue       Patient Diagnosis(es): There were no encounter diagnoses. has a past medical history of Anemia, Anxiety, Bronchitis, COPD (chronic obstructive pulmonary disease) ([address] Diabetes ([address] Former smoker, Hyperlipidemia, Oxygen dependent, Respiratory distress, and Type 2 diabetes mellitus ([address] has a past surgical history that includes AAA repair, open; Pancreas surgery; Finger surgery; and Foot surgery.     Restrictions  Restrictions/Precautions  Restrictions/Precautions: Contact Precautions  Required Braces or Orthoses?: No  Position Activity Restriction  Other position/activity restrictions: Pt on hi breanna canula  Vision/Hearing  Vision: Within Functional Limits  Hearing: Exceptions to [facility]     Subjective  General  Patient assessed for rehabilitation services?: Yes  Family / Caregiver Present: No  Follows Commands: Within Functional Limits  General Comment  Comments: I can get out of this bed  Pain Screening  Patient Currently in Pain: Denies  Vital Signs  Patient Currently in Pain: Denies       Orientation  Orientation  Overall Orientation Status: Within Functional Limits  Social/Functional History  Social/Functional History  Lives With: Alone  Type of Home: House  Home Layout: One level  Home Access: Stairs to enter without rails  Entrance Stairs - Number of Steps: 2  Bathroom Shower/Tub: Tub/Shower unit  Bathroom Equipment: Shower chair, Grab bars in shower, Commode  Home Equipment: Rolling walker, Oxygen  Receives Help From: Friend(s)  ADL Assistance: Independent  Homemaking Assistance: Independent  Homemaking Responsibilities: Yes  Ambulation Assistance: Independent(rw)  Transfer Assistance: Independent  Active : No  Patient's  Info: neighbor drives  Additional Comments: Pt has neighbor who does shopping and assists with cleaning.    Pt has not been home since October due to illnesses  Cognition   Cognition  Overall Cognitive Status: WFL    Objective          AROM RLE (degrees)  RLE AROM: WFL  AROM LLE (degrees)  LLE AROM : WFL  AROM RUE (degrees)  RUE AROM : WFL  AROM LUE (degrees)  LUE AROM : WFL  Strength RLE  Strength RLE: WFL  Strength LLE  Strength LLE: WFL  Strength RUE  Strength RUE: WFL  Strength LUE  Strength LUE: WFL     Sensation  Overall Sensation Status: WFL  Bed mobility  Rolling to Left: Supervision  Supine to Sit: Supervision  Scooting: Supervision  Transfers  Sit to Stand: Stand by assistance  Stand to sit: Stand by assistance  Bed to Chair: Stand by assistance  Ambulation  Ambulation?: Yes  More Ambulation?: No  Ambulation 1  Surface: level tile  Device: Rolling Walker  Assistance: Stand by assistance  Quality of Gait: slow short steps  Distance: 8 ft  Stairs/Curb  Stairs?: No     Balance  Posture: Fair  Sitting - Static: Good  Sitting - Dynamic: Fair;+  Standing - Static: Fair;+  Standing - Dynamic: Fair  Comments: standing balance with B UE support    Seated LE exercise program: Long Arc Quads, hip abduction/adduction, heel/toe raises, and marches. Reps: 10  Pt steady for steps up to chair, SpO2 remained 88-93%    Plan   Plan  Times per week: 5x/week  Current Treatment Recommendations: Strengthening, Endurance Training, Gait Training, Stair training, Functional Mobility Training  Safety Devices  Type of devices: Call light within reach, Left in bed  Restraints  Initially in place: No      AM-PAC Score  AM-PAC Inpatient Mobility Raw Score : 17  AM-PAC Inpatient T-Scale Score : 42.13  Mobility Inpatient CMS 0-100% Score: 50.57  Mobility Inpatient CMS G-Code Modifier : CK          Goals  Short term goals  Time Frame for Short term goals: 10 visits  Short term goal 1: Pt mod indep with bed mobility   Short term goal 2: Pt mod indep with transfers  Short term goal 3: Pt amb 50 ft with rw and supervision  Short term goal 4: Pt negotiate 2 step ups with CGA  Short term goal 5: Pt participate in 25 minutes ther ex to increase endurance  Patient Goals   Patient goals :  To return home       Therapy Time   Individual Concurrent Group Co-treatment   Time In 1400         Time Out 1425         Minutes 25         Timed Code Treatment Minutes: MICHELLE Dale

## 2019-03-12 NOTE — PLAN OF CARE
Problem: Falls - Risk of:  Goal: Will remain free from falls  Description  Will remain free from falls  3/12/2019 0216 by Brendon Tijerina RN  Outcome: Ongoing  Note:   Pt remains free of falls at this time. Bed locked in lowest position, siderails x2, call light in reach. Non-skid footwear applied. Pt ambulates in room with steady gait. Encouraged pt to call for assistance as needed for safety. Falling star posted outside of room. Will continue to monitor.

## 2019-03-12 NOTE — PLAN OF CARE
BRONCHOSPASM/BRONCHOCONSTRICTION     [x]         IMPROVE AERATION/BREATH SOUNDS  [x]   ADMINISTER BRONCHODILATOR THERAPY AS APPROPRIATE  [x]   ASSESS BREATH SOUNDS  [x]   IMPLEMENT AEROSOL/MDI PROTOCOL  [x]   PATIENT EDUCATION AS NEEDED    PATIENT REFUSES TO WEAR BIPAP     [x] Risks and benefits explained to patient   [x] Patient refuses to wear Bipap stating \"No way. My mouth is dry enough. \"  [x] Patient verbalizes understanding of information presented.        PROVIDE ADEQUATE OXYGENATION WITH ACCEPTABLE SP02/ABG'S    [x]  IDENTIFY APPROPRIATE OXYGEN THERAPY  [x]   MONITOR SP02/ABG'S AS NEEDED   [x]   PATIENT EDUCATION AS NEEDED

## 2019-03-13 LAB
ANION GAP SERPL CALCULATED.3IONS-SCNC: 9 MMOL/L (ref 9–17)
BUN BLDV-MCNC: 21 MG/DL (ref 8–23)
BUN/CREAT BLD: ABNORMAL (ref 9–20)
CALCIUM SERPL-MCNC: 8.3 MG/DL (ref 8.6–10.4)
CHLORIDE BLD-SCNC: 91 MMOL/L (ref 98–107)
CO2: 29 MMOL/L (ref 20–31)
CREAT SERPL-MCNC: 0.69 MG/DL (ref 0.7–1.2)
CULTURE: NORMAL
CULTURE: NORMAL
GFR AFRICAN AMERICAN: >60 ML/MIN
GFR NON-AFRICAN AMERICAN: >60 ML/MIN
GFR SERPL CREATININE-BSD FRML MDRD: ABNORMAL ML/MIN/{1.73_M2}
GFR SERPL CREATININE-BSD FRML MDRD: ABNORMAL ML/MIN/{1.73_M2}
GLUCOSE BLD-MCNC: 168 MG/DL (ref 75–110)
GLUCOSE BLD-MCNC: 267 MG/DL (ref 75–110)
GLUCOSE BLD-MCNC: 278 MG/DL (ref 75–110)
GLUCOSE BLD-MCNC: 312 MG/DL (ref 70–99)
GLUCOSE BLD-MCNC: 70 MG/DL (ref 75–110)
GLUCOSE BLD-MCNC: 83 MG/DL (ref 75–110)
Lab: NORMAL
Lab: NORMAL
POTASSIUM SERPL-SCNC: 4.1 MMOL/L (ref 3.7–5.3)
SODIUM BLD-SCNC: 129 MMOL/L (ref 135–144)
SPECIMEN DESCRIPTION: NORMAL
SPECIMEN DESCRIPTION: NORMAL

## 2019-03-13 PROCEDURE — 99232 SBSQ HOSP IP/OBS MODERATE 35: CPT | Performed by: INTERNAL MEDICINE

## 2019-03-13 PROCEDURE — 82947 ASSAY GLUCOSE BLOOD QUANT: CPT

## 2019-03-13 PROCEDURE — 36415 COLL VENOUS BLD VENIPUNCTURE: CPT

## 2019-03-13 PROCEDURE — 94640 AIRWAY INHALATION TREATMENT: CPT

## 2019-03-13 PROCEDURE — 2580000003 HC RX 258: Performed by: INTERNAL MEDICINE

## 2019-03-13 PROCEDURE — 80048 BASIC METABOLIC PNL TOTAL CA: CPT

## 2019-03-13 PROCEDURE — 6370000000 HC RX 637 (ALT 250 FOR IP): Performed by: INTERNAL MEDICINE

## 2019-03-13 PROCEDURE — 6360000002 HC RX W HCPCS: Performed by: INTERNAL MEDICINE

## 2019-03-13 PROCEDURE — G8987 SELF CARE CURRENT STATUS: HCPCS

## 2019-03-13 PROCEDURE — 6370000000 HC RX 637 (ALT 250 FOR IP): Performed by: NURSE PRACTITIONER

## 2019-03-13 PROCEDURE — 2060000000 HC ICU INTERMEDIATE R&B

## 2019-03-13 PROCEDURE — 6360000002 HC RX W HCPCS: Performed by: NURSE PRACTITIONER

## 2019-03-13 PROCEDURE — 2700000000 HC OXYGEN THERAPY PER DAY

## 2019-03-13 PROCEDURE — 94762 N-INVAS EAR/PLS OXIMTRY CONT: CPT

## 2019-03-13 PROCEDURE — 97166 OT EVAL MOD COMPLEX 45 MIN: CPT

## 2019-03-13 PROCEDURE — G8988 SELF CARE GOAL STATUS: HCPCS

## 2019-03-13 RX ADMIN — FAMOTIDINE 20 MG: 20 TABLET, FILM COATED ORAL at 20:50

## 2019-03-13 RX ADMIN — METHYLPREDNISOLONE SODIUM SUCCINATE 40 MG: 40 INJECTION, POWDER, FOR SOLUTION INTRAMUSCULAR; INTRAVENOUS at 10:49

## 2019-03-13 RX ADMIN — ESCITALOPRAM OXALATE 10 MG: 10 TABLET ORAL at 08:23

## 2019-03-13 RX ADMIN — IPRATROPIUM BROMIDE 0.5 MG: 0.5 SOLUTION RESPIRATORY (INHALATION) at 17:24

## 2019-03-13 RX ADMIN — LEVALBUTEROL HYDROCHLORIDE 1.25 MG: 1.25 SOLUTION RESPIRATORY (INHALATION) at 10:29

## 2019-03-13 RX ADMIN — IPRATROPIUM BROMIDE 0.5 MG: 0.5 SOLUTION RESPIRATORY (INHALATION) at 20:11

## 2019-03-13 RX ADMIN — METHYLPREDNISOLONE SODIUM SUCCINATE 40 MG: 40 INJECTION, POWDER, FOR SOLUTION INTRAMUSCULAR; INTRAVENOUS at 17:51

## 2019-03-13 RX ADMIN — INSULIN LISPRO 3 UNITS: 100 INJECTION, SOLUTION INTRAVENOUS; SUBCUTANEOUS at 20:50

## 2019-03-13 RX ADMIN — LEVALBUTEROL HYDROCHLORIDE 1.25 MG: 1.25 SOLUTION RESPIRATORY (INHALATION) at 13:49

## 2019-03-13 RX ADMIN — LEVALBUTEROL HYDROCHLORIDE 1.25 MG: 1.25 SOLUTION RESPIRATORY (INHALATION) at 17:24

## 2019-03-13 RX ADMIN — PIPERACILLIN AND TAZOBACTAM 3.38 G: 3; .375 INJECTION, POWDER, LYOPHILIZED, FOR SOLUTION INTRAVENOUS; PARENTERAL at 12:00

## 2019-03-13 RX ADMIN — IPRATROPIUM BROMIDE 0.5 MG: 0.5 SOLUTION RESPIRATORY (INHALATION) at 13:48

## 2019-03-13 RX ADMIN — TAMSULOSIN HYDROCHLORIDE 0.4 MG: 0.4 CAPSULE ORAL at 08:23

## 2019-03-13 RX ADMIN — MOMETASONE FUROATE AND FORMOTEROL FUMARATE DIHYDRATE 2 PUFF: 200; 5 AEROSOL RESPIRATORY (INHALATION) at 20:11

## 2019-03-13 RX ADMIN — INSULIN LISPRO 6 UNITS: 100 INJECTION, SOLUTION INTRAVENOUS; SUBCUTANEOUS at 08:24

## 2019-03-13 RX ADMIN — FAMOTIDINE 20 MG: 20 TABLET, FILM COATED ORAL at 08:23

## 2019-03-13 RX ADMIN — INSULIN GLARGINE 40 UNITS: 100 INJECTION, SOLUTION SUBCUTANEOUS at 20:50

## 2019-03-13 RX ADMIN — MOMETASONE FUROATE AND FORMOTEROL FUMARATE DIHYDRATE 2 PUFF: 200; 5 AEROSOL RESPIRATORY (INHALATION) at 10:30

## 2019-03-13 RX ADMIN — PIPERACILLIN AND TAZOBACTAM 3.38 G: 3; .375 INJECTION, POWDER, LYOPHILIZED, FOR SOLUTION INTRAVENOUS; PARENTERAL at 03:21

## 2019-03-13 RX ADMIN — ACETAMINOPHEN 650 MG: 325 TABLET ORAL at 02:08

## 2019-03-13 RX ADMIN — ENOXAPARIN SODIUM 40 MG: 40 INJECTION SUBCUTANEOUS at 08:23

## 2019-03-13 RX ADMIN — IPRATROPIUM BROMIDE 0.5 MG: 0.5 SOLUTION RESPIRATORY (INHALATION) at 10:29

## 2019-03-13 RX ADMIN — ASPIRIN 81 MG: 81 TABLET, CHEWABLE ORAL at 08:23

## 2019-03-13 RX ADMIN — LEVALBUTEROL HYDROCHLORIDE 1.25 MG: 1.25 SOLUTION RESPIRATORY (INHALATION) at 20:11

## 2019-03-13 RX ADMIN — INSULIN GLARGINE 20 UNITS: 100 INJECTION, SOLUTION SUBCUTANEOUS at 08:23

## 2019-03-13 RX ADMIN — METHYLPREDNISOLONE SODIUM SUCCINATE 40 MG: 40 INJECTION, POWDER, FOR SOLUTION INTRAMUSCULAR; INTRAVENOUS at 02:08

## 2019-03-13 RX ADMIN — PIPERACILLIN AND TAZOBACTAM 3.38 G: 3; .375 INJECTION, POWDER, LYOPHILIZED, FOR SOLUTION INTRAVENOUS; PARENTERAL at 19:47

## 2019-03-13 RX ADMIN — INSULIN LISPRO 2 UNITS: 100 INJECTION, SOLUTION INTRAVENOUS; SUBCUTANEOUS at 17:47

## 2019-03-13 ASSESSMENT — PAIN SCALES - GENERAL
PAINLEVEL_OUTOF10: 2
PAINLEVEL_OUTOF10: 0
PAINLEVEL_OUTOF10: 0
PAINLEVEL_OUTOF10: 4

## 2019-03-13 NOTE — CARE COORDINATION
Transitional Planning    Patient still refusing ltac or snf  Refusing hospice consult  Remains on HFNC 40% 35 LPM  Signed order obtained for [facility] faxed to [id number] [facility]  Patient still insisting on going home at discharge with [facility] and Palliative care  Had episode of SVT rate 190bpm this afternoon.

## 2019-03-13 NOTE — PLAN OF CARE
Problem: Falls - Risk of:  Goal: Will remain free from falls  Description  Will remain free from falls  3/13/2019 0205 by Kitty Duval RN  Outcome: Ongoing  Note:   Pt remains free of falls at this time. Bed locked in lowest position, siderails x2, call light in reach. Non-skid footwear applied. Pt transfers from bed to chair with steady gait. Encouraged pt to call for assistance as needed for safety. Falling star posted outside of room. Will continue to monitor.

## 2019-03-13 NOTE — PROGRESS NOTES
Constitutional:  negative for chills, fevers, sweats  Respiratory:  negative for cough, dyspnea on exertion, hemoptysis, shortness of breath, wheezing  Cardiovascular:  negative for chest pain, chest pressure/discomfort, lower extremity edema, palpitations  Gastrointestinal:  negative for abdominal pain, constipation, diarrhea, nausea, vomiting  Neurological:  negative for dizziness, headache    Medications: Allergies:  No Known Allergies    Current Meds:   Scheduled Meds:    insulin glargine  40 Units Subcutaneous Nightly    insulin glargine  20 Units Subcutaneous Daily with breakfast    aspirin  81 mg Oral Daily    mometasone-formoterol  2 puff Inhalation BID    escitalopram  10 mg Oral Daily    famotidine  20 mg Oral BID    tamsulosin  0.4 mg Oral Daily    piperacillin-tazobactam  3.375 g Intravenous Q8H    methylPREDNISolone  40 mg Intravenous Q8H    levalbuterol  1.25 mg Nebulization Q4H WA    ipratropium  0.5 mg Nebulization Q4H WA    bisacodyl  10 mg Rectal Once    enoxaparin  40 mg Subcutaneous Daily    sodium chloride flush  10 mL Intravenous 2 times per day    insulin lispro  0-12 Units Subcutaneous TID WC    insulin lispro  0-6 Units Subcutaneous Nightly     Continuous Infusions:    dextrose      dextrose       PRN Meds: acetaminophen, sodium chloride, acetaminophen, albuterol, magnesium hydroxide, nicotine, ondansetron, sodium chloride flush, dextrose, dextrose, glucagon (rDNA), glucose, glucose, dextrose, glucagon (rDNA), dextrose    Data:     Past Medical History:   has a past medical history of Anemia, Anxiety, Bronchitis, COPD (chronic obstructive pulmonary disease) ([facility] Diabetes [address] Former smoker, Hyperlipidemia, Oxygen dependent, Respiratory distress, and Type 2 diabetes mellitus ([facility] Social History:   reports that he has quit smoking. His smoking use included cigarettes. He has never used smokeless tobacco. He reports that he does not drink alcohol or use drugs. normal effort  Heart:  regular rate and rhythm, no murmur  Abdomen:  soft, nontender, nondistended, normal bowel sounds, no masses, hepatomegaly, splenomegaly  Extremities:  no edema, redness, tenderness in the calves  Skin:  no gross lesions, rashes, induration    Assessment:        Primary Problem  Acute respiratory failure with hypoxia [facility])    Active Hospital Problems    Diagnosis Date Noted    Atrial flutter ([address] [I48.92] 03/08/2019    Acute respiratory failure with hypoxia ([address] [J96.01] 01/23/2019    Pneumonia due to infectious organism [J18.9] 12/16/2018    COPD exacerbation ([address] [J44.1] 11/27/2018    Acute on chronic respiratory failure with hypoxemia ([address] [J96.21] 11/27/2018    Pulmonary artery hypertension (HCC) [I27.21] 05/23/2018    Iron deficiency anemia [D50.9] 05/23/2018    Type 2 diabetes mellitus without complication, with long-term current use of insulin ([address] [E11.9, Z79.4] 10/30/2014    Hyperlipidemia [E78.5] 10/30/2014       Plan:        1. High flow oxygen as ordered  2. Aerosol protocol  3. Monitor and control blood sugar, insulin scale  4. Continue Zosyn, to end today  5. Pulmonary evaluation  6. PT and OT for strengthening and balance  7. Discussed discharge plan, patient adamant that he is going home and refuses LTAC and SNF.   Discussed hospice options, adamantly declined as \"hospice kills people\"    Sofi Dominguez DO  3/13/2019  1:52 PM

## 2019-03-13 NOTE — PROGRESS NOTES
Occupational Therapy   Occupational Therapy Initial Assessment  Date: 3/13/2019   Patient Name: Kobe Akins  MRN: [id number]     : 1940    Date of Service: 3/13/2019    Discharge Recommendations:  Home with Home health OT  OT Equipment Recommendations  Equipment Needed: Yes  Mobility Devices: ADL Assistive Devices  ADL Assistive Devices: Toileting - 3-in-1 Commode;Sock-Aid Hard;Transfer Tub Bench;Reacher;Long-handled Shoe Horn;Elastic Shoe Laces    Assessment   Performance deficits / Impairments: Decreased functional mobility ; Decreased endurance;Decreased ADL status; Decreased high-level IADLs  Assessment: Patient is a 71 yo male who presents to [place] [facility]. [facility] s/p pneumonia. Patient lives at home alone and wishes to return to home alone with home health. Patient was able to complete ADL evaluation this date with SETUP-SBA. Pt's greatest limitations include weakness, SOB, and fatigue. OT services necessary at this time to improve Pt's participation in ADL's and improve overall quality of life. Treatment Diagnosis: Decreased ADL's  Prognosis: Fair  Decision Making: Medium Complexity  REQUIRES OT FOLLOW UP: Yes  Activity Tolerance  Activity Tolerance: Patient greatly limited during OT evaluation by shortness of breath. Patient Diagnosis(es): There were no encounter diagnoses. has a past medical history of Anemia, Anxiety, Bronchitis, COPD (chronic obstructive pulmonary disease) ([address] Diabetes ([address] Former smoker, Hyperlipidemia, Oxygen dependent, Respiratory distress, and Type 2 diabetes mellitus ([address] has a past surgical history that includes AAA repair, open; Pancreas surgery; Finger surgery; and Foot surgery.     Treatment Diagnosis: Decreased ADL's      Restrictions  Restrictions/Precautions  Restrictions/Precautions: Contact Precautions, Up as Tolerated, Fall Risk  Required Braces or Orthoses?: No  Position Activity Restriction  Other position/activity restrictions: Pt on hi breanna mary jo    Subjective   General  Chart Reviewed: No  Patient assessed for rehabilitation services?: Yes  Family / Caregiver Present: No  Referring Practitioner: Orlop  Diagnosis: Pneumonia  General Comment  Comments: Patient sitting in recliner chair upon OTR entry. Pt agreeable to OT evaluation. Pain Assessment  Pain Assessment: 0-10  Pain Level: 0  Patient's Stated Pain Goal: No pain  Response to Pain Intervention: Asleep with RR greater than 10     Social/Functional History  Social/Functional History  Lives With: Alone  Type of Home: House  Home Layout: One level  Home Access: Stairs to enter without rails  Entrance Stairs - Number of Steps: 2  Bathroom Shower/Tub: Tub/Shower unit  Bathroom Equipment: Shower chair, Grab bars in shower, Commode  Home Equipment: Rolling walker, Oxygen  Receives Help From: Friend(s)  ADL Assistance: Independent  Homemaking Assistance: Independent  Homemaking Responsibilities: Yes  Ambulation Assistance: Independent  Transfer Assistance: Independent  Active : No  Patient's  Info: neighbor drives  Additional Comments: Pt has neighbor who does shopping and assists with cleaning. Pt has not been home since October due to illnesses       Objective   Vision: Within Functional Limits  Hearing: Exceptions to [facility]  Hearing Exceptions: (Patient having a very hard time hearing OTR during converstion as Pt is reporting that his ears are plugged.  NSG aware. )    Orientation  Overall Orientation Status: Within Normal Limits     Standing Balance  Sit to stand: Stand by assistance  Stand to sit: Stand by assistance  ADL  Feeding: Independent  Grooming: Independent  UE Bathing: Setup  LE Bathing: Setup  UE Dressing: Setup  LE Dressing: Setup  Toileting: Stand by assistance     Transfers  Sit to stand: Stand by assistance  Stand to sit: Stand by assistance     Cognition  Overall Cognitive Status: WNL        Sensation  Overall Sensation Status: WNL         Plan   Plan  Times per week: 3x  Times per day: Daily  Plan weeks: 1 week  Specific instructions for Next Treatment: Education regarding AE for ADL's, UB cardiac strengthening HEP  Current Treatment Recommendations: Strengthening, Endurance Training, Self-Care / ADL    G-Code  OT G-codes  Functional Assessment Tool Used: [facility]-ADL  Score: 23  Functional Limitation: Self care  Self Care Current Status (): At least 1 percent but less than 20 percent impaired, limited or restricted  Self Care Goal Status (): At least 1 percent but less than 20 percent impaired, limited or restricted        AM-PAC Score  AM-PAC Inpatient Daily Activity Raw Score: 23  AM-PAC Inpatient ADL T-Scale Score : 51.12  ADL Inpatient CMS 0-100% Score: 15.86  ADL Inpatient CMS G-Code Modifier : CI    Goals  Short term goals  Time Frame for Short term goals: By discharge:  Short term goal 1: Patient will implement at least 1 energy conservation technique during ADL or functional mobility to improve overall quality of life. Short term goal 2: Patient will complete toilet transfer (i.e. BSC due to HPO2) with MOD I to improve Pt's performance in her functional mobility. Short term goal 3: Patient will complete UB cardiac strengthening HEP with SETUP to improve Pt's overall quality of life. Short term goal 4: Patient will complete standing (static or dynamic) ADL/IADL tasks >5 minutes to improve Pt's participation in functional mobility. Short term goal 5: Patient will complete at least 1 IADL routine (i.e. simple meal prep, simple home mgmt) with MOD I to improve Pt's performance in ADL/IADL upon discharge. Patient goals : To go home and not be transferred to a nursing home.         Therapy Time   Individual Concurrent Group Co-treatment   Time In [address] [place] [address] Road         Time Out 1144         Minutes [address], [place]

## 2019-03-13 NOTE — PLAN OF CARE
BRONCHOSPASM/BRONCHOCONSTRICTION     ? IMPROVE AERATION/BREATH SOUNDS  ? ADMINISTER BRONCHODILATOR THERAPY AS APPROPRIATE  ? ASSESS BREATH SOUNDS  ? IMPLEMENT AEROSOL/MDI PROTOCOL  ?    PATIENT EDUCATION AS NEEDED    NONINVASIVE VENTILATION    PROVIDE OPTIMAL VENTILATION/ACCEPTABLE SPO2   IMPLEMENT NONINVASIVE VENTILATION PROTOCOL   MAINTAIN ACCEPTABLE SPO2   ASSESS SKIN INTEGRITY/BREAKDOWN SCORE   PATIENT EDUCATION AS NEEDED   BIPAP AS NEEDED

## 2019-03-14 LAB
ANION GAP SERPL CALCULATED.3IONS-SCNC: 11 MMOL/L (ref 9–17)
BUN BLDV-MCNC: 20 MG/DL (ref 8–23)
BUN/CREAT BLD: ABNORMAL (ref 9–20)
CALCIUM SERPL-MCNC: 8.7 MG/DL (ref 8.6–10.4)
CHLORIDE BLD-SCNC: 93 MMOL/L (ref 98–107)
CO2: 29 MMOL/L (ref 20–31)
CREAT SERPL-MCNC: 0.69 MG/DL (ref 0.7–1.2)
CULTURE: NORMAL
CULTURE: NORMAL
GFR AFRICAN AMERICAN: >60 ML/MIN
GFR NON-AFRICAN AMERICAN: >60 ML/MIN
GFR SERPL CREATININE-BSD FRML MDRD: ABNORMAL ML/MIN/{1.73_M2}
GFR SERPL CREATININE-BSD FRML MDRD: ABNORMAL ML/MIN/{1.73_M2}
GLUCOSE BLD-MCNC: 132 MG/DL (ref 75–110)
GLUCOSE BLD-MCNC: 178 MG/DL (ref 70–99)
GLUCOSE BLD-MCNC: 195 MG/DL (ref 75–110)
GLUCOSE BLD-MCNC: 316 MG/DL (ref 75–110)
GLUCOSE BLD-MCNC: 81 MG/DL (ref 75–110)
Lab: NORMAL
Lab: NORMAL
POTASSIUM SERPL-SCNC: 4 MMOL/L (ref 3.7–5.3)
SODIUM BLD-SCNC: 133 MMOL/L (ref 135–144)
SPECIMEN DESCRIPTION: NORMAL
SPECIMEN DESCRIPTION: NORMAL

## 2019-03-14 PROCEDURE — 97110 THERAPEUTIC EXERCISES: CPT

## 2019-03-14 PROCEDURE — 6360000002 HC RX W HCPCS: Performed by: INTERNAL MEDICINE

## 2019-03-14 PROCEDURE — 36415 COLL VENOUS BLD VENIPUNCTURE: CPT

## 2019-03-14 PROCEDURE — 6370000000 HC RX 637 (ALT 250 FOR IP): Performed by: INTERNAL MEDICINE

## 2019-03-14 PROCEDURE — 76937 US GUIDE VASCULAR ACCESS: CPT

## 2019-03-14 PROCEDURE — 99232 SBSQ HOSP IP/OBS MODERATE 35: CPT | Performed by: INTERNAL MEDICINE

## 2019-03-14 PROCEDURE — 2580000003 HC RX 258: Performed by: NURSE PRACTITIONER

## 2019-03-14 PROCEDURE — 80048 BASIC METABOLIC PNL TOTAL CA: CPT

## 2019-03-14 PROCEDURE — 82947 ASSAY GLUCOSE BLOOD QUANT: CPT

## 2019-03-14 PROCEDURE — 6360000002 HC RX W HCPCS: Performed by: NURSE PRACTITIONER

## 2019-03-14 PROCEDURE — 94640 AIRWAY INHALATION TREATMENT: CPT

## 2019-03-14 PROCEDURE — 2580000003 HC RX 258: Performed by: INTERNAL MEDICINE

## 2019-03-14 PROCEDURE — 97530 THERAPEUTIC ACTIVITIES: CPT

## 2019-03-14 PROCEDURE — 6370000000 HC RX 637 (ALT 250 FOR IP): Performed by: NURSE PRACTITIONER

## 2019-03-14 PROCEDURE — 2700000000 HC OXYGEN THERAPY PER DAY

## 2019-03-14 PROCEDURE — 94762 N-INVAS EAR/PLS OXIMTRY CONT: CPT

## 2019-03-14 PROCEDURE — 2060000000 HC ICU INTERMEDIATE R&B

## 2019-03-14 RX ADMIN — Medication 5 DROP: at 08:38

## 2019-03-14 RX ADMIN — IPRATROPIUM BROMIDE 0.5 MG: 0.5 SOLUTION RESPIRATORY (INHALATION) at 11:43

## 2019-03-14 RX ADMIN — PIPERACILLIN AND TAZOBACTAM 3.38 G: 3; .375 INJECTION, POWDER, LYOPHILIZED, FOR SOLUTION INTRAVENOUS; PARENTERAL at 20:22

## 2019-03-14 RX ADMIN — ASPIRIN 81 MG: 81 TABLET, CHEWABLE ORAL at 08:37

## 2019-03-14 RX ADMIN — PIPERACILLIN AND TAZOBACTAM 3.38 G: 3; .375 INJECTION, POWDER, LYOPHILIZED, FOR SOLUTION INTRAVENOUS; PARENTERAL at 10:30

## 2019-03-14 RX ADMIN — LEVALBUTEROL HYDROCHLORIDE 1.25 MG: 1.25 SOLUTION RESPIRATORY (INHALATION) at 09:11

## 2019-03-14 RX ADMIN — TAMSULOSIN HYDROCHLORIDE 0.4 MG: 0.4 CAPSULE ORAL at 08:37

## 2019-03-14 RX ADMIN — INSULIN GLARGINE 40 UNITS: 100 INJECTION, SOLUTION SUBCUTANEOUS at 20:23

## 2019-03-14 RX ADMIN — Medication 10 ML: at 20:25

## 2019-03-14 RX ADMIN — LEVALBUTEROL HYDROCHLORIDE 1.25 MG: 1.25 SOLUTION RESPIRATORY (INHALATION) at 11:43

## 2019-03-14 RX ADMIN — FAMOTIDINE 20 MG: 20 TABLET, FILM COATED ORAL at 08:37

## 2019-03-14 RX ADMIN — METHYLPREDNISOLONE SODIUM SUCCINATE 40 MG: 40 INJECTION, POWDER, FOR SOLUTION INTRAMUSCULAR; INTRAVENOUS at 02:02

## 2019-03-14 RX ADMIN — ENOXAPARIN SODIUM 40 MG: 40 INJECTION SUBCUTANEOUS at 08:28

## 2019-03-14 RX ADMIN — IPRATROPIUM BROMIDE 0.5 MG: 0.5 SOLUTION RESPIRATORY (INHALATION) at 09:11

## 2019-03-14 RX ADMIN — FAMOTIDINE 20 MG: 20 TABLET, FILM COATED ORAL at 20:22

## 2019-03-14 RX ADMIN — INSULIN LISPRO 1 UNITS: 100 INJECTION, SOLUTION INTRAVENOUS; SUBCUTANEOUS at 20:23

## 2019-03-14 RX ADMIN — INSULIN LISPRO 8 UNITS: 100 INJECTION, SOLUTION INTRAVENOUS; SUBCUTANEOUS at 17:01

## 2019-03-14 RX ADMIN — LEVALBUTEROL HYDROCHLORIDE 1.25 MG: 1.25 SOLUTION RESPIRATORY (INHALATION) at 20:22

## 2019-03-14 RX ADMIN — IPRATROPIUM BROMIDE 0.5 MG: 0.5 SOLUTION RESPIRATORY (INHALATION) at 15:13

## 2019-03-14 RX ADMIN — METHYLPREDNISOLONE SODIUM SUCCINATE 40 MG: 40 INJECTION, POWDER, FOR SOLUTION INTRAMUSCULAR; INTRAVENOUS at 10:25

## 2019-03-14 RX ADMIN — MOMETASONE FUROATE AND FORMOTEROL FUMARATE DIHYDRATE 2 PUFF: 200; 5 AEROSOL RESPIRATORY (INHALATION) at 20:22

## 2019-03-14 RX ADMIN — INSULIN GLARGINE 20 UNITS: 100 INJECTION, SOLUTION SUBCUTANEOUS at 10:30

## 2019-03-14 RX ADMIN — PIPERACILLIN AND TAZOBACTAM 3.38 G: 3; .375 INJECTION, POWDER, LYOPHILIZED, FOR SOLUTION INTRAVENOUS; PARENTERAL at 03:25

## 2019-03-14 RX ADMIN — METHYLPREDNISOLONE SODIUM SUCCINATE 40 MG: 40 INJECTION, POWDER, FOR SOLUTION INTRAMUSCULAR; INTRAVENOUS at 18:25

## 2019-03-14 RX ADMIN — LEVALBUTEROL HYDROCHLORIDE 1.25 MG: 1.25 SOLUTION RESPIRATORY (INHALATION) at 15:13

## 2019-03-14 RX ADMIN — MOMETASONE FUROATE AND FORMOTEROL FUMARATE DIHYDRATE 2 PUFF: 200; 5 AEROSOL RESPIRATORY (INHALATION) at 09:11

## 2019-03-14 RX ADMIN — IPRATROPIUM BROMIDE 0.5 MG: 0.5 SOLUTION RESPIRATORY (INHALATION) at 20:22

## 2019-03-14 RX ADMIN — ESCITALOPRAM OXALATE 10 MG: 10 TABLET ORAL at 08:37

## 2019-03-14 ASSESSMENT — PAIN SCALES - GENERAL: PAINLEVEL_OUTOF10: 0

## 2019-03-14 NOTE — PROGRESS NOTES
[facility] Assessment complete. Pneumonia [J18.9] . Vitals:    03/14/19 0912   BP:    Pulse:    Resp: 25   Temp:    SpO2: 96%   . Patients home meds are   Prior to Admission medications    Medication Sig Start Date End Date Taking?  Authorizing Provider   predniSONE (DELTASONE) 10 MG tablet 40 mg once daily for 7 days, then 30 mg OD for 7 days, then 20 mg OD for 7 days, then follow up with pulmonologist 2/1/19   Vivi Caldwell MD   sodium chloride (OCEAN) 0.65 % nasal spray 1 spray by Nasal route as needed for Congestion 2/1/19   Vivi Caldwell MD   sodium chloride (OCEAN) 0.65 % nasal spray 1 spray by Nasal route as needed for Congestion 1/29/19   Sarah Henriquez MD   ergocalciferol (ERGOCALCIFEROL) 57122 units capsule Take 1 capsule by mouth once a week 12/24/18   Lila Yin MD   furosemide (LASIX) 20 MG tablet Take 1 tablet by mouth every other day 12/1/18   Kiya Hancock MD   insulin glargine (LANTUS) 100 UNIT/ML injection vial Inject 20 Units into the skin nightly 11/30/18   Kiya Hancock MD   tamsulosin (FLOMAX) 0.4 MG capsule Take 0.4 mg by mouth daily    Historical Provider, MD   famotidine (PEPCID) 20 MG tablet Take 20 mg by mouth 2 times daily    Historical Provider, MD   acetaminophen (TYLENOL) 325 MG tablet Take 650 mg by mouth every 6 hours as needed for Pain    Historical Provider, MD   aclidinium (TUDORZA PRESSAIR) 400 MCG/ACT AEPB inhaler Inhale 1 puff into the lungs 2 times daily 6/22/18 7/22/19  Michael Huynh MD   albuterol sulfate HFA (VENTOLIN HFA) 108 (90 Base) MCG/ACT inhaler Inhale 2 puffs into the lungs every 6 hours as needed for Wheezing 6/22/18   Michael Huynh MD   budesonide-formoterol (SYMBICORT) 80-4.5 MCG/ACT AERO Inhale 2 puffs into the lungs 2 times daily 5/25/18   Kristian Ruiz MD   metFORMIN ER (GLUCOPHAGE-XR) 500 MG XR tablet take 2 tablets by mouth twice a day  Patient taking differently: take 2 tablet by mouth twice a day 8/1/16 []  Occasional Rhonchi []  Scattered Rhonchi []  Course Rhonchi and/or poor aeration   Sputum    []  Small amount of thin secretions []  Moderate amount of viscous secretions []  Copius, Viscious Yellow/ Secretions   CXR as reported by physician []  clear  []  Unavailable []  Infiltrates and/or consolidation  []  Unavailable []  Mucus Plugging and or lobar consolidation  []  Unavailable   Cough []  Strong, productive cough []  Weak productive cough []  No cough or weak non-productive cough   LORENE HESS, [facility]  9:17 AM                            FEMALE                                  MALE                            FEV1 Predicted Normal Values                        FEV1 Predicted Normal Values          Age                                     Height in Feet and Inches       Age                                     Height in Feet and Inches       4' 11\" 5' 1\" 5' 3\" 5' 5\" 5' 7\" 5' 9\" 5' 11\" 6' 1\"  4' 11\" 5' 1\" 5' 3\" 5' 5\" 5' 7\" 5' 9\" 5' 11\" 6' 1\"   42 - 45 2.49 2.66 2.84 3.03 3.22 3.42 3.62 3.83 42 - 45 2.82 3.03 3.26 3.49 3.72 3.96 4.22 4.47   46 - 49 2.40 2.57 2.76 2.94 3.14 3.33 3.54 3.75 46 - 49 2.70 2.92 3.14 3.37 3.61 3.85 4.10 4.36   50 - 53 2.31 2.48 2.66 2.85 3.04 3.24 3.45 3.66 50 - 53 2.58 2.80 3.02 3.25 3.49 3.73 3.98 4.24   54 - 57 2.21 2.38 2.57 2.75 2.95 3.14 3.35 3.56 54 - 57 2.46 2.67 2.89 3.12 3.36 3.60 3.85 4.11   58 - 61 2.10 2.28 2.46 2.65 2.84 3.04 3.24 3.45 58 - 61 2.32 2.54 2.76 2.99 3.23 3.47 3.72 3.98   62 - 65 1.99 2.17 2.35 2.54 2.73 2.93 3.13 3.34 62 - 65 2.19 2.40 2.62 2.85 3.09 3.33 3.58 3.84   66 - 69 1.88 2.05 2.23 2.42 2.61 2.81 3.02 3.23 66 - 69 2.04 2.26 2.48 2.71 2.95 3.19 3.44 3.70   70+ 1.82 1.99 2.17 2.36 2.55 2.75 2.95 3.16 70+ 1.97 2.19 2.41 2.64 2.87 3.12 3.37 3.62             Predicted Peak Expiratory Flow Rate                                       Height (in)  Female       Height (in) Male           Age 64 62 64 63 57 71 78 74 Age            21 344 357 372 387 402 417 228 446  60 62 64 66 68 70 72 74 76   25 337 352 366 381 396 411 426 441 25 447 476 [phone] 277 292 306 321 336 351 366 381 65 363 392 421 449 478 507 [phone]   70 269 284 299 314 329 344 359 374 70 353 382 410 439 468 496 [phone]   75 261 274 289 305 319 334 348 364 75 344 372 400 429 458 [phone] 573   80 253 266 282 296 312 327 342 356 80 335 362 390 419 448 [phone]

## 2019-03-14 NOTE — PLAN OF CARE
Problem: Falls - Risk of:  Goal: Will remain free from falls  Description  Will remain free from falls  3/14/2019 0234 by Adelaide Baldwin RN  Outcome: Ongoing  Note:   Pt remains free of falls at this time. Bed locked in lowest position, siderails x2, call light in reach. Non-skid footwear applied. Pt ambulates in room with steady gait. Encouraged pt to call for assistance as needed for safety. Falling star posted outside of room. Will continue to monitor.

## 2019-03-14 NOTE — PROGRESS NOTES
Pulmonary Progress Note    CC:  Hypoxia   Subjective:  No overnight events. Discussed with RT flow rate to be decreased today. FiO2 is 40%  Patient denies any hemoptysis. No wheeze, cough with no sputum, shortness of breath is still present at rest.  He is comfortable    Review of Systems -  General ROS: negative for - chills, fatigue, fever or weight loss  ENT ROS: hearing loss r >l   Cardiovascular ROS: no chest pain , orthopnea or pnd   Gastrointestinal ROS: no abdominal pain, change in bowel habits, or black or bloody stools  Skin - no rash   Neuro - no blurry vision , no loc . No focal weakness   msk - no jt tenderness or swelling    Vascular - no claudication , rest completed and negative   Lymphatic - complete and negative   Hematology - oncology - complete and negative   Allergy immunology - complete and negative    no burning or hematuria        Immunization   Immunization History   Administered Date(s) Administered    Influenza, High Dose (Fluzone 65 yrs and older) 10/30/2014, 11/10/2015    Influenza, Ronan Coup, 6 mo and older, IM, PF (Flulaval, Fluarix) 10/23/2018    Pneumococcal 13-valent Conjugate ([id number]) 11/10/2015        Pneumococcal Vaccine     [x] Up to date    [] Indicated   [] Refused  [] Contraindicated       Influenza Vaccine   [x] Up to date    [] Indicated   [] Refused  [] Contraindicated     PAST MEDICAL HISTORY:       Diagnosis Date    Anemia     Anxiety     Bronchitis     [facility]     Diabetes ([facility]     Former smoker     Hyperlipidemia     Oxygen dependent     Respiratory distress     Type 2 diabetes mellitus [facility]          Family History:   No family history on file. SURGICAL HISTORY:   Past Surgical History:   Procedure Laterality Date    ABDOMINAL AORTIC ANEURYSM REPAIR, OPEN      FINGER SURGERY      FOOT SURGERY      PANCREAS SURGERY                TOBACCO:   reports that he has quit smoking.  His smoking use included Subcutaneous TID WC    insulin lispro  0-6 Units Subcutaneous Nightly       Continuous Infusions:   dextrose      dextrose         PRN Meds:  acetaminophen, sodium chloride, acetaminophen, albuterol, magnesium hydroxide, nicotine, ondansetron, sodium chloride flush, dextrose, dextrose, glucagon (rDNA), glucose, glucose, dextrose, glucagon (rDNA), dextrose    Labs:  CBC:   No results for input(s): WBC, HGB, HCT, MCV, PLT in the last 72 hours. BMP:   Recent Labs     03/12/19  0557 03/13/19  0619    129*   K 4.0 4.1   CL 98 91*   CO2 28 29   BUN 16 21   CREATININE 0.55* 0.69*     LIVER PROFILE:   No results for input(s): AST, ALT, LIPASE, BILIDIR, BILITOT, ALKPHOS in the last 72 hours. Invalid input(s): AMYLASE,  ALB  PT/INR:   No results for input(s): PROTIME, INR in the last 72 hours. APTT:   No results for input(s): APTT in the last 72 hours. UA:  No results for input(s): NITRITE, COLORU, PHUR, LABCAST, WBCUA, RBCUA, MUCUS, TRICHOMONAS, YEAST, BACTERIA, CLARITYU, SPECGRAV, LEUKOCYTESUR, UROBILINOGEN, BILIRUBINUR, BLOODU, GLUCOSEU, AMORPHOUS in the last 72 hours. Invalid input(s): KETONESU  No results for input(s): PHART, [id number], PO2ART in the last 72 hours. 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 Continue weaning FiO2. Plan is to arrange home on high flow apparatus so patient can be discharged home with home care.   He has refused hospice  Discussed with Dr. Roxie Plunkett in detail    Electronically signed by Lisandra Angel MD on 3/14/2019 at 10:15 AM

## 2019-03-14 NOTE — PROGRESS NOTES
Nutrition Assessment    Type and Reason for Visit: Initial(LOS)    Nutrition Recommendations:   - Continue current Carb Control diet. Encourage/monitor intakes as tolerated. - Will provide Glucerna ONS x 2 per day. Nutrition Assessment: Pt seen for length of stay. Pt tolerating a Carb Control diet with intakes of most of his meals. RN reports pt is eating well without issues. Noted pt with pulmonary fibrosis. Labs reviewed - Na 133 mmol/L noted. Malnutrition Assessment:  · Malnutrition Status: At risk for malnutrition  · Context: (Acute on Chronic)  · Findings of the 6 clinical characteristics of malnutrition (Minimum of 2 out of 6 clinical characteristics is required to make the diagnosis of moderate or severe Protein Calorie Malnutrition based on AND/ASPEN Guidelines):  1. Energy Intake-Less than or equal to 75% of estimated energy requirement, Greater than or equal to 3 months    2. Weight Loss-No significant weight loss  3. Fat Loss-Mild subcutaneous fat loss, Orbital  4. Muscle Loss-No significant muscle mass loss  5. Fluid Accumulation-No significant fluid accumulation    Nutrition Risk Level:  Moderate    Nutrient Needs:  · Estimated Daily Total Kcal: 4065-7055 kcal/day  · Estimated Daily Protein (g):  gm pro/day    Nutrition Diagnosis:   · Problem: Increased nutrient needs  · Etiology: related to Catabolic illness     Signs and symptoms:  as evidenced by need for oral supplements    Objective Information:  · Wound Type: None  · Current Nutrition Therapies:  · Oral Diet Orders: Carb Control 4 Carbs/Meal   · Oral Diet intake: 51-75%  · Oral Nutrition Supplement (ONS) Orders: None  · Anthropometric Measures:  · Ht: 5' 8.9\" (175 cm)   · Current Body Wt: 156 lb 1.4 oz (70.8 kg)  · Admission Body Wt: 159 lb (72.1 kg)  · Ideal Body Wt: 159 lb 6.4 oz (72.3 kg), % Ideal Body 98%  · BMI Classification: BMI 18.5 - 24.9 Normal Weight    Nutrition Interventions:   Continue current diet, Start ONS  Continued Inpatient Monitoring, Education Not Indicated    Nutrition Evaluation:   · Evaluation: Goals set   · Goals: Oral intakes to meet % of estimated nutrition needs.     · Monitoring: Meal Intake, Supplement Intake, Diet Tolerance, Weight, Pertinent Labs, Monitor Hemodynamic Status    Electronically signed by Natalia Sorensen RD, LD on 3/14/19 at 1:42 PM    Contact Number: [phone]

## 2019-03-14 NOTE — PROGRESS NOTES
PATIENT REFUSES TO WEAR BIPAP     [x] Risks and benefits explained to patient   [x] Patient refuses to wear Bipap stating \"I don' want to wear it. \"  [x] Patient verbalizes understanding of information presented.

## 2019-03-14 NOTE — PROGRESS NOTES
Herber Fitzpatrick 19    Progress Note    3/14/2019    8:24 AM    Name:   Faraz Caro  MRN:     [id number]     Acct:      [de-identified]   Room:   [address] [place] [address] Day:  7  Admit Date:  3/7/2019 10:40 PM    PCP:   Imani Dennison PA-C  Code Status:  Full Code    Subjective:     C/C: Shortness of breath    Interval History Status: not changed. Patient continues on high flow oxygen, states he feels well and denies shortness of breath, chest pain, nausea or vomiting other complaints. He adamantly states he is going home    Brief History:     Per my partner  \"This is 70years old male with history of COPD living in a nursing home currently, transferred to us from [facility] after he presented there with shortness of breath.  The patient says that he suddenly started having shortness of breath.  He did not have any chest pain.  He was having coughing with phlegm production.  He also had fevers and chills.  He was found to be in acute respiratory failure.  He was put on BiPAP and started on vancomycin and Chanell Shad had improvement in his symptoms and he was transferred over here. This morning upon my evaluation patient was in respiratory distress, his oxygen saturations were in 70s on high flow oxygen, he was on BiPAP overnight though, patient was tachycardic.  Place the patient on BiPAP with significant improvement in his symptomatology.  Patient's saturations improved to high 90s.  His heart rate improved. Patient otherwise denies chest pain, he denies palpitations, EKG was concerning for new onset A. fib flutter.  Patient denies any other acute issues. Patient was initially started on vancomycin and Zosyn.  Viral PCR panel positive for RSV.  Vancomycin stopped and Zosyn will be continued for 5 days total.  Pulmonology evaluated the patient and hospice was consulted. \"        Review of Systems:     Constitutional:  negative for chills, fevers, sweats  Respiratory:  negative for cough, dyspnea on exertion, hemoptysis, shortness of breath, wheezing  Cardiovascular:  negative for chest pain, chest pressure/discomfort, lower extremity edema, palpitations  Gastrointestinal:  negative for abdominal pain, constipation, diarrhea, nausea, vomiting  Neurological:  negative for dizziness, headache    Medications: Allergies:  No Known Allergies    Current Meds:   Scheduled Meds:    carbamide peroxide  5 drop Right Ear BID    insulin glargine  40 Units Subcutaneous Nightly    insulin glargine  20 Units Subcutaneous Daily with breakfast    aspirin  81 mg Oral Daily    mometasone-formoterol  2 puff Inhalation BID    escitalopram  10 mg Oral Daily    famotidine  20 mg Oral BID    tamsulosin  0.4 mg Oral Daily    piperacillin-tazobactam  3.375 g Intravenous Q8H    methylPREDNISolone  40 mg Intravenous Q8H    levalbuterol  1.25 mg Nebulization Q4H WA    ipratropium  0.5 mg Nebulization Q4H WA    bisacodyl  10 mg Rectal Once    enoxaparin  40 mg Subcutaneous Daily    sodium chloride flush  10 mL Intravenous 2 times per day    insulin lispro  0-12 Units Subcutaneous TID WC    insulin lispro  0-6 Units Subcutaneous Nightly     Continuous Infusions:    dextrose      dextrose       PRN Meds: acetaminophen, sodium chloride, acetaminophen, albuterol, magnesium hydroxide, nicotine, ondansetron, sodium chloride flush, dextrose, dextrose, glucagon (rDNA), glucose, glucose, dextrose, glucagon (rDNA), dextrose    Data:     Past Medical History:   has a past medical history of Anemia, Anxiety, Bronchitis, COPD (chronic obstructive pulmonary disease) ([facility] Former smoker, Hyperlipidemia, Oxygen dependent, Respiratory distress, and Type 2 diabetes mellitus ([facility] Social History:   reports that he has quit smoking. His smoking use included cigarettes.  He has never used smokeless tobacco. He reports that he does not drink alcohol or use drugs. Family History: No family history on file. Vitals:  BP (!) 143/78   Pulse 85   Temp 98.2 °F (36.8 °C) (Oral)   Resp 23   Ht 5' 8.9\" (1.75 m)   Wt 156 lb 1.4 oz (70.8 kg)   SpO2 94%   BMI 23.12 kg/m²   Temp (24hrs), Av.4 °F (36.9 °C), Min:98.2 °F (36.8 °C), Max:98.6 °F (37 °C)    Recent Labs     19  1233 19  1746 19  2049 19  0709   POCGLU 83 168* 267* 81       I/O (24Hr): Intake/Output Summary (Last 24 hours) at 3/14/2019 0824  Last data filed at 3/14/2019 0629  Gross per 24 hour   Intake 1159 ml   Output 2300 ml   Net -1141 ml       Labs:    Hematology:No results for input(s): WBC, RBC, HGB, HCT, MCV, MCH, MCHC, RDW, PLT, MPV, SEDRATE, CRP, INR, DDIMER, VG1FFNBW, LABABSO in the last 72 hours.     Invalid input(s): PT  Chemistry:  Recent Labs     19  0557 19  0619    129*   K 4.0 4.1   CL 98 91*   CO2 28 29   GLUCOSE 169* 312*   BUN 16 21   CREATININE 0.55* 0.69*   ANIONGAP 11 9   LABGLOM >60 >60   GFRAA >60 >60   CALCIUM 8.7 8.3*     Recent Labs     19  0643 19  1206 19  1233 19  1746 19  2049 19  0709   POCGLU 278* 70* 83 168* 267* 81         Lab Results   Component Value Date/Time    SPECIAL RT LOWER ARM 10ML 2019 12:29 AM     Lab Results   Component Value Date/Time    CULTURE NO GROWTH 6 DAYS 2019 12:29 AM       Lab Results   Component Value Date    POCPH 7.468 2019    POCPCO2 38.9 2019    POCPO2 73.0 2019    POCHCO3 28.2 2019    NBEA NOT REPORTED 2019    PBEA 4 2019    PRN3AQG 29 2019    HXAN2CZB 95 2019    FIO2 45.0 2019       Radiology:    No new radiology reports    Physical Examination:        General appearance:  alert, cooperative and no distress  Mental Status:  oriented to person, place and time and normal affect  Lungs:  clear to auscultation bilaterally, normal effort, diminished throughout  Heart:  regular rate and rhythm, no murmur  Abdomen:  soft, nontender, nondistended, normal bowel sounds, no masses, hepatomegaly, splenomegaly  Extremities:  no edema, redness, tenderness in the calves  Skin:  no gross lesions, rashes, induration    Assessment:        Primary Problem  Acute respiratory failure with hypoxia [facility])    Active Hospital Problems    Diagnosis Date Noted    Atrial flutter ([address] [I48.92] 03/08/2019    Acute respiratory failure with hypoxia ([address] [J96.01] 01/23/2019    Pneumonia due to infectious organism [J18.9] 12/16/2018    COPD exacerbation ([address] [J44.1] 11/27/2018    Acute on chronic respiratory failure with hypoxemia ([address] [J96.21] 11/27/2018    Pulmonary artery hypertension (HCC) [I27.21] 05/23/2018    Iron deficiency anemia [D50.9] 05/23/2018    Type 2 diabetes mellitus without complication, with long-term current use of insulin ([address] [E11.9, Z79.4] 10/30/2014    Hyperlipidemia [E78.5] 10/30/2014       Plan:        1. Wean high flow oxygen as able  2. Continue aerosols  3. PT and OT  4. GI and DVT prophylaxis  5. Glycemic control, insulin scale  6. Antibiotics per pulmonary  7.  Discharge planning, home with home care pending weaning of high flow    Katrinka DO Buster  3/14/2019  8:24 AM

## 2019-03-14 NOTE — PROGRESS NOTES
Physical Therapy  Facility/Department: [facility] 4B STEPDOWN  Daily Treatment Note  NAME: Paula Mcgrath  : 1940  MRN: [id number]    Date of Service: 3/14/2019    Discharge Recommendations:  Home with assist PRN, Home with Home health PT        Patient Diagnosis(es): There were no encounter diagnoses. has a past medical history of Anemia, Anxiety, Bronchitis, COPD (chronic obstructive pulmonary disease) ([facility] Diabetes ([facility] Former smoker, Hyperlipidemia, Oxygen dependent, Respiratory distress, and Type 2 diabetes mellitus ([facility] has a past surgical history that includes AAA repair, open; Pancreas surgery; Finger surgery; and Foot surgery. Restrictions  Restrictions/Precautions  Restrictions/Precautions: Contact Precautions, Up as Tolerated, Fall Risk  Required Braces or Orthoses?: No  Position Activity Restriction  Other position/activity restrictions: Pt on hi breanna canula  Subjective   General  Chart Reviewed: Yes  Response To Previous Treatment: Patient with no complaints from previous session. Family / Caregiver Present: No  Subjective  Subjective: Per RN patient okay for PT. Patient agreeable to get to chair. Denies any pain. Pain Screening  Patient Currently in Pain: Denies  Vital Signs  Patient Currently in Pain: Denies       Orientation  Orientation  Overall Orientation Status: Within Functional Limits  Objective   Bed mobility  Supine to Sit: Supervision  Scooting: Supervision  Transfers  Sit to Stand: Stand by assistance  Stand to sit: Stand by assistance  Bed to Chair: Stand by assistance  Comment: VCs for proper sequencing d/t line management. Patient stated \"Let me do it by myself, don't pull on me. \"  Ambulation  Ambulation?: Yes  Ambulation 1  Surface: level tile  Device: Rolling Walker  Assistance: Stand by assistance  Quality of Gait: Decreased imer and step length.   Distance: 8 ft  Comments: distance limited d/t high breanna     Balance  Posture: Fair  Sitting - Static: Good  Sitting - Dynamic: Good;-  Standing - Static: +;Fair  Standing - Dynamic: Fair  Comments: Standing balance assessed with RW. Patient sat EOB ~12mins. Patient limited d/t desaturation at EOB. RN present in room to adjust high breanna settings, O2 sat as low as 79%. Increased to 90% with rest.  Exercises:  Seated LE exercise program: Long Arc Quads, hip abduction/adduction, heel/toe raises, and marches. Reps: 15  UE exercises deferred d/t desaturation. Assessment   Body structures, Functions, Activity limitations: Decreased functional mobility ; Decreased strength;Decreased endurance;Decreased balance  Assessment: Sunita gait trained 8ft from bed to chair. Mobility distance limited d/t high breanna nasal canula. Recommend home with assist as needed. Prognosis: Guarded  REQUIRES PT FOLLOW UP: Yes  Activity Tolerance  Activity Tolerance: Patient limited by fatigue;Patient limited by endurance    Goals  Short term goals  Time Frame for Short term goals: 10 visits  Short term goal 1: Pt mod indep with bed mobility   Short term goal 2: Pt mod indep with transfers  Short term goal 3: Pt amb 50 ft with rw and supervision  Short term goal 4: Pt negotiate 2 step ups with CGA  Short term goal 5: Pt participate in 25 minutes ther ex to increase endurance  Patient Goals   Patient goals : To return home    Plan    Plan  Times per week: 5x/week  Current Treatment Recommendations: Strengthening, Endurance Training, Gait Training, Stair training, Functional Mobility Training  Safety Devices  Type of devices: Left in chair, Call light within reach, Chair alarm in place, Gait belt, Nurse notified  Restraints  Initially in place: No     Therapy Time   Individual Concurrent Group Co-treatment   Time In 1330         Time Out 1400         Minutes [address] [place] [address]  Treatment performed by Student PTA under the supervision of co-signing PTA who agrees with all treatment and documentation.    Sd Tomas PTA

## 2019-03-15 LAB
ANION GAP SERPL CALCULATED.3IONS-SCNC: 11 MMOL/L (ref 9–17)
BUN BLDV-MCNC: 24 MG/DL (ref 8–23)
BUN/CREAT BLD: ABNORMAL (ref 9–20)
CALCIUM SERPL-MCNC: 8.9 MG/DL (ref 8.6–10.4)
CHLORIDE BLD-SCNC: 98 MMOL/L (ref 98–107)
CO2: 29 MMOL/L (ref 20–31)
CREAT SERPL-MCNC: 0.68 MG/DL (ref 0.7–1.2)
GFR AFRICAN AMERICAN: >60 ML/MIN
GFR NON-AFRICAN AMERICAN: >60 ML/MIN
GFR SERPL CREATININE-BSD FRML MDRD: ABNORMAL ML/MIN/{1.73_M2}
GFR SERPL CREATININE-BSD FRML MDRD: ABNORMAL ML/MIN/{1.73_M2}
GLUCOSE BLD-MCNC: 191 MG/DL (ref 75–110)
GLUCOSE BLD-MCNC: 247 MG/DL (ref 75–110)
GLUCOSE BLD-MCNC: 260 MG/DL (ref 75–110)
GLUCOSE BLD-MCNC: 336 MG/DL (ref 75–110)
GLUCOSE BLD-MCNC: 51 MG/DL (ref 75–110)
GLUCOSE BLD-MCNC: 56 MG/DL (ref 70–99)
GLUCOSE BLD-MCNC: 78 MG/DL (ref 75–110)
POTASSIUM SERPL-SCNC: 4.3 MMOL/L (ref 3.7–5.3)
SODIUM BLD-SCNC: 138 MMOL/L (ref 135–144)

## 2019-03-15 PROCEDURE — 6360000002 HC RX W HCPCS: Performed by: NURSE PRACTITIONER

## 2019-03-15 PROCEDURE — 6370000000 HC RX 637 (ALT 250 FOR IP): Performed by: NURSE PRACTITIONER

## 2019-03-15 PROCEDURE — 36415 COLL VENOUS BLD VENIPUNCTURE: CPT

## 2019-03-15 PROCEDURE — 6360000002 HC RX W HCPCS: Performed by: INTERNAL MEDICINE

## 2019-03-15 PROCEDURE — 2580000003 HC RX 258: Performed by: INTERNAL MEDICINE

## 2019-03-15 PROCEDURE — 97530 THERAPEUTIC ACTIVITIES: CPT

## 2019-03-15 PROCEDURE — 99232 SBSQ HOSP IP/OBS MODERATE 35: CPT | Performed by: INTERNAL MEDICINE

## 2019-03-15 PROCEDURE — 2700000000 HC OXYGEN THERAPY PER DAY

## 2019-03-15 PROCEDURE — 94762 N-INVAS EAR/PLS OXIMTRY CONT: CPT

## 2019-03-15 PROCEDURE — 6370000000 HC RX 637 (ALT 250 FOR IP): Performed by: INTERNAL MEDICINE

## 2019-03-15 PROCEDURE — 2580000003 HC RX 258: Performed by: NURSE PRACTITIONER

## 2019-03-15 PROCEDURE — 82947 ASSAY GLUCOSE BLOOD QUANT: CPT

## 2019-03-15 PROCEDURE — 80048 BASIC METABOLIC PNL TOTAL CA: CPT

## 2019-03-15 PROCEDURE — 97535 SELF CARE MNGMENT TRAINING: CPT

## 2019-03-15 PROCEDURE — 2060000000 HC ICU INTERMEDIATE R&B

## 2019-03-15 PROCEDURE — 94640 AIRWAY INHALATION TREATMENT: CPT

## 2019-03-15 RX ORDER — METHYLPREDNISOLONE SODIUM SUCCINATE 40 MG/ML
40 INJECTION, POWDER, LYOPHILIZED, FOR SOLUTION INTRAMUSCULAR; INTRAVENOUS EVERY 12 HOURS
Status: DISCONTINUED | OUTPATIENT
Start: 2019-03-15 | End: 2019-03-17

## 2019-03-15 RX ADMIN — IPRATROPIUM BROMIDE 0.5 MG: 0.5 SOLUTION RESPIRATORY (INHALATION) at 11:57

## 2019-03-15 RX ADMIN — LEVALBUTEROL HYDROCHLORIDE 1.25 MG: 1.25 SOLUTION RESPIRATORY (INHALATION) at 15:24

## 2019-03-15 RX ADMIN — MOMETASONE FUROATE AND FORMOTEROL FUMARATE DIHYDRATE 2 PUFF: 200; 5 AEROSOL RESPIRATORY (INHALATION) at 08:56

## 2019-03-15 RX ADMIN — PIPERACILLIN AND TAZOBACTAM 3.38 G: 3; .375 INJECTION, POWDER, LYOPHILIZED, FOR SOLUTION INTRAVENOUS; PARENTERAL at 02:02

## 2019-03-15 RX ADMIN — Medication 10 ML: at 10:42

## 2019-03-15 RX ADMIN — INSULIN GLARGINE 40 UNITS: 100 INJECTION, SOLUTION SUBCUTANEOUS at 21:06

## 2019-03-15 RX ADMIN — IPRATROPIUM BROMIDE 0.5 MG: 0.5 SOLUTION RESPIRATORY (INHALATION) at 08:56

## 2019-03-15 RX ADMIN — MOMETASONE FUROATE AND FORMOTEROL FUMARATE DIHYDRATE 2 PUFF: 200; 5 AEROSOL RESPIRATORY (INHALATION) at 20:01

## 2019-03-15 RX ADMIN — INSULIN LISPRO 2 UNITS: 100 INJECTION, SOLUTION INTRAVENOUS; SUBCUTANEOUS at 21:06

## 2019-03-15 RX ADMIN — ASPIRIN 81 MG: 81 TABLET, CHEWABLE ORAL at 10:38

## 2019-03-15 RX ADMIN — METHYLPREDNISOLONE SODIUM SUCCINATE 40 MG: 40 INJECTION, POWDER, FOR SOLUTION INTRAMUSCULAR; INTRAVENOUS at 23:24

## 2019-03-15 RX ADMIN — PIPERACILLIN AND TAZOBACTAM 3.38 G: 3; .375 INJECTION, POWDER, LYOPHILIZED, FOR SOLUTION INTRAVENOUS; PARENTERAL at 11:37

## 2019-03-15 RX ADMIN — METHYLPREDNISOLONE SODIUM SUCCINATE 40 MG: 40 INJECTION, POWDER, FOR SOLUTION INTRAMUSCULAR; INTRAVENOUS at 02:01

## 2019-03-15 RX ADMIN — TAMSULOSIN HYDROCHLORIDE 0.4 MG: 0.4 CAPSULE ORAL at 10:38

## 2019-03-15 RX ADMIN — Medication 10 ML: at 21:12

## 2019-03-15 RX ADMIN — INSULIN LISPRO 8 UNITS: 100 INJECTION, SOLUTION INTRAVENOUS; SUBCUTANEOUS at 17:45

## 2019-03-15 RX ADMIN — LEVALBUTEROL HYDROCHLORIDE 1.25 MG: 1.25 SOLUTION RESPIRATORY (INHALATION) at 11:57

## 2019-03-15 RX ADMIN — IPRATROPIUM BROMIDE 0.5 MG: 0.5 SOLUTION RESPIRATORY (INHALATION) at 20:01

## 2019-03-15 RX ADMIN — LEVALBUTEROL HYDROCHLORIDE 1.25 MG: 1.25 SOLUTION RESPIRATORY (INHALATION) at 08:56

## 2019-03-15 RX ADMIN — IPRATROPIUM BROMIDE 0.5 MG: 0.5 SOLUTION RESPIRATORY (INHALATION) at 15:24

## 2019-03-15 RX ADMIN — LEVALBUTEROL HYDROCHLORIDE 1.25 MG: 1.25 SOLUTION RESPIRATORY (INHALATION) at 20:01

## 2019-03-15 RX ADMIN — METHYLPREDNISOLONE SODIUM SUCCINATE 40 MG: 40 INJECTION, POWDER, FOR SOLUTION INTRAMUSCULAR; INTRAVENOUS at 11:53

## 2019-03-15 RX ADMIN — INSULIN LISPRO 2 UNITS: 100 INJECTION, SOLUTION INTRAVENOUS; SUBCUTANEOUS at 11:37

## 2019-03-15 RX ADMIN — ESCITALOPRAM OXALATE 10 MG: 10 TABLET ORAL at 10:38

## 2019-03-15 RX ADMIN — FAMOTIDINE 20 MG: 20 TABLET, FILM COATED ORAL at 10:38

## 2019-03-15 RX ADMIN — ENOXAPARIN SODIUM 40 MG: 40 INJECTION SUBCUTANEOUS at 10:37

## 2019-03-15 RX ADMIN — FAMOTIDINE 20 MG: 20 TABLET, FILM COATED ORAL at 21:06

## 2019-03-15 ASSESSMENT — PAIN SCALES - GENERAL
PAINLEVEL_OUTOF10: 0
PAINLEVEL_OUTOF10: 0

## 2019-03-15 NOTE — PROGRESS NOTES
Physical Therapy    DATE: 3/15/2019    NAME: Asaf Norris  MRN: [id number]   : 1940            [x] Other: Initiated treatment but writer terminated d/t vitals. Denied pain at start of treatment. Pattie Woodward to work with PT. Mainly c/o B LE weakness. On hi-flo35 LPM, 40%, (paper in room indicating, per  keep between 88-92%). 93% seated in chair upon arrival.   Performed 10 dynamic marches with SpO2 as low as 72% and 121 bpm (additional staff arrived to ensure patient status, patient calm throughout). Frequent verbal cues for appropriate breathing technique. Required ~5-8 minutes total to recover above 90%. STS SBA RW (verbal cues for sequencing)    Time In: 1550  Time Out: 0247        [] PT being discontinued at this time. Patient independent. No further needs. [] PT being discontinued at this time as the patient has been transferred to palliative care. No further needs.     Marilyn Lake, PTA

## 2019-03-15 NOTE — PLAN OF CARE
[facility] Pneumonia [J18.9] . Vitals:    03/15/19 0740   BP: 120/73   Pulse: 70   Resp: 20   Temp: 97.9 °F (36.6 °C)   SpO2:    . Patients home meds are   Prior to Admission medications    Medication Sig Start Date End Date Taking?  Authorizing Provider   predniSONE (DELTASONE) 10 MG tablet 40 mg once daily for 7 days, then 30 mg OD for 7 days, then 20 mg OD for 7 days, then follow up with pulmonologist 2/1/19   Jasmyn Woodruff MD   sodium chloride (OCEAN) 0.65 % nasal spray 1 spray by Nasal route as needed for Congestion 2/1/19   Jasmyn Woodruff MD   sodium chloride (OCEAN) 0.65 % nasal spray 1 spray by Nasal route as needed for Congestion 1/29/19   Benjamín Espino MD   ergocalciferol (ERGOCALCIFEROL) 52002 units capsule Take 1 capsule by mouth once a week 12/24/18   Joleen Castro MD   furosemide (LASIX) 20 MG tablet Take 1 tablet by mouth every other day 12/1/18   Conrad Huffman MD   insulin glargine (LANTUS) 100 UNIT/ML injection vial Inject 20 Units into the skin nightly 11/30/18   Conrad Huffman MD   tamsulosin (FLOMAX) 0.4 MG capsule Take 0.4 mg by mouth daily    Historical Provider, MD   famotidine (PEPCID) 20 MG tablet Take 20 mg by mouth 2 times daily    Historical Provider, MD   acetaminophen (TYLENOL) 325 MG tablet Take 650 mg by mouth every 6 hours as needed for Pain    Historical Provider, MD   aclidinium (TUDORZA PRESSAIR) 400 MCG/ACT AEPB inhaler Inhale 1 puff into the lungs 2 times daily 6/22/18 7/22/19  Reshma Buck MD   albuterol sulfate HFA (VENTOLIN HFA) 108 (90 Base) MCG/ACT inhaler Inhale 2 puffs into the lungs every 6 hours as needed for Wheezing 6/22/18   Reshma Buck MD   budesonide-formoterol (SYMBICORT) 80-4.5 MCG/ACT AERO Inhale 2 puffs into the lungs 2 times daily 5/25/18   Angeli De Oliveira MD   metFORMIN ER (GLUCOPHAGE-XR) 500 MG XR tablet take 2 tablets by mouth twice a day  Patient taking differently: take 2 tablet by [id number] [facility] [id number] 294  84 19 64 55 17 75 31 49 75   66 707 054 954 532 699 397 926 553 01 188 628 549 812 568 477 358 025 826   13 452 489 946 473 207 828 972 283 16 249 511 334 312 234 984 917 354 895   17 389 844 088 669 191 667 022 681 22 287 874 665 061 981 554 986 829 793   83 002 586 415 581 313 495 605 277 53 163 238 634 838 058 201 332 740 975   49 839 198 222 125 064 749 517 187 02 603 303 795 112 523 824 002 109 702   63 675 145 512 069 230 668 429 473 50 215 959 703 122 877 207 866 324 848   20 377 103 060 148 900 689 894 770 19 646 274 363 443 964 465 210 552 130   57 287 911 084 946 884 403 224 236 93 944 890 495 243 756 559 284 356 582   83 314 103 789 416 382 422 084 061 51 721 452 739 189 093 864 487 993 452   11 751 275 312 140 719 024 549 024 45 711 072 603 031 821 634 728 491 396   10 011 175 318 783 176 197 299 324 51 002 546 628 596 232 302 913 806 499   86 049 596 449 692 440 217 781 563 39 790 285 150 488 429 985 545 056 663

## 2019-03-15 NOTE — PROGRESS NOTES
Herber Fitzpatrick 19    Progress Note    3/15/2019    9:28 AM    Name:   Little Melchor  MRN:     [id number]     Acct:      [de-identified]   Room:   [address] [place] [address] Day:  8  Admit Date:  3/7/2019 10:40 PM    PCP:   Janie Cummins PA-C  Code Status:  Full Code    Subjective:     C/C: Shortness of breath    Interval History Status: not changed. Patient continues requiring high flow oxygen. Denies any chest pain, nausea or vomiting, fevers or chills. Breathing is at or near baseline. Refusing to go to [facility] or [facility] facility, adamantly is going home. Again advised the high flow oxygen could not be done at home. Brief History:     Per my partner  \"This is 70years old male with history of COPD living in a nursing home currently, transferred to us from [facility] after he presented there with shortness of breath.  The patient says that he suddenly started having shortness of breath.  He did not have any chest pain.  He was having coughing with phlegm production.  He also had fevers and chills.  He was found to be in acute respiratory failure.  He was put on BiPAP and started on vancomycin and Dee Louis had improvement in his symptoms and he was transferred over here. This morning upon my evaluation patient was in respiratory distress, his oxygen saturations were in 70s on high flow oxygen, he was on BiPAP overnight though, patient was tachycardic.  Place the patient on BiPAP with significant improvement in his symptomatology.  Patient's saturations improved to high 90s.  His heart rate improved. Patient otherwise denies chest pain, he denies palpitations, EKG was concerning for new onset A. fib flutter.  Patient denies any other acute issues.   Patient was initially started on vancomycin and Zosyn.  Viral PCR panel positive for RSV.  Vancomycin stopped and Zosyn will be continued for 5 days total.  Pulmonology evaluated the patient and hospice was consulted. \"           Review of Systems:     Constitutional:  negative for chills, fevers, sweats  Respiratory:  negative for cough, dyspnea on exertion, hemoptysis, shortness of breath, wheezing  Cardiovascular:  negative for chest pain, chest pressure/discomfort, lower extremity edema, palpitations  Gastrointestinal:  negative for abdominal pain, constipation, diarrhea, nausea, vomiting  Neurological:  negative for dizziness, headache    Medications: Allergies:  No Known Allergies    Current Meds:   Scheduled Meds:    insulin glargine  40 Units Subcutaneous Nightly    insulin glargine  20 Units Subcutaneous Daily with breakfast    aspirin  81 mg Oral Daily    mometasone-formoterol  2 puff Inhalation BID    escitalopram  10 mg Oral Daily    famotidine  20 mg Oral BID    tamsulosin  0.4 mg Oral Daily    piperacillin-tazobactam  3.375 g Intravenous Q8H    methylPREDNISolone  40 mg Intravenous Q8H    levalbuterol  1.25 mg Nebulization Q4H WA    ipratropium  0.5 mg Nebulization Q4H WA    bisacodyl  10 mg Rectal Once    enoxaparin  40 mg Subcutaneous Daily    sodium chloride flush  10 mL Intravenous 2 times per day    insulin lispro  0-12 Units Subcutaneous TID WC    insulin lispro  0-6 Units Subcutaneous Nightly     Continuous Infusions:    dextrose      dextrose       PRN Meds: acetaminophen, sodium chloride, acetaminophen, albuterol, magnesium hydroxide, nicotine, ondansetron, sodium chloride flush, dextrose, dextrose, glucagon (rDNA), glucose, glucose, dextrose, glucagon (rDNA), dextrose    Data:     Past Medical History:   has a past medical history of Anemia, Anxiety, Bronchitis, COPD (chronic obstructive pulmonary disease) ([facility] Former smoker, Hyperlipidemia, Oxygen dependent, Respiratory distress, and Type 2 diabetes mellitus ([facility] Social History:   reports that he has quit smoking. His smoking use included cigarettes.  He has never used smokeless tobacco. He reports that he does not drink alcohol or use drugs. Family History: No family history on file. Vitals:  /73   Pulse 70   Temp 97.9 °F (36.6 °C) (Temporal)   Resp 20   Ht 5' 8.9\" (1.75 m)   Wt 156 lb 1.4 oz (70.8 kg)   SpO2 95%   BMI 23.12 kg/m²   Temp (24hrs), Av.8 °F (36.6 °C), Min:97.4 °F (36.3 °C), Max:98.4 °F (36.9 °C)    Recent Labs     19  1615 19  1940 03/15/19  0636 03/15/19  0713   POCGLU 316* 195* 51* 78       I/O (24Hr): Intake/Output Summary (Last 24 hours) at 3/15/2019 0928  Last data filed at 3/15/2019 0527  Gross per 24 hour   Intake 1919.29 ml   Output 1875 ml   Net 44.29 ml       Labs:    Hematology:No results for input(s): WBC, RBC, HGB, HCT, MCV, MCH, MCHC, RDW, PLT, MPV, SEDRATE, CRP, INR, DDIMER, NV0SGQXB, LABABSO in the last 72 hours.     Invalid input(s): PT  Chemistry:  Recent Labs     19  0619 19  1118 03/15/19  0634   * 133* 138   K 4.1 4.0 4.3   CL 91* 93* 98   CO2 29 29 29   GLUCOSE 312* 178* 56*   BUN 21 20 24*   CREATININE 0.69* 0.69* 0.68*   ANIONGAP 9 11 11   LABGLOM >60 >60 >60   GFRAA >60 >60 >60   CALCIUM 8.3* 8.7 8.9     Recent Labs     19  0709 19  1158 19  1615 19  1940 03/15/19  0636 03/15/19  0713   POCGLU 81 132* 316* 195* 51* 78         Lab Results   Component Value Date/Time    SPECIAL RT LOWER ARM 10ML 2019 12:29 AM     Lab Results   Component Value Date/Time    CULTURE NO GROWTH 6 DAYS 2019 12:29 AM       Lab Results   Component Value Date    POCPH 7.468 2019    POCPCO2 38.9 2019    POCPO2 73.0 2019    POCHCO3 28.2 2019    NBEA NOT REPORTED 2019    PBEA 4 2019    TTM9DRJ 29 2019    ACML4PYA 95 2019    FIO2 45.0 2019       Radiology:    No new radiology reports    Physical Examination:        General appearance:  alert, cooperative and no distress  Mental Status:  oriented to person, place and time and normal affect  Lungs:  clear to auscultation bilaterally, hypo-oxygen use, diminished throughout  Heart:  regular rate and rhythm, no murmur  Abdomen:  soft, nontender, nondistended, normal bowel sounds, no masses, hepatomegaly, splenomegaly  Extremities:  no edema, redness, tenderness in the calves  Skin:  no gross lesions, rashes, induration    Assessment:        Primary Problem  Acute respiratory failure with hypoxia [facility])    Active Hospital Problems    Diagnosis Date Noted    Atrial flutter ([address] [I48.92] 03/08/2019    Acute respiratory failure with hypoxia ([address] [J96.01] 01/23/2019    Pneumonia due to infectious organism [J18.9] 12/16/2018    COPD exacerbation ([address] [J44.1] 11/27/2018    Acute on chronic respiratory failure with hypoxemia ([address] [J96.21] 11/27/2018    Pulmonary artery hypertension (HCC) [I27.21] 05/23/2018    Iron deficiency anemia [D50.9] 05/23/2018    Type 2 diabetes mellitus without complication, with long-term current use of insulin ([address] [E11.9, Z79.4] 10/30/2014    Hyperlipidemia [E78.5] 10/30/2014       Plan:        1. Wean high flow oxygen as able  2. Continue insulin scale for glycemic control  3. Aerosols as ordered  4. Continue antibiotics if okay with pulmonary  5. Continue present cardiac meds  6. Palliative care evaluation  7.  Discharge planning, home with home care when able    Charla Mancilla DO  3/15/2019  9:28 AM

## 2019-03-15 NOTE — PROGRESS NOTES
PALLIATIVE CARE TEAM    Patient: Marisa Nava  Room: 9102/5806-77    Reason For Consult   Goals of care evaluation  Distress management  Symptom Management  Guidance and support  Facilitate communications  Assistance in coordinating care  Recommendations for the above    Impression: Marisa Nava is a 70y.o. year old male with  has a past medical history of Anemia, Anxiety, Bronchitis, COPD (chronic obstructive pulmonary disease) ([address] Diabetes ([address] Former smoker, Hyperlipidemia, Oxygen dependent, Respiratory distress, and Type 2 diabetes mellitus ([address] .  Currently hospitalized for the management of pulmonary fibrosis. The Palliative Care Team is following to assist with support.       Code Status  Full Code    Vital Signs:  /81   Pulse 90   Temp 97.8 °F (36.6 °C) (Oral)   Resp 24   Ht 5' 8.9\" (1.75 m)   Wt 156 lb 1.4 oz (70.8 kg)   SpO2 98%   BMI 23.12 kg/m²     Patient Active Problem List   Diagnosis    Type 2 diabetes mellitus without complication, with long-term current use of insulin (HCC)    Hyperlipidemia    Anxiety    Respiratory distress    Acute exacerbation of chronic obstructive pulmonary disease (COPD) ([address]    On home O2    Pulmonary artery hypertension (HCC)    Ex-smoker    Iron deficiency anemia    Community acquired pneumonia of right upper lobe of lung ([address]    Lactic acidosis    Pulmonary fibrosis ([address]    Dysuria    Acute on chronic respiratory failure (HCC)    Pleural plaque due to asbestos exposure    COPD exacerbation (HCC)    Pulmonary fibrosis (HCC)    Acute on chronic respiratory failure with hypoxemia (HCC)    Hypomagnesemia    C. difficile colitis    Leukocytosis    Pneumonia due to infectious organism    Diarrhea of infectious origin    General weakness    Hyperglycemia    Asbestosis ([address]    Goals of care, counseling/discussion    Acute respiratory failure with hypoxia ([address]    Hypoxia    Atrial flutter ([address]       Palliative Interaction:  Writer met with patient again today. Patient remains very upbeat and states that the physicians are telling him that he's \"getting better. \"  Patient continues saying, \"I know that I'm going to die, I know that, but I'm just not ready. \"  Patient repeats this a few different times over the course of our conversation. When asked what his plan is for discharge, patient states, \"I'm going HOME,\" and is very emphatic and looks Writer straight in the eye. Patient states that he remains open to adding Palliative Care in is home and Writer has spoken with Rufino Paredes RN with [address] regarding this. Patient does complain very much about wax in his ears. He states that he has told multiple people, \"but they just keep putting something in my ears, they aren't taking anything out! \"  Writer discussed this with patient's bedside RN, Kacie Monday., who will speak with the physician about but the possibility of adding an ENT consult. Continued emotional support provided.       Goals/Plan of care  Education/support to patient  Providing support for coping/adaptation/distress of patient  Continue with current plan of care    Electronically signed by   Ezequiel Gruber RN  Palliative Care Team  on 3/15/2019 at 1:05 PM

## 2019-03-15 NOTE — CARE COORDINATION
Met with patient to review transition plan, high flow continues. Discussed the potential that he may not be able to return home safely, has been to [facility] and SNF recently.  Very resistant to further discussion, but agreeable to review list. SNF list provided

## 2019-03-15 NOTE — PROGRESS NOTES
Occupational Therapy  Facility/Department: [address] STEPDOWN  Daily Treatment Note  NAME: Oscar Paez  : 1940  MRN: [id number]    Date of Service: 3/15/2019    Discharge Recommendations:  Home with Home health OT(Pt safe to return home when pt off of hi-flow.)       Assessment   Performance deficits / Impairments: Decreased functional mobility ; Decreased endurance;Decreased ADL status; Decreased high-level IADLs;Decreased strength  Prognosis: Fair  Patient Education: OT POC, transfer/walker safety, pursed lip breathing, EC/WS - pt verblized understanding. REQUIRES OT FOLLOW UP: Yes  Activity Tolerance  Activity Tolerance: Patient Tolerated treatment well;Treatment limited secondary to medical complications (free text)  Activity Tolerance: Hi-flow. Safety Devices  Safety Devices in place: Yes  Type of devices: Call light within reach; Chair alarm in place; Left in chair;Nurse notified; Patient at risk for falls         Patient Diagnosis(es): There were no encounter diagnoses. has a past medical history of Anemia, Anxiety, Bronchitis, COPD (chronic obstructive pulmonary disease) ([address] Diabetes ([address] Former smoker, Hyperlipidemia, Oxygen dependent, Respiratory distress, and Type 2 diabetes mellitus ([address] has a past surgical history that includes AAA repair, open; Pancreas surgery; Finger surgery; and Foot surgery. Restrictions  Restrictions/Precautions  Restrictions/Precautions: Contact Precautions, Up as Tolerated, Fall Risk  Required Braces or Orthoses?: No  Position Activity Restriction  Other position/activity restrictions: Pt on hi breanna canula. Initially 40%, RN turned up to 45% during functional activity.   Subjective   General  Chart Reviewed: No  Patient assessed for rehabilitation services?: Yes  Family / Caregiver Present: No  Referring Practitioner: Reji  Diagnosis: Pneumonia  General Comment  Vital Signs  Patient Currently in Pain: Denies   Orientation  Orientation  Overall Orientation Patient will complete UB cardiac strengthening HEP with SETUP to improve Pt's overall quality of life. Short term goal 4: Patient will complete standing (static or dynamic) ADL/IADL tasks >5 minutes to improve Pt's participation in functional mobility. Short term goal 5: Patient will complete at least 1 IADL routine (i.e. simple meal prep, simple home mgmt) with MOD I to improve Pt's performance in ADL/IADL upon discharge. Patient Goals   Patient goals : To go home and not be transferred to a nursing home. Therapy Time   Individual Concurrent Group Co-treatment   Time In 1345         Time Out 1433         Minutes 48            Pt supine in bed upon therapist arrival. Pleasant and agreeable to therapy. See above for LOF for all tasks. Pt retired to seated in chair at end of session with chair alarm activated and call light within reach.     ÁNGEL Das/MATI

## 2019-03-15 NOTE — PROGRESS NOTES
Pulmonary Progress Note    CC:  Hypoxia   Subjective:  No acute overnight events. Patient is still on 40% FiO2 34 L. Any attempt to wean or with movement. He desaturates. Patient is comfortable at rest.  He denies any fevers or hemoptysis or sputum. Review of Systems -  General ROS:  fatigue  ENT ROS: negative for - headaches, oral lesions or sore throat  Cardiovascular ROS: no chest pain , orthopnea or pnd   Gastrointestinal ROS: no abdominal pain, change in bowel habits, or black or bloody stools  Skin - no rash   Neuro - no blurry vision , no loc . No focal weakness   msk - no jt tenderness or swelling    Vascular - no claudication , rest completed and negative   Lymphatic - complete and negative   Hematology - oncology - complete and negative   Allergy immunology - complete and negative    no burning or hematuria        Immunization   Immunization History   Administered Date(s) Administered    Influenza, High Dose (Fluzone 65 yrs and older) 10/30/2014, 11/10/2015    Influenza, Genetta Kiang, 6 mo and older, IM, PF (Flulaval, Fluarix) 10/23/2018    Pneumococcal 13-valent Conjugate ([id number]) 11/10/2015        Pneumococcal Vaccine     [x] Up to date    [] Indicated   [] Refused  [] Contraindicated       Influenza Vaccine   [x] Up to date    [] Indicated   [] Refused  [] Contraindicated     PAST MEDICAL HISTORY:       Diagnosis Date    Anemia     Anxiety     Bronchitis     COPD (chronic obstructive [facility] disease) [facility]     Diabetes ([facility]     Former smoker     Hyperlipidemia     Oxygen dependent     Respiratory distress     Type 2 diabetes mellitus ([email or web address]          Family History:   No family history on file. SURGICAL HISTORY:   Past Surgical History:   Procedure Laterality Date    ABDOMINAL AORTIC ANEURYSM REPAIR, OPEN      FINGER SURGERY      FOOT SURGERY      PANCREAS SURGERY                TOBACCO:   reports that he has quit smoking. His smoking use included cigarettes.  He has never used smokeless tobacco.  ETOH:   reports that he does not drink alcohol. ALLERGIES:  No Known Allergies    Home Meds:   Prior to Admission medications    Medication Sig Start Date End Date Taking?  Authorizing Provider   predniSONE (DELTASONE) 10 MG tablet 40 mg once daily for 7 days, then 30 mg OD for 7 days, then 20 mg OD for 7 days, then follow up with pulmonologist 2/1/19   Delmar Woodward MD   sodium chloride (OCEAN) 0.65 % nasal spray 1 spray by Nasal route as needed for Congestion 2/1/19   Delmar Woodward MD   sodium chloride (OCEAN) 0.65 % nasal spray 1 spray by Nasal route as needed for Congestion 1/29/19   Yuliet Barboza MD   ergocalciferol (ERGOCALCIFEROL) 52240 units capsule Take 1 capsule by mouth once a week 12/24/18   Jordin Morrow MD   furosemide (LASIX) 20 MG tablet Take 1 tablet by mouth every other day 12/1/18   Giovanny Hester MD   insulin glargine (LANTUS) 100 UNIT/ML injection vial Inject 20 Units into the skin nightly 11/30/18   Giovanny Hester MD   tamsulosin (FLOMAX) 0.4 MG capsule Take 0.4 mg by mouth daily    Historical Provider, MD   famotidine (PEPCID) 20 MG tablet Take 20 mg by mouth 2 times daily    Historical Provider, MD   acetaminophen (TYLENOL) 325 MG tablet Take 650 mg by mouth every 6 hours as needed for Pain    Historical Provider, MD   aclidinium (TUDORZA PRESSAIR) 400 MCG/ACT AEPB inhaler Inhale 1 puff into the lungs 2 times daily 6/22/18 7/22/19  Kalin Ragsdale MD   albuterol sulfate HFA (VENTOLIN HFA) 108 (90 Base) MCG/ACT inhaler Inhale 2 puffs into the lungs every 6 hours as needed for Wheezing 6/22/18   Kalin Ragsdale MD   budesonide-formoterol (SYMBICORT) 80-4.5 MCG/ACT AERO Inhale 2 puffs into the lungs 2 times daily 5/25/18   Ramón Price MD   metFORMIN ER (GLUCOPHAGE-XR) 500 MG XR tablet take 2 tablets by mouth twice a day  Patient taking differently: take 2 tablet by mouth twice a day 8/1/16   Ronen Pitt PA-C   aspirin 81 MG tablet Take 81 mg by mouth daily    Historical Provider, MD   escitalopram (LEXAPRO) 10 MG tablet Take 1 tablet by mouth daily 11/10/15   Dominique Jensen PA-C   ONE TOUCH ULTRA TEST strip STRIP MISC ONCE A DAY 7/1/15   Dominique Jensen PA-C         Intake/Output Summary (Last 24 hours) at 3/15/2019 1229  Last data filed at 3/15/2019 0527  Gross per 24 hour   Intake 1919.29 ml   Output 1775 ml   Net 144.29 ml         Diet   DIET CARB CONTROL; Dietary Nutrition Supplements: Diabetic Oral Supplement    Vitals:   /81   Pulse 90   Temp 97.8 °F (36.6 °C) (Oral)   Resp 24   Ht 5' 8.9\" (1.75 m)   Wt 156 lb 1.4 oz (70.8 kg)   SpO2 98%   BMI 23.12 kg/m²  on         I/O (24 Hours)    Patient Vitals for the past 8 hrs:   BP Temp Temp src Pulse Resp SpO2   03/15/19 1157 -- -- -- -- 24 98 %   03/15/19 1128 133/81 97.8 °F (36.6 °C) Oral 90 20 90 %   03/15/19 0858 -- -- -- -- 20 95 %   03/15/19 0740 120/73 97.9 °F (36.6 °C) Temporal 70 20 --       Intake/Output Summary (Last 24 hours) at 3/15/2019 1229  Last data filed at 3/15/2019 0527  Gross per 24 hour   Intake 1919.29 ml   Output 1775 ml   Net 144.29 ml     I/O last 3 completed shifts: In: 1919.3 [P.O.:1700; I.V.:219.3]  Out: 1875 [Urine:1875]   Date 03/15/19 0000 - 03/15/19 2359   Shift 5517-1625 6976-4401 5847-9206 24 Hour Total   INTAKE   P.O.(mL/kg/hr) 800(1.4)   800   I. V.(mL/kg) 115(1.6)   115(1.6)   Shift Total(mL/kg) 915(12.9)   915(12.9)   OUTPUT   Urine(mL/kg/hr) 725(1.3)   725   Shift Total(mL/kg) 725(10.2)   725(10.2)   Weight (kg) 70.8 70.8 70.8 70.8     Patient Vitals for the past 96 hrs (Last 3 readings):   Weight   03/14/19 0629 156 lb 1.4 oz (70.8 kg)   03/13/19 0644 151 lb 14.4 oz (68.9 kg)   03/12/19 0630 156 lb 4.9 oz (70.9 kg)          PHYSICAL EXAMINATION:  General Appearance:    Alert, cooperative, no distress, appears stated age   Head:    Normocephalic, without obvious abnormality, atraumatic                  :    Neck:   Supple, symmetrical, Subcutaneous TID WC    insulin lispro  0-6 Units Subcutaneous Nightly       Continuous Infusions:   dextrose      dextrose         PRN Meds:  acetaminophen, sodium chloride, acetaminophen, albuterol, magnesium hydroxide, nicotine, ondansetron, sodium chloride flush, dextrose, dextrose, glucagon (rDNA), glucose, glucose, dextrose, glucagon (rDNA), dextrose    Labs:  CBC:   No results for input(s): WBC, HGB, HCT, MCV, PLT in the last 72 hours. BMP:   Recent Labs     03/13/19  0619 03/14/19  1118 03/15/19  0634   * 133* 138   K 4.1 4.0 4.3   CL 91* 93* 98   CO2 29 29 29   BUN 21 20 24*   CREATININE 0.69* 0.69* 0.68*     LIVER PROFILE:   No results for input(s): AST, ALT, LIPASE, BILIDIR, BILITOT, ALKPHOS in the last 72 hours. Invalid input(s): AMYLASE,  ALB  PT/INR:   No results for input(s): PROTIME, INR in the last 72 hours. APTT:   No results for input(s): APTT in the last 72 hours. UA:  No results for input(s): NITRITE, COLORU, PHUR, LABCAST, WBCUA, RBCUA, MUCUS, TRICHOMONAS, YEAST, BACTERIA, CLARITYU, SPECGRAV, LEUKOCYTESUR, UROBILINOGEN, BILIRUBINUR, BLOODU, GLUCOSEU, AMORPHOUS in the last 72 hours. Invalid input(s): KETONESU  No results for input(s): PHART, DJX9TDR, PO2ART in the last 72 hours. 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 Patient is high risk of decompensation and being readmitted  . Continue IV steroids, decreased  dose to twice a day  . He has completed 5 days of Zosyn.   We will discontinue antibiotics  Discussed with Dr. Sergei Chapman in detail    Electronically signed by Alexandria Nash MD on 3/15/2019 at 12:29 PM

## 2019-03-15 NOTE — PLAN OF CARE
Pt remains free of falls at this time. Bed locked in lowest position, siderails x2, call light in reach. Non-skid footwear applied. Encouraged pt to call for assistance as needed for safety. Falling star posted outside of room. Will continue to monitor.   Electronically signed by Nioclette Shepherd RN on 3/15/2019 at 7:22 PM

## 2019-03-16 LAB
ANION GAP SERPL CALCULATED.3IONS-SCNC: 9 MMOL/L (ref 9–17)
BUN BLDV-MCNC: 22 MG/DL (ref 8–23)
BUN/CREAT BLD: ABNORMAL (ref 9–20)
CALCIUM SERPL-MCNC: 8.5 MG/DL (ref 8.6–10.4)
CHLORIDE BLD-SCNC: 97 MMOL/L (ref 98–107)
CO2: 29 MMOL/L (ref 20–31)
CREAT SERPL-MCNC: 0.54 MG/DL (ref 0.7–1.2)
GFR AFRICAN AMERICAN: >60 ML/MIN
GFR NON-AFRICAN AMERICAN: >60 ML/MIN
GFR SERPL CREATININE-BSD FRML MDRD: ABNORMAL ML/MIN/{1.73_M2}
GFR SERPL CREATININE-BSD FRML MDRD: ABNORMAL ML/MIN/{1.73_M2}
GLUCOSE BLD-MCNC: 104 MG/DL (ref 75–110)
GLUCOSE BLD-MCNC: 105 MG/DL (ref 75–110)
GLUCOSE BLD-MCNC: 136 MG/DL (ref 70–99)
GLUCOSE BLD-MCNC: 149 MG/DL (ref 75–110)
GLUCOSE BLD-MCNC: 80 MG/DL (ref 75–110)
POTASSIUM SERPL-SCNC: 4.4 MMOL/L (ref 3.7–5.3)
SODIUM BLD-SCNC: 135 MMOL/L (ref 135–144)

## 2019-03-16 PROCEDURE — 2700000000 HC OXYGEN THERAPY PER DAY

## 2019-03-16 PROCEDURE — 2060000000 HC ICU INTERMEDIATE R&B

## 2019-03-16 PROCEDURE — 99232 SBSQ HOSP IP/OBS MODERATE 35: CPT | Performed by: FAMILY MEDICINE

## 2019-03-16 PROCEDURE — 6360000002 HC RX W HCPCS: Performed by: NURSE PRACTITIONER

## 2019-03-16 PROCEDURE — 2580000003 HC RX 258: Performed by: NURSE PRACTITIONER

## 2019-03-16 PROCEDURE — 6370000000 HC RX 637 (ALT 250 FOR IP): Performed by: NURSE PRACTITIONER

## 2019-03-16 PROCEDURE — 36415 COLL VENOUS BLD VENIPUNCTURE: CPT

## 2019-03-16 PROCEDURE — 6360000002 HC RX W HCPCS: Performed by: INTERNAL MEDICINE

## 2019-03-16 PROCEDURE — 6370000000 HC RX 637 (ALT 250 FOR IP): Performed by: FAMILY MEDICINE

## 2019-03-16 PROCEDURE — 6370000000 HC RX 637 (ALT 250 FOR IP): Performed by: INTERNAL MEDICINE

## 2019-03-16 PROCEDURE — 94640 AIRWAY INHALATION TREATMENT: CPT

## 2019-03-16 PROCEDURE — 94762 N-INVAS EAR/PLS OXIMTRY CONT: CPT

## 2019-03-16 PROCEDURE — 99232 SBSQ HOSP IP/OBS MODERATE 35: CPT | Performed by: INTERNAL MEDICINE

## 2019-03-16 PROCEDURE — 82947 ASSAY GLUCOSE BLOOD QUANT: CPT

## 2019-03-16 PROCEDURE — 80048 BASIC METABOLIC PNL TOTAL CA: CPT

## 2019-03-16 RX ORDER — INSULIN GLARGINE 100 [IU]/ML
20 INJECTION, SOLUTION SUBCUTANEOUS NIGHTLY
Status: DISCONTINUED | OUTPATIENT
Start: 2019-03-16 | End: 2019-03-17

## 2019-03-16 RX ORDER — GUAIFENESIN 600 MG/1
600 TABLET, EXTENDED RELEASE ORAL 2 TIMES DAILY
Status: DISCONTINUED | OUTPATIENT
Start: 2019-03-16 | End: 2019-03-22 | Stop reason: HOSPADM

## 2019-03-16 RX ADMIN — ESCITALOPRAM OXALATE 10 MG: 10 TABLET ORAL at 08:44

## 2019-03-16 RX ADMIN — LEVALBUTEROL HYDROCHLORIDE 1.25 MG: 1.25 SOLUTION RESPIRATORY (INHALATION) at 11:35

## 2019-03-16 RX ADMIN — FAMOTIDINE 20 MG: 20 TABLET, FILM COATED ORAL at 08:44

## 2019-03-16 RX ADMIN — MOMETASONE FUROATE AND FORMOTEROL FUMARATE DIHYDRATE 2 PUFF: 200; 5 AEROSOL RESPIRATORY (INHALATION) at 08:41

## 2019-03-16 RX ADMIN — IPRATROPIUM BROMIDE 0.5 MG: 0.5 SOLUTION RESPIRATORY (INHALATION) at 19:54

## 2019-03-16 RX ADMIN — LEVALBUTEROL HYDROCHLORIDE 1.25 MG: 1.25 SOLUTION RESPIRATORY (INHALATION) at 15:31

## 2019-03-16 RX ADMIN — METHYLPREDNISOLONE SODIUM SUCCINATE 40 MG: 40 INJECTION, POWDER, FOR SOLUTION INTRAMUSCULAR; INTRAVENOUS at 12:31

## 2019-03-16 RX ADMIN — IPRATROPIUM BROMIDE 0.5 MG: 0.5 SOLUTION RESPIRATORY (INHALATION) at 11:35

## 2019-03-16 RX ADMIN — ASPIRIN 81 MG: 81 TABLET, CHEWABLE ORAL at 08:44

## 2019-03-16 RX ADMIN — GUAIFENESIN 600 MG: 600 TABLET, EXTENDED RELEASE ORAL at 20:28

## 2019-03-16 RX ADMIN — LEVALBUTEROL HYDROCHLORIDE 1.25 MG: 1.25 SOLUTION RESPIRATORY (INHALATION) at 08:41

## 2019-03-16 RX ADMIN — IPRATROPIUM BROMIDE 0.5 MG: 0.5 SOLUTION RESPIRATORY (INHALATION) at 08:41

## 2019-03-16 RX ADMIN — IPRATROPIUM BROMIDE 0.5 MG: 0.5 SOLUTION RESPIRATORY (INHALATION) at 15:31

## 2019-03-16 RX ADMIN — ENOXAPARIN SODIUM 40 MG: 40 INJECTION SUBCUTANEOUS at 08:43

## 2019-03-16 RX ADMIN — Medication 10 ML: at 08:44

## 2019-03-16 RX ADMIN — INSULIN GLARGINE 20 UNITS: 100 INJECTION, SOLUTION SUBCUTANEOUS at 21:29

## 2019-03-16 RX ADMIN — ALBUTEROL SULFATE 2.5 MG: 2.5 SOLUTION RESPIRATORY (INHALATION) at 00:28

## 2019-03-16 RX ADMIN — TAMSULOSIN HYDROCHLORIDE 0.4 MG: 0.4 CAPSULE ORAL at 08:44

## 2019-03-16 RX ADMIN — MOMETASONE FUROATE AND FORMOTEROL FUMARATE DIHYDRATE 2 PUFF: 200; 5 AEROSOL RESPIRATORY (INHALATION) at 19:57

## 2019-03-16 RX ADMIN — Medication 10 ML: at 20:29

## 2019-03-16 RX ADMIN — FAMOTIDINE 20 MG: 20 TABLET, FILM COATED ORAL at 20:28

## 2019-03-16 RX ADMIN — INSULIN GLARGINE 20 UNITS: 100 INJECTION, SOLUTION SUBCUTANEOUS at 08:45

## 2019-03-16 RX ADMIN — INSULIN LISPRO 1 UNITS: 100 INJECTION, SOLUTION INTRAVENOUS; SUBCUTANEOUS at 20:29

## 2019-03-16 NOTE — PROGRESS NOTES
PULMONARY PROGRESS NOTE      Patient:  Wilian Son  YOB: 1940    MRN: [id number]     Acct: [de-identified]     Admit date: 3/7/2019    REASON FOR CONSULT:- Acute on chronic hypoxemic respiratory failure secondary to pulmonary fibrosis due to asbestosis along with COPD with associated pulmonary hypertension    Pt seen and Chart reviewed. Patient on 35% high flow oxygen. Has a dry cough. No fever. Shortness of breath is slowly improving. No chest pain or pressure. No orthopnea or PND    Subjective:   Review of Systems -   General ROS: Completed and except as mentioned above were negative   Psychological ROS:  Completed and except as mentioned above were negative  Allergy and Immunology ROS:  Completed and except as mentioned above were negative  Hematological and Lymphatic ROS:  Completed and except as mentioned above were negative  Respiratory ROS:  Completed and except as mentioned above were negative  Cardiovascular ROS:  Completed and except as mentioned above were negative  Gastrointestinal ROS: Completed and except as mentioned above were negative  Genito-Urinary ROS:  Completed and except as mentioned above were negative  Musculoskeletal ROS:  Completed and except as mentioned above were negative  Neurological ROS:  Completed and except as mentioned above were negative  Dermatological ROS:  Completed and except as mentioned above were negative      Diet:  DIET CARB CONTROL;   Dietary Nutrition Supplements: Diabetic Oral Supplement      Medications:Current Inpatient    Scheduled Meds:   methylPREDNISolone  40 mg Intravenous Q12H    insulin glargine  40 Units Subcutaneous Nightly    insulin glargine  20 Units Subcutaneous Daily with breakfast    aspirin  81 mg Oral Daily    mometasone-formoterol  2 puff Inhalation BID    escitalopram  10 mg Oral Daily    famotidine  20 mg Oral BID    tamsulosin  0.4 mg Oral Daily  levalbuterol  1.25 mg Nebulization Q4H WA    ipratropium  0.5 mg Nebulization Q4H WA    bisacodyl  10 mg Rectal Once    enoxaparin  40 mg Subcutaneous Daily    sodium chloride flush  10 mL Intravenous 2 times per day    insulin lispro  0-12 Units Subcutaneous TID WC    insulin lispro  0-6 Units Subcutaneous Nightly     Continuous Infusions:   dextrose      dextrose       PRN Meds:acetaminophen, sodium chloride, acetaminophen, albuterol, magnesium hydroxide, nicotine, ondansetron, sodium chloride flush, dextrose, dextrose, glucagon (rDNA), glucose, glucose, dextrose, glucagon (rDNA), dextrose    Objective:      Physical Exam:  Vitals: /69   Pulse 87   Temp 98 °F (36.7 °C) (Oral)   Resp 28   Ht 5' 8.9\" (1.75 m)   Wt 156 lb 1.4 oz (70.8 kg)   SpO2 95%   BMI 23.12 kg/m²   24 hour intake/output:    Intake/Output Summary (Last 24 hours) at 3/16/2019 1028  Last data filed at 3/16/2019 0557  Gross per 24 hour   Intake 1865.87 ml   Output 2250 ml   Net -384.13 ml     Last 3 weights: Wt Readings from Last 3 Encounters:   03/14/19 156 lb 1.4 oz (70.8 kg)   03/07/19 155 lb (70.3 kg)   01/30/19 155 lb 8 oz (70.5 kg)         Physical Examination:   PHYSICAL EXAMINATION:  Vitals:    03/16/19 0330 03/16/19 0841 03/16/19 0842 03/16/19 0900   BP: 118/65   120/69   Pulse: 98   87   Resp: 19 27 19 28   Temp: 97.9 °F (36.6 °C)   98 °F (36.7 °C)   TempSrc: Oral   Oral   SpO2: 96% 92% (!) 89% 95%   Weight:       Height:         Constitutional: This is a well developed, well nourished, 18.5-24.9 - Normal 70y.o. year old male who is alert, oriented, cooperative and in no apparent distress. Head:normocephalic and atraumatic. EENT:  TANG. No conjunctival injections. Septum was midline, mucosa was without erythema, exudates or cobblestoning. No thrush was noted. Mallampati  II (soft palate, uvula, fauces visible)  Neck: Supple without thyromegaly. No elevated JVP. Trachea was midline.   Respiratory: Chest was symmetrical without dullness to percussion. Breath sounds bilaterally were clear to auscultation. There were no wheezes, rhonchi , but has bilateral rales. There is no intercostal retraction or use of accessory muscles. No egophony noted. Cardiovascular: Regular without murmur, clicks, gallops or rubs. Abdomen: Slightly rounded and soft without organomegaly. No rebound tenderness, rigidity or guarding was appreciated. Lymphatic: No lymphadenopathy. Musculoskeletal: Normal curvature of the spine. No gross muscle weakness. Extremities:  No lower extremity edema, ulcerations, tenderness, varicosities or erythema. Muscle size, tone and strength are normal.  No involuntary movements are noted. Skin:  Warm and dry. Good color, turgor and pigmentation. No lesions or scars. No cyanosis or clubbing  Neurological/Psychiatric: The patient's general behavior, level of consciousness, thought content and emotional status is normal.          CBC: No results for input(s): WBC, HGB, PLT in the last 72 hours. BMP:    Recent Labs     03/14/19  1118 03/15/19  0634 03/16/19  0630   * 138 135   K 4.0 4.3 4.4   CL 93* 98 97*   CO2 29 29 29   BUN 20 24* 22   CREATININE 0.69* 0.68* 0.54*   GLUCOSE 178* 56* 136*     Calcium:  Recent Labs     03/16/19  0630   CALCIUM 8.5*     Ionized Calcium:No results for input(s): IONCA in the last 72 hours. Magnesium:No results for input(s): MG in the last 72 hours. Phosphorus:No results for input(s): PHOS in the last 72 hours. BNP:No results for input(s): BNP in the last 72 hours. Glucose:  Recent Labs     03/15/19  2055 03/15/19  2108 03/16/19  0842   POCGLU 260* 247* 105     HgbA1C: No results for input(s): LABA1C in the last 72 hours. INR: No results for input(s): INR in the last 72 hours. Hepatic: No results for input(s): ALKPHOS, ALT, AST, PROT, BILITOT, BILIDIR, LABALBU in the last 72 hours.   Amylase and Lipase:No results for input(s): LACTA, AMYLASE in the last 72 hours. Lactic Acid: No results for input(s): LACTA in the last 72 hours. CARDIAC ENZYMES:No results for input(s): CKTOTAL, CKMB, CKMBINDEX, TROPONINI in the last 72 hours. BNP: No results for input(s): BNP in the last 72 hours. Lipids: No results for input(s): CHOL, TRIG, HDL, LDLCALC in the last 72 hours. Invalid input(s): LDL  ABGs: No results found for: PH, PCO2, PO2, HCO3, O2SAT  Thyroid:   Lab Results   Component Value Date    TSH 2.94 02/20/2019      Urinalysis: No results for input(s): BACTERIA, BLOODU, CLARITYU, COLORU, PHUR, PROTEINU, RBCUA, SPECGRAV, BILIRUBINUR, NITRU, WBCUA, LEUKOCYTESUR, GLUCOSEU in the last 72 hours. CULTURES:          Assessment:    Principal Problem:    Acute respiratory failure with hypoxia (HCC)  Active Problems:    Type 2 diabetes mellitus without complication, with long-term current use of insulin (HCC)    Hyperlipidemia    Pulmonary artery hypertension (HCC)    Iron deficiency anemia    COPD exacerbation (HCC)    Acute on chronic respiratory failure with hypoxemia (HCC)    Pneumonia due to infectious organism    Atrial flutter ([facility]  Resolved Problems:    * No resolved hospital problems. *      Plan:  Meds reviewed- changes made as appropriate. Continue corticosteroids and bronchodilators. Patient is on Lovenox  Increase activity as permitted and tolerated. Questions patient had were answered to his satisfaction. Continue supplemental oxygen. Continue effort to cut back on oxygen  Patient was informed of the need for using oxygen. Patient was educated on the importance of compliance and the benefits of oxygen use. Complications of oxygen use, including dryness of the nostrils, epistaxis and also the fire hazard were explained to the patient. Patient willingly accepts to use  the oxygen as recommended  Cxr reviewed. None today  Diet reviewed  Thank you for having us involved in the care of your patient.  Please call us if you have any questions or concerns.     Brittani Kong MD      3/16/2019, 10:28 AM    Pulmonary & Critical Care

## 2019-03-16 NOTE — PROGRESS NOTES
will be continued for 5 days total.  Pulmonology evaluated the patient and hospice was consulted. \"     Review of Systems:     Constitutional:  negative for chills, fevers, sweats  Respiratory:  +ve for cough, dyspnea on exertion, shortness of breath, wheezing  Cardiovascular:  negative for chest pain, chest pressure/discomfort, lower extremity edema, palpitations  Gastrointestinal:  negative for abdominal pain, constipation, diarrhea, nausea, vomiting  Neurological:  negative for dizziness, headache    Medications: Allergies:  No Known Allergies    Current Meds:   Scheduled Meds:    methylPREDNISolone  40 mg Intravenous Q12H    insulin glargine  40 Units Subcutaneous Nightly    insulin glargine  20 Units Subcutaneous Daily with breakfast    aspirin  81 mg Oral Daily    mometasone-formoterol  2 puff Inhalation BID    escitalopram  10 mg Oral Daily    famotidine  20 mg Oral BID    tamsulosin  0.4 mg Oral Daily    levalbuterol  1.25 mg Nebulization Q4H WA    ipratropium  0.5 mg Nebulization Q4H WA    bisacodyl  10 mg Rectal Once    enoxaparin  40 mg Subcutaneous Daily    sodium chloride flush  10 mL Intravenous 2 times per day    insulin lispro  0-12 Units Subcutaneous TID WC    insulin lispro  0-6 Units Subcutaneous Nightly     Continuous Infusions:    dextrose      dextrose       PRN Meds: acetaminophen, sodium chloride, acetaminophen, albuterol, magnesium hydroxide, nicotine, ondansetron, sodium chloride flush, dextrose, dextrose, glucagon (rDNA), glucose, glucose, dextrose, glucagon (rDNA), dextrose    Data:     Past Medical History:   has a past medical history of Anemia, Anxiety, Bronchitis, COPD (chronic obstructive pulmonary disease) ([facility] [address] Diabetes ([facility] [address] Former smoker, Hyperlipidemia, Oxygen dependent, Respiratory distress, and [facility] Social History:   reports that he has quit smoking. His smoking use included cigarettes.  He has never used smokeless tobacco. He bilaterally, normal effort  Heart:  regular rate and rhythm, no murmur  Abdomen:  soft, nontender, nondistended, normal bowel sounds, no masses, hepatomegaly, splenomegaly  Extremities:  no edema, redness, tenderness in the calves  Skin:  no gross lesions, rashes, induration    Assessment:        Primary Problem  Acute respiratory failure with hypoxia [facility])    Active Hospital Problems    Diagnosis Date Noted    Atrial flutter ([address] [I48.92] 03/08/2019    Acute respiratory failure with hypoxia ([address] [J96.01] 01/23/2019    Pneumonia due to infectious organism [J18.9] 12/16/2018    COPD exacerbation ([address] [J44.1] 11/27/2018    Acute on chronic respiratory failure with hypoxemia ([address] [J96.21] 11/27/2018    Pulmonary artery hypertension (HCC) [I27.21] 05/23/2018    Iron deficiency anemia [D50.9] 05/23/2018    Type 2 diabetes mellitus without complication, with long-term current use of insulin ([address] [E11.9, Z79.4] 10/30/2014    Hyperlipidemia [E78.5] 10/30/2014       Plan:        Finished 5 days course of Zosyn. Still on IV Solu-Medrol, recommend to continue IV for today if okay with pulmonology. Continue nasal treatment. Continue to wean high flow as tolerable. Patient to choose SNF facility  Patient denied hospice/palliative care.   He is aware of the poor prognosis  DC planning      Stacie Minor MD  3/16/2019  8:32 AM

## 2019-03-16 NOTE — PROGRESS NOTES
Angelito Cruz PA-C   aspirin 81 MG tablet Take 81 mg by mouth daily    Historical Provider, MD   escitalopram (LEXAPRO) 10 MG tablet Take 1 tablet by mouth daily 11/10/15   Dominique Jensen PA-C   ONE TOUCH ULTRA TEST strip STRIP MISC ONCE A DAY 7/1/15   Dominique Jensen PA-C   .   Recent Surgical History: None = 0     Assessment     Peak Flow (asthma only)    Predicted: na  Personal Best: na  PEF na  % Predicted na  Peak Flow : not applicable = 0    DRQ4/UYS    FEV1 Predicted na      FEV1 na    FEV1 % Predicted na  FVC na  IS volume na  IBW na    RR 21  Breath Sounds: expiratory wheezing/Rhochi       · Bronchodilator assessment at level 3  · Hyperinflation assessment at level   · Secretion Management assessment at level    ·   · []    Bronchodilator Assessment  BRONCHODILATOR ASSESSMENT SCORE  Score 0 1 2 3 4 5   Breath Sounds   []  Patient Baseline []  No Wheeze good aeration []  Faint, scattered wheezing, good aeration [x]  Expiratory Wheezing and or moderately diminished []  Insp/Exp wheeze and/or very diminished []  Insp/Exp and/ or marked distress   Respiratory Rate   []  Patient Baseline []  Less than 20 []  Less than 20 [x]  20-25 []  Greater than 25 []  Greater than 25   Peak flow % of Pred or PB [x]  NA   []  Greater than 90%  []  81-90% []  71-80% []  Less than or equal to 70%  or unable to perform []  Unable due to Respiratory Distress   Dyspnea re []  Patient Baseline []  No SOB []  No SOB [x]  SOB on exertion []  SOB min activity []  At rest/acute   e FEV% Predicted       []  NA []  Above 69%  []  Unable []  Above 60-69%  []  Unable []  Above 50-59%  []  Unable []  Above 35-49%  []  Unable []  Less than 35%  []  Unable                 []  Hyperinflation Assessment  Score 1 2 3   CXR and Breath Sounds   []  Clear []  No atelectasis  Basilar aeration []  Atelectasis or absent basilar breath sounds   Incentive Spirometry Volume  (Per IBW)   []  Greater than or equal to 15ml/Kg []  less than 15ml/Kg []  less than 15ml/Kg   Surgery within last 2 weeks []  None or general   []  Abdominal or thoracic surgery  []  Abdominal or thoracic   Chronic Pulmonary Historyre []  No []  Yes []  Yes     []  Secretion Management Assessment  Score 1 2 3   Bilateral Breath Sounds   []  Occasional Rhonchi []  Scattered Rhonchi []  Course Rhonchi and/or poor aeration   Sputum    []  Small amount of thin secretions []  Moderate amount of viscous secretions []  Copius, Viscious Yellow/ Secretions   CXR as reported by physician []  clear  []  Unavailable []  Infiltrates and/or consolidation  []  Unavailable []  Mucus Plugging and or lobar consolidation  []  Unavailable   Cough []  Strong, productive cough []  Weak productive cough []  No cough or weak non-productive cough   Yue Amaral  11:41 AM                            FEMALE                                  MALE                            FEV1 Predicted Normal Values                        FEV1 Predicted Normal Values          Age                                     Height in Feet and Inches       Age                                     Height in Feet and Inches       4' 11\" 5' 1\" 5' 3\" 5' 5\" 5' 7\" 5' 9\" 5' 11\" 6' 1\"  4' 11\" 5' 1\" 5' 3\" 5' 5\" 5' 7\" 5' 9\" 5' 11\" 6' 1\"   42 - 45 2.49 2.66 2.84 3.03 3.22 3.42 3.62 3.83 42 - 45 2.82 3.03 3.26 3.49 3.72 3.96 4.22 4.47   46 - 49 2.40 2.57 2.76 2.94 3.14 3.33 3.54 3.75 46 - 49 2.70 2.92 3.14 3.37 3.61 3.85 4.10 4.36   50 - 53 2.31 2.48 2.66 2.85 3.04 3.24 3.45 3.66 50 - 53 2.58 2.80 3.02 3.25 3.49 3.73 3.98 4.24   54 - 57 2.21 2.38 2.57 2.75 2.95 3.14 3.35 3.56 54 - 57 2.46 2.67 2.89 3.12 3.36 3.60 3.85 4.11   58 - 61 2.10 2.28 2.46 2.65 2.84 3.04 3.24 3.45 58 - 61 2.32 2.54 2.76 2.99 3.23 3.47 3.72 3.98   62 - 65 1.99 2.17 2.35 2.54 2.73 2.93 3.13 3.34 62 - 65 2.19 2.40 2.62 2.85 3.09 3.33 3.58 3.84   66 - 69 1.88 2.05 2.23 2.42 2.61 2.81 3.02 3.23 66 - 69 2.04 2.26 2.48 2.71 2.95 3.19 3.44 3.70   70+ 1.82 1.99 2.17 2.36 2.55 2.75 2.95 3.16 70+ 1.97 2.19 2.41 2.64 2.87 3.12 3.37 3.62             Predicted Peak Expiratory Flow Rate                                       Height (in)  Female       Height (in) Male           Age 64 63 56 61 58 73 78 74 Age            21 344 357 372 387 402 417 432 446  60 62 64 66 68 70 72 74 76   25 337 352 366 381 396 411 426 441 25 447 476 505 533 562 591 619 648 677   30 329 344 359 374 389 404 419 434 30 437 466 494 523 552 580 609 638 667   35 322 337 351 366 381 396 411 426 35 426 455 484 512 541 570 598 627 657   40 314 329 344 359 374 389 404 419 40 416 445 473 502 531 559 588 617 647   45 307 322 336 351 366 381 396 411 45 405 434 463 491 520 549 577 606 636   50 299 314 329 344 359 374 389 404 50 395 424 452 481 510 538 567 596 625   55 292 307 321 336 351 366 381 396 55 384 413 442 470 499 528 556 585 615   60 284 299 314 329 344 359 374 389 60 374 403 431 460 489 517 546 575 605   65 277 292 306 321 336 351 366 381 65 363 392 421 449 478 507 535 564 594   70 269 284 299 314 329 344 359 374 70 353 382 410 439 468 496 525 554 583   75 261 274 289 305 319 334 348 364 75 344 372 400 429 458 487 515 544 573   80 253 266 282 296 312 327 342 356 80 335 362 390 419 448 476 505 534 562

## 2019-03-16 NOTE — PLAN OF CARE
Problem: Falls - Risk of:  Goal: Will remain free from falls  Description  Will remain free from falls  3/16/2019 0149 by Jay Fay RN  Outcome: Ongoing   Pt remains free of falls at this time. Bed locked in lowest position, siderails x2, call light in reach. Non-skid footwear applied. Pt ambulates in room with steady gait. Encouraged pt to call for assistance as needed for safety. Falling star posted outside of room. Will continue to monitor.   Electronically signed by Jay Fay RN on 3/16/2019 at 1:49 AM

## 2019-03-17 LAB
ANION GAP SERPL CALCULATED.3IONS-SCNC: 12 MMOL/L (ref 9–17)
BUN BLDV-MCNC: 23 MG/DL (ref 8–23)
BUN/CREAT BLD: ABNORMAL (ref 9–20)
CALCIUM SERPL-MCNC: 8.9 MG/DL (ref 8.6–10.4)
CHLORIDE BLD-SCNC: 96 MMOL/L (ref 98–107)
CO2: 29 MMOL/L (ref 20–31)
CREAT SERPL-MCNC: 0.62 MG/DL (ref 0.7–1.2)
GFR AFRICAN AMERICAN: >60 ML/MIN
GFR NON-AFRICAN AMERICAN: >60 ML/MIN
GFR SERPL CREATININE-BSD FRML MDRD: ABNORMAL ML/MIN/{1.73_M2}
GFR SERPL CREATININE-BSD FRML MDRD: ABNORMAL ML/MIN/{1.73_M2}
GLUCOSE BLD-MCNC: 112 MG/DL (ref 75–110)
GLUCOSE BLD-MCNC: 148 MG/DL (ref 70–99)
GLUCOSE BLD-MCNC: 165 MG/DL (ref 75–110)
GLUCOSE BLD-MCNC: 167 MG/DL (ref 75–110)
GLUCOSE BLD-MCNC: 196 MG/DL (ref 75–110)
GLUCOSE BLD-MCNC: 354 MG/DL (ref 75–110)
GLUCOSE BLD-MCNC: 49 MG/DL (ref 75–110)
GLUCOSE BLD-MCNC: 58 MG/DL (ref 75–110)
GLUCOSE BLD-MCNC: 76 MG/DL (ref 75–110)
POTASSIUM SERPL-SCNC: 5.9 MMOL/L (ref 3.7–5.3)
SODIUM BLD-SCNC: 137 MMOL/L (ref 135–144)

## 2019-03-17 PROCEDURE — 6370000000 HC RX 637 (ALT 250 FOR IP): Performed by: NURSE PRACTITIONER

## 2019-03-17 PROCEDURE — 6370000000 HC RX 637 (ALT 250 FOR IP): Performed by: INTERNAL MEDICINE

## 2019-03-17 PROCEDURE — 6360000002 HC RX W HCPCS: Performed by: INTERNAL MEDICINE

## 2019-03-17 PROCEDURE — 99232 SBSQ HOSP IP/OBS MODERATE 35: CPT | Performed by: FAMILY MEDICINE

## 2019-03-17 PROCEDURE — 99232 SBSQ HOSP IP/OBS MODERATE 35: CPT | Performed by: INTERNAL MEDICINE

## 2019-03-17 PROCEDURE — 6370000000 HC RX 637 (ALT 250 FOR IP): Performed by: FAMILY MEDICINE

## 2019-03-17 PROCEDURE — 2580000003 HC RX 258: Performed by: NURSE PRACTITIONER

## 2019-03-17 PROCEDURE — 2700000000 HC OXYGEN THERAPY PER DAY

## 2019-03-17 PROCEDURE — 80048 BASIC METABOLIC PNL TOTAL CA: CPT

## 2019-03-17 PROCEDURE — 82947 ASSAY GLUCOSE BLOOD QUANT: CPT

## 2019-03-17 PROCEDURE — 6360000002 HC RX W HCPCS: Performed by: NURSE PRACTITIONER

## 2019-03-17 PROCEDURE — 94762 N-INVAS EAR/PLS OXIMTRY CONT: CPT

## 2019-03-17 PROCEDURE — 2060000000 HC ICU INTERMEDIATE R&B

## 2019-03-17 PROCEDURE — 94640 AIRWAY INHALATION TREATMENT: CPT

## 2019-03-17 PROCEDURE — 36415 COLL VENOUS BLD VENIPUNCTURE: CPT

## 2019-03-17 RX ORDER — PREDNISONE 10 MG/1
10 TABLET ORAL DAILY
Status: DISCONTINUED | OUTPATIENT
Start: 2019-03-26 | End: 2019-03-21

## 2019-03-17 RX ORDER — PREDNISONE 20 MG/1
40 TABLET ORAL DAILY
Status: COMPLETED | OUTPATIENT
Start: 2019-03-17 | End: 2019-03-19

## 2019-03-17 RX ORDER — PREDNISONE 20 MG/1
20 TABLET ORAL DAILY
Status: DISCONTINUED | OUTPATIENT
Start: 2019-03-23 | End: 2019-03-21

## 2019-03-17 RX ORDER — INSULIN GLARGINE 100 [IU]/ML
10 INJECTION, SOLUTION SUBCUTANEOUS NIGHTLY
Status: DISCONTINUED | OUTPATIENT
Start: 2019-03-17 | End: 2019-03-20

## 2019-03-17 RX ADMIN — INSULIN LISPRO 10 UNITS: 100 INJECTION, SOLUTION INTRAVENOUS; SUBCUTANEOUS at 13:31

## 2019-03-17 RX ADMIN — LEVALBUTEROL HYDROCHLORIDE 1.25 MG: 1.25 SOLUTION RESPIRATORY (INHALATION) at 08:05

## 2019-03-17 RX ADMIN — LEVALBUTEROL HYDROCHLORIDE 1.25 MG: 1.25 SOLUTION RESPIRATORY (INHALATION) at 15:54

## 2019-03-17 RX ADMIN — Medication 10 ML: at 20:35

## 2019-03-17 RX ADMIN — INSULIN GLARGINE 20 UNITS: 100 INJECTION, SOLUTION SUBCUTANEOUS at 11:11

## 2019-03-17 RX ADMIN — MOMETASONE FUROATE AND FORMOTEROL FUMARATE DIHYDRATE 2 PUFF: 200; 5 AEROSOL RESPIRATORY (INHALATION) at 19:42

## 2019-03-17 RX ADMIN — ESCITALOPRAM OXALATE 10 MG: 10 TABLET ORAL at 09:18

## 2019-03-17 RX ADMIN — IPRATROPIUM BROMIDE 0.5 MG: 0.5 SOLUTION RESPIRATORY (INHALATION) at 15:54

## 2019-03-17 RX ADMIN — METHYLPREDNISOLONE SODIUM SUCCINATE 40 MG: 40 INJECTION, POWDER, FOR SOLUTION INTRAMUSCULAR; INTRAVENOUS at 01:00

## 2019-03-17 RX ADMIN — FAMOTIDINE 20 MG: 20 TABLET, FILM COATED ORAL at 09:18

## 2019-03-17 RX ADMIN — IPRATROPIUM BROMIDE 0.5 MG: 0.5 SOLUTION RESPIRATORY (INHALATION) at 08:05

## 2019-03-17 RX ADMIN — GUAIFENESIN 600 MG: 600 TABLET, EXTENDED RELEASE ORAL at 09:18

## 2019-03-17 RX ADMIN — TAMSULOSIN HYDROCHLORIDE 0.4 MG: 0.4 CAPSULE ORAL at 09:18

## 2019-03-17 RX ADMIN — ASPIRIN 81 MG: 81 TABLET, CHEWABLE ORAL at 09:18

## 2019-03-17 RX ADMIN — LEVALBUTEROL HYDROCHLORIDE 1.25 MG: 1.25 SOLUTION RESPIRATORY (INHALATION) at 11:59

## 2019-03-17 RX ADMIN — INSULIN LISPRO 1 UNITS: 100 INJECTION, SOLUTION INTRAVENOUS; SUBCUTANEOUS at 20:34

## 2019-03-17 RX ADMIN — FAMOTIDINE 20 MG: 20 TABLET, FILM COATED ORAL at 20:34

## 2019-03-17 RX ADMIN — Medication 10 ML: at 09:19

## 2019-03-17 RX ADMIN — IPRATROPIUM BROMIDE 0.5 MG: 0.5 SOLUTION RESPIRATORY (INHALATION) at 11:59

## 2019-03-17 RX ADMIN — MOMETASONE FUROATE AND FORMOTEROL FUMARATE DIHYDRATE 2 PUFF: 200; 5 AEROSOL RESPIRATORY (INHALATION) at 08:05

## 2019-03-17 RX ADMIN — INSULIN LISPRO 2 UNITS: 100 INJECTION, SOLUTION INTRAVENOUS; SUBCUTANEOUS at 17:35

## 2019-03-17 RX ADMIN — ENOXAPARIN SODIUM 40 MG: 40 INJECTION SUBCUTANEOUS at 09:18

## 2019-03-17 RX ADMIN — LEVALBUTEROL HYDROCHLORIDE 1.25 MG: 1.25 SOLUTION RESPIRATORY (INHALATION) at 19:42

## 2019-03-17 RX ADMIN — GUAIFENESIN 600 MG: 600 TABLET, EXTENDED RELEASE ORAL at 20:35

## 2019-03-17 RX ADMIN — IPRATROPIUM BROMIDE 0.5 MG: 0.5 SOLUTION RESPIRATORY (INHALATION) at 19:42

## 2019-03-17 RX ADMIN — PREDNISONE 40 MG: 20 TABLET ORAL at 13:35

## 2019-03-17 RX ADMIN — INSULIN GLARGINE 10 UNITS: 100 INJECTION, SOLUTION SUBCUTANEOUS at 20:33

## 2019-03-17 NOTE — PROGRESS NOTES
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 Patient on 35% high flow oxygen. Has a dry cough. No fever. Shortness of breath is slowly improving. No chest pain or pressure. No orthopnea or PND    Subjective:   Review of Systems -   General ROS: Completed and except as mentioned above were negative   Psychological ROS:  Completed and except as mentioned above were negative  Allergy and Immunology ROS:  Completed and except as mentioned above were negative  Hematological and Lymphatic ROS:  Completed and except as mentioned above were negative  Respiratory ROS:  Completed and except as mentioned above were negative  Cardiovascular ROS:  Completed and except as mentioned above were negative  Gastrointestinal ROS: Completed and except as mentioned above were negative  Genito-Urinary ROS:  Completed and except as mentioned above were negative  Musculoskeletal ROS:  Completed and except as mentioned above were negative  Neurological ROS:  Completed and except as mentioned above were negative  Dermatological ROS:  Completed and except as mentioned above were negative      Diet:  DIET CARB CONTROL;   Dietary Nutrition Supplements: Diabetic Oral Supplement      Medications:Current Inpatient    Scheduled Meds:   insulin glargine  10 Units Subcutaneous Nightly    predniSONE  40 mg Oral Daily    Followed by   Pastor Ayala ON 3/20/2019] predniSONE  30 mg Oral Daily    Followed by   aPstor Ayala ON 3/23/2019] predniSONE  20 mg Oral Daily    Followed by   Pastor Ayala ON 3/26/2019] predniSONE  10 mg Oral Daily    guaiFENesin  600 mg Oral BID    insulin glargine  20 Units Subcutaneous Daily with breakfast    aspirin  81 mg Oral Daily    mometasone-formoterol  2 puff Inhalation BID    escitalopram  10 mg Oral Daily    famotidine  20 mg Oral BID    tamsulosin  0.4 mg Oral Daily    levalbuterol  1.25 mg Nebulization Q4H WA    ipratropium  0.5 mg Nebulization Q4H WA    bisacodyl  10 mg Rectal Once    enoxaparin  40 mg Subcutaneous Daily    sodium chloride flush  10 mL Intravenous 2 times per day    insulin lispro  0-12 Units Subcutaneous TID WC    insulin lispro  0-6 Units Subcutaneous Nightly     Continuous Infusions:   dextrose      dextrose       PRN Meds:acetaminophen, sodium chloride, acetaminophen, albuterol, magnesium hydroxide, nicotine, ondansetron, sodium chloride flush, dextrose, dextrose, glucagon (rDNA), glucose, glucose, dextrose, glucagon (rDNA), dextrose    Objective:      Physical Exam:  Vitals: /68   Pulse 103   Temp 98.5 °F (36.9 °C) (Oral)   Resp 23   Ht 5' 8.9\" (1.75 m)   Wt 156 lb 1.4 oz (70.8 kg)   SpO2 96%   BMI 23.12 kg/m²   24 hour intake/output:    Intake/Output Summary (Last 24 hours) at 3/17/2019 1704  Last data filed at 3/17/2019 0527  Gross per 24 hour   Intake 1800 ml   Output 2475 ml   Net -675 ml     Last 3 weights: Wt Readings from Last 3 Encounters:   03/14/19 156 lb 1.4 oz (70.8 kg)   03/07/19 155 lb (70.3 kg)   01/30/19 155 lb 8 oz (70.5 kg)         Physical Examination:   PHYSICAL EXAMINATION:  Vitals:    03/17/19 1150 03/17/19 1159 03/17/19 1510 03/17/19 1556   BP: 132/67  123/68    Pulse: 93  103    Resp: 19 23 26 23   Temp: 98.2 °F (36.8 °C)  98.5 °F (36.9 °C)    TempSrc: Oral  Oral    SpO2: 93% 93% 95% 96%   Weight:       Height:         Constitutional: This is a well developed, well nourished, 18.5-24.9 - Normal 70y.o. year old male who is alert, oriented, cooperative and in no apparent distress. Head:normocephalic and atraumatic. EENT:  TANG. No conjunctival injections.  Septum was midline, mucosa was without erythema, exudates or cobblestoning. No thrush was noted. Mallampati  II (soft palate, uvula, fauces visible)  Neck: Supple without thyromegaly. No elevated JVP. Trachea was midline. Respiratory: Chest was symmetrical without dullness to percussion. Breath sounds bilaterally were clear to auscultation. There were no wheezes, rhonchi , but has bilateral rales. There is no intercostal retraction or use of accessory muscles. No egophony noted. Cardiovascular: Regular without murmur, clicks, gallops or rubs. Abdomen: Slightly rounded and soft without organomegaly. No rebound tenderness, rigidity or guarding was appreciated. Lymphatic: No lymphadenopathy. Musculoskeletal: Normal curvature of the spine. No gross muscle weakness. Extremities:  No lower extremity edema, ulcerations, tenderness, varicosities or erythema. Muscle size, tone and strength are normal.  No involuntary movements are noted. Skin:  Warm and dry. Good color, turgor and pigmentation. No lesions or scars. No cyanosis or clubbing  Neurological/Psychiatric: The patient's general behavior, level of consciousness, thought content and emotional status is normal.          CBC: No results for input(s): WBC, HGB, PLT in the last 72 hours. BMP:    Recent Labs     03/15/19  0634 03/16/19  0630 03/17/19  0650    135 137   K 4.3 4.4 5.9*   CL 98 97* 96*   CO2 29 29 29   BUN 24* 22 23   CREATININE 0.68* 0.54* 0.62*   GLUCOSE 56* 136* 148*     Calcium:  Recent Labs     03/17/19  0650   CALCIUM 8.9     Ionized Calcium:No results for input(s): IONCA in the last 72 hours. Magnesium:No results for input(s): MG in the last 72 hours. Phosphorus:No results for input(s): PHOS in the last 72 hours. BNP:No results for input(s): BNP in the last 72 hours. Glucose:  Recent Labs     03/17/19  0639 03/17/19  1226 03/17/19  1509   POCGLU 165* 354* 196*     HgbA1C: No results for input(s): LABA1C in the last 72 hours.   INR: No results for input(s): INR in the last 72 hours. Hepatic: No results for input(s): ALKPHOS, ALT, AST, PROT, BILITOT, BILIDIR, LABALBU in the last 72 hours. Amylase and Lipase:No results for input(s): LACTA, AMYLASE in the last 72 hours. Lactic Acid: No results for input(s): LACTA in the last 72 hours. CARDIAC ENZYMES:No results for input(s): CKTOTAL, CKMB, CKMBINDEX, TROPONINI in the last 72 hours. BNP: No results for input(s): BNP in the last 72 hours. Lipids: No results for input(s): CHOL, TRIG, HDL, LDLCALC in the last 72 hours. Invalid input(s): LDL  ABGs: No results found for: PH, PCO2, PO2, HCO3, O2SAT  Thyroid:   Lab Results   Component Value Date    TSH 2.94 02/20/2019      Urinalysis: No results for input(s): BACTERIA, BLOODU, CLARITYU, COLORU, PHUR, PROTEINU, RBCUA, SPECGRAV, BILIRUBINUR, NITRU, WBCUA, LEUKOCYTESUR, GLUCOSEU in the last 72 hours. CULTURES:          Assessment:    Principal Problem:    Acute respiratory failure with hypoxia (HCC)  Active Problems:    Type 2 diabetes mellitus without complication, with long-term current use of insulin (HCC)    Hyperlipidemia    Pulmonary artery hypertension (HCC)    Iron deficiency anemia    COPD exacerbation (HCC)    Acute on chronic respiratory failure with hypoxemia (HCC)    Pneumonia due to infectious organism    Atrial flutter ([facility] [address]  Resolved Problems:    * No resolved hospital problems. *      Plan:  Meds reviewed- changes made as appropriate. Continue corticosteroids and bronchodilators. Steroids changed to prednisone  Patient is on Lovenox  Increase activity as permitted and tolerated. Questions patient had were answered to his satisfaction. Continue supplemental oxygen. Continue effort to cut back on oxygen  Patient was informed of the need for using oxygen. Patient was educated on the importance of compliance and the benefits of oxygen use.   Complications of oxygen use, including dryness of the nostrils, epistaxis and also the fire hazard were explained to the patient. Patient willingly accepts to use  the oxygen as recommended  Cxr reviewed. None today  Diet reviewed  Thank you for having us involved in the care of your patient. Please call us if you have any questions or concerns.     Howard Black MD      3/17/2019, 5:04 PM    Pulmonary & Critical Care

## 2019-03-17 NOTE — CARE COORDINATION
Transitional Planning    0930 Met with patient who gave permission for referral to [facility]. E-referral sent. Patient remain on HFNC unable to wean  Awaiting insurance approval for Life 2000 device  Patient stating \"I don't want to die, I want to fight\". Emotional support provided to patient, reassured him if at any point he decides that he wants to change his plan of care and wants to discuss options of hospice, that we are available at anytime to assist with that process. Patient at this time remains full code and wants to fight to live.

## 2019-03-17 NOTE — PROGRESS NOTES
[facility] P[facility]onia [J18.9] . Vitals:    03/17/19 0806   BP:    Pulse:    Resp: 26   Temp:    SpO2: (!) 85%   . Patients home meds are   Prior to Admission medications    Medication Sig Start Date End Date Taking?  Authorizing Provider   predniSONE (DELTASONE) 10 MG tablet 40 mg once daily for 7 days, then 30 mg OD for 7 days, then 20 mg OD for 7 days, then follow up with pulmonologist 2/1/19   Carlos Razo MD   sodium chloride (OCEAN) 0.65 % nasal spray 1 spray by Nasal route as needed for Congestion 2/1/19   Carlos Razo MD   sodium chloride (OCEAN) 0.65 % nasal spray 1 spray by Nasal route as needed for Congestion 1/29/19   Gabriele Cosby MD   ergocalciferol (ERGOCALCIFEROL) 82372 units capsule Take 1 capsule by mouth once a week 12/24/18   Nathalie Anderson MD   furosemide (LASIX) 20 MG tablet Take 1 tablet by mouth every other day 12/1/18   Brennan Aaron MD   insulin glargine (LANTUS) 100 UNIT/ML injection vial Inject 20 Units into the skin nightly 11/30/18   Brennan Aaron MD   tamsulosin (FLOMAX) 0.4 MG capsule Take 0.4 mg by mouth daily    Historical Provider, MD   famotidine (PEPCID) 20 MG tablet Take 20 mg by mouth 2 times daily    Historical Provider, MD   acetaminophen (TYLENOL) 325 MG tablet Take 650 mg by mouth every 6 hours as needed for Pain    Historical Provider, MD   aclidinium (TUDORZA PRESSAIR) 400 MCG/ACT AEPB inhaler Inhale 1 puff into the lungs 2 times daily 6/22/18 7/22/19  Estela Campoverde MD   albuterol sulfate HFA (VENTOLIN HFA) 108 (90 Base) MCG/ACT inhaler Inhale 2 puffs into the lungs every 6 hours as needed for Wheezing 6/22/18   Estela Campoverde MD   budesonide-formoterol (SYMBICORT) 80-4.5 MCG/ACT AERO Inhale 2 puffs into the lungs 2 times daily 5/25/18   Verónica Montana MD   metFORMIN ER (GLUCOPHAGE-XR) 500 MG XR tablet take 2 tablets by mouth twice a day  Patient taking differently: take 2 tablet by mouth twice a day 8/1/16 15ml/Kg []  less than 15ml/Kg   Surgery within last 2 weeks []  None or general   []  Abdominal or thoracic surgery  []  Abdominal or thoracic   Chronic Pulmonary Historyre []  No []  Yes []  Yes     []  Secretion Management Assessment  Score 1 2 3   Bilateral Breath Sounds   []  Occasional Rhonchi []  Scattered Rhonchi []  Course Rhonchi and/or poor aeration   Sputum    []  Small amount of thin secretions []  Moderate amount of viscous secretions []  Copius, Viscious Yellow/ Secretions   CXR as reported by physician []  clear  []  Unavailable []  Infiltrates and/or consolidation  []  Unavailable []  Mucus Plugging and or lobar consolidation  []  Unavailable   Cough []  Strong, productive cough []  Weak productive cough []  No cough or weak non-productive cough   Yue Amaral  8:14 AM                            FEMALE                                  MALE                            FEV1 Predicted Normal Values                        FEV1 Predicted Normal Values          Age                                     Height in Feet and Inches       Age                                     Height in Feet and Inches       4' 11\" 5' 1\" 5' 3\" 5' 5\" 5' 7\" 5' 9\" 5' 11\" 6' 1\"  4' 11\" 5' 1\" 5' 3\" 5' 5\" 5' 7\" 5' 9\" 5' 11\" 6' 1\"   42 - 45 2.49 2.66 2.84 3.03 3.22 3.42 3.62 3.83 42 - 45 2.82 3.03 3.26 3.49 3.72 3.96 4.22 4.47   46 - 49 2.40 2.57 2.76 2.94 3.14 3.33 3.54 3.75 46 - 49 2.70 2.92 3.14 3.37 3.61 3.85 4.10 4.36   50 - 53 2.31 2.48 2.66 2.85 3.04 3.24 3.45 3.66 50 - 53 2.58 2.80 3.02 3.25 3.49 3.73 3.98 4.24   54 - 57 2.21 2.38 2.57 2.75 2.95 3.14 3.35 3.56 54 - 57 2.46 2.67 2.89 3.12 3.36 3.60 3.85 4.11   58 - 61 2.10 2.28 2.46 2.65 2.84 3.04 3.24 3.45 58 - 61 2.32 2.54 2.76 2.99 3.23 3.47 3.72 3.98   62 - 65 1.99 2.17 2.35 2.54 2.73 2.93 3.13 3.34 62 - 65 2.19 2.40 2.62 2.85 3.09 3.33 3.58 3.84   66 - 69 1.88 2.05 2.23 2.42 2.61 2.81 3.02 3.23 66 - 69 2.04 2.26 2.48 2.71 2.95 3.19 3.44 3.70   70+ 1.82 1.99 2.17 2.36 2.55 2. 75 2.95 3.16 70+ 1.97 2.19 2.41 2.64 2.87 3.12 3.37 3.62             Predicted Peak Expiratory Flow Rate                                       Height (in)  Female       Height (in) Male           Age 64 63 56 61 58 73 78 74 Age            21 344 357 372 387 402 417 432 446  60 62 64 66 68 70 72 74 76   25 337 352 366 381 396 411 426 441 25 447 476 505 533 562 591 619 648 677   30 329 344 359 374 389 404 419 434 30 437 466 494 523 552 580 609 638 667   35 322 337 351 366 381 396 411 426 35 426 455 484 512 541 570 598 627 657   40 314 329 344 359 374 389 404 419 40 416 445 473 502 531 559 588 617 647   45 307 322 336 351 366 381 396 411 45 405 434 463 491 520 549 577 606 636   50 299 314 329 344 359 374 389 404 50 395 424 452 481 510 538 567 596 625   55 292 307 321 336 351 366 381 396 55 384 413 442 470 499 528 556 585 615   60 284 299 314 329 344 359 374 389 60 374 403 431 460 489 517 546 575 605   65 277 292 306 321 336 351 366 381 65 363 392 421 449 478 507 535 564 594   70 269 284 299 314 329 344 359 374 70 353 382 410 439 468 496 525 554 583   75 261 274 289 305 319 334 348 364 75 344 372 400 429 458 487 515 544 573   80 253 266 282 296 312 327 342 356 80 335 362 390 419 448 476 505 534 562

## 2019-03-17 NOTE — PLAN OF CARE
Problem: Falls - Risk of:  Goal: Will remain free from falls  Description  Will remain free from falls  3/17/2019 0155 by Moraima Bolton RN  Outcome: Ongoing   Pt remains free of falls at this time. Bed locked in lowest position, siderails x2, call light in reach. Non-skid footwear applied. Pt ambulates in room with steady gait. Encouraged pt to call for assistance as needed for safety. Falling star posted outside of room. Will continue to monitor.   Electronically signed by Moraima Bolton RN on 3/17/2019 at 1:55 AM

## 2019-03-17 NOTE — FLOWSHEET NOTE
DATE: 3/17/2019    NAME: Zheng Rodriges  MRN: [id number]   : 1940    Patient not seen this date for Physical Therapy due to:  [] Blood transfusion in progress  [] Hemodialysis  []  Patient Declined  [] Spine Precautions   [] Strict Bedrest  [] Surgery/ Procedure  [] Testing      [x] Other: O2 sats 86% at rest on HFNC        [] PT being discontinued at this time. Patient independent. No further needs. [] PT being discontinued at this time as the patient has been transferred to palliative care. No further needs.     Fili Maria, PTA

## 2019-03-17 NOTE — PROGRESS NOTES
Herber Fitzpatrick [facility]    Progress Note    3/17/2019    8:00 AM    Name:   Saba Youssef  MRN:     [id number]     Acct:      [de-identified]   Room:   [address] [place] [address] Day:  10  Admit Date:  3/7/2019 10:40 PM    PCP:   Joann Vieira PA-C  Code Status:  Full Code    Subjective:     C/C: respiratory  Distress    Interval History Status: improved. Patient seen and examined at bedside, no acute events overnight. Continue to need HFNC all the time, trials this am to wean down, currently at FIO2 35% and using 35 L  Patient vitals, labs and all providers notes were reviewed,from overnight shift and morning updates were noted and discussed with the nurse    Brief History:     Per my partner  \"This is 70years old male with history of COPD living in a nursing home currently, transferred to us from [facility] after he presented there with shortness of breath.  The patient says that he suddenly started having shortness of breath.  He did not have any chest pain.  He was having coughing with phlegm production.  He also had fevers and chills.  He was found to be in acute respiratory failure.  He was put on BiPAP and started on vancomycin and Margarita Butcher had improvement in his symptoms and he was transferred over here. This morning upon my evaluation patient was in respiratory distress, his oxygen saturations were in 70s on high flow oxygen, he was on BiPAP overnight though, patient was tachycardic.  Place the patient on BiPAP with significant improvement in his symptomatology.  Patient's saturations improved to high 90s.  His heart rate improved. Patient otherwise denies chest pain, he denies palpitations, EKG was concerning for new onset A. fib flutter.  Patient denies any other acute issues.   Patient was initially started on vancomycin and Zosyn.  Viral PCR panel positive for RSV.  Vancomycin stopped and Zosyn will be continued for 5 days total.  Pulmonology evaluated the patient and hospice was consulted. \"     Review of Systems:     Constitutional:  negative for chills, fevers, sweats  Respiratory:  +ve for cough, dyspnea on exertion, shortness of breath, wheezing  Cardiovascular:  negative for chest pain, chest pressure/discomfort, lower extremity edema, palpitations  Gastrointestinal:  negative for abdominal pain, constipation, diarrhea, nausea, vomiting  Neurological:  negative for dizziness, headache    Medications: Allergies:  No Known Allergies    Current Meds:   Scheduled Meds:    insulin glargine  10 Units Subcutaneous Nightly    guaiFENesin  600 mg Oral BID    methylPREDNISolone  40 mg Intravenous Q12H    insulin glargine  20 Units Subcutaneous Daily with breakfast    aspirin  81 mg Oral Daily    mometasone-formoterol  2 puff Inhalation BID    escitalopram  10 mg Oral Daily    famotidine  20 mg Oral BID    tamsulosin  0.4 mg Oral Daily    levalbuterol  1.25 mg Nebulization Q4H WA    ipratropium  0.5 mg Nebulization Q4H WA    bisacodyl  10 mg Rectal Once    enoxaparin  40 mg Subcutaneous Daily    sodium chloride flush  10 mL Intravenous 2 times per day    insulin lispro  0-12 Units Subcutaneous TID WC    insulin lispro  0-6 Units Subcutaneous Nightly     Continuous Infusions:    dextrose      dextrose       PRN Meds: acetaminophen, sodium chloride, acetaminophen, albuterol, magnesium hydroxide, nicotine, ondansetron, sodium chloride flush, dextrose, dextrose, glucagon (rDNA), glucose, glucose, dextrose, glucagon (rDNA), dextrose    Data:     Past Medical History:   has a past medical history of Anemia, Anxiety, Bronchitis, COPD (chronic obstructive pulmonary disease) ([facility] [address] Diabetes ([facility] [address] Former smoker, Hyperlipidemia, Oxygen dependent, Respiratory distress, and [facility] 2 diabetes mellitus ([facility] Social History:   reports that he has quit smoking. His smoking use included cigarettes.  He has never used smokeless tobacco. He reports that he does not drink alcohol or use drugs. Family History: No family history on file. Vitals:  /80   Pulse 89   Temp 98.2 °F (36.8 °C) (Oral)   Resp 22   Ht 5' 8.9\" (1.75 m)   Wt 156 lb 1.4 oz (70.8 kg)   SpO2 92%   BMI 23.12 kg/m²   Temp (24hrs), Av.7 °F (36.5 °C), Min:97.4 °F (36.3 °C), Max:98.2 °F (36.8 °C)    Recent Labs     196 19  0639   POCGLU 58* 76 112* 165*       I/O (24Hr): Intake/Output Summary (Last 24 hours) at 3/17/2019 0800  Last data filed at 3/17/2019 0527  Gross per 24 hour   Intake 1800 ml   Output 2475 ml   Net -675 ml       Labs:    Hematology:No results for input(s): WBC, RBC, HGB, HCT, MCV, MCH, MCHC, RDW, PLT, MPV, SEDRATE, CRP, INR, DDIMER, SX5QYBNM, LABABSO in the last 72 hours.     Invalid input(s): PT  Chemistry:  Recent Labs     03/15/19  0634 19  0630 19  0650    135 137   K 4.3 4.4 5.9*   CL 98 97* 96*   CO2 29 29 29   GLUCOSE 56* 136* 148*   BUN 24* 22 23   CREATININE 0.68* 0.54* 0.62*   ANIONGAP 11 9 12   LABGLOM >60 >60 >60   GFRAA >60 >60 >60   CALCIUM 8.9 8.5* 8.9     Recent Labs     19  0446 19  0639   POCGLU 149* 49* 58* 76 112* 165*         Lab Results   Component Value Date/Time    SPECIAL RT LOWER ARM 10ML 2019 12:29 AM     Lab Results   Component Value Date/Time    CULTURE NO GROWTH 6 DAYS 2019 12:29 AM       Lab Results   Component Value Date    POCPH 7.468 2019    POCPCO2 38.9 2019    POCPO2 73.0 2019    POCHCO3 28.2 2019    NBEA NOT REPORTED 2019    PBEA 4 2019    AOK3OOR 29 2019    NIZD0PKS 95 2019    FIO2 45.0 2019       Radiology:        Physical Examination:        General appearance:  alert, cooperative and no distress, has HFNC   Mental Status:  oriented to person, place and time and normal affect  Lungs:  Very diminished bilaterally, normal effort  Heart:  regular rate and rhythm, no murmur  Abdomen:  soft, nontender, nondistended, normal bowel sounds, no masses, hepatomegaly, splenomegaly  Extremities:  no edema, redness, tenderness in the calves  Skin:  no gross lesions, rashes, induration    Assessment:        Primary Problem  Acute respiratory failure with hypoxia [facility])    Active Hospital Problems    Diagnosis Date Noted    Atrial flutter ([address] [I48.92] 03/08/2019    Acute respiratory failure with hypoxia ([address] [J96.01] 01/23/2019    Pneumonia due to infectious organism [J18.9] 12/16/2018    COPD exacerbation ([address] [J44.1] 11/27/2018    Acute on chronic respiratory failure with hypoxemia ([address] [J96.21] 11/27/2018    Pulmonary artery hypertension (HCC) [I27.21] 05/23/2018    Iron deficiency anemia [D50.9] 05/23/2018    Type 2 diabetes mellitus without complication, with long-term current use of insulin ([address] [E11.9, Z79.4] 10/30/2014    Hyperlipidemia [E78.5] 10/30/2014       Plan:        Finished 5 days course of Zosyn. Switched to steroid taper  Continue nasal treatment. Continue to wean high flow as tolerable. Patient is agreeing to go LTAC, discussed with CM , will send referral to [facility] , still trying with insurance to get approval for Life 2000 device as well  Patient denied hospice/palliative care.   He is aware of the poor prognosis  DC planning      Senia Agarwal MD  3/17/2019  8:00 AM

## 2019-03-17 NOTE — PLAN OF CARE
Problem: Falls - Risk of:  Goal: Will remain free from falls  Description  Will remain free from falls  Outcome: Met This Shift  Goal: Absence of physical injury  Description  Absence of physical injury  Outcome: Met This Shift     Problem: Pain:  Goal: Pain level will decrease  Description  Pain level will decrease  Outcome: Met This Shift  Goal: Control of acute pain  Description  Control of acute pain  Outcome: Met This Shift  Goal: Control of chronic pain  Description  Control of chronic pain  Outcome: Met This Shift     Problem: Risk for Impaired Skin Integrity  Goal: Tissue integrity - skin and mucous membranes  Description  Structural intactness and normal physiological function of skin and  mucous membranes. Outcome: Met This Shift     Problem: IP BALANCE  Goal: BALANCE EDUCATION  Description  Educate patients on maintaining dynamic/static standing/sitting balance, with/without upper extremity support.   Outcome: Met This Shift     Problem: Nutrition  Goal: Optimal nutrition therapy  Outcome: Met This Shift

## 2019-03-18 LAB
ANION GAP SERPL CALCULATED.3IONS-SCNC: 9 MMOL/L (ref 9–17)
BUN BLDV-MCNC: 22 MG/DL (ref 8–23)
BUN/CREAT BLD: ABNORMAL (ref 9–20)
CALCIUM SERPL-MCNC: 8.4 MG/DL (ref 8.6–10.4)
CHLORIDE BLD-SCNC: 93 MMOL/L (ref 98–107)
CO2: 29 MMOL/L (ref 20–31)
CREAT SERPL-MCNC: 0.54 MG/DL (ref 0.7–1.2)
GFR AFRICAN AMERICAN: >60 ML/MIN
GFR NON-AFRICAN AMERICAN: >60 ML/MIN
GFR SERPL CREATININE-BSD FRML MDRD: ABNORMAL ML/MIN/{1.73_M2}
GFR SERPL CREATININE-BSD FRML MDRD: ABNORMAL ML/MIN/{1.73_M2}
GLUCOSE BLD-MCNC: 145 MG/DL (ref 75–110)
GLUCOSE BLD-MCNC: 179 MG/DL (ref 75–110)
GLUCOSE BLD-MCNC: 196 MG/DL (ref 70–99)
GLUCOSE BLD-MCNC: 232 MG/DL (ref 75–110)
GLUCOSE BLD-MCNC: 265 MG/DL (ref 75–110)
GLUCOSE BLD-MCNC: 84 MG/DL (ref 75–110)
POTASSIUM SERPL-SCNC: 4.3 MMOL/L (ref 3.7–5.3)
SODIUM BLD-SCNC: 131 MMOL/L (ref 135–144)

## 2019-03-18 PROCEDURE — 2060000000 HC ICU INTERMEDIATE R&B

## 2019-03-18 PROCEDURE — 82947 ASSAY GLUCOSE BLOOD QUANT: CPT

## 2019-03-18 PROCEDURE — 94640 AIRWAY INHALATION TREATMENT: CPT

## 2019-03-18 PROCEDURE — 97535 SELF CARE MNGMENT TRAINING: CPT

## 2019-03-18 PROCEDURE — 99232 SBSQ HOSP IP/OBS MODERATE 35: CPT | Performed by: INTERNAL MEDICINE

## 2019-03-18 PROCEDURE — 6370000000 HC RX 637 (ALT 250 FOR IP): Performed by: INTERNAL MEDICINE

## 2019-03-18 PROCEDURE — 6360000002 HC RX W HCPCS: Performed by: INTERNAL MEDICINE

## 2019-03-18 PROCEDURE — 2580000003 HC RX 258: Performed by: NURSE PRACTITIONER

## 2019-03-18 PROCEDURE — 99232 SBSQ HOSP IP/OBS MODERATE 35: CPT | Performed by: FAMILY MEDICINE

## 2019-03-18 PROCEDURE — 94760 N-INVAS EAR/PLS OXIMETRY 1: CPT

## 2019-03-18 PROCEDURE — 6360000002 HC RX W HCPCS: Performed by: NURSE PRACTITIONER

## 2019-03-18 PROCEDURE — 2700000000 HC OXYGEN THERAPY PER DAY

## 2019-03-18 PROCEDURE — 6370000000 HC RX 637 (ALT 250 FOR IP): Performed by: FAMILY MEDICINE

## 2019-03-18 PROCEDURE — 80048 BASIC METABOLIC PNL TOTAL CA: CPT

## 2019-03-18 PROCEDURE — 36415 COLL VENOUS BLD VENIPUNCTURE: CPT

## 2019-03-18 PROCEDURE — 6370000000 HC RX 637 (ALT 250 FOR IP): Performed by: NURSE PRACTITIONER

## 2019-03-18 RX ORDER — ECHINACEA PURPUREA EXTRACT 125 MG
2 TABLET ORAL PRN
Status: DISCONTINUED | OUTPATIENT
Start: 2019-03-18 | End: 2019-03-22 | Stop reason: HOSPADM

## 2019-03-18 RX ADMIN — LEVALBUTEROL HYDROCHLORIDE 1.25 MG: 1.25 SOLUTION RESPIRATORY (INHALATION) at 08:31

## 2019-03-18 RX ADMIN — FAMOTIDINE 20 MG: 20 TABLET, FILM COATED ORAL at 19:29

## 2019-03-18 RX ADMIN — Medication 10 ML: at 09:49

## 2019-03-18 RX ADMIN — INSULIN GLARGINE 20 UNITS: 100 INJECTION, SOLUTION SUBCUTANEOUS at 09:50

## 2019-03-18 RX ADMIN — MOMETASONE FUROATE AND FORMOTEROL FUMARATE DIHYDRATE 2 PUFF: 200; 5 AEROSOL RESPIRATORY (INHALATION) at 08:31

## 2019-03-18 RX ADMIN — GUAIFENESIN 600 MG: 600 TABLET, EXTENDED RELEASE ORAL at 19:29

## 2019-03-18 RX ADMIN — MOMETASONE FUROATE AND FORMOTEROL FUMARATE DIHYDRATE 2 PUFF: 200; 5 AEROSOL RESPIRATORY (INHALATION) at 19:40

## 2019-03-18 RX ADMIN — ASPIRIN 81 MG: 81 TABLET, CHEWABLE ORAL at 09:49

## 2019-03-18 RX ADMIN — INSULIN LISPRO 2 UNITS: 100 INJECTION, SOLUTION INTRAVENOUS; SUBCUTANEOUS at 09:50

## 2019-03-18 RX ADMIN — LEVALBUTEROL HYDROCHLORIDE 1.25 MG: 1.25 SOLUTION RESPIRATORY (INHALATION) at 11:30

## 2019-03-18 RX ADMIN — INSULIN LISPRO 3 UNITS: 100 INJECTION, SOLUTION INTRAVENOUS; SUBCUTANEOUS at 19:29

## 2019-03-18 RX ADMIN — IPRATROPIUM BROMIDE 0.5 MG: 0.5 SOLUTION RESPIRATORY (INHALATION) at 16:40

## 2019-03-18 RX ADMIN — PREDNISONE 40 MG: 20 TABLET ORAL at 09:49

## 2019-03-18 RX ADMIN — IPRATROPIUM BROMIDE 0.5 MG: 0.5 SOLUTION RESPIRATORY (INHALATION) at 11:30

## 2019-03-18 RX ADMIN — IPRATROPIUM BROMIDE 0.5 MG: 0.5 SOLUTION RESPIRATORY (INHALATION) at 08:31

## 2019-03-18 RX ADMIN — TAMSULOSIN HYDROCHLORIDE 0.4 MG: 0.4 CAPSULE ORAL at 09:49

## 2019-03-18 RX ADMIN — FAMOTIDINE 20 MG: 20 TABLET, FILM COATED ORAL at 09:49

## 2019-03-18 RX ADMIN — LEVALBUTEROL HYDROCHLORIDE 1.25 MG: 1.25 SOLUTION RESPIRATORY (INHALATION) at 16:40

## 2019-03-18 RX ADMIN — INSULIN LISPRO 2 UNITS: 100 INJECTION, SOLUTION INTRAVENOUS; SUBCUTANEOUS at 17:19

## 2019-03-18 RX ADMIN — LEVALBUTEROL HYDROCHLORIDE 1.25 MG: 1.25 SOLUTION RESPIRATORY (INHALATION) at 19:38

## 2019-03-18 RX ADMIN — IPRATROPIUM BROMIDE 0.5 MG: 0.5 SOLUTION RESPIRATORY (INHALATION) at 19:38

## 2019-03-18 RX ADMIN — ESCITALOPRAM OXALATE 10 MG: 10 TABLET ORAL at 09:49

## 2019-03-18 RX ADMIN — GUAIFENESIN 600 MG: 600 TABLET, EXTENDED RELEASE ORAL at 09:49

## 2019-03-18 RX ADMIN — INSULIN GLARGINE 10 UNITS: 100 INJECTION, SOLUTION SUBCUTANEOUS at 19:29

## 2019-03-18 RX ADMIN — Medication 10 ML: at 19:30

## 2019-03-18 RX ADMIN — ENOXAPARIN SODIUM 40 MG: 40 INJECTION SUBCUTANEOUS at 09:49

## 2019-03-18 NOTE — PROGRESS NOTES
Herber Fitzpatrick [facility]    Progress Note    3/18/2019    2:07 PM    Name:   Brandee Ramesh  MRN:     [id number]     Acct:      [de-identified]   Room:   [address] [place] [address] Day:  11  Admit Date:  3/7/2019 10:40 PM    PCP:   [employer], PA-C  Code Status:  Full Code    Subjective:     C/C: respiratory  Distress    Interval History Status: improved. Patient seen and examined at bedside, no acute events overnight. Continue to need HFNC all the time, trials this am to wean down, currently at FIO2 35% and using 30 L  Patient vitals, labs and all providers notes were reviewed,from overnight shift and morning updates were noted and discussed with the nurse    Brief History:     Per my partner  \"This is 70years old male with history of COPD living in a nursing home currently, transferred to us from [facility] after he presented there with shortness of breath.  The patient says that he suddenly started having shortness of breath.  He did not have any chest pain.  He was having coughing with phlegm production.  He also had fevers and chills.  He was found to be in acute respiratory failure.  He was put on BiPAP and started on vancomycin and Jr Blow had improvement in his symptoms and he was transferred over here. This morning upon my evaluation patient was in respiratory distress, his oxygen saturations were in 70s on high flow oxygen, he was on BiPAP overnight though, patient was tachycardic.  Place the patient on BiPAP with significant improvement in his symptomatology.  Patient's saturations improved to high 90s.  His heart rate improved. Patient otherwise denies chest pain, he denies palpitations, EKG was concerning for new onset A. fib flutter.  Patient denies any other acute issues.   Patient was initially started on vancomycin and Zosyn.  Viral PCR panel positive for RSV.  Vancomycin stopped and Zosyn will be continued for 5 days total.  Pulmonology evaluated the patient and hospice was consulted. \"     Review of Systems:     Constitutional:  negative for chills, fevers, sweats  Respiratory:  +ve for cough, dyspnea on exertion, shortness of breath, wheezing  Cardiovascular:  negative for chest pain, chest pressure/discomfort, lower extremity edema, palpitations  Gastrointestinal:  negative for abdominal pain, constipation, diarrhea, nausea, vomiting  Neurological:  negative for dizziness, headache    Medications:      Allergies:  No Known Allergies    Current Meds:   Scheduled Meds:    insulin glargine  10 Units Subcutaneous Nightly    predniSONE  40 mg Oral Daily    Followed by   Edmund Irving ON 3/20/2019] predniSONE  30 mg Oral Daily    Followed by   Edmund Irving ON 3/23/2019] predniSONE  20 mg Oral Daily    Followed by   Edmund Irving ON 3/26/2019] predniSONE  10 mg Oral Daily    guaiFENesin  600 mg Oral BID    insulin glargine  20 Units Subcutaneous Daily with breakfast    aspirin  81 mg Oral Daily    mometasone-formoterol  2 puff Inhalation BID    escitalopram  10 mg Oral Daily    famotidine  20 mg Oral BID    tamsulosin  0.4 mg Oral Daily    levalbuterol  1.25 mg Nebulization Q4H WA    ipratropium  0.5 mg Nebulization Q4H WA    bisacodyl  10 mg Rectal Once    enoxaparin  40 mg Subcutaneous Daily    sodium chloride flush  10 mL Intravenous 2 times per day    insulin lispro  0-12 Units Subcutaneous TID WC    insulin lispro  0-6 Units Subcutaneous Nightly     Continuous Infusions:    dextrose      dextrose       PRN Meds: sodium chloride, acetaminophen, acetaminophen, albuterol, magnesium hydroxide, nicotine, ondansetron, sodium chloride flush, dextrose, dextrose, glucagon (rDNA), glucose, glucose, dextrose, glucagon (rDNA), dextrose    Data:     Past Medical History:   has a past medical history of Anemia, Anxiety, Bronchitis, COPD (chronic obstructive pulmonary disease) ([facility] [address] Diabetes ([facility] [address] Former smoker, Hyperlipidemia, Oxygen dependent, Respiratory distress, and Type 2 diabetes mellitus ([address] Social History:   reports that he has quit smoking. His smoking use included cigarettes. He has never used smokeless tobacco. He reports that he does not drink alcohol or use drugs. Family History: No family history on file. Vitals:  /71   Pulse 97   Temp 99 °F (37.2 °C) (Oral)   Resp 26   Ht 5' 8.9\" (1.75 m)   Wt 156 lb 1.4 oz (70.8 kg)   SpO2 90%   BMI 23.12 kg/m²   Temp (24hrs), Av.4 °F (36.9 °C), Min:97.4 °F (36.3 °C), Max:99.6 °F (37.6 °C)    Recent Labs     19  0406 19  0655 19  1203   POCGLU 167* 232* 145* 84       I/O (24Hr): Intake/Output Summary (Last 24 hours) at 3/18/2019 1407  Last data filed at 3/18/2019 0611  Gross per 24 hour   Intake --   Output 1300 ml   Net -1300 ml       Labs:    Hematology:No results for input(s): WBC, RBC, HGB, HCT, MCV, MCH, MCHC, RDW, PLT, MPV, SEDRATE, CRP, INR, DDIMER, [id number], LABABSO in the last 72 hours.     Invalid input(s): PT  Chemistry:  Recent Labs     19  0630 19  0650 19  0533    137 131*   K 4.4 5.9* 4.3   CL 97* 96* 93*   CO2 29 29 29   GLUCOSE 136* 148* 196*   BUN 22 23 22   CREATININE 0.54* 0.62* 0.54*   ANIONGAP 9 12 9   LABGLOM >60 >60 >60   GFRAA >60 >60 >60   CALCIUM 8.5* 8.9 8.4*     Recent Labs     19  1226 19  1509 19  0406 19  0655 19  1203   POCGLU 354* 196* 167* 232* 145* 84         Lab Results   Component Value Date/Time    SPECIAL RT LOWER ARM 10ML 2019 12:29 AM     Lab Results   Component Value Date/Time    CULTURE NO GROWTH 6 DAYS 2019 12:29 AM       Lab Results   Component Value Date    POCPH 7.468 2019    POCPCO2 38.9 2019    POCPO2 73.0 2019    POCHCO3 28.2 2019    NBEA NOT REPORTED 2019    PBEA 4 2019    BUC3LFL 29 2019    MCVM1YJC 95 2019    FIO2 45.0 2019       Radiology:        Physical Examination:        General appearance:  alert, cooperative and no distress, has HFNC   Mental Status:  oriented to person, place and time and normal affect  Lungs:  Very diminished  bilaterally, normal effort  Heart:  regular rate and rhythm, no murmur  Abdomen:  soft, nontender, nondistended, normal bowel sounds, no masses, hepatomegaly, splenomegaly  Extremities:  no edema, redness, tenderness in the calves  Skin:  no gross lesions, rashes, induration    Assessment:        Primary Problem  Acute respiratory failure with hypoxia [facility])    Active Hospital Problems    Diagnosis Date Noted    Atrial flutter ([address] [I48.92] 03/08/2019    Acute respiratory failure with hypoxia ([address] [J96.01] 01/23/2019    Pneumonia due to infectious organism [J18.9] 12/16/2018    COPD exacerbation ([address] [J44.1] 11/27/2018    Acute on chronic respiratory failure with hypoxemia ([address] [J96.21] 11/27/2018    Pulmonary artery hypertension (HCC) [I27.21] 05/23/2018    Iron deficiency anemia [D50.9] 05/23/2018    Type 2 diabetes mellitus without complication, with long-term current use of insulin ([address] [E11.9, Z79.4] 10/30/2014    Hyperlipidemia [E78.5] 10/30/2014       Plan:        Finished 5 days course of Zosyn. Switched to steroid taper  Continue nasal treatment. Continue to wean high flow as tolerable. Patient is agreeing to go LTAC, discussed with CM , will send referral to [facility] , still trying with insurance to get approval for Life 2000 device as well  Patient denied hospice/palliative care.   He is aware of the poor prognosis  DC planning to Mandi Godinez MD  3/18/2019  2:07 PM

## 2019-03-18 NOTE — PLAN OF CARE
EVANGELIST[facility]INBER[facility] P[facility] [J18.9] . Vitals:    03/18/19 0443   BP:    Pulse:    Resp: 18   Temp:    SpO2: 90%   . Patients home meds are   Prior to Admission medications    Medication Sig Start Date End Date Taking?  Authorizing Provider   predniSONE (DELTASONE) 10 MG tablet 40 mg once daily for 7 days, then 30 mg OD for 7 days, then 20 mg OD for 7 days, then follow up with pulmonologist 2/1/19   Rashel Samaniego MD   sodium chloride (OCEAN) 0.65 % nasal spray 1 spray by Nasal route as needed for Congestion 2/1/19   Rashel Samaniego MD   sodium chloride (OCEAN) 0.65 % nasal spray 1 spray by Nasal route as needed for Congestion 1/29/19   Terri Garza MD   ergocalciferol (ERGOCALCIFEROL) 13303 units capsule Take 1 capsule by mouth once a week 12/24/18   Lady Gabriella MD   furosemide (LASIX) 20 MG tablet Take 1 tablet by mouth every other day 12/1/18   Ro Shaw MD   insulin glargine (LANTUS) 100 UNIT/ML injection vial Inject 20 Units into the skin nightly 11/30/18   Ro Shaw MD   tamsulosin (FLOMAX) 0.4 MG capsule Take 0.4 mg by mouth daily    Historical Provider, MD   famotidine (PEPCID) 20 MG tablet Take 20 mg by mouth 2 times daily    Historical Provider, MD   acetaminophen (TYLENOL) 325 MG tablet Take 650 mg by mouth every 6 hours as needed for Pain    Historical Provider, MD   aclidinium (TUDORZA PRESSAIR) 400 MCG/ACT AEPB inhaler Inhale 1 puff into the lungs 2 times daily 6/22/18 7/22/19  Aydee Ronquillo MD   albuterol sulfate HFA (VENTOLIN HFA) 108 (90 Base) MCG/ACT inhaler Inhale 2 puffs into the lungs every 6 hours as needed for Wheezing 6/22/18   Aydee Ronquillo MD   budesonide-formoterol (SYMBICORT) 80-4.5 MCG/ACT AERO Inhale 2 puffs into the lungs 2 times daily 5/25/18   Riddhi Sena MD   metFORMIN ER (GLUCOPHAGE-XR) 500 MG XR tablet take 2 tablets by mouth twice a day  Patient taking differently: take 2 tablet by mouth twice a day 8/1/16 Assessment  Score 1 2 3   Bilateral Breath Sounds   []  Occasional Rhonchi []  Scattered Rhonchi []  Course Rhonchi and/or poor aeration   Sputum    []  Small amount of thin secretions []  Moderate amount of viscous secretions []  Copius, Viscious Yellow/ Secretions   CXR as reported by physician []  clear  []  Unavailable []  Infiltrates and/or consolidation  []  Unavailable []  Mucus Plugging and or lobar consolidation  []  Unavailable   Cough []  Strong, productive cough []  Weak productive cough []  No cough or weak non-productive cough   EVANGELISTA FLOR WEINBERG  8:35 AM                            FEMALE                                  MALE                            FEV1 Predicted Normal Values                        FEV1 Predicted Normal Values          Age                                     Height in Feet and Inches       Age                                     Height in Feet and Inches       4' 11\" 5' 1\" 5' 3\" 5' 5\" 5' 7\" 5' 9\" 5' 11\" 6' 1\"  4' 11\" 5' 1\" 5' 3\" 5' 5\" 5' 7\" 5' 9\" 5' 11\" 6' 1\"   42 - 45 2.49 2.66 2.84 3.03 3.22 3.42 3.62 3.83 42 - 45 2.82 3.03 3.26 3.49 3.72 3.96 4.22 4.47   46 - 49 2.40 2.57 2.76 2.94 3.14 3.33 3.54 3.75 46 - 49 2.70 2.92 3.14 3.37 3.61 3.85 4.10 4.36   50 - 53 2.31 2.48 2.66 2.85 3.04 3.24 3.45 3.66 50 - 53 2.58 2.80 3.02 3.25 3.49 3.73 3.98 4.24   54 - 57 2.21 2.38 2.57 2.75 2.95 3.14 3.35 3.56 54 - 57 2.46 2.67 2.89 3.12 3.36 3.60 3.85 4.11   58 - 61 2.10 2.28 2.46 2.65 2.84 3.04 3.24 3.45 58 - 61 2.32 2.54 2.76 2.99 3.23 3.47 3.72 3.98   62 - 65 1.99 2.17 2.35 2.54 2.73 2.93 3.13 3.34 62 - 65 2.19 2.40 2.62 2.85 3.09 3.33 3.58 3.84   66 - 69 1.88 2.05 2.23 2.42 2.61 2.81 3.02 3.23 66 - 69 2.04 2.26 2.48 2.71 2.95 3.19 3.44 3.70   70+ 1.82 1.99 2.17 2.36 2.55 2.75 2.95 3.16 70+ 1.97 2.19 2.41 2.64 2.87 3.12 3.37 3.62             Predicted Peak Expiratory Flow Rate                                       Height (in)  Female       Height (in) Male           Age 64 62 64 63 57 71 78 74

## 2019-03-18 NOTE — PROGRESS NOTES
Occupational Therapy  Facility/Department: [address] STEPDOWN  Daily Treatment Note  NAME: Adalberto Brown  : 1940  MRN: [id number]    Date of Service: 3/18/2019    Discharge Recommendations:  Home with Home health OT(Pt safe to return home when pt off of hi-flow.)       Assessment   Performance deficits / Impairments: Decreased functional mobility ; Decreased endurance;Decreased ADL status; Decreased high-level IADLs;Decreased strength  Prognosis: Fair  Patient Education: OT POC, transfer/walker safety, pursed lip breathing, EC/WS - pt verblized understanding. REQUIRES OT FOLLOW UP: Yes  Activity Tolerance  Activity Tolerance: Patient limited by fatigue  Activity Tolerance: ~ 35 minutes  Safety Devices  Safety Devices in place: Yes  Type of devices: Nurse notified; Left in bed;Call light within reach; Bed alarm in place  Restraints  Initially in place: No         Patient Diagnosis(es): There were no encounter diagnoses. has a past medical history of Anemia, Anxiety, Bronchitis, COPD (chronic obstructive pulmonary disease) ([address] Diabetes ([address] Former smoker, Hyperlipidemia, Oxygen dependent, Respiratory distress, and Type 2 diabetes mellitus ([address] has a past surgical history that includes AAA repair, open; Pancreas surgery; Finger surgery; and Foot surgery. Restrictions  Restrictions/Precautions  Restrictions/Precautions: Contact Precautions, Up as Tolerated, Fall Risk  Required Braces or Orthoses?: No  Position Activity Restriction  Other position/activity restrictions: Pt on hi breanna canula. Initially 35%, Respiratory therapy took pt off HiFlo and placed on 7L, SpO2 between 83-90%  Subjective   General  Chart Reviewed: No  Patient assessed for rehabilitation services?: Yes  Family / Caregiver Present: No  Diagnosis: Pneumonia  General Comment  Comments: RN ok'd for OT treatment.  Pt agreeable to treatment session  Vital Signs  Patient Currently in Pain: Denies  Oxygen Therapy  Pulse Oximeter Device Mode: Continuous  Pulse Oximeter Device Location: Left;Finger  O2 Device: Nasal cannula(Resp therapy taking pt off FiO2 at beginning of session, resp therapy in and out of room throughout therapy session and in room upon therapist exit)  O2 Flow Rate (L/min): 7 L/min     Objective    ADL  Feeding: Setup(pt bringing drink to mouth Isa, setup provided)  Grooming: Setup; Increased time to complete(Pt sat eob to complete facial washing and shaving with setup provided and increased time d/t pt desats with minimal activity, requiring many rest breaks to perform pursed lip breathing)        Balance  Sitting Balance: Supervision (~30 minutes)  Standing Balance: Contact guard assistance  Standing Balance  Time: ~ 1 minute  Activity: Pt stood eob with CGA to work on static standing balance/tolerance  Sit to stand: Contact guard assistance  Stand to sit: Contact guard assistance  Functional Mobility  Functional Mobility Comments: Pt completed 1 side step to [facility] with CGA for safety  Bed mobility  Bridging: Modified independent   Rolling to Left: Modified independent  Rolling to Right: Modified independent  Supine to Sit: Modified independent  Sit to Supine: Modified independent  Scooting: Modified independent  Comment: HOB raised, pt completed all bed mobility Mod I  Transfers  Sit to stand: Contact guard assistance  Stand to sit: Contact guard assistance                                                                 Plan   Plan  Times per week: 3x  Specific instructions for Next Treatment: Education regarding AE for ADL's, UB cardiac strengthening HEP  Current Treatment Recommendations: Strengthening, Endurance Training, Self-Care / ADL    Goals  Short term goals  Time Frame for Short term goals: By discharge:  Short term goal 1: Patient will implement at least 1 energy conservation technique during ADL or functional mobility to improve overall quality of life.   Short term goal 2: Patient will complete toilet transfer (i.e. BSC due

## 2019-03-18 NOTE — PROGRESS NOTES
Nutrition Assessment    Type and Reason for Visit: Reassess    Nutrition Recommendations: Continue current diet with Glucerna shakes. Encourage/monitor intakes as tolerated. Nutrition Assessment: Pt continues to tolerate current diet and is eating well at meals. Using high-flow as needed. Labs reviewed - Na 131 mmol/L noted. Malnutrition Assessment:  · Malnutrition Status: At risk for malnutrition  · Context: (Acute on Chronic)  · Findings of the 6 clinical characteristics of malnutrition (Minimum of 2 out of 6 clinical characteristics is required to make the diagnosis of moderate or severe Protein Calorie Malnutrition based on AND/ASPEN Guidelines):  1. Energy Intake-Less than or equal to 75% of estimated energy requirement, Greater than or equal to 3 months    2. Weight Loss-No significant weight loss  3. Fat Loss-Mild subcutaneous fat loss, Orbital  4. Muscle Loss-No significant muscle mass loss  5. Fluid Accumulation-No significant fluid accumulation    Nutrition Risk Level:  Moderate    Nutrient Needs:  · Estimated Daily Total Kcal: 6663-0873 kcal/day  · Estimated Daily Protein (g):  gm pro/day    Nutrition Diagnosis:   · Problem: Increased nutrient needs  · Etiology: related to Catabolic illness     Signs and symptoms:  as evidenced by need for oral supplements    Objective Information:  · Wound Type: None  · Current Nutrition Therapies:  · Oral Diet Orders: Carb Control 4 Carbs/Meal   · Oral Diet intake: 51-75%, %  · Oral Nutrition Supplement (ONS) Orders: Diabetic Oral Supplement  · ONS intake: 1-25%, 26-50%  · Anthropometric Measures:  · Ht: 5' 8.9\" (175 cm)   · Current Body Wt: 156 lb 1.4 oz (70.8 kg)  · Admission Body Wt: 159 lb (72.1 kg)  · Ideal Body Wt: 159 lb 6.4 oz (72.3 kg), % Ideal Body 98%  · BMI Classification: BMI 18.5 - 24.9 Normal Weight    Nutrition Interventions:   Continue current diet, Continue current ONS  Continued Inpatient Monitoring, Education Not Indicated    Nutrition Evaluation:   · Evaluation: Progressing toward goals   · Goals: Oral intakes to meet % of estimated nutrition needs.     · Monitoring: Meal Intake, Supplement Intake, Diet Tolerance, Weight, Pertinent Labs, Monitor Hemodynamic Status    Electronically signed by Shannan Jessica RD, LD on 3/18/19 at 3:26 PM    Contact Number: [phone]

## 2019-03-18 NOTE — CARE COORDINATION
Transitional Planning    1015 Left  for Ronna Bucio / [facility] to verify acceptance of patient and that precert has been started, await return call. Spoke with Maria Dolores Gar / Abel Neal regarding L2K device, she will continue to follow patient at [facility] if approved for LTAC. Patient on HFNC @ 40% 30 lpm    [address] onsite and accepted patient.  Precert to be started today

## 2019-03-18 NOTE — PROGRESS NOTES
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 Patient on 35% high flow oxygen; 30 LPM. POX 92%  Still with poor activity tolerance   Has a dry cough. No fever. Shortness of breath is slowly improving, feels about the same today. No chest pain or pressure. No orthopnea or PND  Tolerating diet  No constipation/diarrhea    Subjective:   Review of Systems -   General ROS: Completed and except as mentioned above were negative   Psychological ROS:  Completed and except as mentioned above were negative  Allergy and Immunology ROS:  Completed and except as mentioned above were negative  Hematological and Lymphatic ROS:  Completed and except as mentioned above were negative  Respiratory ROS:  Completed and except as mentioned above were negative  Cardiovascular ROS:  Completed and except as mentioned above were negative  Gastrointestinal ROS: Completed and except as mentioned above were negative  Genito-Urinary ROS:  Completed and except as mentioned above were negative  Musculoskeletal ROS:  Completed and except as mentioned above were negative  Neurological ROS:  Completed and except as mentioned above were negative  Dermatological ROS:  Completed and except as mentioned above were negative      Diet:  DIET CARB CONTROL;   Dietary Nutrition Supplements: Diabetic Oral Supplement      Medications:Current Inpatient    Scheduled Meds:   insulin glargine  10 Units Subcutaneous Nightly    predniSONE  40 mg Oral Daily    Followed by   Cornel Sandifer ON 3/20/2019] predniSONE  30 mg Oral Daily    Followed by   Cornel Sandifer ON 3/23/2019] predniSONE  20 mg Oral Daily    Followed by   Inez Mccann ON 3/26/2019] predniSONE  10 mg Oral Daily    guaiFENesin  600 mg Oral BID    insulin glargine  20 Units Subcutaneous Daily with breakfast    aspirin  81 mg Oral Daily    mometasone-formoterol  2 puff Inhalation BID    escitalopram  10 mg Oral Daily    famotidine  20 mg Oral BID    tamsulosin  0.4 mg Oral Daily    levalbuterol  1.25 mg Nebulization Q4H WA    ipratropium  0.5 mg Nebulization Q4H WA    bisacodyl  10 mg Rectal Once    enoxaparin  40 mg Subcutaneous Daily    sodium chloride flush  10 mL Intravenous 2 times per day    insulin lispro  0-12 Units Subcutaneous TID WC    insulin lispro  0-6 Units Subcutaneous Nightly     Continuous Infusions:   dextrose      dextrose       PRN Meds:sodium chloride, acetaminophen, acetaminophen, albuterol, magnesium hydroxide, nicotine, ondansetron, sodium chloride flush, dextrose, dextrose, glucagon (rDNA), glucose, glucose, dextrose, glucagon (rDNA), dextrose    Objective:      Physical Exam:  Vitals: BP (!) 146/75   Pulse 78   Temp 97.5 °F (36.4 °C) (Oral)   Resp 23   Ht 5' 8.9\" (1.75 m)   Wt 156 lb 1.4 oz (70.8 kg)   SpO2 (!) 88%   BMI 23.12 kg/m²   24 hour intake/output:    Intake/Output Summary (Last 24 hours) at 3/18/2019 1027  Last data filed at 3/18/2019 0611  Gross per 24 hour   Intake --   Output 1300 ml   Net -1300 ml     Last 3 weights: Wt Readings from Last 3 Encounters:   03/14/19 156 lb 1.4 oz (70.8 kg)   03/07/19 155 lb (70.3 kg)   01/30/19 155 lb 8 oz (70.5 kg)         Physical Examination:   PHYSICAL EXAMINATION:  Vitals:    03/18/19 0330 03/18/19 0400 03/18/19 0443 03/18/19 0833   BP:  (!) 146/75     Pulse:  78     Resp: 17 19 18 23   Temp:  97.5 °F (36.4 °C)     TempSrc:  Oral     SpO2: 95% 95% 90% (!) 88%   Weight:       Height:         Constitutional: This is a well developed, well nourished, 18.5-24.9 - Normal 70y.o. year old male who is alert, oriented, cooperative and in no apparent distress. 03/18/19  0655   POCGLU 167* 232* 145*     HgbA1C: No results for input(s): LABA1C in the last 72 hours. INR: No results for input(s): INR in the last 72 hours. Hepatic: No results for input(s): ALKPHOS, ALT, AST, PROT, BILITOT, BILIDIR, LABALBU in the last 72 hours. Amylase and Lipase:No results for input(s): LACTA, AMYLASE in the last 72 hours. Lactic Acid: No results for input(s): LACTA in the last 72 hours. CARDIAC ENZYMES:No results for input(s): CKTOTAL, CKMB, CKMBINDEX, TROPONINI in the last 72 hours. BNP: No results for input(s): BNP in the last 72 hours. Lipids: No results for input(s): CHOL, TRIG, HDL, LDLCALC in the last 72 hours. Invalid input(s): LDL  ABGs: No results found for: PH, PCO2, PO2, HCO3, O2SAT  Thyroid:   Lab Results   Component Value Date    TSH 2.94 02/20/2019      Urinalysis: No results for input(s): BACTERIA, BLOODU, CLARITYU, COLORU, PHUR, PROTEINU, RBCUA, SPECGRAV, BILIRUBINUR, NITRU, WBCUA, LEUKOCYTESUR, GLUCOSEU in the last 72 hours. CULTURES:          Assessment:    Principal Problem:    Acute respiratory failure with hypoxia (HCC)  Active Problems:    Type 2 diabetes mellitus without complication, with long-term current use of insulin (HCC)    Hyperlipidemia    Pulmonary artery hypertension (HCC)    Iron deficiency anemia    COPD exacerbation (HCC)    Acute on chronic respiratory failure with hypoxemia (HCC)    Pneumonia due to infectious organism    Atrial flutter ([facility]  Resolved Problems:    * No resolved hospital problems. *      Plan:  Meds reviewed- changes made as appropriate. Continue corticosteroids and bronchodilators. Steroids changed to prednisone, on prednisone taper. Patient is on Lovenox  Increase activity as permitted and tolerated. Encouraged pt to increase activity  Questions patient had were answered to his satisfaction. Continue supplemental oxygen. Continue effort to wean oxygen  Patient was informed of the need for using oxygen.   Patient was educated on the importance of compliance and the benefits of oxygen use. Complications of oxygen use, including dryness of the nostrils, epistaxis and also the fire hazard were explained to the patient. Patient willingly accepts to use  the oxygen as recommended  Cxr reviewed. None today  Diet reviewed  Thank you for having us involved in the care of your patient. Please call us if you have any questions or concerns. Jose Coleman M.D.  PGY-3 Internal Medicine  [facility]. 3/18/2019, 10:27 AM    Pulmonary & Critical Care  Attending Physician Statement  I have discussed the care of Saba Youssef, including pertinent history and exam findings,  with the resident. I have seen and examined the patient and the key elements of all parts of the encounter have been performed by me. I agree with the assessment, plan and orders as documented by the resident with additions . Treatment plan Discussed with nursing staff in detail , all questions answered . Electronically signed by Venita Hi MD on   3/18/19 at 8:00 PM    Please note that this chart was generated using voice recognition Dragon dictation software. Although every effort was made to ensure the accuracy of this automated transcription, some errors in transcription may have occurred.

## 2019-03-19 LAB
ANION GAP SERPL CALCULATED.3IONS-SCNC: 10 MMOL/L (ref 9–17)
BUN BLDV-MCNC: 25 MG/DL (ref 8–23)
BUN/CREAT BLD: ABNORMAL (ref 9–20)
CALCIUM SERPL-MCNC: 8.6 MG/DL (ref 8.6–10.4)
CHLORIDE BLD-SCNC: 96 MMOL/L (ref 98–107)
CO2: 28 MMOL/L (ref 20–31)
CREAT SERPL-MCNC: 0.45 MG/DL (ref 0.7–1.2)
GFR AFRICAN AMERICAN: >60 ML/MIN
GFR NON-AFRICAN AMERICAN: >60 ML/MIN
GFR SERPL CREATININE-BSD FRML MDRD: ABNORMAL ML/MIN/{1.73_M2}
GFR SERPL CREATININE-BSD FRML MDRD: ABNORMAL ML/MIN/{1.73_M2}
GLUCOSE BLD-MCNC: 157 MG/DL (ref 75–110)
GLUCOSE BLD-MCNC: 159 MG/DL (ref 75–110)
GLUCOSE BLD-MCNC: 171 MG/DL (ref 75–110)
GLUCOSE BLD-MCNC: 210 MG/DL (ref 70–99)
GLUCOSE BLD-MCNC: 334 MG/DL (ref 75–110)
POTASSIUM SERPL-SCNC: 4.5 MMOL/L (ref 3.7–5.3)
SODIUM BLD-SCNC: 134 MMOL/L (ref 135–144)

## 2019-03-19 PROCEDURE — 36415 COLL VENOUS BLD VENIPUNCTURE: CPT

## 2019-03-19 PROCEDURE — 97110 THERAPEUTIC EXERCISES: CPT

## 2019-03-19 PROCEDURE — 6370000000 HC RX 637 (ALT 250 FOR IP): Performed by: NURSE PRACTITIONER

## 2019-03-19 PROCEDURE — 2060000000 HC ICU INTERMEDIATE R&B

## 2019-03-19 PROCEDURE — 97530 THERAPEUTIC ACTIVITIES: CPT

## 2019-03-19 PROCEDURE — 6360000002 HC RX W HCPCS: Performed by: INTERNAL MEDICINE

## 2019-03-19 PROCEDURE — 6360000002 HC RX W HCPCS: Performed by: NURSE PRACTITIONER

## 2019-03-19 PROCEDURE — 94762 N-INVAS EAR/PLS OXIMTRY CONT: CPT

## 2019-03-19 PROCEDURE — 6370000000 HC RX 637 (ALT 250 FOR IP): Performed by: INTERNAL MEDICINE

## 2019-03-19 PROCEDURE — 2700000000 HC OXYGEN THERAPY PER DAY

## 2019-03-19 PROCEDURE — 80048 BASIC METABOLIC PNL TOTAL CA: CPT

## 2019-03-19 PROCEDURE — 82947 ASSAY GLUCOSE BLOOD QUANT: CPT

## 2019-03-19 PROCEDURE — 2580000003 HC RX 258: Performed by: NURSE PRACTITIONER

## 2019-03-19 PROCEDURE — 94640 AIRWAY INHALATION TREATMENT: CPT

## 2019-03-19 PROCEDURE — 99232 SBSQ HOSP IP/OBS MODERATE 35: CPT | Performed by: FAMILY MEDICINE

## 2019-03-19 PROCEDURE — 6370000000 HC RX 637 (ALT 250 FOR IP): Performed by: FAMILY MEDICINE

## 2019-03-19 PROCEDURE — 99232 SBSQ HOSP IP/OBS MODERATE 35: CPT | Performed by: INTERNAL MEDICINE

## 2019-03-19 RX ORDER — PREDNISONE 10 MG/1
30 TABLET ORAL DAILY
Qty: 9 TABLET | Refills: 0 | Status: ON HOLD | OUTPATIENT
Start: 2019-03-20 | End: 2019-03-28 | Stop reason: HOSPADM

## 2019-03-19 RX ORDER — PREDNISONE 20 MG/1
20 TABLET ORAL DAILY
Qty: 3 TABLET | Refills: 0 | Status: ON HOLD | OUTPATIENT
Start: 2019-03-24 | End: 2019-03-28 | Stop reason: HOSPADM

## 2019-03-19 RX ORDER — PREDNISONE 10 MG/1
10 TABLET ORAL DAILY
Qty: 3 TABLET | Refills: 0 | Status: ON HOLD | OUTPATIENT
Start: 2019-03-28 | End: 2019-03-28 | Stop reason: HOSPADM

## 2019-03-19 RX ADMIN — IPRATROPIUM BROMIDE 0.5 MG: 0.5 SOLUTION RESPIRATORY (INHALATION) at 20:29

## 2019-03-19 RX ADMIN — MOMETASONE FUROATE AND FORMOTEROL FUMARATE DIHYDRATE 2 PUFF: 200; 5 AEROSOL RESPIRATORY (INHALATION) at 08:54

## 2019-03-19 RX ADMIN — LEVALBUTEROL HYDROCHLORIDE 1.25 MG: 1.25 SOLUTION RESPIRATORY (INHALATION) at 20:29

## 2019-03-19 RX ADMIN — LEVALBUTEROL HYDROCHLORIDE 1.25 MG: 1.25 SOLUTION RESPIRATORY (INHALATION) at 15:55

## 2019-03-19 RX ADMIN — LEVALBUTEROL HYDROCHLORIDE 1.25 MG: 1.25 SOLUTION RESPIRATORY (INHALATION) at 08:54

## 2019-03-19 RX ADMIN — MOMETASONE FUROATE AND FORMOTEROL FUMARATE DIHYDRATE 2 PUFF: 200; 5 AEROSOL RESPIRATORY (INHALATION) at 20:29

## 2019-03-19 RX ADMIN — IPRATROPIUM BROMIDE 0.5 MG: 0.5 SOLUTION RESPIRATORY (INHALATION) at 08:53

## 2019-03-19 RX ADMIN — FAMOTIDINE 20 MG: 20 TABLET, FILM COATED ORAL at 20:27

## 2019-03-19 RX ADMIN — GUAIFENESIN 600 MG: 600 TABLET, EXTENDED RELEASE ORAL at 20:27

## 2019-03-19 RX ADMIN — INSULIN GLARGINE 20 UNITS: 100 INJECTION, SOLUTION SUBCUTANEOUS at 08:47

## 2019-03-19 RX ADMIN — LEVALBUTEROL HYDROCHLORIDE 1.25 MG: 1.25 SOLUTION RESPIRATORY (INHALATION) at 12:31

## 2019-03-19 RX ADMIN — IPRATROPIUM BROMIDE 0.5 MG: 0.5 SOLUTION RESPIRATORY (INHALATION) at 12:31

## 2019-03-19 RX ADMIN — GUAIFENESIN 600 MG: 600 TABLET, EXTENDED RELEASE ORAL at 08:47

## 2019-03-19 RX ADMIN — Medication 10 ML: at 08:48

## 2019-03-19 RX ADMIN — ESCITALOPRAM OXALATE 10 MG: 10 TABLET ORAL at 08:46

## 2019-03-19 RX ADMIN — INSULIN LISPRO 2 UNITS: 100 INJECTION, SOLUTION INTRAVENOUS; SUBCUTANEOUS at 11:41

## 2019-03-19 RX ADMIN — IPRATROPIUM BROMIDE 0.5 MG: 0.5 SOLUTION RESPIRATORY (INHALATION) at 15:55

## 2019-03-19 RX ADMIN — PREDNISONE 40 MG: 20 TABLET ORAL at 08:47

## 2019-03-19 RX ADMIN — INSULIN GLARGINE 10 UNITS: 100 INJECTION, SOLUTION SUBCUTANEOUS at 20:28

## 2019-03-19 RX ADMIN — INSULIN LISPRO 4 UNITS: 100 INJECTION, SOLUTION INTRAVENOUS; SUBCUTANEOUS at 20:27

## 2019-03-19 RX ADMIN — ASPIRIN 81 MG: 81 TABLET, CHEWABLE ORAL at 08:47

## 2019-03-19 RX ADMIN — ENOXAPARIN SODIUM 40 MG: 40 INJECTION SUBCUTANEOUS at 08:48

## 2019-03-19 RX ADMIN — Medication 10 ML: at 20:28

## 2019-03-19 RX ADMIN — FAMOTIDINE 20 MG: 20 TABLET, FILM COATED ORAL at 08:47

## 2019-03-19 RX ADMIN — INSULIN LISPRO 2 UNITS: 100 INJECTION, SOLUTION INTRAVENOUS; SUBCUTANEOUS at 17:32

## 2019-03-19 RX ADMIN — TAMSULOSIN HYDROCHLORIDE 0.4 MG: 0.4 CAPSULE ORAL at 08:47

## 2019-03-19 ASSESSMENT — PAIN SCALES - GENERAL
PAINLEVEL_OUTOF10: 0

## 2019-03-19 NOTE — PROGRESS NOTES
Physical Therapy  Facility/Department: [address] STEPDOWN  Daily Treatment Note  NAME: Belen Graham  : 1940  MRN: [id number]    Date of Service: 3/19/2019    Discharge Recommendations:  Home with assist PRN, Home with Home health PT   PT Equipment Recommendations  Equipment Needed: No    Patient Diagnosis(es): There were no encounter diagnoses. has a past medical history of Anemia, Anxiety, Bronchitis, COPD (chronic obstructive pulmonary disease) ([facility] Diabetes ([facility] [address] [facility] Former smoker, Hyperlipidemia, Oxygen dependent, Respiratory distress, and Type 2 diabetes mellitus ([facility] [address] [facility] has a past surgical history that includes AAA repair, open; Pancreas surgery; Finger surgery; and Foot surgery. Restrictions  Restrictions/Precautions  Restrictions/Precautions: Contact Precautions, Up as Tolerated, Fall Risk  Required Braces or Orthoses?: No  Position Activity Restriction  Other position/activity restrictions: Pt on hi breanna canula. Initially 35%, Respiratory therapy took pt off HiFlo and placed on 7L, SpO2 between 83-90%  Subjective   General  Chart Reviewed: Yes  Response To Previous Treatment: Patient with no complaints from previous session. Family / Caregiver Present: No  Subjective  Subjective: Per RN patient okay for PT. Patient agreeable to get to chair. Denies any pain. States he is doing well.    Orientation  Orientation  Overall Orientation Status: Within Functional Limits  Objective   Bed mobility  Rolling to Left: Modified independent  Rolling to Right: Modified independent  Supine to Sit: Modified independent  Scooting: Modified independent  Comment: HOB raised, no use of bedrail  Transfers  Sit to Stand: Stand by assistance  Stand to sit: Stand by assistance  Ambulation  Ambulation?: Yes  Ambulation 1  Surface: level tile  Device: No Device  Assistance: Stand by assistance  Quality of Gait: decreased step length  Distance: 6ft  Comments: EOB to chair, limited d/t respiratory status

## 2019-03-19 NOTE — PROGRESS NOTES
[facility]    Progress Note    3/19/2019    2:08 PM    Name:   Weston Prado  MRN:     [id number]     [place]:      [de-identified]   Room:   [address] [place] [address] Day:  12  Admit Date:  3/7/2019 10:40 PM    PCP:   Latoya Malone PA-C  Code Status:  Full Code    Subjective:     C/C: respiratory  Distress    Interval History Status: improved. Patient seen and examined at bedside, no acute events overnight. Off HFNC since yesterday on 5-6 L nasal canula and saturating  Between 88-92 %. Wants to go home  Patient vitals, labs and all providers notes were reviewed,from overnight shift and morning updates were noted and discussed with the nurse    Brief History:     Per my partner  \"This is 70years old male with history of COPD living in a nursing home currently, transferred to us from [facility] after he presented there with shortness of breath.  The patient says that he suddenly started having shortness of breath.  He did not have any chest pain.  He was having coughing with phlegm production.  He also had fevers and chills.  He was found to be in acute respiratory failure.  He was put on BiPAP and started on vancomycin and Yue Gonzales had improvement in his symptoms and he was transferred over here. This morning upon my evaluation patient was in respiratory distress, his oxygen saturations were in 70s on high flow oxygen, he was on BiPAP overnight though, patient was tachycardic.  Place the patient on BiPAP with significant improvement in his symptomatology.  Patient's saturations improved to high 90s.  His heart rate improved. Patient otherwise denies chest pain, he denies palpitations, EKG was concerning for new onset A. fib flutter.  Patient denies any other acute issues.   Patient was initially started on vancomycin and Zosyn.  Viral PCR panel positive for RSV.  Vancomycin stopped and Zosyn will be continued for 5 days total.  Pulmonology evaluated the patient and hospice was consulted. \"     Review of Systems:     Constitutional:  negative for chills, fevers, sweats  Respiratory:  +ve for cough, dyspnea on exertion, shortness of breath, wheezing  Cardiovascular:  negative for chest pain, chest pressure/discomfort, lower extremity edema, palpitations  Gastrointestinal:  negative for abdominal pain, constipation, diarrhea, nausea, vomiting  Neurological:  negative for dizziness, headache    Medications:      Allergies:  No Known Allergies    Current Meds:   Scheduled Meds:    insulin glargine  10 Units Subcutaneous Nightly    [START ON 3/20/2019] predniSONE  30 mg Oral Daily    Followed by   Maria Luisa Rosen ON 3/23/2019] predniSONE  20 mg Oral Daily    Followed by   Maria Luisa Rosen ON 3/26/2019] predniSONE  10 mg Oral Daily    guaiFENesin  600 mg Oral BID    insulin glargine  20 Units Subcutaneous Daily with breakfast    aspirin  81 mg Oral Daily    mometasone-formoterol  2 puff Inhalation BID    escitalopram  10 mg Oral Daily    famotidine  20 mg Oral BID    tamsulosin  0.4 mg Oral Daily    levalbuterol  1.25 mg Nebulization Q4H WA    ipratropium  0.5 mg Nebulization Q4H WA    bisacodyl  10 mg Rectal Once    enoxaparin  40 mg Subcutaneous Daily    sodium chloride flush  10 mL Intravenous 2 times per day    insulin lispro  0-12 Units Subcutaneous TID WC    insulin lispro  0-6 Units Subcutaneous Nightly     Continuous Infusions:    dextrose      dextrose       PRN Meds: sodium chloride, acetaminophen, acetaminophen, albuterol, magnesium hydroxide, nicotine, ondansetron, sodium chloride flush, dextrose, dextrose, glucagon (rDNA), glucose, glucose, dextrose, glucagon (rDNA), dextrose    Data:     Past Medical History:   has a past medical history of Anemia, Anxiety, Bronchitis, COPD (chronic obstructive pulmonary disease) ([facility] [address] Diabetes ([facility] [address] Former smoker, Hyperlipidemia, Oxygen dependent, Respiratory distress, and Type 2 diabetes appearance:  alert, cooperative and no distress, has HFNC   Mental Status:  oriented to person, place and time and normal affect  Lungs:  Very diminished  bilaterally, normal effort  Heart:  regular rate and rhythm, no murmur  Abdomen:  soft, nontender, nondistended, normal bowel sounds, no masses, hepatomegaly, splenomegaly  Extremities:  no edema, redness, tenderness in the calves  Skin:  no gross lesions, rashes, induration    Assessment:        Primary Problem  Acute respiratory failure with hypoxia [facility])    Active Hospital Problems    Diagnosis Date Noted    Atrial flutter ([address] [I48.92] 03/08/2019    Acute respiratory failure with hypoxia ([address] [J96.01] 01/23/2019    Pneumonia due to infectious organism [J18.9] 12/16/2018    COPD exacerbation ([address] [J44.1] 11/27/2018    Acute on chronic respiratory failure with hypoxemia ([address] [J96.21] 11/27/2018    Pulmonary artery hypertension (HCC) [I27.21] 05/23/2018    Iron deficiency anemia [D50.9] 05/23/2018    Type 2 diabetes mellitus without complication, with long-term current use of insulin ([address] [E11.9, Z79.4] 10/30/2014    Hyperlipidemia [E78.5] 10/30/2014       Plan:        Finished 5 days course of Zosyn. Switched to steroid taper  Continue nasal treatment. Continue to wean high flow as tolerable. Patient denied hospice/palliative care.   He is aware of the poor prognosis  DC planning, ok to go home if continue to be stable, discussed with CM still trying with insurance to get approval for [place]  3/19/2019  2:08 PM

## 2019-03-19 NOTE — PROGRESS NOTES
Home Oxygen Evaluation    Home Oxygen Evaluation completed. Patient is on 5 liters per minute via nasal cannullaResting SpO2 = 93%  Resting SpO2 on room air = 87%  SaO2 dropped to 87% in less than a minute  Nocturnal Oximetry with patient on room air is recommended is SpO2 is between 89% and 95% (requires additional order).     MIHAI BHAGAT  6:45 PM

## 2019-03-19 NOTE — PROGRESS NOTES
Home Oxygen Evaluation    Home Oxygen Evaluation not necessary. Patient will be discharged to SNF rather than a private residence.     Michelle Jerome  2:36 PM

## 2019-03-19 NOTE — CARE COORDINATION
Received call from [facility]. Denial is pending for LTAC, if physician requests - peer to peer option is available call [phone] option #2 ext [phone] by 3/20 @  [id number]. Phoebe Slater can be reached at the same number as above but ext [phone]. Dr. Tilley Ar updated and as pt is off HFNC and peer to peer is not needed. Updated patient about above - refuses SNF placement and says he wants to go home since he is on only 5L O2 per [place] where he was on 4L at home before. Has [address] and he lives alone but has people that will help with meals, etc. Call to Nicki Rascon for L2K device progress. Will need home O2 eval - uses [facility].

## 2019-03-19 NOTE — PROGRESS NOTES
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 Patient on Nasal cannula 5 L, POX 90%. Still with poor activity tolerance  Sitting up in chair this am.   Has a dry cough. No fever. Shortness of breath is slowly improving, feels better today  No chest pain or pressure. No orthopnea or PND  Tolerating diet  No constipation/diarrhea    Subjective:   Review of Systems -   General ROS: Completed and except as mentioned above were negative   Psychological ROS:  Completed and except as mentioned above were negative  Allergy and Immunology ROS:  Completed and except as mentioned above were negative  Hematological and Lymphatic ROS:  Completed and except as mentioned above were negative  Respiratory ROS:  Completed and except as mentioned above were negative  Cardiovascular ROS:  Completed and except as mentioned above were negative  Gastrointestinal ROS: Completed and except as mentioned above were negative  Genito-Urinary ROS:  Completed and except as mentioned above were negative  Musculoskeletal ROS:  Completed and except as mentioned above were negative  Neurological ROS:  Completed and except as mentioned above were negative  Dermatological ROS:  Completed and except as mentioned above were negative      Diet:  DIET CARB CONTROL;   Dietary Nutrition Supplements: Diabetic Oral Supplement      Medications:Current Inpatient    Scheduled Meds:   insulin glargine  10 Units Subcutaneous Nightly    [START ON 3/20/2019] predniSONE  30 mg Oral Daily    Followed by   Maria Luisa Rosen ON 3/23/2019] predniSONE  20 mg Oral Daily    Followed by   Maria Luisa Rosen ON 3/26/2019] predniSONE  10 mg Oral Daily    guaiFENesin  600 mg Oral BID    insulin glargine  20 Units Subcutaneous Daily with breakfast    aspirin  81 mg Oral Daily    mometasone-formoterol  2 puff Inhalation BID    escitalopram  10 mg Oral Daily    famotidine  20 mg Oral BID    tamsulosin  0.4 mg Oral Daily    levalbuterol  1.25 mg Nebulization Q4H WA    ipratropium  0.5 mg Nebulization Q4H WA    bisacodyl  10 mg Rectal Once    enoxaparin  40 mg Subcutaneous Daily    sodium chloride flush  10 mL Intravenous 2 times per day    insulin lispro  0-12 Units Subcutaneous TID WC    insulin lispro  0-6 Units Subcutaneous Nightly     Continuous Infusions:   dextrose      dextrose       PRN Meds:sodium chloride, acetaminophen, acetaminophen, albuterol, magnesium hydroxide, nicotine, ondansetron, sodium chloride flush, dextrose, dextrose, glucagon (rDNA), glucose, glucose, dextrose, glucagon (rDNA), dextrose    Objective:      Physical Exam:  Vitals: BP (!) 146/75   Pulse 97   Temp 97.6 °F (36.4 °C) (Axillary)   Resp 21   Ht 5' 8.9\" (1.75 m)   Wt 156 lb 1.4 oz (70.8 kg)   SpO2 (!) 86%   BMI 23.12 kg/m²   24 hour intake/output:    Intake/Output Summary (Last 24 hours) at 3/19/2019 1033  Last data filed at 3/19/2019 0528  Gross per 24 hour   Intake 800 ml   Output 700 ml   Net 100 ml     Last 3 weights:   Wt Readings from Last 3 Encounters:   03/14/19 156 lb 1.4 oz (70.8 kg)   03/07/19 155 lb (70.3 kg)   01/30/19 155 lb 8 oz (70.5 kg)         Physical Examination:   PHYSICAL EXAMINATION:  Vitals:    03/19/19 0300 03/19/19 0800 03/19/19 0858 03/19/19 0901   BP: (!) 148/84 (!) 146/75     Pulse: 84 97     Resp: 20 18 28 21   Temp: 97.4 °F (36.3 °C) 97.6 °F (36.4 °C)     TempSrc: Oral Axillary     SpO2: 95% (!) 89% (!) 85% (!) 86%   Weight:       Height:         Constitutional: This is a well developed, well nourished, 18.5-24.9 - Normal 70y.o. year old male who is alert, oriented, cooperative and in no apparent distress. Head:normocephalic and atraumatic. EENT:  TANG. No conjunctival injections. Septum was midline, mucosa was without erythema, exudates or cobblestoning. No thrush was noted. Mallampati  II (soft palate, uvula, fauces visible)  Neck: Supple without thyromegaly. No elevated JVP. Trachea was midline. Respiratory: Chest was symmetrical without dullness to percussion. Breath sounds bilaterally were clear to auscultation. There were no wheezes, rhonchi , but has bilateral rales. There is no intercostal retraction or use of accessory muscles. No egophony noted. Cardiovascular: Regular without murmur, clicks, gallops or rubs. Abdomen: Slightly rounded and soft without organomegaly. No rebound tenderness, rigidity or guarding was appreciated. Lymphatic: No lymphadenopathy. Musculoskeletal: Normal curvature of the spine. No gross muscle weakness. Extremities:  No lower extremity edema, ulcerations, tenderness, varicosities or erythema. Muscle size, tone and strength are normal.  No involuntary movements are noted. Skin:  Warm and dry. Good color, turgor and pigmentation. No lesions or scars. No cyanosis or clubbing  Neurological/Psychiatric: The patient's general behavior, level of consciousness, thought content and emotional status is normal.          CBC: No results for input(s): WBC, HGB, PLT in the last 72 hours. BMP:    Recent Labs     03/17/19  0650 03/18/19  0533 03/19/19  0612    131* 134*   K 5.9* 4.3 4.5   CL 96* 93* 96*   CO2 29 29 28   BUN 23 22 25*   CREATININE 0.62* 0.54* 0.45*   GLUCOSE 148* 196* 210*     Calcium:  Recent Labs     03/19/19  0612   CALCIUM 8.6     Ionized Calcium:No results for input(s): IONCA in the last 72 hours. Magnesium:No results for input(s): MG in the last 72 hours. Phosphorus:No results for input(s): PHOS in the last 72 hours. BNP:No results for input(s): BNP in the last 72 hours.   Glucose:  Recent Labs     03/18/19  1624 oxygen. Patient was educated on the importance of compliance and the benefits of oxygen use. Complications of oxygen use, including dryness of the nostrils, epistaxis and also the fire hazard were explained to the patient. Patient willingly accepts to use  the oxygen as recommended  Cxr reviewed. None today  Diet reviewed  Okay for discharge from pulmonary standpoint  Overall Prognosis is poor. Pt not wanting Hospice/pallitive care. Thank you for having us involved in the care of your patient. Please call us if you have any questions or concerns. Karen Swann M.D.  PGY-3 Internal Medicine  [facility]. 3/19/2019, 10:33 AM    Pulmonary & Critical Care  Attending Physician Statement  I have discussed the care of Clarisse Woodward, including pertinent history and exam findings,  with the resident. I have seen and examined the patient and the key elements of all parts of the encounter have been performed by me. I agree with the assessment, plan and orders as documented by the resident with additions . Stable off high flow but continues to need 5 l of o2 and sob with minimal exertion   Continue steroid taper   Long term prognoses is poor   Treatment plan Discussed with nursing staff in detail , all questions answered . Electronically signed by Rajat Thomas MD on   3/19/19 at 8:57 PM    Please note that this chart was generated using voice recognition Dragon dictation software. Although every effort was made to ensure the accuracy of this automated transcription, some errors in transcription may have occurred.

## 2019-03-20 LAB
ANION GAP SERPL CALCULATED.3IONS-SCNC: 8 MMOL/L (ref 9–17)
BUN BLDV-MCNC: 24 MG/DL (ref 8–23)
BUN/CREAT BLD: ABNORMAL (ref 9–20)
CALCIUM SERPL-MCNC: 8.5 MG/DL (ref 8.6–10.4)
CHLORIDE BLD-SCNC: 98 MMOL/L (ref 98–107)
CO2: 26 MMOL/L (ref 20–31)
CREAT SERPL-MCNC: 0.6 MG/DL (ref 0.7–1.2)
GFR AFRICAN AMERICAN: >60 ML/MIN
GFR NON-AFRICAN AMERICAN: >60 ML/MIN
GFR SERPL CREATININE-BSD FRML MDRD: ABNORMAL ML/MIN/{1.73_M2}
GFR SERPL CREATININE-BSD FRML MDRD: ABNORMAL ML/MIN/{1.73_M2}
GLUCOSE BLD-MCNC: 148 MG/DL (ref 75–110)
GLUCOSE BLD-MCNC: 165 MG/DL (ref 70–99)
GLUCOSE BLD-MCNC: 199 MG/DL (ref 75–110)
GLUCOSE BLD-MCNC: 271 MG/DL (ref 75–110)
GLUCOSE BLD-MCNC: 43 MG/DL (ref 75–110)
POTASSIUM SERPL-SCNC: 4.6 MMOL/L (ref 3.7–5.3)
SODIUM BLD-SCNC: 132 MMOL/L (ref 135–144)

## 2019-03-20 PROCEDURE — 6370000000 HC RX 637 (ALT 250 FOR IP): Performed by: INTERNAL MEDICINE

## 2019-03-20 PROCEDURE — 2060000000 HC ICU INTERMEDIATE R&B

## 2019-03-20 PROCEDURE — 6360000002 HC RX W HCPCS: Performed by: INTERNAL MEDICINE

## 2019-03-20 PROCEDURE — 97535 SELF CARE MNGMENT TRAINING: CPT

## 2019-03-20 PROCEDURE — 6370000000 HC RX 637 (ALT 250 FOR IP): Performed by: FAMILY MEDICINE

## 2019-03-20 PROCEDURE — 2580000003 HC RX 258: Performed by: NURSE PRACTITIONER

## 2019-03-20 PROCEDURE — 99232 SBSQ HOSP IP/OBS MODERATE 35: CPT | Performed by: FAMILY MEDICINE

## 2019-03-20 PROCEDURE — 6360000002 HC RX W HCPCS: Performed by: NURSE PRACTITIONER

## 2019-03-20 PROCEDURE — 82947 ASSAY GLUCOSE BLOOD QUANT: CPT

## 2019-03-20 PROCEDURE — 94762 N-INVAS EAR/PLS OXIMTRY CONT: CPT

## 2019-03-20 PROCEDURE — 80048 BASIC METABOLIC PNL TOTAL CA: CPT

## 2019-03-20 PROCEDURE — 6370000000 HC RX 637 (ALT 250 FOR IP): Performed by: NURSE PRACTITIONER

## 2019-03-20 PROCEDURE — 2700000000 HC OXYGEN THERAPY PER DAY

## 2019-03-20 PROCEDURE — 99233 SBSQ HOSP IP/OBS HIGH 50: CPT | Performed by: INTERNAL MEDICINE

## 2019-03-20 PROCEDURE — 36415 COLL VENOUS BLD VENIPUNCTURE: CPT

## 2019-03-20 PROCEDURE — 94640 AIRWAY INHALATION TREATMENT: CPT

## 2019-03-20 RX ORDER — LEVALBUTEROL INHALATION SOLUTION 0.63 MG/3ML
1.25 SOLUTION RESPIRATORY (INHALATION) 2 TIMES DAILY
Status: DISCONTINUED | OUTPATIENT
Start: 2019-03-21 | End: 2019-03-22 | Stop reason: HOSPADM

## 2019-03-20 RX ORDER — ALBUTEROL SULFATE 2.5 MG/3ML
2.5 SOLUTION RESPIRATORY (INHALATION) 2 TIMES DAILY
Status: DISCONTINUED | OUTPATIENT
Start: 2019-03-21 | End: 2019-03-20

## 2019-03-20 RX ADMIN — FAMOTIDINE 20 MG: 20 TABLET, FILM COATED ORAL at 10:44

## 2019-03-20 RX ADMIN — PREDNISONE 30 MG: 20 TABLET ORAL at 10:40

## 2019-03-20 RX ADMIN — IPRATROPIUM BROMIDE 0.5 MG: 0.5 SOLUTION RESPIRATORY (INHALATION) at 08:16

## 2019-03-20 RX ADMIN — MOMETASONE FUROATE AND FORMOTEROL FUMARATE DIHYDRATE 2 PUFF: 200; 5 AEROSOL RESPIRATORY (INHALATION) at 08:16

## 2019-03-20 RX ADMIN — IPRATROPIUM BROMIDE 0.5 MG: 0.5 SOLUTION RESPIRATORY (INHALATION) at 16:10

## 2019-03-20 RX ADMIN — IPRATROPIUM BROMIDE 0.5 MG: 0.5 SOLUTION RESPIRATORY (INHALATION) at 20:28

## 2019-03-20 RX ADMIN — INSULIN LISPRO 3 UNITS: 100 INJECTION, SOLUTION INTRAVENOUS; SUBCUTANEOUS at 20:04

## 2019-03-20 RX ADMIN — IPRATROPIUM BROMIDE 0.5 MG: 0.5 SOLUTION RESPIRATORY (INHALATION) at 11:55

## 2019-03-20 RX ADMIN — FAMOTIDINE 20 MG: 20 TABLET, FILM COATED ORAL at 20:04

## 2019-03-20 RX ADMIN — Medication 10 ML: at 20:04

## 2019-03-20 RX ADMIN — INSULIN LISPRO 2 UNITS: 100 INJECTION, SOLUTION INTRAVENOUS; SUBCUTANEOUS at 10:17

## 2019-03-20 RX ADMIN — INSULIN LISPRO 2 UNITS: 100 INJECTION, SOLUTION INTRAVENOUS; SUBCUTANEOUS at 16:52

## 2019-03-20 RX ADMIN — ASPIRIN 81 MG: 81 TABLET, CHEWABLE ORAL at 10:39

## 2019-03-20 RX ADMIN — LEVALBUTEROL HYDROCHLORIDE 1.25 MG: 1.25 SOLUTION RESPIRATORY (INHALATION) at 08:16

## 2019-03-20 RX ADMIN — ACETAMINOPHEN 650 MG: 325 TABLET ORAL at 09:50

## 2019-03-20 RX ADMIN — ACETAMINOPHEN 650 MG: 325 TABLET ORAL at 20:03

## 2019-03-20 RX ADMIN — ENOXAPARIN SODIUM 40 MG: 40 INJECTION SUBCUTANEOUS at 10:40

## 2019-03-20 RX ADMIN — LEVALBUTEROL HYDROCHLORIDE 1.25 MG: 1.25 SOLUTION RESPIRATORY (INHALATION) at 16:10

## 2019-03-20 RX ADMIN — GUAIFENESIN 600 MG: 600 TABLET, EXTENDED RELEASE ORAL at 20:04

## 2019-03-20 RX ADMIN — LEVALBUTEROL HYDROCHLORIDE 1.25 MG: 1.25 SOLUTION RESPIRATORY (INHALATION) at 20:28

## 2019-03-20 RX ADMIN — INSULIN GLARGINE 20 UNITS: 100 INJECTION, SOLUTION SUBCUTANEOUS at 10:16

## 2019-03-20 RX ADMIN — MOMETASONE FUROATE AND FORMOTEROL FUMARATE DIHYDRATE 2 PUFF: 200; 5 AEROSOL RESPIRATORY (INHALATION) at 20:28

## 2019-03-20 RX ADMIN — LEVALBUTEROL HYDROCHLORIDE 1.25 MG: 0.63 SOLUTION RESPIRATORY (INHALATION) at 23:20

## 2019-03-20 RX ADMIN — Medication 10 ML: at 10:44

## 2019-03-20 RX ADMIN — TAMSULOSIN HYDROCHLORIDE 0.4 MG: 0.4 CAPSULE ORAL at 10:46

## 2019-03-20 RX ADMIN — GUAIFENESIN 600 MG: 600 TABLET, EXTENDED RELEASE ORAL at 10:39

## 2019-03-20 RX ADMIN — ESCITALOPRAM OXALATE 10 MG: 10 TABLET ORAL at 10:40

## 2019-03-20 RX ADMIN — LEVALBUTEROL HYDROCHLORIDE 1.25 MG: 1.25 SOLUTION RESPIRATORY (INHALATION) at 11:55

## 2019-03-20 ASSESSMENT — PAIN SCALES - GENERAL
PAINLEVEL_OUTOF10: 5
PAINLEVEL_OUTOF10: 3
PAINLEVEL_OUTOF10: 0

## 2019-03-20 NOTE — PROGRESS NOTES
RSV.  Vancomycin stopped and Zosyn will be continued for 5 days total.  Pulmonology evaluated the patient and hospice was consulted. \"     Review of Systems:     Constitutional:  negative for chills, fevers, sweats  Respiratory:  +ve for cough, dyspnea on exertion, shortness of breath, wheezing  Cardiovascular:  negative for chest pain, chest pressure/discomfort, lower extremity edema, palpitations  Gastrointestinal:  negative for abdominal pain, constipation, diarrhea, nausea, vomiting  Neurological:  negative for dizziness, headache    Medications:      Allergies:  No Known Allergies    Current Meds:   Scheduled Meds:    insulin glargine  10 Units Subcutaneous Nightly    predniSONE  30 mg Oral Daily    Followed by   Amrita Felder ON 3/23/2019] predniSONE  20 mg Oral Daily    Followed by   Amrtia Felder ON 3/26/2019] predniSONE  10 mg Oral Daily    guaiFENesin  600 mg Oral BID    insulin glargine  20 Units Subcutaneous Daily with breakfast    aspirin  81 mg Oral Daily    mometasone-formoterol  2 puff Inhalation BID    escitalopram  10 mg Oral Daily    famotidine  20 mg Oral BID    tamsulosin  0.4 mg Oral Daily    levalbuterol  1.25 mg Nebulization Q4H WA    ipratropium  0.5 mg Nebulization Q4H WA    bisacodyl  10 mg Rectal Once    enoxaparin  40 mg Subcutaneous Daily    sodium chloride flush  10 mL Intravenous 2 times per day    insulin lispro  0-12 Units Subcutaneous TID WC    insulin lispro  0-6 Units Subcutaneous Nightly     Continuous Infusions:    dextrose      dextrose       PRN Meds: sodium chloride, acetaminophen, acetaminophen, albuterol, magnesium hydroxide, nicotine, ondansetron, sodium chloride flush, dextrose, dextrose, glucagon (rDNA), glucose, glucose, dextrose, glucagon (rDNA), dextrose    Data:     Past Medical History:   has a past medical history of Anemia, Anxiety, Bronchitis, COPD (chronic obstructive pulmonary disease) ([facility] [address] Diabetes ([facility] [address] Former smoker, Hyperlipidemia, Oxygen dependent, Respiratory distress, and Type 2 diabetes mellitus ([address] Social History:   reports that he has quit smoking. His smoking use included cigarettes. He has never used smokeless tobacco. He reports that he does not drink alcohol or use drugs. Family History: No family history on file. Vitals:  /71   Pulse 89   Temp 97.7 °F (36.5 °C) (Oral)   Resp 23   Ht 5' 8.9\" (1.75 m)   Wt 156 lb 1.4 oz (70.8 kg)   SpO2 95%   BMI 23.12 kg/m²   Temp (24hrs), Av.8 °F (36.6 °C), Min:97.4 °F (36.3 °C), Max:98.2 °F (36.8 °C)    Recent Labs     19  1102 19  1550 19  0634   POCGLU 157* 171* 334* 148*       I/O (24Hr): Intake/Output Summary (Last 24 hours) at 3/20/2019 0742  Last data filed at 3/20/2019 0531  Gross per 24 hour   Intake 1160 ml   Output 1750 ml   Net -590 ml       Labs:    Hematology:No results for input(s): WBC, RBC, HGB, HCT, MCV, MCH, MCHC, RDW, PLT, MPV, SEDRATE, CRP, INR, DDIMER, [id number], LABABSO in the last 72 hours.     Invalid input(s): PT  Chemistry:  Recent Labs     19  0533 19  0612 19  0631   * 134* 132*   K 4.3 4.5 4.6   CL 93* 96* 98   CO2 29 28 26   GLUCOSE 196* 210* 165*   BUN 22 25* 24*   CREATININE 0.54* 0.45* 0.60*   ANIONGAP 9 10 8*   LABGLOM >60 >60 >60   GFRAA >60 >60 >60   CALCIUM 8.4* 8.6 8.5*     Recent Labs     19  1923 19  0735 19  1102 19  1550 19  0634   POCGLU 265* 159* 157* 171* 334* 148*         Lab Results   Component Value Date/Time    SPECIAL RT LOWER ARM 10ML 2019 12:29 AM     Lab Results   Component Value Date/Time    CULTURE NO GROWTH 6 DAYS 2019 12:29 AM       Lab Results   Component Value Date    POCPH 7.468 2019    POCPCO2 38.9 2019    POCPO2 73.0 2019    POCHCO3 28.2 2019    NBEA NOT REPORTED 2019    PBEA 4 2019    CZH9WDJ 29 2019    LEQL6YKJ 95 2019    FIO2 45.0 2019 Radiology:        Physical Examination:        General appearance:  alert, cooperative and no distress, has HFNC   Mental Status:  oriented to person, place and time and normal affect  Lungs:  Very diminished  bilaterally, normal effort  Heart:  regular rate and rhythm, no murmur  Abdomen:  soft, nontender, nondistended, normal bowel sounds, no masses, hepatomegaly, splenomegaly  Extremities:  no edema, redness, tenderness in the calves  Skin:  no gross lesions, rashes, induration    Assessment:        Primary Problem  Acute respiratory failure with hypoxia [facility])    Active Hospital Problems    Diagnosis Date Noted    Atrial flutter ([address] [I48.92] 03/08/2019    Acute respiratory failure with hypoxia ([address] [J96.01] 01/23/2019    Pneumonia due to infectious organism [J18.9] 12/16/2018    COPD exacerbation ([address] [J44.1] 11/27/2018    Acute on chronic respiratory failure with hypoxemia ([address] [J96.21] 11/27/2018    Pulmonary artery hypertension (HCC) [I27.21] 05/23/2018    Iron deficiency anemia [D50.9] 05/23/2018    Type 2 diabetes mellitus without complication, with long-term current use of insulin ([address] [E11.9, Z79.4] 10/30/2014    Hyperlipidemia [E78.5] 10/30/2014       Plan:        Finished 5 days course of Zosyn. Switched to steroid taper  Continue aerosol treatment. Continue to wean high flow as tolerable. Patient denied hospice/palliative care.   He is aware of the poor prognosis  DC planning, trouble finding placement, discussed with AUSTEN ALFREDO device to arrive later today and patient to try it overnight  Rejected yesterday by [facility] as he was on nasal canula      Tyesha De La Crzu MD  3/20/2019  7:42 AM

## 2019-03-20 NOTE — PROGRESS NOTES
Occupational Therapy  Facility/Department: [facility] 4B STEPDOWN  Daily Treatment Note  NAME: Ramirez Langley  : 1940  MRN: [id number]    Date of Service: 3/20/2019    Discharge Recommendations:  (LTACH for O2)       Assessment   Performance deficits / Impairments: Decreased endurance;Decreased ADL status; Decreased high-level IADLs;Decreased strength  Assessment: O2 sats dropped into 70s when sitting EOB and returned patient to supine to complete bathing self-cares. Patient understanding of importance to keep O2 sats in 90s and educated on pursued lip breathing. OT services necessary for energy conservation and to improve participation in self-cares. Prognosis: Fair  Decision Making: Medium Complexity  REQUIRES OT FOLLOW UP: Yes  Activity Tolerance  Activity Tolerance: Patient able to tolerate activity although O2 sats dropped into 70s when sitting EOB  Safety Devices  Type of devices: Left in bed;Call light within reach; Patient at risk for falls         Patient Diagnosis(es): There were no encounter diagnoses. has a past medical history of Anemia, Anxiety, Bronchitis, COPD (chronic obstructive pulmonary disease) ([facility] [address] Diabetes ([facility] [address] Former smoker, Hyperlipidemia, Oxygen dependent, Respiratory distress, and Type 2 diabetes mellitus ([address] has a past surgical history that includes AAA repair, open; Pancreas surgery; Finger surgery; and Foot surgery. Restrictions  Restrictions/Precautions  Restrictions/Precautions: Contact Precautions, Up as Tolerated, Fall Risk(Watch O2sats)  Required Braces or Orthoses?: No  Position Activity Restriction  Other position/activity restrictions: Pt on hi breanna canula.  Initially 35%, Respiratory therapy took pt off HiFlo and placed on 7L, SpO2 between 83-90%    Subjective   General  Chart Reviewed: No  Patient assessed for rehabilitation services?: Yes  Family / Caregiver Present: No  Referring Practitioner: Reji  Diagnosis: Pneumonia  General Comment  Comments: JENNIFER deleon'luís for treatment this date. Vital Signs  Patient Currently in Pain: Denies  Oxygen Therapy  SpO2: 94 %(Dropped to 75 sitting EOB)  Pulse Oximeter Device Mode: Continuous  O2 Flow Rate (L/min): 5 L/min     Orientation  Orientation  Overall Orientation Status: Within Functional Limits    Objective    ADL  UE Bathing: Contact guard assistance  LE Bathing: Maximum assistance;Setup  UE Dressing: Minimal assistance  LE Dressing: Maximum assistance;Setup  Additional Comments: OT facilitated UB/LB bathing trialed EOB with O2 sats dropped to 75% and returned patient to supine with sats going back into 90s over 2 minutes. Pt Max A for LB bathing and Min A for UB bathing to assist in keep sats up. Patient Max A to doff/christiane socks. Educated patient on pursued lip breathing with poor return. Balance  Sitting Balance: Unable to assess(comment)(Did not assess due to patients O2 sat levels)  Bed mobility  Supine to Sit: Modified independent                          Cognition  Overall Cognitive Status: [facility]      Plan   Plan  Times per week: 3x  Times per day: Daily  Plan weeks: 1 week  Specific instructions for Next Treatment: Education regarding AE for ADL's, UB cardiac strengthening HEP  Current Treatment Recommendations: Strengthening, Endurance Training, Self-Care / ADL    Goals  Short term goals  Time Frame for Short term goals: By discharge:  Short term goal 1: Patient will implement at least 1 energy conservation technique during ADL or functional mobility to improve overall quality of life. Short term goal 2: Patient will complete toilet transfer (i.e. BSC due to HPO2) with MOD I to improve Pt's performance in her functional mobility. Short term goal 3: Patient will complete UB cardiac strengthening HEP with SETUP to improve Pt's overall quality of life. Short term goal 4: Patient will complete standing (static or dynamic) ADL/IADL tasks >5 minutes to improve Pt's participation in functional mobility.   Short term goal 5: Patient will complete at least 1 IADL routine (i.e. simple meal prep, simple home mgmt) with MOD I to improve Pt's performance in ADL/IADL upon discharge. Patient Goals   Patient goals : To go home and not be transferred to a nursing home.         Therapy Time   Individual Concurrent Group Co-treatment   Time In 5496         Time Out 1542         Minutes [address], OTR/L

## 2019-03-20 NOTE — PROGRESS NOTES
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 Patient on Nasal cannula 5 L, POX 96% while resting. Required High flow overnight and this am.  Still with poor activity tolerance, desats with activity in the 80's  Has a dry cough. No fever. Shortness of breath is slowly improving, feels better today  No chest pain or pressure. No orthopnea or PND  Tolerating diet  No constipation/diarrhea    Subjective:   Review of Systems -   General ROS: Completed and except as mentioned above were negative   Psychological ROS:  Completed and except as mentioned above were negative  Allergy and Immunology ROS:  Completed and except as mentioned above were negative  Hematological and Lymphatic ROS:  Completed and except as mentioned above were negative  Respiratory ROS:  Completed and except as mentioned above were negative  Cardiovascular ROS:  Completed and except as mentioned above were negative  Gastrointestinal ROS: Completed and except as mentioned above were negative  Genito-Urinary ROS:  Completed and except as mentioned above were negative  Musculoskeletal ROS:  Completed and except as mentioned above were negative  Neurological ROS:  Completed and except as mentioned above were negative  Dermatological ROS:  Completed and except as mentioned above were negative      Diet:  DIET CARB CONTROL;   Dietary Nutrition Supplements: Diabetic Oral Supplement      Medications:Current Inpatient    Scheduled Meds:   [START ON 3/21/2019] levalbuterol  1.25 mg Nebulization BID    predniSONE  30 mg Oral Daily    Followed by   Jada Clark ON 3/23/2019] predniSONE 20 mg Oral Daily    Followed by   Sigifredo Perez ON 3/26/2019] predniSONE  10 mg Oral Daily    guaiFENesin  600 mg Oral BID    insulin glargine  20 Units Subcutaneous Daily with breakfast    aspirin  81 mg Oral Daily    mometasone-formoterol  2 puff Inhalation BID    escitalopram  10 mg Oral Daily    famotidine  20 mg Oral BID    tamsulosin  0.4 mg Oral Daily    levalbuterol  1.25 mg Nebulization Q4H WA    ipratropium  0.5 mg Nebulization Q4H WA    bisacodyl  10 mg Rectal Once    enoxaparin  40 mg Subcutaneous Daily    sodium chloride flush  10 mL Intravenous 2 times per day    insulin lispro  0-12 Units Subcutaneous TID WC    insulin lispro  0-6 Units Subcutaneous Nightly     Continuous Infusions:   dextrose      dextrose       PRN Meds:sodium chloride, acetaminophen, acetaminophen, albuterol, magnesium hydroxide, nicotine, ondansetron, sodium chloride flush, dextrose, dextrose, glucagon (rDNA), glucose, glucose, dextrose, glucagon (rDNA), dextrose    Objective:      Physical Exam:  Vitals: /66   Pulse 99   Temp 97.5 °F (36.4 °C) (Oral)   Resp 19   Ht 5' 8.9\" (1.75 m)   Wt 156 lb 1.4 oz (70.8 kg)   SpO2 94%   BMI 23.12 kg/m²   24 hour intake/output:    Intake/Output Summary (Last 24 hours) at 3/20/2019 1803  Last data filed at 3/20/2019 1735  Gross per 24 hour   Intake 2060 ml   Output 1750 ml   Net 310 ml     Last 3 weights: Wt Readings from Last 3 Encounters:   03/14/19 156 lb 1.4 oz (70.8 kg)   03/07/19 155 lb (70.3 kg)   01/30/19 155 lb 8 oz (70.5 kg)         Physical Examination:   PHYSICAL EXAMINATION:  Vitals:    03/20/19 1425 03/20/19 1524 03/20/19 1552 03/20/19 1613   BP:  128/66     Pulse:  99     Resp: 21 19     Temp:  97.5 °F (36.4 °C)     TempSrc:  Oral     SpO2: 94% 94% 94% 94%   Weight:       Height:         Constitutional: This is a well developed, well nourished, 18.5-24.9 - Normal 70y.o. year old male who is alert, oriented, cooperative and in no apparent distress. Head:normocephalic and atraumatic. EENT:  TANG. No conjunctival injections. Septum was midline, mucosa was without erythema, exudates or cobblestoning. No thrush was noted. Mallampati  II (soft palate, uvula, fauces visible)  Neck: Supple without thyromegaly. No elevated JVP. Trachea was midline. Respiratory: Chest was symmetrical without dullness to percussion. Breath sounds bilaterally were clear to auscultation. There were no wheezes, rhonchi , but has bilateral rales. There is no intercostal retraction or use of accessory muscles. No egophony noted. Cardiovascular: Regular without murmur, clicks, gallops or rubs. Abdomen: Slightly rounded and soft without organomegaly. No rebound tenderness, rigidity or guarding was appreciated. Lymphatic: No lymphadenopathy. Musculoskeletal: Normal curvature of the spine. No gross muscle weakness. Extremities:  No lower extremity edema, ulcerations, tenderness, varicosities or erythema. Muscle size, tone and strength are normal.  No involuntary movements are noted. Skin:  Warm and dry. Good color, turgor and pigmentation. No lesions or scars. No cyanosis or clubbing  Neurological/Psychiatric: The patient's general behavior, level of consciousness, thought content and emotional status is normal.          CBC: No results for input(s): WBC, HGB, PLT in the last 72 hours. BMP:    Recent Labs     03/18/19  0533 03/19/19  0612 03/20/19  0631   * 134* 132*   K 4.3 4.5 4.6   CL 93* 96* 98   CO2 29 28 26   BUN 22 25* 24*   CREATININE 0.54* 0.45* 0.60*   GLUCOSE 196* 210* 165*     Calcium:  Recent Labs     03/20/19  0631   CALCIUM 8.5*     Ionized Calcium:No results for input(s): IONCA in the last 72 hours. Magnesium:No results for input(s): MG in the last 72 hours. Phosphorus:No results for input(s): PHOS in the last 72 hours. BNP:No results for input(s): BNP in the last 72 hours.   Glucose:  Recent Labs     03/20/19  0634 03/20/19  1216 03/20/19  1617   POCGLU 148* 43* 199*     HgbA1C: No results for input(s): LABA1C in the last 72 hours. INR: No results for input(s): INR in the last 72 hours. Hepatic: No results for input(s): ALKPHOS, ALT, AST, PROT, BILITOT, BILIDIR, LABALBU in the last 72 hours. Amylase and Lipase:No results for input(s): LACTA, AMYLASE in the last 72 hours. Lactic Acid: No results for input(s): LACTA in the last 72 hours. CARDIAC ENZYMES:No results for input(s): CKTOTAL, CKMB, CKMBINDEX, TROPONINI in the last 72 hours. BNP: No results for input(s): BNP in the last 72 hours. Lipids: No results for input(s): CHOL, TRIG, HDL, LDLCALC in the last 72 hours. Invalid input(s): LDL  ABGs: No results found for: PH, PCO2, PO2, HCO3, O2SAT  Thyroid:   Lab Results   Component Value Date    TSH 2.94 02/20/2019      Urinalysis: No results for input(s): BACTERIA, BLOODU, CLARITYU, COLORU, PHUR, PROTEINU, RBCUA, SPECGRAV, BILIRUBINUR, NITRU, WBCUA, LEUKOCYTESUR, GLUCOSEU in the last 72 hours. CULTURES:    3/8/2019 blood cultures negative      Assessment:    Principal Problem:    Acute respiratory failure with hypoxia (HCC)  Active Problems:    Type 2 diabetes mellitus without complication, with long-term current use of insulin (HCC)    Hyperlipidemia    Pulmonary artery hypertension (HCC)    Iron deficiency anemia    COPD exacerbation (HCC)    Acute on chronic respiratory failure with hypoxemia ([facility])    Pneumonia due to infectious organism    Atrial flutter ([facility] [address]  Resolved Problems:    * No resolved hospital problems. *      Plan:  Meds reviewed- changes made as appropriate. Continue corticosteroids and bronchodilators. Continue prednisone taper. Patient is on Lovenox  Increase activity as permitted and tolerated. Encouraged pt to increase activity  Questions patient had were answered to his satisfaction. Continue supplemental oxygen. Continue effort to wean oxygen  Patient was informed of the need for using oxygen.   Patient was educated on the importance of compliance and the benefits of oxygen use. Complications of oxygen use, including dryness of the nostrils, epistaxis and also the fire hazard were explained to the patient. Patient willingly accepts to use  the oxygen as recommended  Cxr reviewed. None today  Diet reviewed  Okay for discharge from pulmonary standpoint  Overall Prognosis is poor. Pt not wanting Hospice/pallitive care. Awaiting L2K to be obtained. Placement pending,  assistance appreciated. Thank you for having us involved in the care of your patient. Please call us if you have any questions or concerns. Ivet Vazquez M.D.  PGY-3 Internal Medicine  [facility]. 3/20/2019, 6:03 PM    Pulmonary & Critical Care    Attending Physician Statement  I have discussed the care of June Valle, including pertinent history and exam findings with the resident. I have reviewed the key elements of all parts of the encounter with the resident. I have seen and examined the patient with the resident. I agree with the assessment and plan and status of the problem list as documented. I have reviewed the events since yesterday, medications reviewed and labs seen discussed with the respiratory therapist and nursing staff. Yesterday he started having more hypoxia and from nasal cannula switch to high flow again and overnight he was on 55% and 35 L of high flow nasal cannula, this morning he was on 35% and 25 L and he was saturating between 86% to 90%. He does complain of increasing shortness of breath and increasing sputum production no yellow-green sputum is afebrile, he does desaturate on any movement and activity. Continue with the prednisone he will likely need longer prednisone taper and likely will need chronic steroids. Continue to wean high flow to nasal cannula. Continue with bronchodilators.     Antonio Billings MD  3/20/2019 6:42 PM    Please note that this chart was generated using voice recognition Dragon dictation software. Although every effort was made to ensure the accuracy of this automated transcription, some errors in transcription may have occurred.

## 2019-03-20 NOTE — PROGRESS NOTES
BRONCHOSPASM/BRONCHOCONSTRICTION     [x]         IMPROVE AERATION/BREATH SOUNDS  [x]   ADMINISTER BRONCHODILATOR THERAPY AS APPROPRIATE  [x]   ASSESS BREATH SOUNDS  [x]   IMPLEMENT AEROSOL/MDI PROTOCOL  [facility] Pneumonia [J18.9] . Vitals:    03/20/19 0822   BP:    Pulse:    Resp: 24   Temp:    SpO2: 98%   . Patients home meds are   Prior to Admission medications    Medication Sig Start Date End Date Taking?  Authorizing Provider   predniSONE (DELTASONE) 10 MG tablet Take 3 tablets by mouth daily for 3 days 3/20/19 3/23/19 Yes Sheng Brown MD   predniSONE (DELTASONE) 20 MG tablet Take 1 tablet by mouth daily for 3 days 3/24/19 3/27/19 Yes Sheng Brown MD   predniSONE (DELTASONE) 10 MG tablet Take 1 tablet by mouth daily for 3 days 3/28/19 3/31/19 Yes Sheng Brown MD   sodium chloride (OCEAN) 0.65 % nasal spray 1 spray by Nasal route as needed for Congestion 1/29/19   Dwain Vogel MD   ergocalciferol (ERGOCALCIFEROL) 95693 units capsule Take 1 capsule by mouth once a week 12/24/18   Mohan Wade MD   furosemide (LASIX) 20 MG tablet Take 1 tablet by mouth every other day 12/1/18   Rosita Dowling MD   insulin glargine (LANTUS) 100 UNIT/ML injection vial Inject 20 Units into the skin nightly 11/30/18   Rosita Dowling MD   tamsulosin (FLOMAX) 0.4 MG capsule Take 0.4 mg by mouth daily    Historical Provider, MD   famotidine (PEPCID) 20 MG tablet Take 20 mg by mouth 2 times daily    Historical Provider, MD   acetaminophen (TYLENOL) 325 MG tablet Take 650 mg by mouth every 6 hours as needed for Pain    Historical Provider, MD   aclidinium (TUDORZA PRESSAIR) 400 MCG/ACT AEPB inhaler Inhale 1 puff into the lungs 2 times daily 6/22/18 7/22/19  Nieves Gerard MD   albuterol sulfate HFA (VENTOLIN HFA) 108 (90 Base) MCG/ACT inhaler Inhale 2 puffs into the lungs every 6 hours as needed for Wheezing 6/22/18   Nieves Gerard MD   budesonide-formoterol []  less than 15ml/Kg []  less than 15ml/Kg   Surgery within last 2 weeks []  None or general   []  Abdominal or thoracic surgery  []  Abdominal or thoracic   Chronic Pulmonary Historyre []  No []  Yes []  Yes     []  Secretion Management Assessment  Score 1 2 3   Bilateral Breath Sounds   []  Occasional Rhonchi []  Scattered Rhonchi []  Course Rhonchi and/or poor aeration   Sputum    []  Small amount of thin secretions []  Moderate amount of viscous secretions []  Copius, Viscious Yellow/ Secretions   CXR as reported by physician []  clear  []  Unavailable []  Infiltrates and/or consolidation  []  Unavailable []  Mucus Plugging and or lobar consolidation  []  Unavailable   Cough []  Strong, productive cough []  Weak productive cough []  No cough or weak non-productive cough   MIHAI BHAGAT  10:53 AM                            FEMALE                                  MALE                            FEV1 Predicted Normal Values                        FEV1 Predicted Normal Values          Age                                     Height in Feet and Inches       Age                                     Height in Feet and Inches       4' 11\" 5' 1\" 5' 3\" 5' 5\" 5' 7\" 5' 9\" 5' 11\" 6' 1\"  4' 11\" 5' 1\" 5' 3\" 5' 5\" 5' 7\" 5' 9\" 5' 11\" 6' 1\"   42 - 45 2.49 2.66 2.84 3.03 3.22 3.42 3.62 3.83 42 - 45 2.82 3.03 3.26 3.49 3.72 3.96 4.22 4.47   46 - 49 2.40 2.57 2.76 2.94 3.14 3.33 3.54 3.75 46 - 49 2.70 2.92 3.14 3.37 3.61 3.85 4.10 4.36   50 - 53 2.31 2.48 2.66 2.85 3.04 3.24 3.45 3.66 50 - 53 2.58 2.80 3.02 3.25 3.49 3.73 3.98 4.24   54 - 57 2.21 2.38 2.57 2.75 2.95 3.14 3.35 3.56 54 - 57 2.46 2.67 2.89 3.12 3.36 3.60 3.85 4.11   58 - 61 2.10 2.28 2.46 2.65 2.84 3.04 3.24 3.45 58 - 61 2.32 2.54 2.76 2.99 3.23 3.47 3.72 3.98   62 - 65 1.99 2.17 2.35 2.54 2.73 2.93 3.13 3.34 62 - 65 2.19 2.40 2.62 2.85 3.09 3.33 3.58 3.84   66 - 69 1.88 2.05 2.23 2.42 2.61 2.81 3.02 3.23 66 - 69 2.04 2.26 2.48 2.71 2.95 3.19 3.44 3.70   70+ 1.82 1.99 2. 17 2.36 2.55 2.75 2.95 3.16 70+ 1.97 2.19 2.41 2.64 2.87 3.12 3.37 3.62             Predicted Peak Expiratory Flow Rate                                       Height (in)  Female       Height (in) Male           Age 64 63 56 61 58 73 78 74 Age            21 344 357 372 387 402 417 432 446  60 58 64 66 68 70 72 74 76   25 337 352 366 381 396 411 426 441 25 447 476 505 533 [phone] 677   30 329 344 359 374 389 404 419 434 30 437 466 494 523 552 580 609 638 667   35 322 337 351 366 381 396 411 426 35 426 455 484 512 541 570 598 627 657   40 314 329 344 359 374 389 404 419 40 416 445 473 502 531 559 588 617 647   45 307 322 336 351 366 381 396 411 45 405 434 463 491 520 549 577 606 636   50 299 314 329 344 359 374 389 404 50 395 424 452 481 510 538 567 596 625   55 292 307 321 336 351 366 381 396 55 384 413 442 470 499 528 556 585 615   60 284 299 314 329 344 359 374 389 60 374 403 431 460 489 517 546 575 605   65 277 292 306 321 336 351 366 381 65 363 392 421 449 478 507 535 564 594   70 269 284 299 314 329 344 359 374 70 353 382 410 439 468 496 525 554 583   75 261 274 289 305 319 334 348 364 75 344 372 400 429 458 487 515 544 573   80 253 266 282 296 312 327 342 356 80 335 362 390 419 448 [phone] 562           [x]   PATIENT EDUCATION AS NEEDED

## 2019-03-20 NOTE — CARE COORDINATION
Transitional Planning    1500 Discharge order in place  Patient still wanting to discharge home with [address] Patient on nasal cannula at 5lpm/nc  Plan is for [employer] to deliver and titrate L2K device to stabilize oxygen to return to home. Patient insisting he can move and ambulate to and from chair. Discussed at length need for back up plan and permission for a referral to a SNF that can accommodate HFNC  Patient agreed to referral to [facility], referral sent. [address] [facility] [address] and patient was at their facility on 2/22/19 and discharged to [facility]  Patient stated \"if that was the place I was in before going to [facility], it was dirty and I do not want to go to there\". 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 Patient has agreed to referral to [facility] if his concerns are addressed  Spoke with Chantale and updated her on referral and she stated she will speak with administration regarding patient concerns. Will start referral today, and if patient fails on L2K device patient will go to [facility]. Santana Byrne with [facility], they require patient to be off of high flow for 24 hours in order to try the L2K device. Tawanda Mathur to follow up tomorrow and see if patient remained off of high flow. Precert with [facility] started as backup plan.

## 2019-03-20 NOTE — PLAN OF CARE
Problem: Pain:  Goal: Pain level will decrease  Description  Pain level will decrease  Outcome: Ongoing  Note:   Pain level assessment complete. Pt educated on pain scale and control interventions. PRN pain medication given see MAR. Will continue to monitor.

## 2019-03-21 LAB
ANION GAP SERPL CALCULATED.3IONS-SCNC: 8 MMOL/L (ref 9–17)
BUN BLDV-MCNC: 22 MG/DL (ref 8–23)
BUN/CREAT BLD: ABNORMAL (ref 9–20)
CALCIUM SERPL-MCNC: 8.6 MG/DL (ref 8.6–10.4)
CHLORIDE BLD-SCNC: 95 MMOL/L (ref 98–107)
CO2: 28 MMOL/L (ref 20–31)
CREAT SERPL-MCNC: 0.57 MG/DL (ref 0.7–1.2)
GFR AFRICAN AMERICAN: >60 ML/MIN
GFR NON-AFRICAN AMERICAN: >60 ML/MIN
GFR SERPL CREATININE-BSD FRML MDRD: ABNORMAL ML/MIN/{1.73_M2}
GFR SERPL CREATININE-BSD FRML MDRD: ABNORMAL ML/MIN/{1.73_M2}
GLUCOSE BLD-MCNC: 155 MG/DL (ref 75–110)
GLUCOSE BLD-MCNC: 274 MG/DL (ref 75–110)
GLUCOSE BLD-MCNC: 281 MG/DL (ref 75–110)
GLUCOSE BLD-MCNC: 309 MG/DL (ref 70–99)
GLUCOSE BLD-MCNC: 357 MG/DL (ref 75–110)
GLUCOSE BLD-MCNC: 63 MG/DL (ref 75–110)
GLUCOSE BLD-MCNC: 64 MG/DL (ref 75–110)
POTASSIUM SERPL-SCNC: 4.8 MMOL/L (ref 3.7–5.3)
SODIUM BLD-SCNC: 131 MMOL/L (ref 135–144)

## 2019-03-21 PROCEDURE — 6360000002 HC RX W HCPCS: Performed by: INTERNAL MEDICINE

## 2019-03-21 PROCEDURE — 94640 AIRWAY INHALATION TREATMENT: CPT

## 2019-03-21 PROCEDURE — 6370000000 HC RX 637 (ALT 250 FOR IP): Performed by: NURSE PRACTITIONER

## 2019-03-21 PROCEDURE — 6370000000 HC RX 637 (ALT 250 FOR IP): Performed by: INTERNAL MEDICINE

## 2019-03-21 PROCEDURE — 6370000000 HC RX 637 (ALT 250 FOR IP): Performed by: FAMILY MEDICINE

## 2019-03-21 PROCEDURE — 80048 BASIC METABOLIC PNL TOTAL CA: CPT

## 2019-03-21 PROCEDURE — 6360000002 HC RX W HCPCS: Performed by: NURSE PRACTITIONER

## 2019-03-21 PROCEDURE — 94762 N-INVAS EAR/PLS OXIMTRY CONT: CPT

## 2019-03-21 PROCEDURE — 36415 COLL VENOUS BLD VENIPUNCTURE: CPT

## 2019-03-21 PROCEDURE — 2700000000 HC OXYGEN THERAPY PER DAY

## 2019-03-21 PROCEDURE — 2580000003 HC RX 258: Performed by: NURSE PRACTITIONER

## 2019-03-21 PROCEDURE — 99232 SBSQ HOSP IP/OBS MODERATE 35: CPT | Performed by: INTERNAL MEDICINE

## 2019-03-21 PROCEDURE — 2060000000 HC ICU INTERMEDIATE R&B

## 2019-03-21 PROCEDURE — 82947 ASSAY GLUCOSE BLOOD QUANT: CPT

## 2019-03-21 PROCEDURE — 99231 SBSQ HOSP IP/OBS SF/LOW 25: CPT | Performed by: FAMILY MEDICINE

## 2019-03-21 RX ORDER — PREDNISONE 10 MG/1
10 TABLET ORAL DAILY
Status: DISCONTINUED | OUTPATIENT
Start: 2019-04-03 | End: 2019-03-22 | Stop reason: HOSPADM

## 2019-03-21 RX ORDER — INSULIN GLARGINE 100 [IU]/ML
20 INJECTION, SOLUTION SUBCUTANEOUS ONCE
Status: DISCONTINUED | OUTPATIENT
Start: 2019-03-21 | End: 2019-03-22 | Stop reason: HOSPADM

## 2019-03-21 RX ORDER — PREDNISONE 20 MG/1
20 TABLET ORAL DAILY
Status: DISCONTINUED | OUTPATIENT
Start: 2019-03-27 | End: 2019-03-22 | Stop reason: HOSPADM

## 2019-03-21 RX ORDER — INSULIN GLARGINE 100 [IU]/ML
40 INJECTION, SOLUTION SUBCUTANEOUS
Status: DISCONTINUED | OUTPATIENT
Start: 2019-03-22 | End: 2019-03-22

## 2019-03-21 RX ADMIN — IPRATROPIUM BROMIDE 0.5 MG: 0.5 SOLUTION RESPIRATORY (INHALATION) at 20:34

## 2019-03-21 RX ADMIN — INSULIN LISPRO 6 UNITS: 100 INJECTION, SOLUTION INTRAVENOUS; SUBCUTANEOUS at 08:21

## 2019-03-21 RX ADMIN — LEVALBUTEROL HYDROCHLORIDE 1.25 MG: 0.63 SOLUTION RESPIRATORY (INHALATION) at 03:19

## 2019-03-21 RX ADMIN — IPRATROPIUM BROMIDE 0.5 MG: 0.5 SOLUTION RESPIRATORY (INHALATION) at 09:06

## 2019-03-21 RX ADMIN — ASPIRIN 81 MG: 81 TABLET, CHEWABLE ORAL at 08:20

## 2019-03-21 RX ADMIN — LEVALBUTEROL HYDROCHLORIDE 1.25 MG: 1.25 SOLUTION RESPIRATORY (INHALATION) at 20:33

## 2019-03-21 RX ADMIN — Medication 10 ML: at 08:22

## 2019-03-21 RX ADMIN — IPRATROPIUM BROMIDE 0.5 MG: 0.5 SOLUTION RESPIRATORY (INHALATION) at 12:35

## 2019-03-21 RX ADMIN — FAMOTIDINE 20 MG: 20 TABLET, FILM COATED ORAL at 21:02

## 2019-03-21 RX ADMIN — LEVALBUTEROL HYDROCHLORIDE 1.25 MG: 1.25 SOLUTION RESPIRATORY (INHALATION) at 22:52

## 2019-03-21 RX ADMIN — TAMSULOSIN HYDROCHLORIDE 0.4 MG: 0.4 CAPSULE ORAL at 08:21

## 2019-03-21 RX ADMIN — ESCITALOPRAM OXALATE 10 MG: 10 TABLET ORAL at 08:21

## 2019-03-21 RX ADMIN — MOMETASONE FUROATE AND FORMOTEROL FUMARATE DIHYDRATE 2 PUFF: 200; 5 AEROSOL RESPIRATORY (INHALATION) at 09:09

## 2019-03-21 RX ADMIN — PREDNISONE 30 MG: 20 TABLET ORAL at 08:21

## 2019-03-21 RX ADMIN — INSULIN LISPRO 6 UNITS: 100 INJECTION, SOLUTION INTRAVENOUS; SUBCUTANEOUS at 17:58

## 2019-03-21 RX ADMIN — ENOXAPARIN SODIUM 40 MG: 40 INJECTION SUBCUTANEOUS at 08:21

## 2019-03-21 RX ADMIN — LEVALBUTEROL HYDROCHLORIDE 1.25 MG: 1.25 SOLUTION RESPIRATORY (INHALATION) at 12:35

## 2019-03-21 RX ADMIN — GUAIFENESIN 600 MG: 600 TABLET, EXTENDED RELEASE ORAL at 21:02

## 2019-03-21 RX ADMIN — FAMOTIDINE 20 MG: 20 TABLET, FILM COATED ORAL at 08:21

## 2019-03-21 RX ADMIN — GUAIFENESIN 600 MG: 600 TABLET, EXTENDED RELEASE ORAL at 08:21

## 2019-03-21 RX ADMIN — LEVALBUTEROL HYDROCHLORIDE 1.25 MG: 1.25 SOLUTION RESPIRATORY (INHALATION) at 09:06

## 2019-03-21 RX ADMIN — MOMETASONE FUROATE AND FORMOTEROL FUMARATE DIHYDRATE 2 PUFF: 200; 5 AEROSOL RESPIRATORY (INHALATION) at 20:34

## 2019-03-21 RX ADMIN — INSULIN LISPRO 5 UNITS: 100 INJECTION, SOLUTION INTRAVENOUS; SUBCUTANEOUS at 21:31

## 2019-03-21 RX ADMIN — INSULIN GLARGINE 20 UNITS: 100 INJECTION, SOLUTION SUBCUTANEOUS at 08:21

## 2019-03-21 NOTE — CARE COORDINATION
Transition Plannin Received VM from [facility] informed pt is denied for admission to [facility] d/t level of care guidelines. 4167 Received faxed denial notice from [facility]. 1040 Received VM from Sofya Funes with [facility]. Called Sofya Funes back informed pt is on 5L NC and is tolerating off HFNC and would like to go home. She will call back with time for L2K device to be delivered and trialed with pt. She informs writer to contact [facility] if pt is unable to have a portable O2 tank brought in by family or friends. 105 Received c/b from Sofya Funes with [facility] informed L2K device will arrive around 1500 today. Informed bedside nurse Eric Lord. 1225 Met with pt discussed plan for delivery of L2K device around 1500. Inquired if he has someone available to bring the portable oxygen to the hospital and he confirms that he does and he will contact them, declines offer for writer to contact. [address] in to evaluate pt. Per their report \"Pt does not currently tolerate Life 2000 NIV, pt placed on L2K with no difference in WOB and worsening SpO2. Report placed on chart. 1610Spoke with Sofya Funes from [facility] informed of above, she states to inquire about Trilogy and if received order and it can be delivered to the pt's home tonight. 0 Notified Dr. Siri France via PS that the pt did not qualify for L2K, asked about trilogy order, she reqeusts to notify pulm. 0 PS Dr. Amelia Segovia updated on pt not qualifying for  L2K, Inquired about trilogy. 1720 Informed pt of above need for ABG per Dr. Ward Andre and will need to wait on results for obtaining a Trilogy NIV device. [address] message from Dr. Ward Andre he will complete the face to face now and will sign order in AM for Trilogy. 16 [facility] with Dr. Ward Andre he is informed that the pt does not need the ABG to qualify for the trilogy per [facility] as the pt already meets criteria.  He would like to cancel ABG if not already done, Jade LIAO charge nurse next to CM during call and will contact RT to inform.

## 2019-03-21 NOTE — PROGRESS NOTES
Herber Fitzpatrick 19    Progress Note    3/21/2019    3:17 PM    Name:   Ramón Almazan  MRN:     [id number]     Acct:      [de-identified]   Room:   [address] [place] [address]  IP Day:  15  Admit Date:  3/7/2019 10:40 PM    PCP:   Aurora Cardoza PA-C  Code Status:  Full Code    Subjective:     C/C: respiratory  Distress    Interval History Status: No change  Patient seen and examined at bedside, no acute events overnight. Off HFNC, needing 5-6 L NC   L2K device to arrive today  Still dyspneic and desaturates with minimal activity  Patient vitals, labs and all providers notes were reviewed,from overnight shift and morning updates were noted and discussed with the nurse    Brief History:     Per my partner  \"This is 70years old male with history of COPD living in a nursing home currently, transferred to us from [facility] after he presented there with shortness of breath.  The patient says that he suddenly started having shortness of breath.  He did not have any chest pain.  He was having coughing with phlegm production.  He also had fevers and chills.  He was found to be in acute respiratory failure.  He was put on BiPAP and started on vancomycin and Kelly Maribel had improvement in his symptoms and he was transferred over here. This morning upon my evaluation patient was in respiratory distress, his oxygen saturations were in 70s on high flow oxygen, he was on BiPAP overnight though, patient was tachycardic.  Place the patient on BiPAP with significant improvement in his symptomatology.  Patient's saturations improved to high 90s.  His heart rate improved. Patient otherwise denies chest pain, he denies palpitations, EKG was concerning for new onset A. fib flutter.  Patient denies any other acute issues.   Patient was initially started on vancomycin and Zosyn.  Viral PCR panel positive for RSV.  Vancomycin stopped and Zosyn will be continued for 5 days total.  Pulmonology evaluated the patient and hospice was consulted. \"     Review of Systems:     Constitutional:  negative for chills, fevers, sweats  Respiratory:  +ve for cough, dyspnea on exertion, shortness of breath, wheezing  Cardiovascular:  negative for chest pain, chest pressure/discomfort, lower extremity edema, palpitations  Gastrointestinal:  negative for abdominal pain, constipation, diarrhea, nausea, vomiting  Neurological:  negative for dizziness, headache    Medications:      Allergies:  No Known Allergies    Current Meds:   Scheduled Meds:    [START ON 3/22/2019] insulin glargine  40 Units Subcutaneous Daily with breakfast    insulin glargine  20 Units Subcutaneous Once    [START ON 4/3/2019] predniSONE  10 mg Oral Daily    Followed by   Italo Dobbs ON 3/22/2019] predniSONE  30 mg Oral Daily    Followed by   Italo Dobbs ON 3/27/2019] predniSONE  20 mg Oral Daily    levalbuterol  1.25 mg Nebulization BID    guaiFENesin  600 mg Oral BID    aspirin  81 mg Oral Daily    mometasone-formoterol  2 puff Inhalation BID    escitalopram  10 mg Oral Daily    famotidine  20 mg Oral BID    tamsulosin  0.4 mg Oral Daily    levalbuterol  1.25 mg Nebulization Q4H WA    ipratropium  0.5 mg Nebulization Q4H WA    bisacodyl  10 mg Rectal Once    enoxaparin  40 mg Subcutaneous Daily    sodium chloride flush  10 mL Intravenous 2 times per day    insulin lispro  0-12 Units Subcutaneous TID WC    insulin lispro  0-6 Units Subcutaneous Nightly     Continuous Infusions:    dextrose      dextrose       PRN Meds: sodium chloride, acetaminophen, acetaminophen, albuterol, magnesium hydroxide, nicotine, ondansetron, sodium chloride flush, dextrose, dextrose, glucagon (rDNA), glucose, glucose, dextrose, glucagon (rDNA), dextrose    Data:     Past Medical History:   has a past medical history of Anemia, Anxiety, Bronchitis, COPD (chronic obstructive pulmonary disease) ([facility] Diabetes ([facility] Former smoker, Hyperlipidemia, Oxygen dependent, Respiratory distress, and Type 2 diabetes mellitus ([address] Social History:   reports that he has quit smoking. His smoking use included cigarettes. He has never used smokeless tobacco. He reports that he does not drink alcohol or use drugs. Family History: No family history on file. Vitals:  /62   Pulse 97   Temp 98 °F (36.7 °C)   Resp 20   Ht 5' 8.9\" (1.75 m)   Wt 156 lb 1.4 oz (70.8 kg)   SpO2 (!) 87%   BMI 23.12 kg/m²   Temp (24hrs), Av.7 °F (36.5 °C), Min:97.2 °F (36.2 °C), Max:98.4 °F (36.9 °C)    Recent Labs     19  0638 19  1125 19  1234 19  1422   POCGLU 281* 63* 64* 155*       I/O (24Hr): Intake/Output Summary (Last 24 hours) at 3/21/2019 1517  Last data filed at 3/21/2019 0530  Gross per 24 hour   Intake 1750 ml   Output 1150 ml   Net 600 ml       Labs:    Hematology:No results for input(s): WBC, RBC, HGB, HCT, MCV, MCH, MCHC, RDW, PLT, MPV, SEDRATE, CRP, INR, DDIMER, PJ5XYTVX, LABABSO in the last 72 hours.     Invalid input(s): PT  Chemistry:  Recent Labs     19  0612 19  0631 19  0602   * 132* 131*   K 4.5 4.6 4.8   CL 96* 98 95*   CO2 28 26 28   GLUCOSE 210* 165* 309*   BUN 25* 24* 22   CREATININE 0.45* 0.60* 0.57*   ANIONGAP 10 8* 8*   LABGLOM >60 >60 >60   GFRAA >60 >60 >60   CALCIUM 8.6 8.5* 8.6     Recent Labs     19  1617 19  1930 19  0638 19  1125 19  1234 19  1422   POCGLU 199* 271* 281* 63* 64* 155*         Lab Results   Component Value Date/Time    SPECIAL RT LOWER ARM 10ML 2019 12:29 AM     Lab Results   Component Value Date/Time    CULTURE NO GROWTH 6 DAYS 2019 12:29 AM       Lab Results   Component Value Date    POCPH 7.468 2019    POCPCO2 38.9 2019    POCPO2 73.0 2019    POCHCO3 28.2 2019    NBEA NOT REPORTED 2019    PBEA 4 2019    AUB7ZES 29 2019    DRMW2GFX 95 2019    FIO2 45.0 03/08/2019       Radiology:        Physical Examination:        General appearance:  alert, cooperative and no distress, has HFNC   Mental Status:  oriented to person, place and time and normal affect  Lungs:  Very diminished  bilaterally, normal effort  Heart:  regular rate and rhythm, no murmur  Abdomen:  soft, nontender, nondistended, normal bowel sounds, no masses, hepatomegaly, splenomegaly  Extremities:  no edema, redness, tenderness in the calves  Skin:  no gross lesions, rashes, induration    Assessment:        Primary Problem  Acute respiratory failure with hypoxia [facility])    Active Hospital Problems    Diagnosis Date Noted    Atrial flutter ([address] [I48.92] 03/08/2019    Acute respiratory failure with hypoxia ([address] [J96.01] 01/23/2019    Pneumonia due to infectious organism [J18.9] 12/16/2018    COPD exacerbation ([address] [J44.1] 11/27/2018    Acute on chronic respiratory failure with hypoxemia ([address] [J96.21] 11/27/2018    Pulmonary artery hypertension (HCC) [I27.21] 05/23/2018    Iron deficiency anemia [D50.9] 05/23/2018    Type 2 diabetes mellitus without complication, with long-term current use of insulin ([address] [E11.9, Z79.4] 10/30/2014    Hyperlipidemia [E78.5] 10/30/2014       Plan:        -Finished 5 days course of Zosyn.  -On oral steroid taper  -Continue aerosol treatment.  -Continue to wean high flow as tolerable.   -Patient denied hospice/palliative care.  -He is aware of the poor prognosis  -DC planning, trouble finding placement, discussed with AUSTEN ALFREDO device to arrive later today and patient to try it overnight        Nena Horn MD  3/21/2019  3:17 PM

## 2019-03-21 NOTE — PROGRESS NOTES
[facility] Pneumonia [J18.9] . Vitals:    03/21/19 0315   BP: 137/74   Pulse: 86   Resp: 17   Temp: 97.6 °F (36.4 °C)   SpO2: 99%   . Patients home meds are   Prior to Admission medications    Medication Sig Start Date End Date Taking?  Authorizing Provider   predniSONE (DELTASONE) 10 MG tablet Take 3 tablets by mouth daily for 3 days 3/20/19 3/23/19 Yes Senia Agarwal MD   predniSONE (DELTASONE) 20 MG tablet Take 1 tablet by mouth daily for 3 days 3/24/19 3/27/19 Yes Senia Agarwal MD   predniSONE (DELTASONE) 10 MG tablet Take 1 tablet by mouth daily for 3 days 3/28/19 3/31/19 Yes Senia Agarwal MD   sodium chloride (OCEAN) 0.65 % nasal spray 1 spray by Nasal route as needed for Congestion 1/29/19   Maryjane Hill MD   ergocalciferol (ERGOCALCIFEROL) 41206 units capsule Take 1 capsule by mouth once a week 12/24/18   Reagan Salgado MD   furosemide (LASIX) 20 MG tablet Take 1 tablet by mouth every other day 12/1/18   Jose Taylor MD   insulin glargine (LANTUS) 100 UNIT/ML injection vial Inject 20 Units into the skin nightly 11/30/18   Jose Taylor MD   tamsulosin (FLOMAX) 0.4 MG capsule Take 0.4 mg by mouth daily    Historical Provider, MD   famotidine (PEPCID) 20 MG tablet Take 20 mg by mouth 2 times daily    Historical Provider, MD   acetaminophen (TYLENOL) 325 MG tablet Take 650 mg by mouth every 6 hours as needed for Pain    Historical Provider, MD   aclidinium (TUDORZA PRESSAIR) 400 MCG/ACT AEPB inhaler Inhale 1 puff into the lungs 2 times daily 6/22/18 7/22/19  Gilma Chew MD   albuterol sulfate HFA (VENTOLIN HFA) 108 (90 Base) MCG/ACT inhaler Inhale 2 puffs into the lungs every 6 hours as needed for Wheezing 6/22/18   Gilma Chew MD   budesonide-formoterol (SYMBICORT) 80-4.5 MCG/ACT AERO Inhale 2 puffs into the lungs 2 times daily 5/25/18   Ignacio Britton MD   metFORMIN ER (GLUCOPHAGE-XR) 500 MG XR tablet take 2 tablets by mouth twice a within last 2 weeks []  None or general   []  Abdominal or thoracic surgery  []  Abdominal or thoracic   Chronic Pulmonary Historyre []  No []  Yes []  Yes     []  Secretion Management Assessment  Score 1 2 3   Bilateral Breath Sounds   []  Occasional Rhonchi []  Scattered Rhonchi []  Course Rhonchi and/or poor aeration   Sputum    []  Small amount of thin secretions []  Moderate amount of viscous secretions []  Copius, Viscious Yellow/ Secretions   CXR as reported by physician []  clear  []  Unavailable []  Infiltrates and/or consolidation  []  Unavailable []  Mucus Plugging and or lobar consolidation  []  Unavailable   Cough []  Strong, productive cough []  Weak productive cough []  No cough or weak non-productive cough   CRISTI  MARTIN  9:12 AM                            FEMALE                                  MALE                            FEV1 Predicted Normal Values                        FEV1 Predicted Normal Values          Age                                     Height in Feet and Inches       Age                                     Height in Feet and Inches       4' 11\" 5' 1\" 5' 3\" 5' 5\" 5' 7\" 5' 9\" 5' 11\" 6' 1\"  4' 11\" 5' 1\" 5' 3\" 5' 5\" 5' 7\" 5' 9\" 5' 11\" 6' 1\"   42 - 45 2.49 2.66 2.84 3.03 3.22 3.42 3.62 3.83 42 - 45 2.82 3.03 3.26 3.49 3.72 3.96 4.22 4.47   46 - 49 2.40 2.57 2.76 2.94 3.14 3.33 3.54 3.75 46 - 49 2.70 2.92 3.14 3.37 3.61 3.85 4.10 4.36   50 - 53 2.31 2.48 2.66 2.85 3.04 3.24 3.45 3.66 50 - 53 2.58 2.80 3.02 3.25 3.49 3.73 3.98 4.24   54 - 57 2.21 2.38 2.57 2.75 2.95 3.14 3.35 3.56 54 - 57 2.46 2.67 2.89 3.12 3.36 3.60 3.85 4.11   58 - 61 2.10 2.28 2.46 2.65 2.84 3.04 3.24 3.45 58 - 61 2.32 2.54 2.76 2.99 3.23 3.47 3.72 3.98   62 - 65 1.99 2.17 2.35 2.54 2.73 2.93 3.13 3.34 62 - 65 2.19 2.40 2.62 2.85 3.09 3.33 3.58 3.84   66 - 69 1.88 2.05 2.23 2.42 2.61 2.81 3.02 3.23 66 - 69 2.04 2.26 2.48 2.71 2.95 3.19 3.44 3.70   70+ 1.82 1.99 2.17 2.36 2.55 2.75 2.95 3.16 70+ 1.97 2.19 2.41

## 2019-03-21 NOTE — PLAN OF CARE
BRONCHOSPASM/BRONCHOCONSTRICTION     X         IMPROVE AERATION/BREATH SOUNDS  X  ADMINISTER BRONCHODILATOR THERAPY AS APPROPRIATE  X   ASSESS BREATH SOUNDS  X   IMPLEMENT AEROSOL/MDI PROTOCOL  X   PATIENT EDUCATION AS NEEDED

## 2019-03-21 NOTE — PROGRESS NOTES
Occupational Therapy    Occupational Therapy Not Seen Note    DATE: 3/21/2019  Name: Ryan Mccoy  : 1940  MRN: [id number]    Patient not available for Occupational Therapy due to: Other: Pt supine in bed waiting for new hi-flow NC to arrive. Pt states he was told to not do anything till it arrives. RN stated pt taking that advice too literally. Pt politely declined to participate and thanked therapy for al the help.         Electronically signed by DIVINA Billy on 3/21/2019 at 10:46 AM

## 2019-03-21 NOTE — PROGRESS NOTES
3/26/2019] predniSONE  10 mg Oral Daily    guaiFENesin  600 mg Oral BID    insulin glargine  20 Units Subcutaneous Daily with breakfast    aspirin  81 mg Oral Daily    mometasone-formoterol  2 puff Inhalation BID    escitalopram  10 mg Oral Daily    famotidine  20 mg Oral BID    tamsulosin  0.4 mg Oral Daily    levalbuterol  1.25 mg Nebulization Q4H WA    ipratropium  0.5 mg Nebulization Q4H WA    bisacodyl  10 mg Rectal Once    enoxaparin  40 mg Subcutaneous Daily    sodium chloride flush  10 mL Intravenous 2 times per day    insulin lispro  0-12 Units Subcutaneous TID WC    insulin lispro  0-6 Units Subcutaneous Nightly     Continuous Infusions:   dextrose      dextrose       PRN Meds:sodium chloride, acetaminophen, acetaminophen, albuterol, magnesium hydroxide, nicotine, ondansetron, sodium chloride flush, dextrose, dextrose, glucagon (rDNA), glucose, glucose, dextrose, glucagon (rDNA), dextrose    Objective:      Physical Exam:  Vitals: /74   Pulse 86   Temp 97.6 °F (36.4 °C) (Oral)   Resp 17   Ht 5' 8.9\" (1.75 m)   Wt 156 lb 1.4 oz (70.8 kg)   SpO2 99%   BMI 23.12 kg/m²   24 hour intake/output:    Intake/Output Summary (Last 24 hours) at 3/21/2019 0910  Last data filed at 3/21/2019 0530  Gross per 24 hour   Intake 1750 ml   Output 1150 ml   Net 600 ml     Last 3 weights:   Wt Readings from Last 3 Encounters:   03/14/19 156 lb 1.4 oz (70.8 kg)   03/07/19 155 lb (70.3 kg)   01/30/19 155 lb 8 oz (70.5 kg)         Physical Examination:   PHYSICAL EXAMINATION:  Vitals:    03/20/19 1613 03/20/19 1915 03/20/19 2330 03/21/19 0315   BP:  113/74 134/66 137/74   Pulse:  95 93 86   Resp:  20 23 17   Temp:  97.5 °F (36.4 °C) 97.2 °F (36.2 °C) 97.6 °F (36.4 °C)   TempSrc:  Oral Oral Oral   SpO2: 94% 95% 94% 99%   Weight:       Height:         Constitutional: This is a well developed, well nourished, 18.5-24.9 - Normal 70y.o. year old male who is alert, oriented, cooperative and in no apparent distress. Head:normocephalic and atraumatic. EENT:  TANG. No conjunctival injections. Septum was midline, mucosa was without erythema, exudates or cobblestoning. No thrush was noted. Mallampati  II (soft palate, uvula, fauces visible)  Neck: Supple without thyromegaly. No elevated JVP. Trachea was midline. Respiratory: Chest was symmetrical without dullness to percussion. Breath sounds bilaterally were clear to auscultation. There were no wheezes, rhonchi , but has bilateral rales. There is no intercostal retraction or use of accessory muscles. No egophony noted. Cardiovascular: Regular without murmur, clicks, gallops or rubs. Abdomen: Slightly rounded and soft without organomegaly. No rebound tenderness, rigidity or guarding was appreciated. Lymphatic: No lymphadenopathy. Musculoskeletal: Normal curvature of the spine. No gross muscle weakness. Extremities:  No lower extremity edema, ulcerations, tenderness, varicosities or erythema. Muscle size, tone and strength are normal.  No involuntary movements are noted. Skin:  Warm and dry. Good color, turgor and pigmentation. No lesions or scars. No cyanosis or clubbing  Neurological/Psychiatric: The patient's general behavior, level of consciousness, thought content and emotional status is normal.          CBC: No results for input(s): WBC, HGB, PLT in the last 72 hours. BMP:    Recent Labs     03/19/19  0612 03/20/19  0631 03/21/19  0602   * 132* 131*   K 4.5 4.6 4.8   CL 96* 98 95*   CO2 28 26 28   BUN 25* 24* 22   CREATININE 0.45* 0.60* 0.57*   GLUCOSE 210* 165* 309*     Calcium:  Recent Labs     03/21/19  0602   CALCIUM 8.6     Ionized Calcium:No results for input(s): IONCA in the last 72 hours. Magnesium:No results for input(s): MG in the last 72 hours. Phosphorus:No results for input(s): PHOS in the last 72 hours. BNP:No results for input(s): BNP in the last 72 hours.   Glucose:  Recent Labs     03/20/19  1617 03/20/19  1930 03/21/19  0638   POCGLU 199* 271* 281*     HgbA1C: No results for input(s): LABA1C in the last 72 hours. INR: No results for input(s): INR in the last 72 hours. Hepatic: No results for input(s): ALKPHOS, ALT, AST, PROT, BILITOT, BILIDIR, LABALBU in the last 72 hours. Amylase and Lipase:No results for input(s): LACTA, AMYLASE in the last 72 hours. Lactic Acid: No results for input(s): LACTA in the last 72 hours. CARDIAC ENZYMES:No results for input(s): CKTOTAL, CKMB, CKMBINDEX, TROPONINI in the last 72 hours. BNP: No results for input(s): BNP in the last 72 hours. Lipids: No results for input(s): CHOL, TRIG, HDL, LDLCALC in the last 72 hours. Invalid input(s): LDL  ABGs: No results found for: PH, PCO2, PO2, HCO3, O2SAT  Thyroid:   Lab Results   Component Value Date    TSH 2.94 02/20/2019      Urinalysis: No results for input(s): BACTERIA, BLOODU, CLARITYU, COLORU, PHUR, PROTEINU, RBCUA, SPECGRAV, BILIRUBINUR, NITRU, WBCUA, LEUKOCYTESUR, GLUCOSEU in the last 72 hours. CULTURES:    3/8/2019 blood cultures negative      Assessment:    Principal Problem:    Acute respiratory failure with hypoxia ([facility])  Active Problems:    Type 2 diabetes mellitus without complication, with long-term current use of insulin (HCC)    Hyperlipidemia    Pulmonary artery hypertension (HCC)    Iron deficiency anemia    COPD exacerbation (HCC)    Acute on chronic respiratory failure with hypoxemia ([facility])    Pneumonia due to infectious organism    Atrial flutter ([facility]  Resolved Problems:    * No resolved hospital problems. *      Plan:  Meds reviewed- changes made as appropriate. Continue corticosteroids and bronchodilators. Continue prednisone taper. Will prolong prednisone taper. 30 mg x 7 days, 20 mg x 7 days, 10 mg indefinite. Patient is on Lovenox  Increase activity as permitted and tolerated. Encouraged pt to increase activity  Questions patient had were answered to his satisfaction. Continue supplemental oxygen.   Continue effort to wean oxygen  Patient was informed of the need for using oxygen. Patient was educated on the importance of compliance and the benefits of oxygen use. Complications of oxygen use, including dryness of the nostrils, epistaxis and also the fire hazard were explained to the patient. Patient willingly accepts to use  the oxygen as recommended  Cxr reviewed. None today  Diet reviewed  Okay for discharge from pulmonary standpoint  Overall Prognosis is poor. Pt not wanting Hospice/pallitive care. Awaiting L2K to be obtained. Placement pending,  assistance appreciated. Thank you for having us involved in the care of your patient. Please call us if you have any questions or concerns. Floyd Castaneda M.D.  PGY-3 Internal Medicine  [facility].   3/21/2019, 9:10 AM    Pulmonary & Critical Care

## 2019-03-22 VITALS
BODY MASS INDEX: 23.12 KG/M2 | SYSTOLIC BLOOD PRESSURE: 131 MMHG | WEIGHT: 156.09 LBS | RESPIRATION RATE: 25 BRPM | TEMPERATURE: 96.8 F | HEIGHT: 69 IN | DIASTOLIC BLOOD PRESSURE: 66 MMHG | HEART RATE: 102 BPM | OXYGEN SATURATION: 95 %

## 2019-03-22 LAB
ANION GAP SERPL CALCULATED.3IONS-SCNC: 14 MMOL/L (ref 9–17)
BUN BLDV-MCNC: 21 MG/DL (ref 8–23)
BUN/CREAT BLD: ABNORMAL (ref 9–20)
CALCIUM SERPL-MCNC: 8.7 MG/DL (ref 8.6–10.4)
CHLORIDE BLD-SCNC: 97 MMOL/L (ref 98–107)
CO2: 25 MMOL/L (ref 20–31)
CREAT SERPL-MCNC: 0.61 MG/DL (ref 0.7–1.2)
GFR AFRICAN AMERICAN: >60 ML/MIN
GFR NON-AFRICAN AMERICAN: >60 ML/MIN
GFR SERPL CREATININE-BSD FRML MDRD: ABNORMAL ML/MIN/{1.73_M2}
GFR SERPL CREATININE-BSD FRML MDRD: ABNORMAL ML/MIN/{1.73_M2}
GLUCOSE BLD-MCNC: 215 MG/DL (ref 75–110)
GLUCOSE BLD-MCNC: 232 MG/DL (ref 70–99)
GLUCOSE BLD-MCNC: 352 MG/DL (ref 75–110)
GLUCOSE BLD-MCNC: 49 MG/DL (ref 75–110)
POTASSIUM SERPL-SCNC: 4.7 MMOL/L (ref 3.7–5.3)
SODIUM BLD-SCNC: 136 MMOL/L (ref 135–144)

## 2019-03-22 PROCEDURE — 94762 N-INVAS EAR/PLS OXIMTRY CONT: CPT

## 2019-03-22 PROCEDURE — 36415 COLL VENOUS BLD VENIPUNCTURE: CPT

## 2019-03-22 PROCEDURE — 2700000000 HC OXYGEN THERAPY PER DAY

## 2019-03-22 PROCEDURE — 6360000002 HC RX W HCPCS: Performed by: NURSE PRACTITIONER

## 2019-03-22 PROCEDURE — 94640 AIRWAY INHALATION TREATMENT: CPT

## 2019-03-22 PROCEDURE — 6360000002 HC RX W HCPCS: Performed by: INTERNAL MEDICINE

## 2019-03-22 PROCEDURE — 6370000000 HC RX 637 (ALT 250 FOR IP): Performed by: FAMILY MEDICINE

## 2019-03-22 PROCEDURE — 99232 SBSQ HOSP IP/OBS MODERATE 35: CPT | Performed by: INTERNAL MEDICINE

## 2019-03-22 PROCEDURE — 6370000000 HC RX 637 (ALT 250 FOR IP): Performed by: INTERNAL MEDICINE

## 2019-03-22 PROCEDURE — 6370000000 HC RX 637 (ALT 250 FOR IP): Performed by: HOSPITALIST

## 2019-03-22 PROCEDURE — 6370000000 HC RX 637 (ALT 250 FOR IP): Performed by: NURSE PRACTITIONER

## 2019-03-22 PROCEDURE — 99231 SBSQ HOSP IP/OBS SF/LOW 25: CPT | Performed by: FAMILY MEDICINE

## 2019-03-22 PROCEDURE — 82947 ASSAY GLUCOSE BLOOD QUANT: CPT

## 2019-03-22 PROCEDURE — 2580000003 HC RX 258: Performed by: NURSE PRACTITIONER

## 2019-03-22 PROCEDURE — 80048 BASIC METABOLIC PNL TOTAL CA: CPT

## 2019-03-22 RX ORDER — INSULIN GLARGINE 100 [IU]/ML
20 INJECTION, SOLUTION SUBCUTANEOUS
Status: DISCONTINUED | OUTPATIENT
Start: 2019-03-23 | End: 2019-03-22 | Stop reason: HOSPADM

## 2019-03-22 RX ADMIN — IPRATROPIUM BROMIDE 0.5 MG: 0.5 SOLUTION RESPIRATORY (INHALATION) at 12:16

## 2019-03-22 RX ADMIN — MOMETASONE FUROATE AND FORMOTEROL FUMARATE DIHYDRATE 2 PUFF: 200; 5 AEROSOL RESPIRATORY (INHALATION) at 09:55

## 2019-03-22 RX ADMIN — ENOXAPARIN SODIUM 40 MG: 40 INJECTION SUBCUTANEOUS at 07:59

## 2019-03-22 RX ADMIN — TAMSULOSIN HYDROCHLORIDE 0.4 MG: 0.4 CAPSULE ORAL at 07:59

## 2019-03-22 RX ADMIN — IPRATROPIUM BROMIDE 0.5 MG: 0.5 SOLUTION RESPIRATORY (INHALATION) at 09:50

## 2019-03-22 RX ADMIN — GUAIFENESIN 600 MG: 600 TABLET, EXTENDED RELEASE ORAL at 07:59

## 2019-03-22 RX ADMIN — Medication 10 ML: at 08:08

## 2019-03-22 RX ADMIN — LEVALBUTEROL HYDROCHLORIDE 1.25 MG: 1.25 SOLUTION RESPIRATORY (INHALATION) at 04:04

## 2019-03-22 RX ADMIN — IPRATROPIUM BROMIDE 0.5 MG: 0.5 SOLUTION RESPIRATORY (INHALATION) at 16:46

## 2019-03-22 RX ADMIN — INSULIN GLARGINE 40 UNITS: 100 INJECTION, SOLUTION SUBCUTANEOUS at 08:00

## 2019-03-22 RX ADMIN — PREDNISONE 30 MG: 10 TABLET ORAL at 07:58

## 2019-03-22 RX ADMIN — LEVALBUTEROL HYDROCHLORIDE 1.25 MG: 1.25 SOLUTION RESPIRATORY (INHALATION) at 16:46

## 2019-03-22 RX ADMIN — INSULIN LISPRO 4 UNITS: 100 INJECTION, SOLUTION INTRAVENOUS; SUBCUTANEOUS at 08:41

## 2019-03-22 RX ADMIN — ASPIRIN 81 MG: 81 TABLET, CHEWABLE ORAL at 07:59

## 2019-03-22 RX ADMIN — INSULIN LISPRO 10 UNITS: 100 INJECTION, SOLUTION INTRAVENOUS; SUBCUTANEOUS at 16:14

## 2019-03-22 RX ADMIN — LEVALBUTEROL HYDROCHLORIDE 1.25 MG: 1.25 SOLUTION RESPIRATORY (INHALATION) at 09:51

## 2019-03-22 RX ADMIN — LEVALBUTEROL HYDROCHLORIDE 1.25 MG: 1.25 SOLUTION RESPIRATORY (INHALATION) at 12:15

## 2019-03-22 RX ADMIN — FAMOTIDINE 20 MG: 20 TABLET, FILM COATED ORAL at 07:59

## 2019-03-22 RX ADMIN — ESCITALOPRAM OXALATE 10 MG: 10 TABLET ORAL at 07:59

## 2019-03-22 NOTE — CARE COORDINATION
[id number] Order for Trilogy faxed to [phone] along with face to face, plan is for home [place]. Discussed safe transition plan with the patient, Marlon/neighbor/friend will assist with bringing  oxygen at discharge, will also help have food in the home since he has not been home since December. Will likely discharge once the Trilogy is approved. Referral sent to [facility] this is the patient's request.  Flores Ramirez RN/staff RN updated on plan also. 1514 Approved for Trilogy, will be delivered to home per [place]si. [employer] will start care 3/23.

## 2019-03-22 NOTE — PROGRESS NOTES
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 Patient on Nasal cannula 5 L, POX 96%   Has not been on high flow for over 24 hours. Still with poor activity tolerance, desats with activity   Has a dry cough. No hemoptysis  No fever. Shortness of breath is slowly improving, feels better today, wanting to go home  Did not qualify for L2K  No chest pain or pressure. No orthopnea or PND  Tolerating diet  No constipation/diarrhea    Subjective:   Review of Systems -   General ROS: Completed and except as mentioned above were negative   Psychological ROS:  Completed and except as mentioned above were negative  Allergy and Immunology ROS:  Completed and except as mentioned above were negative  Hematological and Lymphatic ROS:  Completed and except as mentioned above were negative  Respiratory ROS:  Completed and except as mentioned above were negative  Cardiovascular ROS:  Completed and except as mentioned above were negative  Gastrointestinal ROS: Completed and except as mentioned above were negative  Genito-Urinary ROS:  Completed and except as mentioned above were negative  Musculoskeletal ROS:  Completed and except as mentioned above were negative  Neurological ROS:  Completed and except as mentioned above were negative  Dermatological ROS:  Completed and except as mentioned above were negative      Diet:  DIET CARB CONTROL;   Dietary Nutrition Supplements: Diabetic Oral Supplement      Medications:Current Inpatient    Scheduled Meds:   [START ON 3/23/2019] insulin glargine  20 Units Subcutaneous Daily with breakfast    insulin glargine 20 Units Subcutaneous Once    [START ON 4/3/2019] predniSONE  10 mg Oral Daily    Followed by   Nanci Gonzales predniSONE  30 mg Oral Daily    Followed by   Diomedes Carrasco ON 3/27/2019] predniSONE  20 mg Oral Daily    levalbuterol  1.25 mg Nebulization BID    guaiFENesin  600 mg Oral BID    aspirin  81 mg Oral Daily    mometasone-formoterol  2 puff Inhalation BID    escitalopram  10 mg Oral Daily    famotidine  20 mg Oral BID    tamsulosin  0.4 mg Oral Daily    levalbuterol  1.25 mg Nebulization Q4H WA    ipratropium  0.5 mg Nebulization Q4H WA    bisacodyl  10 mg Rectal Once    enoxaparin  40 mg Subcutaneous Daily    sodium chloride flush  10 mL Intravenous 2 times per day    insulin lispro  0-12 Units Subcutaneous TID WC    insulin lispro  0-6 Units Subcutaneous Nightly     Continuous Infusions:   dextrose      dextrose       PRN Meds:sodium chloride, acetaminophen, acetaminophen, albuterol, magnesium hydroxide, nicotine, ondansetron, sodium chloride flush, dextrose, dextrose, glucagon (rDNA), glucose, glucose, dextrose, glucagon (rDNA), dextrose    Objective:      Physical Exam:  Vitals: /66   Pulse 102   Temp 96.8 °F (36 °C) (Temporal)   Resp 25   Ht 5' 8.9\" (1.75 m)   Wt 156 lb 1.4 oz (70.8 kg)   SpO2 95%   BMI 23.12 kg/m²   24 hour intake/output:    Intake/Output Summary (Last 24 hours) at 3/22/2019 1915  Last data filed at 3/22/2019 1535  Gross per 24 hour   Intake --   Output 1150 ml   Net -1150 ml     Last 3 weights:   Wt Readings from Last 3 Encounters:   03/14/19 156 lb 1.4 oz (70.8 kg)   03/07/19 155 lb (70.3 kg)   01/30/19 155 lb 8 oz (70.5 kg)         Physical Examination:   PHYSICAL EXAMINATION:  Vitals:    03/22/19 0957 03/22/19 1057 03/22/19 1217 03/22/19 1540   BP:    131/66   Pulse:  107  102   Resp: 28  21 25   Temp:    96.8 °F (36 °C)   TempSrc:    Temporal   SpO2: 98% 93% 90% 95%   Weight:       Height:         Constitutional: This is a well developed, well nourished, 18.5-24.9 - Normal 70y.o. year old male who is alert, oriented, cooperative and in no apparent distress. Head:normocephalic and atraumatic. EENT:  TANG. No conjunctival injections. Septum was midline, mucosa was without erythema, exudates or cobblestoning. No thrush was noted. Mallampati  II (soft palate, uvula, fauces visible)  Neck: Supple without thyromegaly. No elevated JVP. Trachea was midline. Respiratory: Chest was symmetrical without dullness to percussion. Breath sounds bilaterally were clear to auscultation. There were no wheezes, rhonchi , but has bilateral rales. There is no intercostal retraction or use of accessory muscles. No egophony noted. Cardiovascular: Regular without murmur, clicks, gallops or rubs. Abdomen: Slightly rounded and soft without organomegaly. No rebound tenderness, rigidity or guarding was appreciated. Lymphatic: No lymphadenopathy. Musculoskeletal: Normal curvature of the spine. No gross muscle weakness. Extremities:  No lower extremity edema, ulcerations, tenderness, varicosities or erythema. Muscle size, tone and strength are normal.  No involuntary movements are noted. Skin:  Warm and dry. Good color, turgor and pigmentation. No lesions or scars. No cyanosis or clubbing  Neurological/Psychiatric: The patient's general behavior, level of consciousness, thought content and emotional status is normal.          CBC: No results for input(s): WBC, HGB, PLT in the last 72 hours. BMP:    Recent Labs     03/20/19  0631 03/21/19  0602 03/22/19  0732   * 131* 136   K 4.6 4.8 4.7   CL 98 95* 97*   CO2 26 28 25   BUN 24* 22 21   CREATININE 0.60* 0.57* 0.61*   GLUCOSE 165* 309* 232*     Calcium:  Recent Labs     03/22/19  0732   CALCIUM 8.7     Ionized Calcium:No results for input(s): IONCA in the last 72 hours. Magnesium:No results for input(s): MG in the last 72 hours. Phosphorus:No results for input(s): PHOS in the last 72 hours.   BNP:No results for input(s): BNP in the last 72 hours. Glucose:  Recent Labs     03/22/19  0635 03/22/19  1123 03/22/19  1609   POCGLU 215* 49* 352*     HgbA1C: No results for input(s): LABA1C in the last 72 hours. INR: No results for input(s): INR in the last 72 hours. Hepatic: No results for input(s): ALKPHOS, ALT, AST, PROT, BILITOT, BILIDIR, LABALBU in the last 72 hours. Amylase and Lipase:No results for input(s): LACTA, AMYLASE in the last 72 hours. Lactic Acid: No results for input(s): LACTA in the last 72 hours. CARDIAC ENZYMES:No results for input(s): CKTOTAL, CKMB, CKMBINDEX, TROPONINI in the last 72 hours. BNP: No results for input(s): BNP in the last 72 hours. Lipids: No results for input(s): CHOL, TRIG, HDL, LDLCALC in the last 72 hours. Invalid input(s): LDL  ABGs: No results found for: PH, PCO2, PO2, HCO3, O2SAT  Thyroid:   Lab Results   Component Value Date    TSH 2.94 02/20/2019      Urinalysis: No results for input(s): BACTERIA, BLOODU, CLARITYU, COLORU, PHUR, PROTEINU, RBCUA, SPECGRAV, BILIRUBINUR, NITRU, WBCUA, LEUKOCYTESUR, GLUCOSEU in the last 72 hours. CULTURES:    3/8/2019 blood cultures negative      Assessment:    Principal Problem:    Acute respiratory failure with hypoxia ([facility])  Active Problems:    Type 2 diabetes mellitus without complication, with long-term current use of insulin (HCC)    Hyperlipidemia    Pulmonary artery hypertension (HCC)    Iron deficiency anemia    COPD exacerbation (HCC)    Acute on chronic respiratory failure with hypoxemia ([facility])    Pneumonia due to infectious organism    Atrial flutter ([facility] [address]  Resolved Problems:    * No resolved hospital problems. *      Plan:  Meds reviewed- changes made as appropriate. Continue corticosteroids and bronchodilators. Continue prednisone taper. Will prolong prednisone taper. 30 mg x 7 days, 20 mg x 7 days, 10 mg indefinite. Patient is on Lovenox  Increase activity as permitted and tolerated.  Encouraged pt to increase activity  Questions been on high flow nasal cannula since yesterday and he is on nasal cannula when I saw him he was on 4 L and saturating around 90% at the most, he was tachypneic at baseline in mild distress but denies shortness of breath no change in exam with bilateral distant breath sound with basilar crackles. He does have hypercapnia on previous arterial blood gases and Trilogy vent to be arranged at home and in process of setting up trilogy at home. Advised to continue with bronchodilators, physical therapy as tolerated. Continue with nasal cannula oxygen. Would recommend to give prolong taper prednisone course. Patient wants to go home he understands all the risks involved with his chronic lung condition and acute on chronic respiratory failure is not want to go to extended care facility/rehab. Paige Horn MD  3/22/2019 9:10 PM     Please note that this chart was generated using voice recognition Dragon dictation software. Although every effort was made to ensure the accuracy of this automated transcription, some errors in transcription may have occurred.

## 2019-03-22 NOTE — PROGRESS NOTES
PULMONARY PROGRESS NOTE      Patient:  Oscar Paez  YOB: 1940    MRN: [id number]     Acct: [de-identified]     Admit date: 3/7/2019    REASON FOR CONSULT:- acute on chronic resp failure secondary to fibrosis due to asbestosis with COPD and associated pulmonary hypertension    Pt seen and Chart reviewed. Subjective:   Feeling well this morning; resting comfortably in bed on N/C at 5 LPM.  No orthopnea or PND  Denies fever, chills, CP or nausea. Continues to have cough with minimal sputum production. Continues to have poor activity tolerance. Reports he was denied L2K device so awaiting possible Trilogy. Review of Systems -   CONSTITUTIONAL:  negative  EYES:  negative for  eye discharge, irritation and icterus  HEENT:  negative  RESPIRATORY:  positive for  cough with sputum and dyspnea  CARDIOVASCULAR:  negative for  chest pain, palpitations, orthopnea, PND, exertional chest pressure/discomfort  GASTROINTESTINAL:  negative  GENITOURINARY:  negative  HEMATOLOGIC/LYMPHATIC:  negative  ALLERGIC/IMMUNOLOGIC:  negative  ENDOCRINE:  negative  MUSCULOSKELETAL:  positive for  muscle weakness  NEUROLOGICAL:  negative  BEHAVIOR/PSYCH:  negative        Physical Exam:  Vitals: /78   Pulse 107   Temp 98.5 °F (36.9 °C) (Oral)   Resp 21   Ht 5' 8.9\" (1.75 m)   Wt 156 lb 1.4 oz (70.8 kg)   SpO2 90%   BMI 23.12 kg/m²   24 hour intake/output:    Intake/Output Summary (Last 24 hours) at 3/22/2019 1611  Last data filed at 3/22/2019 1535  Gross per 24 hour   Intake --   Output 1150 ml   Net -1150 ml     Last 3 weights:   Wt Readings from Last 3 Encounters:   03/14/19 156 lb 1.4 oz (70.8 kg)   03/07/19 155 lb (70.3 kg)   01/30/19 155 lb 8 oz (70.5 kg)       General appearance: alert and cooperative with exam  Physical Examination:   General appearance - alert, well appearing, and in no distress  Mental status - alert, oriented to person, place, and time  Eyes - pupils equal and reactive, extraocular eye movements intact  Nose - normal and patent, no erythema, discharge or polyps  Mouth - mucous membranes moist, pharynx normal without lesions  Neck - supple, no significant adenopathy; no JVP; trachea midline  Chest - symmetrical without dullness to percussion. Breath sounds diminished with bilateral rales. No wheezing or rhonchi noted. No intercostal retractions or use of accessory muscles. Heart - normal rate, regular rhythm, normal S1, S2, no murmurs, rubs, clicks or gallops  Abdomen - soft, nontender, nondistended, no masses or organomegaly  Neurological - alert, oriented, normal speech, no focal findings or movement disorder noted}  Extremities - peripheral pulses normal, no pedal edema, no clubbing or cyanosis  Skin - normal coloration and turgor, no rashes, no suspicious skin lesions noted     Diet:  DIET CARB CONTROL;   Dietary Nutrition Supplements: Diabetic Oral Supplement    Medications:Current Inpatient    Scheduled Meds:   [START ON 3/23/2019] insulin glargine  20 Units Subcutaneous Daily with breakfast    insulin glargine  20 Units Subcutaneous Once    [START ON 4/3/2019] predniSONE  10 mg Oral Daily    Followed by   [facility] predniSONE  30 mg Oral Daily    Followed by   [facility] ON 3/27/2019] predniSONE  20 mg Oral Daily    levalbuterol  1.25 mg Nebulization BID    guaiFENesin  600 mg Oral BID    aspirin  81 mg Oral Daily    mometasone-formoterol  2 puff Inhalation BID    escitalopram  10 mg Oral Daily    famotidine  20 mg Oral BID    tamsulosin  0.4 mg Oral Daily    levalbuterol  1.25 mg Nebulization Q4H WA    ipratropium  0.5 mg Nebulization Q4H WA    bisacodyl  10 mg Rectal Once    enoxaparin  40 mg Subcutaneous Daily    sodium chloride flush  10 mL Intravenous 2 times per day    insulin lispro  0-12 Units Subcutaneous TID WC    insulin lispro  0-6 Units Subcutaneous Nightly     Continuous Infusions:   dextrose      dextrose       PRN Meds:sodium chloride, acetaminophen, acetaminophen, albuterol, magnesium hydroxide, nicotine, ondansetron, sodium chloride flush, dextrose, dextrose, glucagon (rDNA), glucose, glucose, dextrose, glucagon (rDNA), dextrose    Objective:    CBC: No results for input(s): WBC, HGB, PLT in the last 72 hours. BMP:    Recent Labs     03/20/19  0631 03/21/19  0602 03/22/19  0732   * 131* 136   K 4.6 4.8 4.7   CL 98 95* 97*   CO2 26 28 25   BUN 24* 22 21   CREATININE 0.60* 0.57* 0.61*   GLUCOSE 165* 309* 232*     Calcium:  Recent Labs     03/22/19  0732   CALCIUM 8.7     Ionized Calcium:No results for input(s): IONCA in the last 72 hours. Magnesium:No results for input(s): MG in the last 72 hours. Phosphorus:No results for input(s): PHOS in the last 72 hours. BNP:No results for input(s): BNP in the last 72 hours. Glucose:  Recent Labs     03/21/19  1903 03/22/19  0635 03/22/19  1123   POCGLU 357* 215* 49*     HgbA1C: No results for input(s): LABA1C in the last 72 hours. INR: No results for input(s): INR in the last 72 hours. Hepatic: No results for input(s): ALKPHOS, ALT, AST, PROT, BILITOT, BILIDIR, LABALBU in the last 72 hours. Amylase and Lipase:No results for input(s): LACTA, AMYLASE in the last 72 hours. Lactic Acid: No results for input(s): LACTA in the last 72 hours. CARDIAC ENZYMES:No results for input(s): CKTOTAL, CKMB, CKMBINDEX, TROPONINI in the last 72 hours. BNP: No results for input(s): BNP in the last 72 hours. Lipids: No results for input(s): CHOL, TRIG, HDL, LDLCALC in the last 72 hours. Invalid input(s): LDL  ABGs: No results found for: PH, PCO2, PO2, HCO3, O2SAT  Thyroid:   Lab Results   Component Value Date    TSH 2.94 02/20/2019      Urinalysis: No results for input(s): BACTERIA, BLOODU, CLARITYU, COLORU, PHUR, PROTEINU, RBCUA, SPECGRAV, BILIRUBINUR, NITRU, WBCUA, LEUKOCYTESUR, GLUCOSEU in the last 72 hours.     CULTURES:  No recent cultures    CXR and CT Scans  No recent imaging        Assessment and Plan:    Patient Active Problem List   Diagnosis    Type 2 diabetes mellitus without complication, with long-term current use of insulin ([address]    Hyperlipidemia    Anxiety    Respiratory distress    Acute exacerbation of chronic obstructive pulmonary disease (COPD) ([address]    On home O2    Pulmonary artery hypertension (HCC)    Ex-smoker    Iron deficiency anemia    Community acquired pneumonia of right upper lobe of lung (HCC)    Lactic acidosis    Pulmonary fibrosis (HCC)    Dysuria    Acute on chronic respiratory failure (HCC)    Pleural plaque due to asbestos exposure    COPD exacerbation (HCC)    Pulmonary fibrosis (HCC)    Acute on chronic respiratory failure with hypoxemia (HCC)    Hypomagnesemia    C. difficile colitis    Leukocytosis    Pneumonia due to infectious organism    Diarrhea of infectious origin    General weakness    Hyperglycemia    Asbestosis ([address]    Goals of care, counseling/discussion    Acute respiratory failure with hypoxia ([address]    Hypoxia    Atrial flutter ([facility] [address]     Meds reviewed- changes made as appropriate. Continue corticosteroids and bronchodilators. Continue prednisone taper. Will prolong prednisone taper. 30 mg x 7 days, 20 mg x 7 days, 10 mg indefinite. Patient is on Lovenox  Increase activity as permitted and tolerated. Encouraged pt to increase activity  Questions patient had were answered to his satisfaction. Continue supplemental oxygen. Continue effort to wean oxygen  Patient was informed of the need for using oxygen. Patient was educated on the importance of compliance and the benefits of oxygen use. Complications of oxygen use, including dryness of the nostrils, epistaxis and also the fire hazard were explained to the patient. Patient willingly accepts to use the oxygen as recommended  Cxr reviewed:  None today  Diet reviewed  Okay for discharge from pulmonary standpoint  Overall Prognosis is poor. Pt not wanting Hospice/pallitive care.    Awaiting

## 2019-03-22 NOTE — PROGRESS NOTES
Herber Fitzpatrick 19    Progress Note    3/22/2019    4:41 PM    Name:   Weston Prado  MRN:     [id number]     Acct:      [de-identified]   Room:   [address] [place] [address] Day:  13  Admit Date:  3/7/2019 10:40 PM    PCP:   Latoya Malone PA-C  Code Status:  Full Code    Subjective:     C/C: respiratory  Distress    Interval History Status: No change  Patient seen and examined at bedside, no acute events overnight. Off HFNC, needing 5-6 L NC   Still with minimal activity  Still dyspneic and desaturates with minimal activity  Patient vitals, labs and all providers notes were reviewed,from overnight shift and morning updates were noted and discussed with the nurse    Brief History:     Per my partner  \"This is 70years old male with history of COPD living in a nursing home currently, transferred to us from [facility] after he presented there with shortness of breath.  The patient says that he suddenly started having shortness of breath.  He did not have any chest pain.  He was having coughing with phlegm production.  He also had fevers and chills.  He was found to be in acute respiratory failure.  He was put on BiPAP and started on vancomycin and Yue Gonzales had improvement in his symptoms and he was transferred over here. This morning upon my evaluation patient was in respiratory distress, his oxygen saturations were in 70s on high flow oxygen, he was on BiPAP overnight though, patient was tachycardic.  Place the patient on BiPAP with significant improvement in his symptomatology.  Patient's saturations improved to high 90s.  His heart rate improved. Patient otherwise denies chest pain, he denies palpitations, EKG was concerning for new onset A. fib flutter.  Patient denies any other acute issues.   Patient was initially started on vancomycin and Zosyn.  Viral PCR panel positive for RSV.  Vancomycin stopped and Zosyn will be continued for 5 days total.  Pulmonology evaluated the patient and hospice was consulted. \"     Review of Systems:     Constitutional:  negative for chills, fevers, sweats  Respiratory:  +ve for cough, dyspnea on exertion, shortness of breath, wheezing  Cardiovascular:  negative for chest pain, chest pressure/discomfort, lower extremity edema, palpitations  Gastrointestinal:  negative for abdominal pain, constipation, diarrhea, nausea, vomiting  Neurological:  negative for dizziness, headache    Medications:      Allergies:  No Known Allergies    Current Meds:   Scheduled Meds:    [START ON 3/23/2019] insulin glargine  20 Units Subcutaneous Daily with breakfast    insulin glargine  20 Units Subcutaneous Once    [START ON 4/3/2019] predniSONE  10 mg Oral Daily    Followed by   [facility] predniSONE  30 mg Oral Daily    Followed by   Amrita Felder ON 3/27/2019] predniSONE  20 mg Oral Daily    levalbuterol  1.25 mg Nebulization BID    guaiFENesin  600 mg Oral BID    aspirin  81 mg Oral Daily    mometasone-formoterol  2 puff Inhalation BID    escitalopram  10 mg Oral Daily    famotidine  20 mg Oral BID    tamsulosin  0.4 mg Oral Daily    levalbuterol  1.25 mg Nebulization Q4H WA    ipratropium  0.5 mg Nebulization Q4H WA    bisacodyl  10 mg Rectal Once    enoxaparin  40 mg Subcutaneous Daily    sodium chloride flush  10 mL Intravenous 2 times per day    insulin lispro  0-12 Units Subcutaneous TID WC    insulin lispro  0-6 Units Subcutaneous Nightly     Continuous Infusions:    dextrose      dextrose       PRN Meds: sodium chloride, acetaminophen, acetaminophen, albuterol, magnesium hydroxide, nicotine, ondansetron, sodium chloride flush, dextrose, dextrose, glucagon (rDNA), glucose, glucose, dextrose, glucagon (rDNA), dextrose    Data:     Past Medical History:   has a past medical history of Anemia, Anxiety, Bronchitis, COPD (chronic obstructive pulmonary disease) ([facility] [address] Diabetes ([facility] [address] [facility] Former smoker, Hyperlipidemia, Oxygen dependent, Respiratory distress, and Type 2 diabetes mellitus ([address] Social History:   reports that he has quit smoking. His smoking use included cigarettes. He has never used smokeless tobacco. He reports that he does not drink alcohol or use drugs. Family History: No family history on file. Vitals:  /66   Pulse 102   Temp 96.8 °F (36 °C) (Temporal)   Resp 25   Ht 5' 8.9\" (1.75 m)   Wt 156 lb 1.4 oz (70.8 kg)   SpO2 95%   BMI 23.12 kg/m²   Temp (24hrs), Av.8 °F (36.6 °C), Min:96.8 °F (36 °C), Max:98.5 °F (36.9 °C)    Recent Labs     19  1903 03/22/19  0635 19  1123 19  1609   POCGLU 357* 215* 49* 352*       I/O (24Hr): Intake/Output Summary (Last 24 hours) at 3/22/2019 1641  Last data filed at 3/22/2019 1535  Gross per 24 hour   Intake --   Output 1150 ml   Net -1150 ml       Labs:    Hematology:No results for input(s): WBC, RBC, HGB, HCT, MCV, MCH, MCHC, RDW, PLT, MPV, SEDRATE, CRP, INR, DDIMER, GZ0UEWXS, LABABSO in the last 72 hours.     Invalid input(s): PT  Chemistry:  Recent Labs     19  0631 19  0602 19  0732   * 131* 136   K 4.6 4.8 4.7   CL 98 95* 97*   CO2 26 28 25   GLUCOSE 165* 309* 232*   BUN 24* 22 21   CREATININE 0.60* 0.57* 0.61*   ANIONGAP 8* 8* 14   LABGLOM >60 >60 >60   GFRAA >60 >60 >60   CALCIUM 8.5* 8.6 8.7     Recent Labs     19  1422 19  1554 19  1903 19  0635 19  1123 19  1609   POCGLU 155* 274* 357* 215* 49* 352*         Lab Results   Component Value Date/Time    SPECIAL RT LOWER ARM 10ML 2019 12:29 AM     Lab Results   Component Value Date/Time    CULTURE NO GROWTH 6 DAYS 2019 12:29 AM       Lab Results   Component Value Date    POCPH 7.468 2019    POCPCO2 38.9 2019    POCPO2 73.0 2019    POCHCO3 28.2 2019    NBEA NOT REPORTED 2019    PBEA 4 2019    AYS9WHC 29 2019    VZNJ4RBM 95 2019    FIO2 45.0 2019 Radiology:        Physical Examination:        General appearance:  alert, cooperative and no distress, has HFNC   Mental Status:  oriented to person, place and time and normal affect  Lungs:  Very diminished  bilaterally, normal effort  Heart:  regular rate and rhythm, no murmur  Abdomen:  soft, nontender, nondistended, normal bowel sounds, no masses, hepatomegaly, splenomegaly  Extremities:  no edema, redness, tenderness in the calves  Skin:  no gross lesions, rashes, induration    Assessment:        Primary Problem  Acute respiratory failure with hypoxia [facility])    Active Hospital Problems    Diagnosis Date Noted    Atrial flutter ([address] [I48.92] 03/08/2019    Acute respiratory failure with hypoxia ([address] [J96.01] 01/23/2019    Pneumonia due to infectious organism [J18.9] 12/16/2018    COPD exacerbation ([address] [J44.1] 11/27/2018    Acute on chronic respiratory failure with hypoxemia ([address] [J96.21] 11/27/2018    Pulmonary artery hypertension (HCC) [I27.21] 05/23/2018    Iron deficiency anemia [D50.9] 05/23/2018    Type 2 diabetes mellitus without complication, with long-term current use of insulin ([address] [E11.9, Z79.4] 10/30/2014    Hyperlipidemia [E78.5] 10/30/2014       Plan:        -Finished 5 days course of Zosyn.  -On oral steroid taper  -Continue aerosol treatment.  -Continue to wean high flow as tolerable.   -Patient denied hospice/palliative care.  -He is aware of the poor prognosis  -DC home with triology , hopefully today      Yeyo Elliott MD  3/22/2019  4:41 PM

## 2019-03-22 NOTE — CARE COORDINATION
Discharge [facility] Case Management Department  Written by: Amber Turcios RN    Patient Name: Belen Graham  Attending Provider: Neal Das MD  Admit Date: 3/7/2019 10:40 PM  MRN: [id number]  Account: [de-identified]                     : 1940  Discharge Date:       Disposition: home    Amber Turcios RN

## 2019-03-22 NOTE — DISCHARGE SUMMARY
Herber Fitzpatrick 19    Discharge Summary     Patient ID: Ramirez Langley  :  1940   MRN: [id number]     ACCOUNT:  [de-identified]   Patient's PCP: Maribell Jain PA-C  Admit Date: 3/7/2019   Discharge Date: 3/22/2019     Length of Stay: 15  Code Status:  Full Code  Admitting Physician: Nena Horn MD  Discharge Physician: Nena Horn MD     Active Discharge Diagnoses:     Hospital Problem Lists:  Principal Problem:    Acute respiratory failure with hypoxia ([facility] [facility]  Active Problems:    Type 2 diabetes mellitus without complication, with long-term current use of insulin (HCC)    Hyperlipidemia    Pulmonary artery hypertension (HCC)    Iron deficiency anemia    COPD exacerbation ([facility] [address]    Acute on chronic respiratory failure with hypoxemia ([facility] [address]    Pneumonia due to infectious organism    Atrial flutter ([facility] [address]  Resolved Problems:    * No resolved hospital problems. *      Admission Condition:  poor     Discharged Condition: stable    Hospital Stay:     Hospital Course:     Per my partner  \"This is 70years old male with history of COPD living in a nursing home currently, transferred to us from [address] [place] [address] after he presented there with shortness of breath. The patient says that he suddenly started having shortness of breath. He did not have any chest pain. He was having coughing with phlegm production. He also had fevers and chills. He was found to be in acute respiratory failure. He was put on BiPAP and started on vancomycin and Zosyn. Patient had improvement in his symptoms and he was transferred over here. This morning upon my evaluation patient was in respiratory distress, his oxygen saturations were in 70s on high flow oxygen, he was on BiPAP overnight though, patient was tachycardic. Place the patient on BiPAP with significant improvement in his symptomatology. Patient's saturations improved to high 90s.   His heart rate improved. Patient otherwise denies chest pain, he denies palpitations, EKG was concerning for new onset A. fib flutter. Patient denies any other acute issues. Patient was initially started on vancomycin and Zosyn. Viral PCR panel positive for RSV. Vancomycin stopped and Zosyn will be continued for 5 days total.  Pulmonology evaluated the patient and hospice was consulted and patient denied, patient then had prolonged stay for placement issue as well we requested a device L2K that after approval from the insurance did not work for the patient, patient was then discharged home with triology     During the admission patient was managed as follow  -Finished 5 days course of Zosyn.  -On oral steroid taper  -Continue aerosol treatment.  -Continue to wean high flow as tolerable. -Patient denied hospice/palliative care.  -He is aware of the poor prognosis  -DC home with triology , hopefully today  - Medication reconciliation reviewed     Significant Diagnostic Studies:     Radiology:    Ct Abdomen Pelvis Wo Contrast Additional Contrast? None    Result Date: 3/8/2019  EXAMINATION: CT OF THE ABDOMEN AND PELVIS WITHOUT CONTRAST 3/8/2019 1:05 pm TECHNIQUE: CT of the abdomen and pelvis was performed without the administration of intravenous contrast. Multiplanar reformatted images are provided for review. Dose modulation, iterative reconstruction, and/or weight based adjustment of the mA/kV was utilized to reduce the radiation dose to as low as reasonably achievable. COMPARISON: None. HISTORY: ORDERING SYSTEM PROVIDED HISTORY: abdominal pain TECHNOLOGIST PROVIDED HISTORY: FINDINGS: Lower Chest: Pulmonary fibrosis with mild traction bronchiectasis in the lung bases. Dependent atelectasis is noted. Calcified pleural plaques and mild diffuse pleural thickening is noted. No effusion. Organs: Evaluation of the abdominal and pelvic viscera is limited in the absence of contrast.  Status post cholecystectomy.   No biliary dilatation. The liver, pancreas, spleen, adrenals, and kidneys reveal no acute findings. Scattered calcifications throughout the pancreas are noted which may represent chronic calcific pancreatitis. GI/Bowel: Large amount stool burden throughout the colon. The rectum is distended with stool. Diverticulosis is noted. Fluid and small amount of air within the small bowel is noted suggestive of mild ileus. Pelvis: No acute findings. Mild bladder distension and bladder wall thickening suggestive of chronic bladder outlet obstruction. Mild enlargement of the median lobe of the prostate impressing upon the adjacent bladder. Rectal distention, as above. Peritoneum/Retroperitoneum: Status post endovascular aortic repair. The aneurysm sac measures up to 3.2 x 3.1 cm in the distal segment. Extensive calcified atheromatous plaque is present. No free air or free fluid. No lymphadenopathy. Bones/Soft Tissues: Chronic appearing mild compression deformity and degenerative endplate changes at L2. Multilevel degenerative disc disease and advanced facet arthropathy. Osteopenia is noted. Large amount stool burden throughout the colon with rectal distention, suggestive of impaction. Pulmonary fibrosis and calcified pleural plaques. Status post endovascular aortic aneurysm repair. The aneurysm sac measures up to 3.2 cm. Chronic appearing L2 compression deformity. Diverticulosis. Mild bladder distension and bladder wall thickening, raising the possibility of chronic bladder outlet obstruction. Xr Chest (single View Frontal)    Result Date: 3/8/2019  EXAMINATION: SINGLE XRAY VIEW OF THE CHEST 3/8/2019 10:39 am COMPARISON: Chest radiograph performed 03/07/2019. HISTORY: ORDERING SYSTEM PROVIDED HISTORY: sob TECHNOLOGIST PROVIDED HISTORY: sob FINDINGS: There is chronic pulmonary change. There are bibasilar effusions with adjacent infiltrates. There is no pneumothorax. The mediastinal structures are stable.   The upper abdomen is unremarkable. The extrathoracic soft tissues are unremarkable. Bibasilar effusions with adjacent infiltrates representing pneumonia versus atelectasis/scarring. Xr Chest Portable    Result Date: 3/7/2019  EXAMINATION: SINGLE XRAY VIEW OF THE CHEST 3/7/2019 5:26 am COMPARISON: January 30, 2019. HISTORY: ORDERING SYSTEM PROVIDED HISTORY: sob TECHNOLOGIST PROVIDED HISTORY: sob Ordering Physician Provided Reason for Exam: sob Acuity: Acute Type of Exam: Initial Additional signs and symptoms: Difficulty breathing Relevant Medical/Surgical History: Difficulty breathing FINDINGS: Low lung volume. Similar appearing bilateral heterogeneous opacities. Emphysema. Small bilateral pleural effusions. No definite pneumothorax. Grossly stable cardiomediastinal silhouette and great vessels. Stable regional skeleton. Similar-appearing bilateral heterogeneous opacities. Differential considerations include asymmetric pulmonary edema and multifocal pneumonia. Small bilateral pleural effusions. Consultations:    Consults:     Final Specialist Recommendations/Findings:   IP CONSULT TO PULMONOLOGY  IP CONSULT TO CRITICAL CARE  IP CONSULT TO CARDIOLOGY  PALLIATIVE CARE EVAL  IP CONSULT TO HOSPICE  IP CONSULT TO IV TEAM  IP CONSULT TO HOME CARE NEEDS      The patient was seen and examined on day of discharge and this discharge summary is in conjunction with any daily progress note from day of discharge.     Discharge plan:     Disposition: Home    Physician Follow Up:     Jan Rodriguez PA-C  [address] [place] [address]  [phone]    In 1 week         Requiring Further Evaluation/Follow Up POST HOSPITALIZATION/Incidental Findings:     Diet: cardiac diet/ diabetic diet    Activity: As tolerated    Instructions to Patient:     Discharge Medications:      Medication List      CHANGE how you take these medications    metFORMIN 500 MG extended release tablet  Commonly known as: GLUCOPHAGE-XR  take 2 tablets by mouth twice a day  What changed:  See the new instructions. * predniSONE 10 MG tablet  Commonly known as:  DELTASONE  Take 3 tablets by mouth daily for 3 days  What changed:    · how much to take  · how to take this  · when to take this  · additional instructions     * predniSONE 20 MG tablet  Commonly known as:  DELTASONE  Take 1 tablet by mouth daily for 3 days  Start taking on:  3/24/2019  What changed: You were already taking a medication with the same name, and this prescription was added. Make sure you understand how and when to take each. * predniSONE 10 MG tablet  Commonly known as:  DELTASONE  Take 1 tablet by mouth daily for 3 days  Start taking on:  3/28/2019  What changed: You were already taking a medication with the same name, and this prescription was added. Make sure you understand how and when to take each.     sodium chloride 0.65 % nasal spray  Commonly known as:  OCEAN  1 spray by Nasal route as needed for Congestion  What changed:  Another medication with the same name was removed. Continue taking this medication, and follow the directions you see here. * This list has 3 medication(s) that are the same as other medications prescribed for you. Read the directions carefully, and ask your doctor or other care provider to review them with you.             CONTINUE taking these medications    acetaminophen 325 MG tablet  Commonly known as:  TYLENOL     aclidinium 400 MCG/ACT Aepb inhaler  Commonly known as:  TUDORZA PRESSAIR  Inhale 1 puff into the lungs 2 times daily     albuterol sulfate  (90 Base) MCG/ACT inhaler  Commonly known as:  VENTOLIN HFA  Inhale 2 puffs into the lungs every 6 hours as needed for Wheezing     aspirin 81 MG tablet     budesonide-formoterol 80-4.5 MCG/ACT Aero  Commonly known as:  SYMBICORT  Inhale 2 puffs into the lungs 2 times daily     ergocalciferol 87817 units capsule  Commonly known as:  ERGOCALCIFEROL  Take 1 capsule by mouth once a week     escitalopram 10 MG tablet  Commonly known as:  LEXAPRO  Take 1 tablet by mouth daily     famotidine 20 MG tablet  Commonly known as:  PEPCID     furosemide 20 MG tablet  Commonly known as:  LASIX  Take 1 tablet by mouth every other day     insulin glargine 100 UNIT/ML injection vial  Commonly known as:  LANTUS  Inject 20 Units into the skin nightly     tamsulosin 0.4 MG capsule  Commonly known as:  FLOMAX        STOP taking these medications    ONE TOUCH ULTRA TEST strip  Generic drug:  blood glucose test strips           Where to Get Your Medications      These medications were sent to [facility] [address] [place] [address] [address], [address]  [address]    Phone:  [phone]   · predniSONE 10 MG tablet  · predniSONE 10 MG tablet  · predniSONE 20 MG tablet         Time Spent on discharge is  35 mins in patient examination, evaluation, counseling as well as medication reconciliation, prescriptions for required medications, discharge plan and follow up. Electronically signed by   Latoya Mcdaniel MD  3/22/2019  4:46 PM      Thank you Dr. Latoya Malone PA-C for the opportunity to be involved in this patient's care.

## 2019-03-22 NOTE — PROGRESS NOTES
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 Patient on Nasal cannula 5 L, POX 96%   Still with poor activity tolerance, desats with activity   Has a dry cough. No hemoptysis  No fever. Shortness of breath is slowly improving, feels better today, wanting to go home  No chest pain or pressure. No orthopnea or PND  Tolerating diet  No constipation/diarrhea    Subjective:   Review of Systems -   General ROS: Completed and except as mentioned above were negative   Psychological ROS:  Completed and except as mentioned above were negative  Allergy and Immunology ROS:  Completed and except as mentioned above were negative  Hematological and Lymphatic ROS:  Completed and except as mentioned above were negative  Respiratory ROS:  Completed and except as mentioned above were negative  Cardiovascular ROS:  Completed and except as mentioned above were negative  Gastrointestinal ROS: Completed and except as mentioned above were negative  Genito-Urinary ROS:  Completed and except as mentioned above were negative  Musculoskeletal ROS:  Completed and except as mentioned above were negative  Neurological ROS:  Completed and except as mentioned above were negative  Dermatological ROS:  Completed and except as mentioned above were negative      Diet:  DIET CARB CONTROL;   Dietary Nutrition Supplements: Diabetic Oral Supplement      Medications:Current Inpatient    Scheduled Meds:   [START ON 3/22/2019] insulin glargine  40 Units Subcutaneous Daily with breakfast    insulin glargine  20 Units Subcutaneous Once    [START ON 4/3/2019] predniSONE  10 mg Oral Daily    Followed by   Latrell Wilcox ON 3/22/2019] predniSONE  30 mg Oral Daily    Followed by   Latrell Wilcox ON 3/27/2019] predniSONE  20 mg Oral Daily    levalbuterol  1.25 mg Nebulization BID    guaiFENesin  600 mg Oral BID    aspirin  81 mg Oral Daily    mometasone-formoterol  2 puff Inhalation BID    escitalopram  10 mg Oral Daily    famotidine  20 mg Oral BID    tamsulosin  0.4 mg Oral Daily    levalbuterol  1.25 mg Nebulization Q4H WA    ipratropium  0.5 mg Nebulization Q4H WA    bisacodyl  10 mg Rectal Once    enoxaparin  40 mg Subcutaneous Daily    sodium chloride flush  10 mL Intravenous 2 times per day    insulin lispro  0-12 Units Subcutaneous TID WC    insulin lispro  0-6 Units Subcutaneous Nightly     Continuous Infusions:   dextrose      dextrose       PRN Meds:sodium chloride, acetaminophen, acetaminophen, albuterol, magnesium hydroxide, nicotine, ondansetron, sodium chloride flush, dextrose, dextrose, glucagon (rDNA), glucose, glucose, dextrose, glucagon (rDNA), dextrose    Objective:      Physical Exam:  Vitals: BP (!) 147/75   Pulse 108   Temp 97.4 °F (36.3 °C) (Oral)   Resp 30   Ht 5' 8.9\" (1.75 m)   Wt 156 lb 1.4 oz (70.8 kg)   SpO2 90%   BMI 23.12 kg/m²   24 hour intake/output:    Intake/Output Summary (Last 24 hours) at 3/21/2019 2224  Last data filed at 3/21/2019 0530  Gross per 24 hour   Intake 850 ml   Output 1150 ml   Net -300 ml     Last 3 weights:   Wt Readings from Last 3 Encounters:   03/14/19 156 lb 1.4 oz (70.8 kg)   03/07/19 155 lb (70.3 kg)   01/30/19 155 lb 8 oz (70.5 kg)         Physical Examination:   PHYSICAL EXAMINATION:  Vitals:    03/21/19 1516 03/21/19 1533 03/21/19 1928 03/21/19 2034   BP: (!) 145/47 106/68 (!) 147/75    Pulse: 99 75 108    Resp: 20 20 30 30   Temp: 97.4 °F (36.3 °C) 97.3 °F (36.3 °C) 97.4 °F (36.3 °C)    TempSrc: Oral Oral Oral    SpO2: (!) 87% 100% (!) 86% 90%   Weight:       Height:         Constitutional: This is a well developed, well nourished, 18.5-24.9 - Normal 70y.o. year old male who is alert, oriented, cooperative and in no apparent distress. Head:normocephalic and atraumatic. EENT:  TANG. No conjunctival injections. Septum was midline, mucosa was without erythema, exudates or cobblestoning. No thrush was noted. Mallampati  II (soft palate, uvula, fauces visible)  Neck: Supple without thyromegaly. No elevated JVP. Trachea was midline. Respiratory: Chest was symmetrical without dullness to percussion. Breath sounds bilaterally were clear to auscultation. There were no wheezes, rhonchi , but has bilateral rales. There is no intercostal retraction or use of accessory muscles. No egophony noted. Cardiovascular: Regular without murmur, clicks, gallops or rubs. Abdomen: Slightly rounded and soft without organomegaly. No rebound tenderness, rigidity or guarding was appreciated. Lymphatic: No lymphadenopathy. Musculoskeletal: Normal curvature of the spine. No gross muscle weakness. Extremities:  No lower extremity edema, ulcerations, tenderness, varicosities or erythema. Muscle size, tone and strength are normal.  No involuntary movements are noted. Skin:  Warm and dry. Good color, turgor and pigmentation. No lesions or scars. No cyanosis or clubbing  Neurological/Psychiatric: The patient's general behavior, level of consciousness, thought content and emotional status is normal.          CBC: No results for input(s): WBC, HGB, PLT in the last 72 hours. BMP:    Recent Labs     03/19/19  0612 03/20/19  0631 03/21/19  0602   * 132* 131*   K 4.5 4.6 4.8   CL 96* 98 95*   CO2 28 26 28   BUN 25* 24* 22   CREATININE 0.45* 0.60* 0.57*   GLUCOSE 210* 165* 309*     Calcium:  Recent Labs     03/21/19  0602   CALCIUM 8.6     Ionized Calcium:No results for input(s): IONCA in the last 72 hours. Magnesium:No results for input(s): MG in the last 72 hours. Phosphorus:No results for input(s): PHOS in the last 72 hours.   BNP:No results for input(s): BNP in the last 72 hours. Glucose:  Recent Labs     03/21/19  1422 03/21/19  1554 03/21/19  1903   POCGLU 155* 274* 357*     HgbA1C: No results for input(s): LABA1C in the last 72 hours. INR: No results for input(s): INR in the last 72 hours. Hepatic: No results for input(s): ALKPHOS, ALT, AST, PROT, BILITOT, BILIDIR, LABALBU in the last 72 hours. Amylase and Lipase:No results for input(s): LACTA, AMYLASE in the last 72 hours. Lactic Acid: No results for input(s): LACTA in the last 72 hours. CARDIAC ENZYMES:No results for input(s): CKTOTAL, CKMB, CKMBINDEX, TROPONINI in the last 72 hours. BNP: No results for input(s): BNP in the last 72 hours. Lipids: No results for input(s): CHOL, TRIG, HDL, LDLCALC in the last 72 hours. Invalid input(s): LDL  ABGs: No results found for: PH, PCO2, PO2, HCO3, O2SAT  Thyroid:   Lab Results   Component Value Date    TSH 2.94 02/20/2019      Urinalysis: No results for input(s): BACTERIA, BLOODU, CLARITYU, COLORU, PHUR, PROTEINU, RBCUA, SPECGRAV, BILIRUBINUR, NITRU, WBCUA, LEUKOCYTESUR, GLUCOSEU in the last 72 hours. CULTURES:    3/8/2019 blood cultures negative      Assessment:    Principal Problem:    Acute respiratory failure with hypoxia (HCC)  Active Problems:    Type 2 diabetes mellitus without complication, with long-term current use of insulin (HCC)    Hyperlipidemia    Pulmonary artery hypertension (HCC)    Iron deficiency anemia    COPD exacerbation (HCC)    Acute on chronic respiratory failure with hypoxemia ([facility])    Pneumonia due to infectious organism    Atrial flutter ([facility] [address]  Resolved Problems:    * No resolved hospital problems. *      Plan:  Meds reviewed- changes made as appropriate. Continue corticosteroids and bronchodilators. Continue prednisone taper. Will prolong prednisone taper. 30 mg x 7 days, 20 mg x 7 days, 10 mg indefinite. Patient is on Lovenox  Increase activity as permitted and tolerated.  Encouraged pt to increase activity  Questions patient had were answered to his satisfaction. Continue supplemental oxygen. Continue effort to wean oxygen  Patient was informed of the need for using oxygen. Patient was educated on the importance of compliance and the benefits of oxygen use. Complications of oxygen use, including dryness of the nostrils, epistaxis and also the fire hazard were explained to the patient. Patient willingly accepts to use  the oxygen as recommended  Cxr reviewed. None today  Diet reviewed  Okay for discharge from pulmonary standpoint  Overall Prognosis is poor. Pt not wanting Hospice/pallitive care. Awaiting L2K to be obtained. Placement pending,  assistance appreciated. Thank you for having us involved in the care of your patient. Please call us if you have any questions or concerns. Angela Babin M.D.  PGY-3 Internal Medicine  [facility]. 3/21/2019, 10:24 PM    Pulmonary & Critical Care    Attending Physician Statement  I have discussed the care of Zheng Rodriges, including pertinent history and exam findings,  with the resident. I have seen and examined the patient and the key elements of all parts of the encounter have been performed by me. I agree with the assessment, plan and orders as documented by the resident with additions . Did not meet criteria for L2k device   Question for trilogy vent   I ordered abg but was told that as per [facility] pt already qualifies and needs face to face . So abg cancelled   Taper steroids 10 over every 7 days and then maintain on 10 mg a day   Cont bronchodilator         Treatment plan Discussed with nursing staff in detail , all questions answered . Electronically signed by Vahid Nelson MD on   3/21/19 at 10:24 PM    Please note that this chart was generated using voice recognition Dragon dictation software.   Although every effort was made to ensure the accuracy of this automated transcription, some errors in

## 2019-03-23 ENCOUNTER — HOSPITAL ENCOUNTER (INPATIENT)
Age: 79
LOS: 8 days | Discharge: HOSPICE | DRG: 189 | End: 2019-03-31
Attending: EMERGENCY MEDICINE | Admitting: INTERNAL MEDICINE
Payer: MEDICARE

## 2019-03-23 ENCOUNTER — APPOINTMENT (OUTPATIENT)
Dept: GENERAL RADIOLOGY | Age: 79
DRG: 189 | End: 2019-03-23
Payer: MEDICARE

## 2019-03-23 DIAGNOSIS — R06.89 DYSPNEA AND RESPIRATORY ABNORMALITIES: ICD-10-CM

## 2019-03-23 DIAGNOSIS — R06.00 DYSPNEA AND RESPIRATORY ABNORMALITIES: ICD-10-CM

## 2019-03-23 DIAGNOSIS — J18.9 PNEUMONIA DUE TO ORGANISM: ICD-10-CM

## 2019-03-23 DIAGNOSIS — D72.829 LEUKOCYTOSIS, UNSPECIFIED TYPE: Primary | ICD-10-CM

## 2019-03-23 LAB
-: NORMAL
-: NORMAL
ABSOLUTE EOS #: 0 K/UL (ref 0–0.44)
ABSOLUTE IMMATURE GRANULOCYTE: 0.54 K/UL (ref 0–0.3)
ABSOLUTE LYMPH #: 1.88 K/UL (ref 1.1–3.7)
ABSOLUTE MONO #: 0.81 K/UL (ref 0.1–1.2)
ALBUMIN SERPL-MCNC: 2.7 G/DL (ref 3.5–5.2)
ALBUMIN/GLOBULIN RATIO: 0.9 (ref 1–2.5)
ALLEN TEST: ABNORMAL
ALP BLD-CCNC: 73 U/L (ref 40–129)
ALT SERPL-CCNC: 16 U/L (ref 5–41)
AMORPHOUS: NORMAL
ANION GAP SERPL CALCULATED.3IONS-SCNC: 12 MMOL/L (ref 9–17)
AST SERPL-CCNC: 22 U/L
BACTERIA: NORMAL
BASOPHILS # BLD: 0 % (ref 0–2)
BASOPHILS ABSOLUTE: 0 K/UL (ref 0–0.2)
BILIRUB SERPL-MCNC: 0.62 MG/DL (ref 0.3–1.2)
BILIRUBIN DIRECT: 0.15 MG/DL
BILIRUBIN URINE: NEGATIVE
BILIRUBIN, INDIRECT: 0.47 MG/DL (ref 0–1)
BNP INTERPRETATION: ABNORMAL
BNP INTERPRETATION: NORMAL
BUN BLDV-MCNC: 30 MG/DL (ref 8–23)
BUN/CREAT BLD: ABNORMAL (ref 9–20)
CALCIUM SERPL-MCNC: 8.1 MG/DL (ref 8.6–10.4)
CARBOXYHEMOGLOBIN: 2 % (ref 0–5)
CASTS UA: NORMAL /LPF (ref 0–8)
CHLORIDE BLD-SCNC: 94 MMOL/L (ref 98–107)
CO2: 24 MMOL/L (ref 20–31)
COLOR: YELLOW
COMMENT UA: ABNORMAL
CREAT SERPL-MCNC: 0.48 MG/DL (ref 0.7–1.2)
CRYSTALS, UA: NORMAL /HPF
DIFFERENTIAL TYPE: ABNORMAL
EOSINOPHILS RELATIVE PERCENT: 0 % (ref 1–4)
EPITHELIAL CELLS UA: NORMAL /HPF (ref 0–5)
FIO2: ABNORMAL
GFR AFRICAN AMERICAN: >60 ML/MIN
GFR NON-AFRICAN AMERICAN: >60 ML/MIN
GFR SERPL CREATININE-BSD FRML MDRD: ABNORMAL ML/MIN/{1.73_M2}
GFR SERPL CREATININE-BSD FRML MDRD: ABNORMAL ML/MIN/{1.73_M2}
GLOBULIN: ABNORMAL G/DL (ref 1.5–3.8)
GLUCOSE BLD-MCNC: 160 MG/DL (ref 75–110)
GLUCOSE BLD-MCNC: 61 MG/DL (ref 70–99)
GLUCOSE URINE: NEGATIVE
HCO3 VENOUS: 28.8 MMOL/L (ref 24–30)
HCT VFR BLD CALC: 40 % (ref 40.7–50.3)
HEMOGLOBIN: 13.2 G/DL (ref 13–17)
IMMATURE GRANULOCYTES: 2 %
INR BLD: 1.1
KETONES, URINE: ABNORMAL
LACTIC ACID, WHOLE BLOOD: 1.4 MMOL/L (ref 0.7–2.1)
LACTIC ACID, WHOLE BLOOD: 1.9 MMOL/L (ref 0.7–2.1)
LEUKOCYTE ESTERASE, URINE: NEGATIVE
LYMPHOCYTES # BLD: 7 % (ref 24–43)
MCH RBC QN AUTO: 31.9 PG (ref 25.2–33.5)
MCHC RBC AUTO-ENTMCNC: 33 G/DL (ref 28.4–34.8)
MCV RBC AUTO: 96.6 FL (ref 82.6–102.9)
METHEMOGLOBIN: ABNORMAL % (ref 0–1.5)
MODE: ABNORMAL
MONOCYTES # BLD: 3 % (ref 3–12)
MORPHOLOGY: ABNORMAL
MUCUS: NORMAL
NEGATIVE BASE EXCESS, VEN: ABNORMAL MMOL/L (ref 0–2)
NITRITE, URINE: NEGATIVE
NOTIFICATION TIME: ABNORMAL
NOTIFICATION: ABNORMAL
NRBC AUTOMATED: 0.1 PER 100 WBC
O2 DEVICE/FLOW/%: ABNORMAL
O2 SAT, VEN: 72.9 % (ref 60–85)
OTHER OBSERVATIONS UA: NORMAL
OXYHEMOGLOBIN: ABNORMAL % (ref 95–98)
PATIENT TEMP: 37
PCO2, VEN, TEMP ADJ: ABNORMAL MMHG (ref 39–55)
PCO2, VEN: 45.4 (ref 39–55)
PDW BLD-RTO: 16.8 % (ref 11.8–14.4)
PEEP/CPAP: ABNORMAL
PH UA: 7 (ref 5–8)
PH VENOUS: 7.42 (ref 7.32–7.42)
PH, VEN, TEMP ADJ: ABNORMAL (ref 7.32–7.42)
PLATELET # BLD: 301 K/UL (ref 138–453)
PLATELET ESTIMATE: ABNORMAL
PMV BLD AUTO: 10.2 FL (ref 8.1–13.5)
PO2, VEN, TEMP ADJ: ABNORMAL MMHG (ref 30–50)
PO2, VEN: 40.1 (ref 30–50)
POSITIVE BASE EXCESS, VEN: 4.2 MMOL/L (ref 0–2)
POTASSIUM SERPL-SCNC: 4.2 MMOL/L (ref 3.7–5.3)
PRO-BNP: 305 PG/ML
PRO-BNP: NORMAL PG/ML
PROTEIN UA: NEGATIVE
PROTHROMBIN TIME: 11.3 SEC (ref 9–12)
PSV: ABNORMAL
PT. POSITION: ABNORMAL
RBC # BLD: 4.14 M/UL (ref 4.21–5.77)
RBC # BLD: ABNORMAL 10*6/UL
RBC UA: NORMAL /HPF (ref 0–4)
REASON FOR REJECTION: NORMAL
RENAL EPITHELIAL, UA: NORMAL /HPF
RESPIRATORY RATE: ABNORMAL
SAMPLE SITE: ABNORMAL
SEG NEUTROPHILS: 88 % (ref 36–65)
SEGMENTED NEUTROPHILS ABSOLUTE COUNT: 23.67 K/UL (ref 1.5–8.1)
SET RATE: ABNORMAL
SODIUM BLD-SCNC: 130 MMOL/L (ref 135–144)
SPECIFIC GRAVITY UA: 1.02 (ref 1–1.03)
TEXT FOR RESPIRATORY: ABNORMAL
TOTAL HB: ABNORMAL G/DL (ref 12–16)
TOTAL PROTEIN: 5.7 G/DL (ref 6.4–8.3)
TOTAL RATE: ABNORMAL
TRICHOMONAS: NORMAL
TROPONIN INTERP: ABNORMAL
TROPONIN INTERP: NORMAL
TROPONIN T: ABNORMAL NG/ML
TROPONIN T: NORMAL NG/ML
TROPONIN, HIGH SENSITIVITY: 24 NG/L (ref 0–22)
TROPONIN, HIGH SENSITIVITY: NORMAL NG/L (ref 0–22)
TURBIDITY: ABNORMAL
URINE HGB: NEGATIVE
UROBILINOGEN, URINE: NORMAL
VT: ABNORMAL
WBC # BLD: 26.9 K/UL (ref 3.5–11.3)
WBC # BLD: ABNORMAL 10*3/UL
WBC UA: NORMAL /HPF (ref 0–5)
YEAST: NORMAL
ZZ NTE CLEAN UP: ORDERED TEST: NORMAL
ZZ NTE WITH NAME CLEAN UP: SPECIMEN SOURCE: NORMAL

## 2019-03-23 PROCEDURE — 97530 THERAPEUTIC ACTIVITIES: CPT

## 2019-03-23 PROCEDURE — 2580000003 HC RX 258: Performed by: NURSE PRACTITIONER

## 2019-03-23 PROCEDURE — 86403 PARTICLE AGGLUT ANTBDY SCRN: CPT

## 2019-03-23 PROCEDURE — 82947 ASSAY GLUCOSE BLOOD QUANT: CPT

## 2019-03-23 PROCEDURE — 71045 X-RAY EXAM CHEST 1 VIEW: CPT

## 2019-03-23 PROCEDURE — 36415 COLL VENOUS BLD VENIPUNCTURE: CPT

## 2019-03-23 PROCEDURE — 96365 THER/PROPH/DIAG IV INF INIT: CPT

## 2019-03-23 PROCEDURE — 82805 BLOOD GASES W/O2 SATURATION: CPT

## 2019-03-23 PROCEDURE — G8978 MOBILITY CURRENT STATUS: HCPCS

## 2019-03-23 PROCEDURE — 6370000000 HC RX 637 (ALT 250 FOR IP): Performed by: STUDENT IN AN ORGANIZED HEALTH CARE EDUCATION/TRAINING PROGRAM

## 2019-03-23 PROCEDURE — 2580000003 HC RX 258: Performed by: EMERGENCY MEDICINE

## 2019-03-23 PROCEDURE — 87040 BLOOD CULTURE FOR BACTERIA: CPT

## 2019-03-23 PROCEDURE — 83605 ASSAY OF LACTIC ACID: CPT

## 2019-03-23 PROCEDURE — 87086 URINE CULTURE/COLONY COUNT: CPT

## 2019-03-23 PROCEDURE — 80076 HEPATIC FUNCTION PANEL: CPT

## 2019-03-23 PROCEDURE — 93005 ELECTROCARDIOGRAM TRACING: CPT

## 2019-03-23 PROCEDURE — 85025 COMPLETE CBC W/AUTO DIFF WBC: CPT

## 2019-03-23 PROCEDURE — 94640 AIRWAY INHALATION TREATMENT: CPT

## 2019-03-23 PROCEDURE — 6360000002 HC RX W HCPCS: Performed by: STUDENT IN AN ORGANIZED HEALTH CARE EDUCATION/TRAINING PROGRAM

## 2019-03-23 PROCEDURE — 84484 ASSAY OF TROPONIN QUANT: CPT

## 2019-03-23 PROCEDURE — 2580000003 HC RX 258: Performed by: STUDENT IN AN ORGANIZED HEALTH CARE EDUCATION/TRAINING PROGRAM

## 2019-03-23 PROCEDURE — 87077 CULTURE AEROBIC IDENTIFY: CPT

## 2019-03-23 PROCEDURE — 96367 TX/PROPH/DG ADDL SEQ IV INF: CPT

## 2019-03-23 PROCEDURE — 83880 ASSAY OF NATRIURETIC PEPTIDE: CPT

## 2019-03-23 PROCEDURE — 96366 THER/PROPH/DIAG IV INF ADDON: CPT

## 2019-03-23 PROCEDURE — 85610 PROTHROMBIN TIME: CPT

## 2019-03-23 PROCEDURE — 2000000000 HC ICU R&B

## 2019-03-23 PROCEDURE — 6360000002 HC RX W HCPCS: Performed by: NURSE PRACTITIONER

## 2019-03-23 PROCEDURE — 80048 BASIC METABOLIC PNL TOTAL CA: CPT

## 2019-03-23 PROCEDURE — 81001 URINALYSIS AUTO W/SCOPE: CPT

## 2019-03-23 PROCEDURE — 2700000000 HC OXYGEN THERAPY PER DAY

## 2019-03-23 PROCEDURE — 87641 MR-STAPH DNA AMP PROBE: CPT

## 2019-03-23 PROCEDURE — 87186 SC STD MICRODIL/AGAR DIL: CPT

## 2019-03-23 PROCEDURE — 97162 PT EVAL MOD COMPLEX 30 MIN: CPT

## 2019-03-23 PROCEDURE — 99285 EMERGENCY DEPT VISIT HI MDM: CPT

## 2019-03-23 RX ORDER — VANCOMYCIN HYDROCHLORIDE 1 G/200ML
15 INJECTION, SOLUTION INTRAVENOUS ONCE
Status: COMPLETED | OUTPATIENT
Start: 2019-03-23 | End: 2019-03-23

## 2019-03-23 RX ORDER — ALBUTEROL SULFATE 2.5 MG/3ML
2.5 SOLUTION RESPIRATORY (INHALATION)
Status: DISCONTINUED | OUTPATIENT
Start: 2019-03-23 | End: 2019-03-31 | Stop reason: HOSPADM

## 2019-03-23 RX ORDER — METHYLPREDNISOLONE SODIUM SUCCINATE 40 MG/ML
40 INJECTION, POWDER, LYOPHILIZED, FOR SOLUTION INTRAMUSCULAR; INTRAVENOUS EVERY 8 HOURS
Status: DISCONTINUED | OUTPATIENT
Start: 2019-03-23 | End: 2019-03-24

## 2019-03-23 RX ORDER — 0.9 % SODIUM CHLORIDE 0.9 %
1000 INTRAVENOUS SOLUTION INTRAVENOUS ONCE
Status: DISCONTINUED | OUTPATIENT
Start: 2019-03-23 | End: 2019-03-23

## 2019-03-23 RX ORDER — SODIUM CHLORIDE 0.9 % (FLUSH) 0.9 %
10 SYRINGE (ML) INJECTION EVERY 12 HOURS SCHEDULED
Status: DISCONTINUED | OUTPATIENT
Start: 2019-03-23 | End: 2019-03-31 | Stop reason: HOSPADM

## 2019-03-23 RX ORDER — DEXTROSE MONOHYDRATE 50 MG/ML
100 INJECTION, SOLUTION INTRAVENOUS PRN
Status: DISCONTINUED | OUTPATIENT
Start: 2019-03-23 | End: 2019-03-31 | Stop reason: HOSPADM

## 2019-03-23 RX ORDER — NICOTINE POLACRILEX 4 MG
15 LOZENGE BUCCAL PRN
Status: DISCONTINUED | OUTPATIENT
Start: 2019-03-23 | End: 2019-03-31 | Stop reason: HOSPADM

## 2019-03-23 RX ORDER — 0.9 % SODIUM CHLORIDE 0.9 %
1000 INTRAVENOUS SOLUTION INTRAVENOUS ONCE
Status: DISCONTINUED | OUTPATIENT
Start: 2019-03-23 | End: 2019-03-31 | Stop reason: HOSPADM

## 2019-03-23 RX ORDER — TAMSULOSIN HYDROCHLORIDE 0.4 MG/1
0.4 CAPSULE ORAL DAILY
Status: DISCONTINUED | OUTPATIENT
Start: 2019-03-23 | End: 2019-03-31 | Stop reason: HOSPADM

## 2019-03-23 RX ORDER — SODIUM CHLORIDE 0.9 % (FLUSH) 0.9 %
10 SYRINGE (ML) INJECTION PRN
Status: DISCONTINUED | OUTPATIENT
Start: 2019-03-23 | End: 2019-03-31 | Stop reason: HOSPADM

## 2019-03-23 RX ORDER — 0.9 % SODIUM CHLORIDE 0.9 %
1000 INTRAVENOUS SOLUTION INTRAVENOUS ONCE
Status: COMPLETED | OUTPATIENT
Start: 2019-03-23 | End: 2019-03-23

## 2019-03-23 RX ORDER — ONDANSETRON 2 MG/ML
4 INJECTION INTRAMUSCULAR; INTRAVENOUS EVERY 6 HOURS PRN
Status: DISCONTINUED | OUTPATIENT
Start: 2019-03-23 | End: 2019-03-31 | Stop reason: HOSPADM

## 2019-03-23 RX ORDER — SODIUM CHLORIDE 9 MG/ML
INJECTION, SOLUTION INTRAVENOUS CONTINUOUS
Status: DISCONTINUED | OUTPATIENT
Start: 2019-03-23 | End: 2019-03-24

## 2019-03-23 RX ORDER — ALBUTEROL SULFATE 90 UG/1
2 AEROSOL, METERED RESPIRATORY (INHALATION) EVERY 6 HOURS PRN
Status: DISCONTINUED | OUTPATIENT
Start: 2019-03-23 | End: 2019-03-23

## 2019-03-23 RX ORDER — DEXTROSE MONOHYDRATE 25 G/50ML
12.5 INJECTION, SOLUTION INTRAVENOUS PRN
Status: DISCONTINUED | OUTPATIENT
Start: 2019-03-23 | End: 2019-03-31 | Stop reason: HOSPADM

## 2019-03-23 RX ORDER — IPRATROPIUM BROMIDE AND ALBUTEROL SULFATE 2.5; .5 MG/3ML; MG/3ML
1 SOLUTION RESPIRATORY (INHALATION) 4 TIMES DAILY
Status: DISCONTINUED | OUTPATIENT
Start: 2019-03-24 | End: 2019-03-31 | Stop reason: HOSPADM

## 2019-03-23 RX ADMIN — TAMSULOSIN HYDROCHLORIDE 0.4 MG: 0.4 CAPSULE ORAL at 20:44

## 2019-03-23 RX ADMIN — SODIUM CHLORIDE: 9 INJECTION, SOLUTION INTRAVENOUS at 20:43

## 2019-03-23 RX ADMIN — METHYLPREDNISOLONE SODIUM SUCCINATE 40 MG: 40 INJECTION, POWDER, FOR SOLUTION INTRAMUSCULAR; INTRAVENOUS at 20:44

## 2019-03-23 RX ADMIN — SODIUM CHLORIDE 1000 ML: 9 INJECTION, SOLUTION INTRAVENOUS at 14:51

## 2019-03-23 RX ADMIN — ENOXAPARIN SODIUM 40 MG: 40 INJECTION, SOLUTION INTRAVENOUS; SUBCUTANEOUS at 20:44

## 2019-03-23 RX ADMIN — IPRATROPIUM BROMIDE 2 PUFF: 17 AEROSOL, METERED RESPIRATORY (INHALATION) at 20:15

## 2019-03-23 RX ADMIN — VANCOMYCIN HYDROCHLORIDE 1000 MG: 1 INJECTION, SOLUTION INTRAVENOUS at 14:58

## 2019-03-23 RX ADMIN — INSULIN LISPRO 1 UNITS: 100 INJECTION, SOLUTION INTRAVENOUS; SUBCUTANEOUS at 20:52

## 2019-03-23 RX ADMIN — PIPERACILLIN SODIUM,TAZOBACTAM SODIUM 4.5 G: 4; .5 INJECTION, POWDER, FOR SOLUTION INTRAVENOUS at 17:12

## 2019-03-23 RX ADMIN — Medication 10 ML: at 20:45

## 2019-03-23 RX ADMIN — MOMETASONE FUROATE AND FORMOTEROL FUMARATE DIHYDRATE 2 PUFF: 100; 5 AEROSOL RESPIRATORY (INHALATION) at 20:14

## 2019-03-23 ASSESSMENT — ENCOUNTER SYMPTOMS
DIARRHEA: 0
SHORTNESS OF BREATH: 0
VOMITING: 0
ABDOMINAL PAIN: 0
WHEEZING: 0
COUGH: 0
NAUSEA: 0

## 2019-03-23 NOTE — ED NOTES
Green top redrawn, labeled, sent for testing. Pt in NAD w/ rr even and unlabored. Will continue to monitor.      Kathryn Rincon RN  03/23/19 1165

## 2019-03-23 NOTE — ED NOTES
IV access lost. Pt labs redrawn and sent. Writer paging for medic to arrive w/ ultrasound for attempt to reaccess IV. Pt in NAD w/ rr agustinne nad unlabored @ this time. Will continue to monitor. ATB paused.      Jenn Ramires RN  03/23/19 6100

## 2019-03-23 NOTE — ED PROVIDER NOTES
[facility]  Emergency Department Encounter  Mid Level Provider     Pt Name: Ely Bullock  MRN: [id number]  [place] 1940  Date of evaluation: 3/23/19  PCP:  Deandra Swain PA-C    CHIEF COMPLAINT       Chief Complaint   Patient presents with    Fatigue    Shortness of Breath       HISTORY OF PRESENT ILLNESS  (Location/Symptom, Timing/Onset,Context/Setting, Quality, Duration, Modifying Factors, Severity.)      Ely Bullock is a 70 y.o. male who presents with inability safely care for himself. Patient recently admitted to hospital on March 7 for pneumonia and discharged yesterday. They did speak with him about hospice during admission which he declined. They also encouraged him to go to extended care which he declined also. Patient with end-stage CHF, COPD, pulmonary fibrosis. He is on home oxygen at 5 L/m. Home nurse arrived today and found that he was too weak to care for himself and pulse ox was in the 80s. Patient denies any worsening of baseline chest pain, shortness breath. Denies nausea vomiting or diarrhea. Denies fever or chills. Patient states he regrets declining extended care and was agreeable to return to the emergency room for ECF placement. PAST MEDICAL /SURGICAL / SOCIAL / FAMILY HISTORY      has a past medical history of Anemia, Anxiety, Bronchitis, COPD (chronic obstructive pulmonary disease) ([address] Diabetes ([address] Former smoker, Hyperlipidemia, Oxygen dependent, Respiratory distress, and Type 2 diabetes mellitus ([address] has a past surgical history that includes AAA repair, open; Pancreas surgery; Finger surgery; and Foot surgery.     Social History     Socioeconomic History    Marital status:      Spouse name: Not on file    Number of children: Not on file    Years of education: Not on file    Highest education level: Not on file   Occupational History    Occupation: none   Social Needs    Financial resource strain: Not on file    Food insecurity: Worry: Not on file     Inability: Not on file    Transportation needs:     Medical: Not on file     Non-medical: Not on file   Tobacco Use    Smoking status: Former Smoker     Types: Cigarettes    Smokeless tobacco: Never Used    Tobacco comment: Stated he quit smoking in the 90's   Substance and Sexual Activity    Alcohol use: No     Alcohol/week: 0.0 oz    Drug use: No    Sexual activity: Never   Lifestyle    Physical activity:     Days per week: Not on file     Minutes per session: Not on file    Stress: Not on file   Relationships    Social connections:     Talks on phone: Not on file     Gets together: Not on file     Attends Restorationist service: Not on file     Active member of club or organization: Not on file     Attends meetings of clubs or organizations: Not on file     Relationship status: Not on file    Intimate partner violence:     Fear of current or ex partner: Not on file     Emotionally abused: Not on file     Physically abused: Not on file     Forced sexual activity: Not on file   Other Topics Concern    Not on file   Social History Narrative    Not on file       History reviewed. No pertinent family history. Allergies:  Patient has no known allergies. Home Medications:  Prior to Admission medications    Medication Sig Start Date End Date Taking?  Authorizing Provider   predniSONE (DELTASONE) 10 MG tablet Take 3 tablets by mouth daily for 3 days 3/20/19 3/23/19  Sd Frias MD   predniSONE (DELTASONE) 20 MG tablet Take 1 tablet by mouth daily for 3 days 3/24/19 3/27/19  Sd Frias MD   predniSONE (DELTASONE) 10 MG tablet Take 1 tablet by mouth daily for 3 days 3/28/19 3/31/19  Sd Frias MD   sodium chloride (OCEAN) 0.65 % nasal spray 1 spray by Nasal route as needed for Congestion 1/29/19   Rigoberto Hussein MD   ergocalciferol (ERGOCALCIFEROL) 25521 units capsule Take 1 capsule by mouth once a week 12/24/18   Hina Schneider MD   furosemide (LASIX) 20 MG tablet Take 1 tablet by mouth every other day 12/1/18   Robin Taylor MD   insulin glargine (LANTUS) 100 UNIT/ML injection vial Inject 20 Units into the skin nightly 11/30/18   Robin Taylor MD   tamsulosin (FLOMAX) 0.4 MG capsule Take 0.4 mg by mouth daily    Historical Provider, MD   famotidine (PEPCID) 20 MG tablet Take 20 mg by mouth 2 times daily    Historical Provider, MD   acetaminophen (TYLENOL) 325 MG tablet Take 650 mg by mouth every 6 hours as needed for Pain    Historical Provider, MD   aclidinium (TUDORZA PRESSAIR) 400 MCG/ACT AEPB inhaler Inhale 1 puff into the lungs 2 times daily 6/22/18 7/22/19  Estiven Garcia MD   albuterol sulfate HFA (VENTOLIN HFA) 108 (90 Base) MCG/ACT inhaler Inhale 2 puffs into the lungs every 6 hours as needed for Wheezing 6/22/18   Estiven Garcia MD   budesonide-formoterol (SYMBICORT) 80-4.5 MCG/ACT AERO Inhale 2 puffs into the lungs 2 times daily 5/25/18   Elsa Nair MD   metFORMIN ER (GLUCOPHAGE-XR) 500 MG XR tablet take 2 tablets by mouth twice a day  Patient taking differently: take 2 tablet by mouth twice a day 8/1/16   Myles Bailey PA-C   aspirin 81 MG tablet Take 81 mg by mouth daily    Historical Provider, MD   escitalopram (LEXAPRO) 10 MG tablet Take 1 tablet by mouth daily 11/10/15   Dominique Jensen PA-C       REVIEW OF SYSTEMS    (2-9 systems for level 4, 10 or more for level 5)      Review of Systems   Constitutional: Positive for fatigue. Negative for chills and fever. HENT: Negative for congestion. Respiratory: Negative for cough, shortness of breath and wheezing. Cardiovascular: Negative for chest pain. Gastrointestinal: Negative for abdominal pain, diarrhea, nausea and vomiting. Genitourinary: Negative for decreased urine volume. Musculoskeletal: Negative for myalgias. Allergic/Immunologic: Positive for immunocompromised state. Neurological: Positive for weakness. Negative for headaches.        PHYSICALEXAM   (upto 7 for level 4, 8 or more for level 5)      INITIAL VITALS:  height is 5' 9\" (1.753 m) and weight is 148 lb 9.4 oz (67.4 kg). His temperature is 97.3 °F (36.3 °C). His blood pressure is 115/66 and his pulse is 95. His respiration is 26 and oxygen saturation is 67% (abnormal). Physical Exam   Constitutional: He is oriented to person, place, and time. He appears well-developed and well-nourished. No distress. HENT:   Head: Normocephalic. Eyes: Pupils are equal, round, and reactive to light. Neck: Normal range of motion. Neck supple. Cardiovascular: Normal rate. Pulmonary/Chest: Effort normal. No respiratory distress. Rales bibasilar   Abdominal: Soft. There is no tenderness. There is no guarding. Musculoskeletal: Normal range of motion. Neurological: He is alert and oriented to person, place, and time. Skin: Skin is warm and dry. Capillary refill takes less than 2 seconds. There is pallor. Psychiatric: He has a normal mood and affect. His behavior is normal. Judgment and thought content normal.   Nursing note and vitals reviewed.       DIFFERENTIAL  DIAGNOSIS   Hypoxia, respiratory failure, end-stage lung disease, pneumonia    PLAN (LABS / IMAGING / EKG):  Orders Placed This Encounter   Procedures    Culture Blood #2    Culture Blood #1    Urine Culture    Respiratory Virus PCR Panel    SPUTUM CULTURE    MRSA DNA Probe, Nasal    XR CHEST PORTABLE    CBC Auto Differential    SPECIMEN REJECTION    PREVIOUS SPECIMEN    Troponin    Brain Natriuretic Peptide    Protime-INR    Basic Metabolic Panel    Brain Natriuretic Peptide    Lactic Acid, Whole Blood    Hepatic Function Panel    PREVIOUS SPECIMEN    Troponin    Blood gas, arterial    Basic Metabolic Panel w/ Reflex to MG    CBC WITH AUTO DIFFERENTIAL    LACTIC ACID, WHOLE BLOOD    BLOOD GAS, VENOUS    Urinalysis Reflex to Culture    Diet NPO Effective Now    Telemetry Monitoring    Vital signs per unit routine    Telemetry monitoring    Daily weights    Intake and output    Tobacco cessation education    Place intermittent pneumatic compression device    Notify physician    Up as tolerated    HYPOGLYCEMIA TREATMENT: BG LESS THAN 50 MG/DL AND PATIENT ALERT    HYPOGLYCEMIA TREATMENT: BG LESS THAN 70 MG/DL AND PATIENT ALERT    HYPOGLYCEMIA TREATMENT: BG LESS THAN 70 MG/DL AND PATIENT NOT ALERT    DNR comfort care - arrest    Inpatient consult to Hospitalist    Inpatient consult to Critical Care    PT eval and treat    Heated/ Humidified High Flow Nasal Cannula    Initiate Oxygen Therapy Protocol    Respiratory care evaluation only    Respiratory Care Evaluation and Treat    POCT Glucose    EKG 12 Lead    PATIENT STATUS (FROM ED OR OR/PROCEDURAL) Inpatient       MEDICATIONS ORDERED:  Orders Placed This Encounter   Medications    0.9 % sodium chloride bolus    vancomycin (VANCOCIN) 1000 mg in dextrose 5% 200 mL IVPB    piperacillin-tazobactam (ZOSYN) 4.5 g in dextrose 5 % 100 mL IVPB (mini-bag)    DISCONTD: 0.9 % sodium chloride bolus    0.9 % sodium chloride bolus    ipratropium (ATROVENT HFA) 17 MCG/ACT inhaler 2 puff    albuterol sulfate  (90 Base) MCG/ACT inhaler 2 puff    mometasone-formoterol (DULERA) 100-5 MCG/ACT inhaler 2 puff    tamsulosin (FLOMAX) capsule 0.4 mg    sodium chloride flush 0.9 % injection 10 mL    sodium chloride flush 0.9 % injection 10 mL    magnesium hydroxide (MILK OF MAGNESIA) 400 MG/5ML suspension 30 mL    ondansetron (ZOFRAN) injection 4 mg    enoxaparin (LOVENOX) injection 40 mg    piperacillin-tazobactam (ZOSYN) 3.375 g in dextrose 5 % 50 mL IVPB (mini-bag)    methylPREDNISolone sodium (SOLU-MEDROL) injection 40 mg    glucose (GLUTOSE) 40 % oral gel 15 g    dextrose 50 % solution 12.5 g    glucagon (rDNA) injection 1 mg    dextrose 5 % solution    insulin lispro (HUMALOG) injection vial 0-6 Units    insulin lispro (HUMALOG) injection vial 0-3 Units    0.9 % sodium chloride infusion       Controlled Substances Monitoring:      DIAGNOSTIC RESULTS / EMERGENCY DEPARTMENT COURSE / MDM     RADIOLOGY:   I directly visualized(with the attending physician) the following  images and reviewed the radiologist interpretations:  Ct Abdomen Pelvis Wo Contrast Additional Contrast? None    Result Date: 3/8/2019  EXAMINATION: CT OF THE ABDOMEN AND PELVIS WITHOUT CONTRAST 3/8/2019 1:05 pm TECHNIQUE: CT of the abdomen and pelvis was performed without the administration of intravenous contrast. Multiplanar reformatted images are provided for review. Dose modulation, iterative reconstruction, and/or weight based adjustment of the mA/kV was utilized to reduce the radiation dose to as low as reasonably achievable. COMPARISON: None. HISTORY: ORDERING SYSTEM PROVIDED HISTORY: abdominal pain TECHNOLOGIST PROVIDED HISTORY: FINDINGS: Lower Chest: Pulmonary fibrosis with mild traction bronchiectasis in the lung bases. Dependent atelectasis is noted. Calcified pleural plaques and mild diffuse pleural thickening is noted. No effusion. Organs: Evaluation of the abdominal and pelvic viscera is limited in the absence of contrast.  Status post cholecystectomy. No biliary dilatation. The liver, pancreas, spleen, adrenals, and kidneys reveal no acute findings. Scattered calcifications throughout the pancreas are noted which may represent chronic calcific pancreatitis. GI/Bowel: Large amount stool burden throughout the colon. The rectum is distended with stool. Diverticulosis is noted. Fluid and small amount of air within the small bowel is noted suggestive of mild ileus. Pelvis: No acute findings. Mild bladder distension and bladder wall thickening suggestive of chronic bladder outlet obstruction. Mild enlargement of the median lobe of the prostate impressing upon the adjacent bladder. Rectal distention, as above. Peritoneum/Retroperitoneum: Status post endovascular aortic repair.   The aneurysm Xr Chest Portable    Result Date: 3/7/2019  EXAMINATION: SINGLE XRAY VIEW OF THE CHEST 3/7/2019 5:26 am COMPARISON: January 30, 2019. HISTORY: ORDERING SYSTEM PROVIDED HISTORY: sob TECHNOLOGIST PROVIDED HISTORY: sob Ordering Physician Provided Reason for Exam: sob Acuity: Acute Type of Exam: Initial Additional signs and symptoms: Difficulty breathing Relevant Medical/Surgical History: Difficulty breathing FINDINGS: Low lung volume. Similar appearing bilateral heterogeneous opacities. Emphysema. Small bilateral pleural effusions. No definite pneumothorax. Grossly stable cardiomediastinal silhouette and great vessels. Stable regional skeleton. Similar-appearing bilateral heterogeneous opacities. Differential considerations include asymmetric pulmonary edema and multifocal pneumonia. Small bilateral pleural effusions. XR CHEST PORTABLE   Final Result   Bibasilar effusions with adjacent consolidation representing atelectasis   versus pneumonia.              LABS:  Results for orders placed or performed during the hospital encounter of 03/23/19   CBC Auto Differential   Result Value Ref Range    WBC 26.9 (H) 3.5 - 11.3 k/uL    RBC 4.14 (L) 4.21 - 5.77 m/uL    Hemoglobin 13.2 13.0 - 17.0 g/dL    Hematocrit 40.0 (L) 40.7 - 50.3 %    MCV 96.6 82.6 - 102.9 fL    MCH 31.9 25.2 - 33.5 pg    MCHC 33.0 28.4 - 34.8 g/dL    RDW 16.8 (H) 11.8 - 14.4 %    Platelets 296 679 - 559 k/uL    MPV 10.2 8.1 - 13.5 fL    NRBC Automated 0.1 (H) 0.0 per 100 WBC    Differential Type NOT REPORTED     WBC Morphology NOT REPORTED     RBC Morphology NOT REPORTED     Platelet Estimate NOT REPORTED     Immature Granulocytes 2 (H) 0 %    Seg Neutrophils 88 (H) 36 - 65 %    Lymphocytes 7 (L) 24 - 43 %    Monocytes 3 3 - 12 %    Eosinophils % 0 (L) 1 - 4 %    Basophils 0 0 - 2 %    Absolute Immature Granulocyte 0.54 (H) 0.00 - 0.30 k/uL    Segs Absolute 23.67 (H) 1.50 - 8.10 k/uL    Absolute Lymph # 1.88 1.10 - 3.70 k/uL    Absolute Mono # 0.81 0.10 - 1.20 k/uL    Absolute Eos # 0.00 0.00 - 0.44 k/uL    Basophils # 0.00 0.00 - 0.20 k/uL    Morphology ANISOCYTOSIS PRESENT    SPECIMEN REJECTION   Result Value Ref Range    Specimen Source . BLOOD     Ordered Test BMP TROPI     Reason for Rejection Unable to perform testing: Specimen hemolyzed.      - NOT REPORTED    Troponin   Result Value Ref Range    Troponin, High Sensitivity SPECIMEN GROSSLY HEMOLYZED 0 - 22 ng/L    Troponin T NOT REPORTED <0.03 ng/mL    Troponin Interp NOT REPORTED    Brain Natriuretic Peptide   Result Value Ref Range    Pro-BNP SPECIMEN GROSSLY HEMOLYZED <300 pg/mL    BNP Interpretation Pro-BNP Reference Range:    Protime-INR   Result Value Ref Range    Protime 11.3 9.0 - 12.0 sec    INR 1.1    Basic Metabolic Panel   Result Value Ref Range    Glucose 61 (L) 70 - 99 mg/dL    BUN 30 (H) 8 - 23 mg/dL    CREATININE 0.48 (L) 0.70 - 1.20 mg/dL    Bun/Cre Ratio NOT REPORTED 9 - 20    Calcium 8.1 (L) 8.6 - 10.4 mg/dL    Sodium 130 (L) 135 - 144 mmol/L    Potassium 4.2 3.7 - 5.3 mmol/L    Chloride 94 (L) 98 - 107 mmol/L    CO2 24 20 - 31 mmol/L    Anion Gap 12 9 - 17 mmol/L    GFR Non-African American >60 >60 mL/min    GFR African American >60 >60 mL/min    GFR Comment          GFR Staging NOT REPORTED    Brain Natriuretic Peptide   Result Value Ref Range    Pro- (H) <300 pg/mL    BNP Interpretation Pro-BNP Reference Range:    Lactic Acid, Whole Blood   Result Value Ref Range    Lactic Acid, Whole Blood 1.9 0.7 - 2.1 mmol/L   Hepatic Function Panel   Result Value Ref Range    Alb 2.7 (L) 3.5 - 5.2 g/dL    Alkaline Phosphatase 73 40 - 129 U/L    ALT 16 5 - 41 U/L    AST 22 <40 U/L    Total Bilirubin 0.62 0.3 - 1.2 mg/dL    Bilirubin, Direct 0.15 <0.31 mg/dL    Bilirubin, Indirect 0.47 0.00 - 1.00 mg/dL    Total Protein 5.7 (L) 6.4 - 8.3 g/dL    Globulin NOT REPORTED 1.5 - 3.8 g/dL    Albumin/Globulin Ratio 0.9 (L) 1.0 - 2.5   Troponin   Result Value Ref Range    Troponin, High Sensitivity 24 (H) 0 - 22 ng/L    Troponin T NOT REPORTED <0.03 ng/mL    Troponin Interp NOT REPORTED    EKG 12 Lead   Result Value Ref Range    Ventricular Rate 97 BPM    Atrial Rate 97 BPM    P-R Interval 116 ms    QRS Duration 66 ms    Q-T Interval 312 ms    QTc Calculation (Bazett) 396 ms    P Axis 56 degrees    R Axis 46 degrees    T Axis 61 degrees       EMERGENCY DEPARTMENT COURSE:  Discuss code status with patient. He states he wants to remain a full code at this time. Advised on review of his chart it shows that he was recommended hospice. He states he understands but he wants to see his daughter one last time from [place]. He also admits however he has never notified her of his deteriorating condition. He does give me permission to talk to [address] [place] [address], Ruperto Lawrence. I did speak with Kelsey. She will be in to see patient tomorrow. She states that she was unaware that hospice was recommended and she is kept this from her. He states his pulse because she recently lost her mom months ago and he probably did not want to burden her. Kelsey asked that I tried to reach sister, Anastasiya Mendieta,  to advise the patient status. Update provided to patient. Nonrebreather mask initiated as pulse ox persistently low with nasal cannula. 1540: Attempted to call sister, Anastasiya Mendieta, to provide update; no voicemail available [phone]  I was called back to room patient wants to discuss code status. He wants me to explain difference between DNR CC, DNR CC rest and full code. After further discussion patient states he wants to be a DNR CCA with no intubation. The attending physician, Dr. Ariela Bejarano, was also at the bedside. Paperwork was signed. Patient reports he feels comfortable with this decision; patient alert and oriented following commands appropriately when he signed a DNR CCA. 1610: Attempting nasal cannula 5 L/m to see if pulse ox maintains  Continued issues with low pulse ox.   Will order high flow nasal cannula; blood pressure improving with IV fluids  1830: Dayan Ortiz, sister (\"matias\") advised of patient status [phone]    CONSULTS:  Spoke with Dr. Gael Infante, hospitalist.  He states he is familiar with client and client has required ICU services in the past 1 when had persistent hypotension. Systolic persistently in the 90s. Will consult ICU per his request  ICU agrees to admission    PROCEDURES:  None    FINAL IMPRESSION      1. Leukocytosis, unspecified type    2. Dyspnea and respiratory abnormalities    3.  Pneumonia due to organism          DISPOSITION / PLAN     DISPOSITION Admitted    PATIENT REFERRED TO:  Latoya Malone PA-C  [address] [phone]            DISCHARGE MEDICATIONS:  Current Discharge Medication List          KHALIDA Ramírez CNP   Emergency Medicine Nurse Practitioner    (Please note that portions of this note were completed with a voice recognitionprogram.  Efforts were made to edit the dictations but occasionally words are mis-transcribed.)        KHALIDA Ramírez CNP  03/23/19 1952

## 2019-03-23 NOTE — ED PROVIDER NOTES
[facility]     Emergency Department     Faculty Note/ Attestation      Pt Name: Shlomo Sykes                                       MRN: [id number]  [place] 1940  Date of evaluation: 3/23/2019    Patients PCP:    Merry Nguyen PA-C    Attestation  I performed a history and physical examination of the patient and discussed management with the resident. I reviewed the residents note and agree with the documented findings and plan of care. Any areas of disagreement are noted on the chart. I was personally present for the key portions of any procedures. I have documented in the chart those procedures where I was not present during the key portions. I have reviewed the emergency nurses triage note. I agree with the chief complaint, past medical history, past surgical history, allergies, medications, social and family history as documented unless otherwise noted below. For Physician Assistant/ Nurse Practitioner cases/documentation I have personally evaluated this patient and have completed at least one if not all key elements of the E/M (history, physical exam, and MDM). Additional findings are as noted. Initial Screens:             Vitals:    Vitals:    03/23/19 1246   BP: (!) 92/56   Pulse: 104   Resp: 20   Temp: 97.3 °F (36.3 °C)   SpO2: (!) 80%   Weight: 156 lb (70.8 kg)       CHIEF COMPLAINT       Chief Complaint   Patient presents with    Fatigue    Shortness of Breath     The pt arrives hypoxic on arrival but had been off his 4LPM since being picke dup. The pt has no worsening of symptoms but just cannot complete his ADLS when he tried going home. The pt was told he needed a facility due to his severe fatigue and cannot take care of himself he couldn't even get water and comes in very dry.       DIAGNOSTIC RESULTS     RADIOLOGY:   XR CHEST STANDARD (2 VW)    (Results Pending)     EKG Interpretation   Interpreted by Shara Lyman DO    Rhythm: normal sinus   Rate: normal  Axis: normal  Ectopy: none  Conduction: normal  ST Segments: normal  T Waves: nonspecific flattening  Q Waves: none    Clinical Impression: nonspefic    LABS:  Labs Reviewed   CBC WITH AUTO DIFFERENTIAL   BASIC METABOLIC PANEL   TROPONIN       EMERGENCY DEPARTMENT COURSE:     -------------------------  BP: (!) 92/56, Temp: 97.3 °F (36.3 °C), Pulse: 104, Resp: 20  Physical Exam   Constitutional: He is oriented to person, place, and time. He appears well-developed and well-nourished. HENT:   Head: Normocephalic and atraumatic. Right Ear: External ear normal.   Left Ear: External ear normal.   The pt is dry mucous membranes and sunken eyes only after just a short stay home   Eyes: Right eye exhibits no discharge. Left eye exhibits no discharge. No scleral icterus. Neck: Normal range of motion. No tracheal deviation present. Cardiovascular: Regular rhythm. No murmur heard. Tachycardic   Pulmonary/Chest: No stridor. He is in respiratory distress (off the O2 pt is breathing rapidly ). Once on O2 pt feeling better   Musculoskeletal: Normal range of motion. Neurological: He is alert and oriented to person, place, and time. Coordination normal.   Skin: Skin is warm and dry. He is not diaphoretic. Psychiatric: He has a normal mood and affect. His behavior is normal.         Comments  THe pt will need his home O2 with the physical signs of dehydration will start fluids and recheck labs. Social work will be called about a Nursing home stay. 3/23/19  2:10 PM  The pt was intially not concerning for sepsis however after WBC count returned and the initial tachycardia the pt was now concerning for sepsis and will need broad spectrum antibiotics and Sepsis evaluation. Karen Lara,, DO, RDMS.   Attending Emergency Physician          Karen Lara,   03/23/19 1322 [address], DO  03/23/19 7930

## 2019-03-23 NOTE — ED NOTES
Pt in resting in bed in NAD. RT at bedside.  Will continue to monitor       Indra Almeida RN  03/23/19 8675

## 2019-03-23 NOTE — PROGRESS NOTES
Physical Therapy    Facility/Department: [address] [place] [address] ED  Initial Assessment    NAME: Adán Verdugo  : 1940  MRN: [id number]  The pt arrives hypoxic on arrival but had been off his 4LPM since being picke dup. The pt has no worsening of symptoms but just cannot complete his ADLS when he tried going home. The pt was told he needed a facility due to his severe fatigue and cannot take care of himself he couldn't even get water and comes in very dry      Date of Service: 3/23/2019    Discharge Recommendations:  Continue to assess pending progress, Patient would benefit from continued therapy after discharge, 24 hour supervision or assist, Subacute/Skilled Nursing Facility        Assessment   Body structures, Functions, Activity limitations: Decreased functional mobility ; Decreased endurance;Decreased strength  Assessment: ModA bed mobility, edge of bedx8 min.-pt refusing stance/amb-pt with decreasing Sats with exertion-mask non re-breather in place  Prognosis: Good  Decision Making: Medium Complexity  REQUIRES PT FOLLOW UP: Yes  Activity Tolerance  Activity Tolerance: Patient limited by endurance; Patient limited by fatigue;Treatment limited secondary to medical complications (free text)  Activity Tolerance: pt with non re-breather mask on-with edge of bed mobility-decreased sats to low 80's-slow recovery with proper breathing techniques       Patient Diagnosis(es): The primary encounter diagnosis was Leukocytosis, unspecified type. Diagnoses of Dyspnea and respiratory abnormalities and Pneumonia due to organism were also pertinent to this visit. has a past medical history of Anemia, Anxiety, Bronchitis, COPD (chronic obstructive pulmonary disease) ([facility] [address] Diabetes ([address] Former smoker, Hyperlipidemia, Oxygen dependent, Respiratory distress, and Type 2 diabetes mellitus ([address] has a past surgical history that includes AAA repair, open; Pancreas surgery;  Finger surgery; and Foot surgery.     Restrictions  Restrictions/Precautions  Restrictions/Precautions: General Precautions  Position Activity Restriction  Other position/activity restrictions: in ER-asked to assess pt for placement from ER-now to be admitted   Vision/Hearing  Vision: Within Functional Limits  Hearing: Within functional limits     Subjective  General  Family / Caregiver Present: No  Follows Commands: Within Functional Limits  Pain Screening  Patient Currently in Pain: Denies  Vital Signs  Patient Currently in Pain: Denies  Pre Treatment Pain Screening  Pain at present: 0  Scale Used: Numeric Score  Intervention List: Patient able to continue with treatment    Orientation  Orientation  Overall Orientation Status: Within Functional Limits  Social/Functional History  Social/Functional History  Lives With: Alone  Type of Home: House  Home Layout: One level  Home Access: Stairs to enter without rails  Entrance Stairs - Number of Steps: 2  Bathroom Shower/Tub: Tub/Shower unit  Home Equipment: Rolling walker, Cane  Receives Help From: [employer])  ADL Assistance: Independent  Homemaking Assistance: Needs assistance  Homemaking Responsibilities: Yes  Ambulation Assistance: Independent  Transfer Assistance: Independent  Active : No  Patient's  Info: neighbor drives  Additional Comments: has become dependent on neighbor Malu anguiano over past few weeks-\"I just feel so weak\"  Cognition        Objective          AROM RLE (degrees)  RLE AROM: WFL  AROM LLE (degrees)  LLE AROM : WFL  AROM RUE (degrees)  RUE AROM : WFL  AROM LUE (degrees)  LUE AROM : WFL  Strength RLE  Strength RLE: Exception  Comment: 4-/5 overall  Strength LLE  Strength LLE: Exception  Comment: 4-/5 overall  Strength RUE  Strength RUE: Exception  Comment: 4-/5 overall  Strength LUE  Strength LUE: Exception  Comment: 4-/5 overall        Bed mobility  Rolling to Left: Minimal assistance  Rolling to Right: Minimal assistance  Supine to Sit: Moderate assistance  Sit to Supine: Moderate assistance  Scooting: Moderate assistance  Transfers  Comment: pt refused OOB mobility  Ambulation  Ambulation?: No     Balance  Posture: Fair  Sitting - Static: Fair  Sitting - Dynamic: Fair;-        Plan   Plan  Times per week: 5-6x/wk  Times per day: Daily  Current Treatment Recommendations: Strengthening, Transfer Training, Endurance Training, Gait Training, Functional Mobility Training, Stair training  Safety Devices  Type of devices: Call light within reach, Left in bed, Patient at risk for falls, Nurse notified  Restraints  Initially in place: No    G-Code  PT G-Codes  Functional Assessment Tool Used: Dorothy  Score: 7  Functional Limitation: Mobility: Walking and moving around  Mobility: Walking and Moving Around Current Status ():  At least 80 percent but less than 100 percent impaired, limited or restricted    OutComes Score                                                  AM-PAC Score     AM-PAC Inpatient Mobility without Stair Climbing Raw Score : 7  AM-PAC Inpatient without Stair Climbing T-Scale Score : 28.66  Mobility Inpatient CMS 0-100% Score: 86.29  Mobility Inpatient without Stair CMS G-Code Modifier : CM       Goals  Short term goals  Time Frame for Short term goals: 10  Short term goal 1: pt independent with bed mobility  Short term goal 2: pt MinAx 1 for transfers   Short term goal 3: pt able to ambulate with least restrictive device 50 ft with Giovanni  Short term goal 4: pt able to tolerate 30 min of activity with mobility, exercise to improve endurance       Therapy Time   Individual Concurrent Group Co-treatment   Time In 1455         Time Out 1527         Minutes 32                 DC DOE, PT

## 2019-03-23 NOTE — H&P
Critical Care - History and Physical Examination    Patient's name:  Faraz Caro  Medical Record Number: [id number]  Patient's account/billing number: [de-identified]  Patient's YOB: 1940  Age: 70 y.o. Date of Admission: 3/23/2019 12:40 PM  Date of History and Physical Examination: 3/23/2019      Primary Care Physician: Imani Dennison PA-C  Attending Physician: Dr. Jose Giles    Code Status: Prior    Chief complaint:   Chief Complaint   Patient presents with    Fatigue    Shortness of Breath         HISTORY OF PRESENT ILLNESS:      History was obtained from chart review and the patient. Faraz Caro is a 70 y.o. 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 He was weak and short of breath and hence brought to the ER. His crit on nonrebreather. Blood pressures were low and he was tachycardic but improved with fluids. Patient was discharged from the hospital only yesterday and he presented with shortness of breath. He was treated with steroids and antibiotics and discharged. He was recommended to go to hospice or extended nursing facility but patient refused and went home and today he is back to the ER. Initially needed to be on non-rebreather due to low saturations. Patient transitioned to 5 L nasal cannula and tolerating saturation between 82% to 89%. Presented with tachycardia and hypotension. Improved with fluids. WBC elevated. Received vancomycin and Zosyn in ER. Code status discussion was done in ED and patient signed DNR CCA with no intubation. Palliative and hospice care was discussed with patient during previous admission but patient refused  He was evaluated by pulmonology during his previous admission. He was sent home on [employer]. Did not qualify for L2K device.     PAST MEDICAL HISTORY:         Diagnosis Date    Anemia     Anxiety     Bronchitis     [facility]chronic obstructive [facility] disease) ([facility] [address]     [facility]     Former smoker     Hyperlipidemia     Oxygen dependent     Respiratory distress     Type 2 diabetes mellitus ([facility]          PAST SURGICAL HISTORY:         Procedure Laterality Date    ABDOMINAL AORTIC ANEURYSM REPAIR, OPEN      FINGER SURGERY      FOOT SURGERY      PANCREAS SURGERY         ALLERGIES:      No Known Allergies      HOME MEDS: :      Prior to Admission medications    Medication Sig Start Date End Date Taking?  Authorizing Provider   predniSONE (DELTASONE) 10 MG tablet Take 3 tablets by mouth daily for 3 days 3/20/19 3/23/19  Nean Horn MD   predniSONE (DELTASONE) 20 MG tablet Take 1 tablet by mouth daily for 3 days 3/24/19 3/27/19  Nena Horn MD   predniSONE (DELTASONE) 10 MG tablet Take 1 tablet by mouth daily for 3 days 3/28/19 3/31/19  Nena Horn MD   sodium chloride (OCEAN) 0.65 % nasal spray 1 spray by Nasal route as needed for Congestion 1/29/19   Juan J Christy MD   ergocalciferol (ERGOCALCIFEROL) 36148 units capsule Take 1 capsule by mouth once a week 12/24/18   Philmore Severance, MD   furosemide (LASIX) 20 MG tablet Take 1 tablet by mouth every other day 12/1/18   Laurel Israel MD   insulin glargine (LANTUS) 100 UNIT/ML injection vial Inject 20 Units into the skin nightly 11/30/18   Laurel Israel MD   tamsulosin (FLOMAX) 0.4 MG capsule Take 0.4 mg by mouth daily    Historical Provider, MD   famotidine (PEPCID) 20 MG tablet Take 20 mg by mouth 2 times daily    Historical Provider, MD   acetaminophen (TYLENOL) 325 MG tablet Take 650 mg by mouth every 6 hours as needed for Pain    Historical Provider, MD   aclidinium (TUDORZA PRESSAIR) 400 MCG/ACT AEPB inhaler Inhale 1 puff into the lungs 2 times daily 6/22/18 7/22/19  Gabby Shaver MD   albuterol sulfate HFA (VENTOLIN HFA) 108 (90 Base) MCG/ACT inhaler Inhale 2 puffs into the lungs every 6 hours as needed for Wheezing 18   Duane Harris, MD   budesonide-formoterol [facility]) 80-4.5 MCG/ACT AERO Inhale 2 puffs into the lungs 2 times daily 18   Pepito Lr MD   metFORMIN ER (GLUCOPHAGE-XR) 500 MG XR tablet take 2 tablets by mouth twice a day  Patient taking differently: take 2 tablet by mouth twice a day 16   Lauren Agrawal PA-C   aspirin 81 MG tablet Take 81 mg by mouth daily    Historical Provider, MD   escitalopram (LEXAPRO) 10 MG tablet Take 1 tablet by mouth daily 11/10/15   Humaira Laughter VANDANA Jensen       SOCIAL HISTORY:       TOBACCO:   reports that he has quit smoking. His smoking use included cigarettes. He has never used smokeless tobacco.  ETOH:   reports that he does not drink alcohol. DRUGS:  reports that he does not use drugs. FAMILY HISTORY:      History reviewed. No pertinent family history.     REVIEW OF SYSTEMS (ROS):     Review of Systems -   General ROS: negative  Psychological ROS: negative  Ophthalmic ROS: negative  ENT ROS: negative  Allergy and Immunology ROS: negative  Hematological and Lymphatic ROS: negative  Endocrine ROS: negative  Breast ROS: negative  Respiratory ROS: Positive for shortness of breath  Cardiovascular ROS: no chest pain or dyspnea on exertion  Gastrointestinal ROS:negative  Genito-Urinary ROS: negative  Musculoskeletal ROS: negative  Neurological ROS: negative  Dermatological ROS: negative    OBJECTIVE:     VITAL SIGNS:  /66   Pulse 89   Temp 97.3 °F (36.3 °C)   Resp 28   Wt 156 lb (70.8 kg)   SpO2 (!) 89%   BMI 23.11 kg/m²   Tmax over 24 hours:  Temp (24hrs), Av.3 °F (36.3 °C), Min:97.3 °F (36.3 °C), Max:97.3 °F (36.3 °C)      Patient Vitals for the past 8 hrs:   BP Temp Pulse Resp SpO2 Weight   19 1659 -- -- -- 28 (!) 89 % --   19 1653 115/66 -- -- -- -- --   19 1651 -- -- -- 29 (!) 84 % --   19 1624 106/60 -- -- -- -- --   19 1540 (!) 93/53 -- -- -- -- --   19 1501 (!) 90/48 -- 89 22 98 % --   03/23/19 1331 -- -- -- -- 90 % --   03/23/19 1246 (!) 92/56 97.3 °F (36.3 °C) 104 20 (!) 80 % 156 lb (70.8 kg)         Intake/Output Summary (Last 24 hours) at 3/23/2019 1708  Last data filed at 3/23/2019 1705  Gross per 24 hour   Intake 200 ml   Output --   Net 200 ml     Date 03/23/19 0000 - 03/23/19 2359   Shift 4242-3237 3122-6276 8235-1221 24 Hour Total   INTAKE   IV Piggyback   200(2.8) 200(2.8)   Shift Total(mL/kg)   200(2.8) 200(2.8)   OUTPUT   Shift Total(mL/kg)       Weight (kg)  70.8 70.8 70.8     Wt Readings from Last 3 Encounters:   03/23/19 156 lb (70.8 kg)   03/14/19 156 lb 1.4 oz (70.8 kg)   03/07/19 155 lb (70.3 kg)     Body mass index is 23.11 kg/m². PHYSICAL EXAM:  Constitutional: Appears well, no distress  EENT: neck supple with midline trachea. Neck: Supple, symmetrical, trachea midline, no adenopathy,  no jvd, skin normal  Respiratory: Diminished bilateral breath sounds. Wheezes present.   Cardiovascular: regular rate and rhythm, normal S1, S2, no murmur noted  Abdomen: soft, nontender, nondistended, no masses or organomegaly  Extremities:   no pedal edema, no clubbing or cyanosis    MEDICATIONS:  Scheduled Meds:   piperacillin-tazobactam  4.5 g Intravenous Once    sodium chloride  1,000 mL Intravenous Once     Continuous Infusions:    PRN Meds:          ABGs:   Lab Results   Component Value Date    YJQ1QNW 29 03/08/2019    FIO2 45.0 03/08/2019       DATA:  Complete Blood Count:   Recent Labs     03/23/19  1304   WBC 26.9*   RBC 4.14*   HGB 13.2   HCT 40.0*   MCV 96.6   MCH 31.9   MCHC 33.0   RDW 16.8*      MPV 10.2        Last 3 Blood Glucose:   Recent Labs     03/21/19  0602 03/22/19  0732 03/23/19  1537   GLUCOSE 309* 232* 61*        PT/INR:    Lab Results   Component Value Date    PROTIME 11.3 03/23/2019    INR 1.1 03/23/2019     PTT:    Lab Results   Component Value Date    APTT 23.8 03/07/2019       Comprehensive Metabolic Profile:   Recent Labs 03/21/19  0602 03/22/19  0732 03/23/19  1537   * 136 130*   K 4.8 4.7 4.2   CL 95* 97* 94*   CO2 28 25 24   BUN 22 21 30*   CREATININE 0.57* 0.61* 0.48*   GLUCOSE 309* 232* 61*   CALCIUM 8.6 8.7 8.1*   PROT  --   --  5.7*   LABALBU  --   --  2.7*   BILITOT  --   --  0.62   ALKPHOS  --   --  73   AST  --   --  22   ALT  --   --  16      Magnesium:   Lab Results   Component Value Date    MG 1.2 01/30/2019    MG 1.6 12/21/2018    MG 1.8 12/19/2018     Phosphorus:   Lab Results   Component Value Date    PHOS 3.1 12/21/2018    PHOS 3.0 05/23/2018     Ionized Calcium: No results found for: CAION     Urinalysis:   Lab Results   Component Value Date    NITRU NEGATIVE 03/07/2019    COLORU YELLOW 03/07/2019    PHUR 5.5 03/07/2019    WBCUA 0 TO 2 03/07/2019    RBCUA 0 TO 2 03/07/2019    MUCUS NOT REPORTED 03/07/2019    TRICHOMONAS NOT REPORTED 03/07/2019    YEAST NOT REPORTED 03/07/2019    BACTERIA None 03/07/2019    SPECGRAV 1.025 03/07/2019    LEUKOCYTESUR NEGATIVE 03/07/2019    UROBILINOGEN Normal 03/07/2019    BILIRUBINUR NEGATIVE 03/07/2019    BILIRUBINUR NEGATIVE 11/15/2011    GLUCOSEU 1+ 03/07/2019    GLUCOSEU 3+ 11/15/2011    KETUA NEGATIVE 03/07/2019    AMORPHOUS NOT REPORTED 03/07/2019       HgBA1c:    Lab Results   Component Value Date    LABA1C 8.7 03/08/2019     TSH:    Lab Results   Component Value Date    TSH 2.94 02/20/2019       Lactic Acid: No results found for: LACTA   Troponin: No results for input(s): TROPONINI in the last 72 hours. Radiological imaging  Ct Abdomen Pelvis Wo Contrast Additional Contrast? None    Result Date: 3/8/2019  EXAMINATION: CT OF THE ABDOMEN AND PELVIS WITHOUT CONTRAST 3/8/2019 1:05 pm TECHNIQUE: CT of the abdomen and pelvis was performed without the administration of intravenous contrast. Multiplanar reformatted images are provided for review.  Dose modulation, iterative reconstruction, and/or weight based adjustment of the mA/kV was utilized to reduce the radiation dose to as low as reasonably achievable. COMPARISON: None. HISTORY: ORDERING SYSTEM PROVIDED HISTORY: abdominal pain TECHNOLOGIST PROVIDED HISTORY: FINDINGS: Lower Chest: Pulmonary fibrosis with mild traction bronchiectasis in the lung bases. Dependent atelectasis is noted. Calcified pleural plaques and mild diffuse pleural thickening is noted. No effusion. Organs: Evaluation of the abdominal and pelvic viscera is limited in the absence of contrast.  Status post cholecystectomy. No biliary dilatation. The liver, pancreas, spleen, adrenals, and kidneys reveal no acute findings. Scattered calcifications throughout the pancreas are noted which may represent chronic calcific pancreatitis. GI/Bowel: Large amount stool burden throughout the colon. The rectum is distended with stool. Diverticulosis is noted. Fluid and small amount of air within the small bowel is noted suggestive of mild ileus. Pelvis: No acute findings. Mild bladder distension and bladder wall thickening suggestive of chronic bladder outlet obstruction. Mild enlargement of the median lobe of the prostate impressing upon the adjacent bladder. Rectal distention, as above. Peritoneum/Retroperitoneum: Status post endovascular aortic repair. The aneurysm sac measures up to 3.2 x 3.1 cm in the distal segment. Extensive calcified atheromatous plaque is present. No free air or free fluid. No lymphadenopathy. Bones/Soft Tissues: Chronic appearing mild compression deformity and degenerative endplate changes at L2. Multilevel degenerative disc disease and advanced facet arthropathy. Osteopenia is noted. Large amount stool burden throughout the colon with rectal distention, suggestive of impaction. Pulmonary fibrosis and calcified pleural plaques. Status post endovascular aortic aneurysm repair. The aneurysm sac measures up to 3.2 cm. Chronic appearing L2 compression deformity. Diverticulosis.  Mild bladder distension and bladder wall thickening, raising the possibility of chronic bladder outlet obstruction. Xr Chest (single View Frontal)    Result Date: 3/8/2019  EXAMINATION: SINGLE XRAY VIEW OF THE CHEST 3/8/2019 10:39 am COMPARISON: Chest radiograph performed 03/07/2019. HISTORY: ORDERING SYSTEM PROVIDED HISTORY: sob TECHNOLOGIST PROVIDED HISTORY: sob FINDINGS: There is chronic pulmonary change. There are bibasilar effusions with adjacent infiltrates. There is no pneumothorax. The mediastinal structures are stable. The upper abdomen is unremarkable. The extrathoracic soft tissues are unremarkable. Bibasilar effusions with adjacent infiltrates representing pneumonia versus atelectasis/scarring. Xr Chest Portable    Result Date: 3/23/2019  EXAMINATION: SINGLE XRAY VIEW OF THE CHEST 3/23/2019 1:14 pm COMPARISON: Chest radiograph performed 03/08/2019. HISTORY: ORDERING SYSTEM PROVIDED HISTORY: SOB TECHNOLOGIST PROVIDED HISTORY: SOB FINDINGS: There are bilateral effusions with adjacent consolidation. There is no pneumothorax. The mediastinal structures are stable. The upper abdomen is unremarkable. The extrathoracic soft tissues are unremarkable. Bibasilar effusions with adjacent consolidation representing atelectasis versus pneumonia. Xr Chest Portable    Result Date: 3/7/2019  EXAMINATION: SINGLE XRAY VIEW OF THE CHEST 3/7/2019 5:26 am COMPARISON: January 30, 2019. HISTORY: ORDERING SYSTEM PROVIDED HISTORY: sob TECHNOLOGIST PROVIDED HISTORY: sob Ordering Physician Provided Reason for Exam: sob Acuity: Acute Type of Exam: Initial Additional signs and symptoms: Difficulty breathing Relevant Medical/Surgical History: Difficulty breathing FINDINGS: Low lung volume. Similar appearing bilateral heterogeneous opacities. Emphysema. Small bilateral pleural effusions. No definite pneumothorax. Grossly stable cardiomediastinal silhouette and great vessels. Stable regional skeleton. Similar-appearing bilateral heterogeneous opacities. Differential considerations include asymmetric pulmonary edema and multifocal pneumonia. Small bilateral pleural effusions. ASSESSMENT:     Active Problems:    * No active hospital problems. *  Resolved Problems:    * No resolved hospital problems. *      PLAN:     ICU PROPHYLAXIS:  VTE:   [x] Enoxaparin  [] Unfract. Heparin Subcut  [] EPC Cuffs    NUTRITION:  [x] NPO  [] Tube Feeding (Specify: ) [] TPN  [] PO    CONSULTATION NEEDED:  [x] No   [] Yes     HOME MEDICATIONS RECONCILED: [] No  [x] Yes    FAMILY UPDATED:    [] No   [x] Yes     ADDITIONAL PLAN:    1. Acute on chronic hypoxic respiratory failure - condition to initially needed to be on non-rebreather due to low saturations. Patient transitioned to 5 L nasal cannula and tolerating saturation between 82% to 89%. Patient uses 4 L home oxygen. Monitor blood gases. Encourage BiPAP and if needed start high flow. 2.   3. Pulmonary fibrosis and copd emphysema   4. Pulmonary hypertension due to lung diease  - monitor for fluid overload. 5. COPD - start IV steroids. Continue Zosyn. Breathing treatments. 6. Diabetes mellitus 2- currently hypoglycemic. Hypoglycemic protocol. Insulin sliding scale. HbA1c in March 8.7.  7. DVT prophylaxis - Lovenox  8. Code status - DNR CCA. Palliative care consult for goals of care and hospice discussion. Azra Capps MD  ICU resident   [facility]  3/23/2019 5:08 PM    Attending Physician Statement  I have discussed the care of Belen Graham, including pertinent history and exam findings,  with the resident. I have seen and examined the patient and the key elements of all parts of the encounter have been performed by me. I agree with the assessment, plan and orders as documented by the resident with additions .   Well known   Recently dc home with trilogy   Resistant to snf   Re admitted with acute on chronic hypoxic respiratory failure and hypovolemic shock - placed on high flow and oxygenation better - shock improved with fluids   Plan   Start midodrine   Dc antibiotics   No pneumonia   Xray chronic changes   Dc iv fluids   Ok to step down cont high flow        Total critical care time caring for this patient with life threatening, unstable organ failure, including direct patient contact, management of life support systems, review of data including imaging and labs, discussions with other team members and physicians at least 27   Min so far today, excluding procedures. Treatment plan Discussed with nursing staff in detail , all questions answered . Electronically signed by Robin Marquez MD on   3/24/19 at 4:21 PM    Please note that this chart was generated using voice recognition Dragon dictation software. Although every effort was made to ensure the accuracy of this automated transcription, some errors in transcription may have occurred.

## 2019-03-23 NOTE — ED NOTES
Bed: 16  Expected date:   Expected time:   Means of arrival:   Comments:  Puruntie 50, RN  03/23/19 2029

## 2019-03-23 NOTE — ED NOTES
Pt brought into ER from EMS. Pt was recently just discharged from [facility]. Vs. Pt came in today with SOB and weakness. Pt is alert and oriented x4. Looking for patient to be sent to long term care facility due to being unable to take care of self at home. Pt in NAD with even and labored rr.  Will continue to monitor        Bebeto Gibbons RN  03/23/19 [id number]

## 2019-03-23 NOTE — ED NOTES
IV started by Noman Benítez is patent, infusing ATB. Pt I nNAD. Will continue to monitor.      Renate Martínez, KESHAWN  03/23/19 [id number]

## 2019-03-23 NOTE — ED PROVIDER NOTES
I did not see nor evaluate this patient.     Electronically signed by Marsha Willett MD on 3/23/2019 at 5:27 PM        Marsha Willett MD  Resident  03/23/19 [id number]

## 2019-03-24 PROBLEM — J44.9 CHRONIC OBSTRUCTIVE PULMONARY DISEASE (HCC): Status: ACTIVE | Noted: 2018-11-27

## 2019-03-24 LAB
ABSOLUTE EOS #: 0 K/UL (ref 0–0.44)
ABSOLUTE IMMATURE GRANULOCYTE: 0.15 K/UL (ref 0–0.3)
ABSOLUTE LYMPH #: 0.44 K/UL (ref 1.1–3.7)
ABSOLUTE MONO #: 0.29 K/UL (ref 0.1–1.2)
ANION GAP SERPL CALCULATED.3IONS-SCNC: 9 MMOL/L (ref 9–17)
BASOPHILS # BLD: 0 % (ref 0–2)
BASOPHILS ABSOLUTE: 0 K/UL (ref 0–0.2)
BUN BLDV-MCNC: 25 MG/DL (ref 8–23)
BUN/CREAT BLD: ABNORMAL (ref 9–20)
CALCIUM SERPL-MCNC: 7.9 MG/DL (ref 8.6–10.4)
CHLORIDE BLD-SCNC: 97 MMOL/L (ref 98–107)
CO2: 26 MMOL/L (ref 20–31)
CREAT SERPL-MCNC: 0.5 MG/DL (ref 0.7–1.2)
DIFFERENTIAL TYPE: ABNORMAL
EOSINOPHILS RELATIVE PERCENT: 0 % (ref 1–4)
GFR AFRICAN AMERICAN: >60 ML/MIN
GFR NON-AFRICAN AMERICAN: >60 ML/MIN
GFR SERPL CREATININE-BSD FRML MDRD: ABNORMAL ML/MIN/{1.73_M2}
GFR SERPL CREATININE-BSD FRML MDRD: ABNORMAL ML/MIN/{1.73_M2}
GLUCOSE BLD-MCNC: 276 MG/DL (ref 75–110)
GLUCOSE BLD-MCNC: 283 MG/DL (ref 75–110)
GLUCOSE BLD-MCNC: 300 MG/DL (ref 75–110)
GLUCOSE BLD-MCNC: 306 MG/DL (ref 75–110)
GLUCOSE BLD-MCNC: 336 MG/DL (ref 70–99)
GLUCOSE BLD-MCNC: 345 MG/DL (ref 75–110)
HCT VFR BLD CALC: 30.6 % (ref 40.7–50.3)
HEMOGLOBIN: 9.9 G/DL (ref 13–17)
IMMATURE GRANULOCYTES: 1 %
LYMPHOCYTES # BLD: 3 % (ref 24–43)
MCH RBC QN AUTO: 31.1 PG (ref 25.2–33.5)
MCHC RBC AUTO-ENTMCNC: 32.4 G/DL (ref 28.4–34.8)
MCV RBC AUTO: 96.2 FL (ref 82.6–102.9)
MONOCYTES # BLD: 2 % (ref 3–12)
MORPHOLOGY: ABNORMAL
MRSA, DNA, NASAL: ABNORMAL
NRBC AUTOMATED: 0 PER 100 WBC
PDW BLD-RTO: 15.6 % (ref 11.8–14.4)
PLATELET # BLD: 206 K/UL (ref 138–453)
PLATELET ESTIMATE: ABNORMAL
PMV BLD AUTO: 10 FL (ref 8.1–13.5)
POTASSIUM SERPL-SCNC: 4.6 MMOL/L (ref 3.7–5.3)
RBC # BLD: 3.18 M/UL (ref 4.21–5.77)
RBC # BLD: ABNORMAL 10*6/UL
SEG NEUTROPHILS: 94 % (ref 36–65)
SEGMENTED NEUTROPHILS ABSOLUTE COUNT: 13.72 K/UL (ref 1.5–8.1)
SODIUM BLD-SCNC: 132 MMOL/L (ref 135–144)
SPECIMEN DESCRIPTION: ABNORMAL
WBC # BLD: 14.6 K/UL (ref 3.5–11.3)
WBC # BLD: ABNORMAL 10*3/UL

## 2019-03-24 PROCEDURE — 82947 ASSAY GLUCOSE BLOOD QUANT: CPT

## 2019-03-24 PROCEDURE — 80048 BASIC METABOLIC PNL TOTAL CA: CPT

## 2019-03-24 PROCEDURE — 2700000000 HC OXYGEN THERAPY PER DAY

## 2019-03-24 PROCEDURE — 6370000000 HC RX 637 (ALT 250 FOR IP): Performed by: STUDENT IN AN ORGANIZED HEALTH CARE EDUCATION/TRAINING PROGRAM

## 2019-03-24 PROCEDURE — 99222 1ST HOSP IP/OBS MODERATE 55: CPT | Performed by: FAMILY MEDICINE

## 2019-03-24 PROCEDURE — 94762 N-INVAS EAR/PLS OXIMTRY CONT: CPT

## 2019-03-24 PROCEDURE — 94640 AIRWAY INHALATION TREATMENT: CPT

## 2019-03-24 PROCEDURE — 6360000002 HC RX W HCPCS: Performed by: STUDENT IN AN ORGANIZED HEALTH CARE EDUCATION/TRAINING PROGRAM

## 2019-03-24 PROCEDURE — 2060000000 HC ICU INTERMEDIATE R&B

## 2019-03-24 PROCEDURE — 2580000003 HC RX 258: Performed by: STUDENT IN AN ORGANIZED HEALTH CARE EDUCATION/TRAINING PROGRAM

## 2019-03-24 PROCEDURE — 36415 COLL VENOUS BLD VENIPUNCTURE: CPT

## 2019-03-24 PROCEDURE — 99291 CRITICAL CARE FIRST HOUR: CPT | Performed by: INTERNAL MEDICINE

## 2019-03-24 PROCEDURE — 85025 COMPLETE CBC W/AUTO DIFF WBC: CPT

## 2019-03-24 RX ORDER — METHYLPREDNISOLONE SODIUM SUCCINATE 40 MG/ML
40 INJECTION, POWDER, LYOPHILIZED, FOR SOLUTION INTRAMUSCULAR; INTRAVENOUS DAILY
Status: DISCONTINUED | OUTPATIENT
Start: 2019-03-25 | End: 2019-03-29

## 2019-03-24 RX ORDER — INSULIN GLARGINE 100 [IU]/ML
20 INJECTION, SOLUTION SUBCUTANEOUS DAILY
Status: DISCONTINUED | OUTPATIENT
Start: 2019-03-24 | End: 2019-03-27

## 2019-03-24 RX ORDER — SULFAMETHOXAZOLE AND TRIMETHOPRIM 800; 160 MG/1; MG/1
1 TABLET ORAL
Status: COMPLETED | OUTPATIENT
Start: 2019-03-25 | End: 2019-03-29

## 2019-03-24 RX ORDER — MIDODRINE HYDROCHLORIDE 5 MG/1
5 TABLET ORAL
Status: DISCONTINUED | OUTPATIENT
Start: 2019-03-24 | End: 2019-03-27

## 2019-03-24 RX ORDER — INSULIN GLARGINE 100 [IU]/ML
20 INJECTION, SOLUTION SUBCUTANEOUS DAILY
Status: DISCONTINUED | OUTPATIENT
Start: 2019-03-24 | End: 2019-03-24

## 2019-03-24 RX ADMIN — INSULIN LISPRO 5 UNITS: 100 INJECTION, SOLUTION INTRAVENOUS; SUBCUTANEOUS at 23:06

## 2019-03-24 RX ADMIN — PIPERACILLIN AND TAZOBACTAM 3.38 G: 3; .375 INJECTION, POWDER, LYOPHILIZED, FOR SOLUTION INTRAVENOUS; PARENTERAL at 01:06

## 2019-03-24 RX ADMIN — IPRATROPIUM BROMIDE AND ALBUTEROL SULFATE 1 AMPULE: .5; 3 SOLUTION RESPIRATORY (INHALATION) at 15:05

## 2019-03-24 RX ADMIN — INSULIN LISPRO 6 UNITS: 100 INJECTION, SOLUTION INTRAVENOUS; SUBCUTANEOUS at 12:45

## 2019-03-24 RX ADMIN — IPRATROPIUM BROMIDE AND ALBUTEROL SULFATE 1 AMPULE: .5; 3 SOLUTION RESPIRATORY (INHALATION) at 20:40

## 2019-03-24 RX ADMIN — MOMETASONE FUROATE AND FORMOTEROL FUMARATE DIHYDRATE 2 PUFF: 100; 5 AEROSOL RESPIRATORY (INHALATION) at 11:00

## 2019-03-24 RX ADMIN — MIDODRINE HYDROCHLORIDE 5 MG: 5 TABLET ORAL at 13:30

## 2019-03-24 RX ADMIN — MIDODRINE HYDROCHLORIDE 5 MG: 5 TABLET ORAL at 16:54

## 2019-03-24 RX ADMIN — METHYLPREDNISOLONE SODIUM SUCCINATE 40 MG: 40 INJECTION, POWDER, FOR SOLUTION INTRAMUSCULAR; INTRAVENOUS at 03:38

## 2019-03-24 RX ADMIN — ENOXAPARIN SODIUM 40 MG: 40 INJECTION, SOLUTION INTRAVENOUS; SUBCUTANEOUS at 10:03

## 2019-03-24 RX ADMIN — INSULIN LISPRO 12 UNITS: 100 INJECTION, SOLUTION INTRAVENOUS; SUBCUTANEOUS at 16:24

## 2019-03-24 RX ADMIN — TAMSULOSIN HYDROCHLORIDE 0.4 MG: 0.4 CAPSULE ORAL at 10:02

## 2019-03-24 RX ADMIN — INSULIN GLARGINE 20 UNITS: 100 INJECTION, SOLUTION SUBCUTANEOUS at 08:35

## 2019-03-24 RX ADMIN — INSULIN LISPRO 8 UNITS: 100 INJECTION, SOLUTION INTRAVENOUS; SUBCUTANEOUS at 08:35

## 2019-03-24 RX ADMIN — MOMETASONE FUROATE AND FORMOTEROL FUMARATE DIHYDRATE 2 PUFF: 100; 5 AEROSOL RESPIRATORY (INHALATION) at 20:41

## 2019-03-24 RX ADMIN — IPRATROPIUM BROMIDE AND ALBUTEROL SULFATE 1 AMPULE: .5; 3 SOLUTION RESPIRATORY (INHALATION) at 11:00

## 2019-03-24 RX ADMIN — ALBUTEROL SULFATE 2.5 MG: 2.5 SOLUTION RESPIRATORY (INHALATION) at 23:32

## 2019-03-24 RX ADMIN — Medication 10 ML: at 21:10

## 2019-03-24 ASSESSMENT — PAIN SCALES - GENERAL
PAINLEVEL_OUTOF10: 0

## 2019-03-24 NOTE — PROGRESS NOTES
BMI 21.94 kg/m²   Tmax over 24 hours:  Temp (24hrs), Av.8 °F (36.6 °C), Min:97.3 °F (36.3 °C), Max:98.4 °F (36.9 °C)      Patient Vitals for the past 6 hrs:   BP Temp Temp src Pulse Resp SpO2   19 0600 (!) 100/57 -- -- 86 20 96 %   19 0500 (!) 96/56 -- -- 85 20 96 %   19 0400 (!) 97/53 97.9 °F (36.6 °C) Oral 88 24 92 %   19 0300 (!) 115/52 -- -- 82 22 94 %         Intake/Output Summary (Last 24 hours) at 3/24/2019 0834  Last data filed at 3/24/2019 0400  Gross per 24 hour   Intake 1650.78 ml   Output 270 ml   Net 1380.78 ml     Wt Readings from Last 2 Encounters:   19 148 lb 9.4 oz (67.4 kg)   19 156 lb 1.4 oz (70.8 kg)     Body mass index is 21.94 kg/m². PHYSICAL EXAMINATION:  Constitutional: Appears well, no distress  EENT: PERRLA, EOMI, sclera clear, no lesions, neck supple with midline trachea. Neck: Supple, symmetrical, trachea midline, no adenopathy,  no jvd, skin normal  Respiratory: clear to auscultation, no wheezes or rales and unlabored breathing.   Cardiovascular: regular rate and rhythm, normal S1, S2, no murmur noted   Abdomen: soft, nontender, nondistended, no masses or organomegaly  Extremities:  peripheral pulses normal, no pedal edema, no clubbing or cyanosis  Neuro: moves all 4 extremities     Any additional physical findings:    MEDICATIONS:    Scheduled Meds:   insulin lispro  0-12 Units Subcutaneous TID     insulin lispro  0-6 Units Subcutaneous Nightly    insulin glargine  20 Units Subcutaneous Daily    sodium chloride  1,000 mL Intravenous Once    mometasone-formoterol  2 puff Inhalation BID    tamsulosin  0.4 mg Oral Daily    sodium chloride flush  10 mL Intravenous 2 times per day    enoxaparin  40 mg Subcutaneous Daily    piperacillin-tazobactam  3.375 g Intravenous Q8H    methylPREDNISolone  40 mg Intravenous Q8H    ipratropium-albuterol  1 ampule Inhalation 4x daily     Continuous Infusions:   dextrose      sodium chloride 75 mL/hr at 03/23/19 2043     PRN Meds:     sodium chloride flush 10 mL PRN   magnesium hydroxide 30 mL Daily PRN   ondansetron 4 mg Q6H PRN   glucose 15 g PRN   dextrose 12.5 g PRN   glucagon (rDNA) 1 mg PRN   dextrose 100 mL/hr PRN   albuterol 2.5 mg As Directed RT PRN         VENT SETTINGS (Comprehensive) (if applicable):  Vent Information  Skin Assessment: Clean, dry, & intact  FiO2 : (S) 50 %  Humidification Source: Heated wire  Humidification Temp: 33  Humidification Temp Measured: 33  Additional Respiratory  Assessments  Pulse: 86  Resp: 20  SpO2: 96 %  Humidification Source: Heated wire  Humidification Temp: 33      Laboratory findings:    Complete Blood Count: Recent Labs     03/23/19  1304 03/24/19  0432   WBC 26.9* 14.6*   HGB 13.2 9.9*   HCT 40.0* 30.6*    206        Last 3 Blood Glucose:   Recent Labs     03/22/19  0732 03/23/19  1537 03/24/19  0432   GLUCOSE 232* 61* 336*        PT/INR:    Lab Results   Component Value Date    PROTIME 11.3 03/23/2019    INR 1.1 03/23/2019     PTT:    Lab Results   Component Value Date    APTT 23.8 03/07/2019       Comprehensive Metabolic Profile:   Recent Labs     03/22/19  0732 03/23/19  1537 03/24/19  0432    130* 132*   K 4.7 4.2 4.6   CL 97* 94* 97*   CO2 25 24 26   BUN 21 30* 25*   CREATININE 0.61* 0.48* 0.50*   GLUCOSE 232* 61* 336*   CALCIUM 8.7 8.1* 7.9*   PROT  --  5.7*  --    LABALBU  --  2.7*  --    BILITOT  --  0.62  --    ALKPHOS  --  73  --    AST  --  22  --    ALT  --  16  --       Magnesium:   Lab Results   Component Value Date    MG 1.2 01/30/2019     Phosphorus:   Lab Results   Component Value Date    PHOS 3.1 12/21/2018     Ionized Calcium: No results found for: CAION     Urinalysis:     Troponin: No results for input(s): TROPONINI in the last 72 hours.     Microbiology:    Cultures during this admission:     Blood cultures:                 [] None drawn      [] Negative             []  Positive (Details:  )  Urine Culture: [] None drawn      [] Negative             []  Positive (Details:  )  Sputum Culture:               [] None drawn       [] Negative             []  Positive (Details:  )   Endotracheal aspirate:     [] None drawn       [] Negative             []  Positive (Details:  )     Other pertinent Labs:       Radiology/Imaging:       ASSESSMENT:     Active Problems:    Hypoxia  Resolved Problems:    * No resolved hospital problems. *            PLAN:     WEAN PER PROTOCOL:  [] No   [x] Yes  [] N/A    ICU PROPHYLAXIS:    VTE:   [x] Enoxaparin  [] Unfract. Heparin Subcut  [] EPC Cuffs    NUTRITION: (Diet: DIET CARB CONTROL; Carb Control: 3 carb choices (45 gms)/meal)    HOME MEDICATIONS RECONCILED: [] No  [x] Yes    INSULIN DRIP:   [x] No   [] Yes    CONSULTATION NEEDED:  [x] No   [] Yes    ADDITIONAL PLAN:    1. Acute on chronic respiratory failure - on HiFlow 50%. Patient uses 4 L home oxygen. Monitor blood gases. Encourage BiPAP and if needed start high flow. 2. Pneumonia- presented with tachycardia and hypotension. Improved with fluids. WBC trending down. Discharge from hospital only yesterday. Received vancomycin and Zosyn in ER. Continue Zosyn. Follow with sputum cultures, urine culture, blood cultures, viral PCR. Continue IV hydration. 3. 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 Pulmonary hypertension - monitor for fluid overload. 5. COPD - continue IV steroids. Continue Zosyn. Breathing treatments. 6. Diabetes mellitus -hypoglycemic on admission. Hypoglycemic protocol. Insulin sliding scale. HbA1c in March 8.7. Sugars high this morning. Restart home dose of Lantus. 7. DVT prophylaxis - Lovenox  8. Code status - DNR CCA. Palliative care consult for goals of care and hospice discussion.     Dionna Mclain MD      Department of [facility]         3/24/2019, 8:34 AM

## 2019-03-24 NOTE — FLOWSHEET NOTE
Pt transferred to 3021 per bed, with monitor, all belongings, and with NRB. RT present for transfer. Pt notified family of transfer.

## 2019-03-24 NOTE — PROGRESS NOTES
[id number] without incident. Patient placed on non-rebreather for transport.

## 2019-03-24 NOTE — PLAN OF CARE
Problem: Neurological  Goal: Maximum potential motor/sensory/cognitive function  Outcome: Ongoing     Problem: Risk for Impaired Skin Integrity  Goal: Tissue integrity - skin and mucous membranes  Description  Structural intactness and normal physiological function of skin and  mucous membranes.   Outcome: Ongoing     Problem: Falls - Risk of:  Goal: Will remain free from falls  Description  Will remain free from falls  Outcome: Ongoing  Goal: Absence of physical injury  Description  Absence of physical injury  Outcome: Ongoing     Problem: Gas Exchange - Impaired:  Goal: Levels of oxygenation will improve  Description  Levels of oxygenation will improve  Outcome: Ongoing

## 2019-03-24 NOTE — CONSULTS
Palliative Care Inpatient Consult    NAME:  Mary Lowe  MEDICAL RECORD NUMBER:  [id number]  AGE: 70 y.o. GENDER: male  : 1940  TODAY'S DATE:  3/24/2019    Reasons for Consultation:    Symptom and/or pain management  Provision of information regarding PC and/or hospice philosophies  Complex, time-intensive communication and interdisciplinary psychosocial support  Clarification of goals of care and/or assistance with difficult decision-making  Guidance in regards to resources and transition(s)    Members of PC team contributing to this consultation are :  Dr. Palmira Del Castillo palliative care attending  History of Present Illness     The patient is a 70 y.o. Non-/non  male who presents with Fatigue and Shortness of Breath      Referred to Palliative Care by   ?x Physician   ? Nursing  ? Family Request   ? Other:       He was admitted to the critical care service for Hypoxia [R09.02]. His hospital course has been associated with <principal problem not specified>. The patient has a complicated medical history and has been hospitalized since 3/23/2019 12:40 PM. The patient was brought to ER due to worsening of shortness of breath and generalized weakness. Patient was discharged from hospital yesterday only when he present it again with shortness of breath. Patient was treated with steroids and antibiotics and discharged. Patient was recommended to go on hospice care or extended care facility but patient refused and went home and now is back again. Initially patient was on nonrebreather due to low saturations and later patient was transitioned to 5 L nasal cannula and saturating between 82% to 89%. On presentation patient also had tachycardia and hypotension which improved with fluids, WBCs were elevated. In the ED course status was discussed and patient himself signed as DNR CCA with no intubation.  Patient had been seen by pulmonologist during his previous admission and was sent home on [facility] and he did not qualify for L2 K device. Palliative care consulted for goals of care and code status. Active Hospital Problems    Diagnosis Date Noted    Hypoxia [R09.02]        PAST MEDICAL HISTORY      Diagnosis Date    Anemia     Anxiety     Bronchitis     [facility]     Diabetes [facility]     Former smoker     Hyperlipidemia     Oxygen dependent     Respiratory distress     Type 2 diabetes mellitus ([facility]        PAST SURGICAL HISTORY  Past Surgical History:   Procedure Laterality Date    ABDOMINAL AORTIC ANEURYSM REPAIR, OPEN      FINGER SURGERY      FOOT SURGERY      PANCREAS SURGERY         SOCIAL HISTORY  Social History     Tobacco Use    Smoking status: Former Smoker     Types: Cigarettes    Smokeless tobacco: Never Used    Tobacco comment: Stated he quit smoking in the 90's   Substance Use Topics    Alcohol use: No     Alcohol/week: 0.0 oz    Drug use: No       ALLERGIES  No Known Allergies      MEDICATIONS  Current Medications    insulin lispro  0-12 Units Subcutaneous TID WC    insulin lispro  0-6 Units Subcutaneous Nightly    insulin glargine  20 Units Subcutaneous Daily    [START ON 3/25/2019] methylPREDNISolone  40 mg Intravenous Daily    midodrine  5 mg Oral TID     [START ON 3/25/2019] sulfamethoxazole-trimethoprim  1 tablet Oral Once per day on Mon Wed Fri    sodium chloride  1,000 mL Intravenous Once    mometasone-formoterol  2 puff Inhalation BID    tamsulosin  0.4 mg Oral Daily    sodium chloride flush  10 mL Intravenous 2 times per day    enoxaparin  40 mg Subcutaneous Daily    ipratropium-albuterol  1 ampule Inhalation 4x daily     sodium chloride flush, magnesium hydroxide, ondansetron, glucose, dextrose, glucagon (rDNA), dextrose, albuterol  IV Drips/Infusions   dextrose      sodium chloride 10 mL/hr at 03/24/19 1022     Home Medications  No current facility-administered medications on file prior to encounter.       Current disease; Can't do any work; Considerable assist; intake normal or reduced; LOC full/confusion  __x_40%  Mainly in bed; Extensive disease; Mainly assist; intake normal or reduced; LOC full/confusion   ___30%  Bed Bound; Extensive disease; Total care; intake reduced; LOCfull/confusion  ___20%  Bed Bound; Extensive disease; Total care; intake minimal; Drowsy/coma  ___10%  Bed Bound; Extensive disease;  Total care; Mouth care only; Drowsy/coma  ___0       Death      Plan      Palliative Interaction:  - The patient was sitting on the bed comfortably and in no acute distress  - Patient's current medical conditions were discussed with him  - The significance of POA paperwork for health was explained to the patient and patient told that his wife was his POA for health but she recently passed away  - Patient told that \" he has many children and they are scattered at different places\"    - Patient said that he wanted his oldest daughter to be his POA for health and also told that he would want his stepdaughter Jeevan Trinidad to be contacted if need be    - I explained to the patient that spiritual care team can help him complete the POA paperwork and he agreed with that    - I explained different types of code status to the patient and he wanted his code status to remain as DNR CCA with no intubation - the patient clearly stated that he does not want to have CPR/chest compressions done in case his heart stopped and also does not want to be intubated if his breathing stopped    - I discussed with the patient regarding the discharge plan and patient stated that he wants to go to [facility]    - I offered comfort and emotional support to the patient    - I met with patient's nurse Charlotte Adler and updated her regarding patient's wishes for having POA paperwork done    - Spiritual care consulted for POA paperwork    Education/support to staff  Education/support to family  Education/support to patient  Discharge planning/helping to coordinate care  Communications with primary service  Caregiver support/education  Code status clarified: Full Code  Code status clarified: [facility]  Code status clarified: DNRCCA  Other major issues     Principle Problem/Diagnosis:  Acute on chronic respiratory failure    Additional Assessments:   Active Problems:    Hypoxia    COPD    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 Anxiety:  fatigue, shortness of breath                          Dyspnea:  acute dyspnea and chronic dyspnea                          Fatigue:  Tiredness and weakness    Other: On high flow    We feel the patient symptoms are being controlled. his current regimen is reviewed by myself and discussed with the staff. 2- Goals of care evaluation   The patient goals of care are improve or maintain function/quality of life, accomplish a particular personal goal, spiritual needs, strengthening relationships, preserve independence/autonomy/control and support for family/caregiver   Goals of care discussed with:    ?x Patient independently    ? Patient and Family    ? Family or Healthcare DPOA independently    ? Unable to discuss with patient, family/DPOA not present    3- Code Status  DNR-CCA    4- Other recommendations   - We will follow-up on the POA paperwork for health  - We will continue to provide comfort and support to the patient and the family  Please call with any palliative questions or concerns. Palliative Care Team is available via perfect serve or via phone. Palliative Care will continue to follow Mr. Lashon Clinton care as needed. Thank you for allowing Palliative Care to participate in the care of Mr. Waylon Mendez . This note has been dictated by dragon, typing errors may be a possibility.   The total time I spent in seeing the patient and discussing goals of care, code status,

## 2019-03-24 NOTE — PROGRESS NOTES
table[facility] by mouth twice a day [facility]1/16   [facility]   aspirin 81 MG tablet Take 81 mg by mouth daily    Historical Provider, MD   escitalopram (LEXAPRO) 10 MG tablet Take 1 tablet by mouth daily 11/10/15   Sandy Jensen PA-C     Assessment - COPD, CHF, Pulmonary Fibrosis, 5L hm O2.   Home nurse arrived and found that he was too weak to care for himself and pulse ox was in the 80s  Pt currently on high flow nasal cannula     RR 22  Breath Sounds: diminished exp wheezes t/o      · Bronchodilator assessment at level  3  · Hyperinflation assessment at level   · Secretion Management assessment at level    ·   · [x]    Bronchodilator Assessment  BRONCHODILATOR ASSESSMENT SCORE  Score 0 1 2 3 4 5   Breath Sounds   []  Patient Baseline []  No Wheeze good aeration []  Faint, scattered wheezing, good aeration [x]  Expiratory Wheezing and or moderately diminished []  Insp/Exp wheeze and/or very diminished []  Insp/Exp and/ or marked distress   Respiratory Rate   []  Patient Baseline []  Less than 20 []  Less than 20 [x]  20-25 []  Greater than 25 []  Greater than 25   Peak flow % of Pred or PB [x]  NA   []  Greater than 90%  []  81-90% []  71-80% []  Less than or equal to 70%  or unable to perform []  Unable due to Respiratory Distress   Dyspnea re []  Patient Baseline []  No SOB []  No SOB [x]  SOB on exertion []  SOB min activity []  At rest/acute   e FEV% Predicted       [x]  NA []  Above 69%  []  Unable []  Above 60-69%  []  Unable []  Above 50-59%  []  Unable []  Above 35-49%  []  Unable []  Less than 35%  []  Unable                 []  Hyperinflation Assessment  Score 1 2 3   CXR and Breath Sounds   []  Clear []  No atelectasis  Basilar aeration []  Atelectasis or absent basilar breath sounds   Incentive Spirometry Volume  (Per IBW)   []  Greater than or equal to 15ml/Kg []  less than 15ml/Kg []  less than 15ml/Kg   Surgery within last 2 weeks []  None or general   []  Abdominal or thoracic surgery []  Abdominal or thoracic   Chronic Pulmonary Historyre []  No []  Yes []  Yes     []  Secretion Management Assessment  Score 1 2 3   Bilateral Breath Sounds   []  Occasional Rhonchi []  Scattered Rhonchi []  Course Rhonchi and/or poor aeration   Sputum    []  Small amount of thin secretions []  Moderate amount of viscous secretions []  Copius, Viscious Yellow/ Secretions   CXR as reported by physician []  clear  []  Unavailable []  Infiltrates and/or consolidation  []  Unavailable []  Mucus Plugging and or lobar consolidation  []  Unavailable   Cough []  Strong, productive cough []  Weak productive cough []  No cough or weak non-productive cough   Dixon Hint  9:04 PM                            FEMALE                                  MALE                            FEV1 Predicted Normal Values                        FEV1 Predicted Normal Values          Age                                     Height in Feet and Inches       Age                                     Height in Feet and Inches       4' 11\" 5' 1\" 5' 3\" 5' 5\" 5' 7\" 5' 9\" 5' 11\" 6' 1\"  4' 11\" 5' 1\" 5' 3\" 5' 5\" 5' 7\" 5' 9\" 5' 11\" 6' 1\"   42 - 45 2.49 2.66 2.84 3.03 3.22 3.42 3.62 3.83 42 - 45 2.82 3.03 3.26 3.49 3.72 3.96 4.22 4.47   46 - 49 2.40 2.57 2.76 2.94 3.14 3.33 3.54 3.75 46 - 49 2.70 2.92 3.14 3.37 3.61 3.85 4.10 4.36   50 - 53 2.31 2.48 2.66 2.85 3.04 3.24 3.45 3.66 50 - 53 2.58 2.80 3.02 3.25 3.49 3.73 3.98 4.24   54 - 57 2.21 2.38 2.57 2.75 2.95 3.14 3.35 3.56 54 - 57 2.46 2.67 2.89 3.12 3.36 3.60 3.85 4.11   58 - 61 2.10 2.28 2.46 2.65 2.84 3.04 3.24 3.45 58 - 61 2.32 2.54 2.76 2.99 3.23 3.47 3.72 3.98   62 - 65 1.99 2.17 2.35 2.54 2.73 2.93 3.13 3.34 62 - 65 2.19 2.40 2.62 2.85 3.09 3.33 3.58 3.84   66 - 69 1.88 2.05 2.23 2.42 2.61 2.81 3.02 3.23 66 - 69 2.04 2.26 2.48 2.71 2.95 3.19 3.44 3.70   70+ 1.82 1.99 2.17 2.36 2.55 2.75 2.95 3.16 70+ 1.97 2.19 2.41 2.64 2.87 3.12 3.37 3.62             Predicted Peak Expiratory Flow Rate

## 2019-03-24 NOTE — PROGRESS NOTES
Pt. Arrived from MICU to room 3021. High Flow continued at 30L and 50% 02. Pt. Appears comfortable, sats in low 90's and no complaints of pain at this time. Dietary notified of pt. Transfer. All needs met.  Will monitor

## 2019-03-24 NOTE — PROGRESS NOTES
INTERNAL MEDICINE                                                                             ICU PATIENT TRANSFER NOTE        Patient:  Ramirez Langley  YOB: 1940  MRN: [id number]     Acct: [de-identified]     Admit date: 3/23/2019    Code Status:-  Full code     Reason for ICU Admission:-   Acute on chronic respiratory failure    SUPPORT DEVICES: [] Ventilator [] BIPAP  [x] Nasal Cannula [] Room Air    Consultations:- [] Cardiology [] Nephrology  [] Hemo onco  [] GI                               [] ID [] ENT  [] Rheum [] Endo   []Physiotherapy                                 Others:- Palliative care    NUTRITION:  [] NPO [] Tube Feeding (Specify: ) [] TPN  [x] PO    Central Lines:- [] No   [] Yes           If yes - Days/Date of Insertion. Pt seen,examined and Chart reviewed. ICU COURSE :-     Tanisha Jolly a 70 y. o. 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 Jemal Crain was weak and short of breath and hence brought to the ER.  His crit on nonrebreather.  Blood pressures were low and he was tachycardic but improved with fluids.  Patient was discharged from the hospital only yesterday and he presented with shortness of breath.  He was treated with steroids and antibiotics and discharged. Jemal Crain was recommended to go to hospice or extended nursing facility but patient refused and went home and today he is back to the ER. Initially needed to be on non-rebreather due to low saturations.  Patient transitioned to 5 L nasal cannula and tolerating saturation between 82% to 89%. Presented with tachycardia and hypotension.  Improved with fluids.  WBC elevated.  Received vancomycin and Zosyn in ER.  Code status discussion was done in ED and patient signed DNR CCA with no intubation. Palliative and hospice care was discussed with patient during previous admission but patient refused  He was evaluated by pulmonology during his previous admission. [facility] was sent home on trilogy vent.   Did not qualify for L2K device. Patient is on HiFlow. Steroids were changed to daily. Zosyn discontinued and started on Bactrim three times a week. Patient stable to transfer to the floor. REVIEW OF SYSTEMS:  · Constitutional: Negative for Fever, chills  · Eyes: Negative for visual changes, diplopia  · ENT: Negative for mouth sores, sore throat. · Cardiovascular: Negative for lightheadedness ,chest pain, palpitations   · Respiratory:Negative for Shortness of breath,cough or wheezing. · Gastrointestinal: Negative for nausea/vomiting, change in bowel habits, abdominal pain  · Genitourinary:Negative for change in bladder habits, dysuria, hematuria. · Musculoskeletal: Negative for joint pain   · Neurological: Negative for headache, change in muscle strength numbness/tinglin      Physical Exam:   Vitals: /63   Pulse 92   Temp 97.9 °F (36.6 °C) (Axillary)   Resp 23   Ht 5' 9\" (1.753 m)   Wt 148 lb 9.4 oz (67.4 kg)   SpO2 95%   BMI 21.94 kg/m²   24 hour intake/output:    Intake/Output Summary (Last 24 hours) at 3/24/2019 1540  Last data filed at 3/24/2019 1300  Gross per 24 hour   Intake 2878.78 ml   Output 1045 ml   Net 1833.78 ml     Last 3 weights: Wt Readings from Last 3 Encounters:   03/23/19 148 lb 9.4 oz (67.4 kg)   03/14/19 156 lb 1.4 oz (70.8 kg)   03/07/19 155 lb (70.3 kg)     · General appearance: awake, alert, cooperative  · HEENT: Head: Normocephalic, no lesions, without obvious abnormality.   · Lungs: clear to auscultation bilaterally  · Heart: regular rate and rhythm, S1, S2 normal, no murmur  · Abdomen: soft, non-tender; bowel sounds normal; no masses,  no organomegaly  · Extremities: extremities normal, atraumatic, no cyanosis or edema  · Neurological:  Awake, alert, oriented to name, place and time. Cranial nerves II-XII are grossly intact. Reflexes normal and symmetric. Sensation grossly normal    Medications:Current Inpatient  Scheduled Meds:   insulin lispro  0-12 Units Subcutaneous TID WC    insulin lispro  0-6 Units Subcutaneous Nightly    insulin glargine  20 Units Subcutaneous Daily    [START ON 3/25/2019] methylPREDNISolone  40 mg Intravenous Daily    midodrine  5 mg Oral TID WC    [START ON 3/25/2019] sulfamethoxazole-trimethoprim  1 tablet Oral Once per day on Mon Wed Fri    insulin lispro  0-18 Units Subcutaneous TID     insulin lispro  0-9 Units Subcutaneous Nightly    sodium chloride  1,000 mL Intravenous Once    mometasone-formoterol  2 puff Inhalation BID    tamsulosin  0.4 mg Oral Daily    sodium chloride flush  10 mL Intravenous 2 times per day    enoxaparin  40 mg Subcutaneous Daily    ipratropium-albuterol  1 ampule Inhalation 4x daily     Continuous Infusions:   dextrose      sodium chloride Stopped (03/24/19 1300)     PRN Meds:sodium chloride flush, magnesium hydroxide, ondansetron, glucose, dextrose, glucagon (rDNA), dextrose, albuterol    Objective:    CBC:   Recent Labs     03/23/19  1304 03/24/19  0432   WBC 26.9* 14.6*   HGB 13.2 9.9*    206     BMP:    Recent Labs     03/22/19  0732 03/23/19  1537 03/24/19  0432    130* 132*   K 4.7 4.2 4.6   CL 97* 94* 97*   CO2 25 24 26   BUN 21 30* 25*   CREATININE 0.61* 0.48* 0.50*   GLUCOSE 232* 61* 336*     Calcium:  Recent Labs     03/24/19  0432   CALCIUM 7.9*     Glucose:  Recent Labs     03/24/19  0558 03/24/19  0831 03/24/19  1243   POCGLU 306* 300* 283*     HgbA1C: No results for input(s): LABA1C in the last 72 hours.   INR:   Recent Labs     03/23/19  1443   INR 1.1     Hepatic:   Recent Labs     03/23/19  1537   ALKPHOS 73   ALT 16   AST 22   PROT 5.7*   BILITOT 0.62   BILIDIR 0.15   LABALBU 2.7*     Thyroid:   Lab Results Component Value Date    TSH 2.94 02/20/2019        Urinalysis:   Recent Labs     03/23/19  1930   BACTERIA NOT REPORTED   COLORU YELLOW   PHUR 7.0   PROTEINU NEGATIVE   RBCUA 2 TO 5   SPECGRAV 1.023   BILIRUBINUR NEGATIVE   NITRU NEGATIVE   WBCUA 2 TO 5   LEUKOCYTESUR NEGATIVE   GLUCOSEU NEGATIVE       CULTURES:         Assessment:  Patient Active Problem List   Diagnosis    Type 2 diabetes mellitus without complication, with long-term current use of insulin (HCC)    Hyperlipidemia    Anxiety    Respiratory distress    Acute exacerbation of chronic obstructive pulmonary disease (COPD) ([address]    On home O2    Pulmonary artery hypertension (HCC)    Ex-smoker    Iron deficiency anemia    Community acquired pneumonia of right upper lobe of lung (HCC)    Lactic acidosis    Pulmonary fibrosis (HCC)    Dysuria    Acute on chronic respiratory failure (HCC)    Pleural plaque due to asbestos exposure    Chronic obstructive pulmonary disease (HCC)    Pulmonary fibrosis (HCC)    Acute on chronic respiratory failure with hypoxemia (HCC)    Hypomagnesemia    C. difficile colitis    Leukocytosis    Pneumonia due to organism    Diarrhea of infectious origin    General weakness    Hyperglycemia    Asbestosis ([address]    Goals of care, counseling/discussion    Acute respiratory failure with hypoxia ([address]    Hypoxia    Atrial flutter ([address]    Encounter for palliative care    Dyspnea and respiratory abnormalities           Plan:  1. Acute on chronic respiratory failure - on HiFlow 35 L and 50 %FiO2 . Patient uses 4 L home oxygen.  Monitor blood gases.  Encourage BiPAP and if needed start high flow. 2. 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 Pulmonary hypertension - monitor for fluid overload. 4. Hypotenstion: Midodrine 5 mg   5. COPD. continue IV steroids. RT aerosol protocol. Breathing treatments. 6. Diabetes [id number] in March 8. 7. Hypoglycemic on admission. Hypoglycemic protocol.  Insulin sliding scale.  Sugars high this morning. Restart home dose of Lantus.   7. DVT prophylaxis - Lovenox  8. Code status - DNR CCA. Palliative care consult for goals of care and hospice discussion.  Renate Xiong M.D.   Internal Medicine Resident , PGY-1  [address] [place] [address]  03/24/19 3:40 PM

## 2019-03-24 NOTE — PLAN OF CARE
BRONCHOSPASM/BRONCHOCONSTRICTION     X        IMPROVE AERATION/BREATH SOUNDS  X   ADMINISTER BRONCHODILATOR THERAPY AS APPROPRIATE  X   ASSESS BREATH SOUNDS  X   PATIENT EDUCATION AS NEEDED

## 2019-03-24 NOTE — PROGRESS NOTES
Critical care team - Resident sign-out to medicine service      Date and time: 3/24/2019 10:26 AM  Patient's name:  Sanam Cunningham  Medical Record Number: [id number]  Patient's account/billing number: [de-identified]  Patient's YOB: 1940  Age: 70 y.o. Date of Admission: 3/23/2019 12:40 PM  Length of stay during current admission: 1    Primary Care Physician: Bailey Jennings PA-C    Code Status: DNR-CCA    Mode of physician to physician communication:        [] Via telephone   [] In person     Date and time of sign-out: 3/24/2019 10:26 AM    Accepting Internal Medicine resident: Dr. Greg Menjivar   Accepting Medicine team: IM Team 1    Accepting team's attending: Dr. Radha Sue     Patient's current ICU Bed:  117     Patient's assigned bed on floor:  4281        [x] Med-Surg Monitored [] Step-down       [] Psychiatry ICU       [] Psych floor     Reason for ICU admission:     Acute on chronic respiratory failure    ICU course summary:     Sanam Cunningham is a 70 y.o. 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 He was weak and short of breath and hence brought to the ER. His crit on nonrebreather. Blood pressures were low and he was tachycardic but improved with fluids. Patient was discharged from the hospital only yesterday and he presented with shortness of breath. He was treated with steroids and antibiotics and discharged. He was recommended to go to hospice or extended nursing facility but patient refused and went home and today he is back to the ER. Initially needed to be on non-rebreather due to low saturations.  Patient transitioned to 5 L nasal cannula and tolerating saturation between 82% to 89%. Presented with tachycardia and hypotension.  Improved with fluids.  WBC elevated. Received vancomycin and Zosyn in ER.   Code status discussion was done in ED and patient signed DNR CCA with no intubation. Palliative and hospice care was discussed with patient during previous admission but patient refused  He was evaluated by pulmonology during his previous admission. He was sent home on trilogy vent. Did not qualify for L2K device. Patient is on HiFlow. Steroids were changed to daily. Zosyn discontinued and started on Bactrim three times a week. Patient stable to transfer to the floor.       Procedures during patient's ICU stay:     None    Current Vitals:     BP (!) 100/57   Pulse 86   Temp 97.9 °F (36.6 °C) (Oral)   Resp 20   Ht 5' 9\" (1.753 m)   Wt 148 lb 9.4 oz (67.4 kg)   SpO2 97%   BMI 21.94 kg/m²       Cultures:       Blood cultures:      [] None drawn      [] Negative             []  Positive (Details:  )  Urine Culture:        [] None drawn      [] Negative             []  Positive (Details:  )  Sputum Culture:   [] None drawn       [] Negative             []  Positive (Details:  )       Consults:     None   Assessment:     Patient Active Problem List    Diagnosis Date Noted    Atrial flutter ([address] 03/08/2019    Acute respiratory failure with hypoxia ([address] 01/23/2019    Hypoxia     Goals of care, counseling/discussion 12/24/2018    Asbestosis ([address] 12/22/2018    Diarrhea of infectious origin     General weakness     Hyperglycemia     Hypomagnesemia 12/16/2018    C. difficile colitis 12/16/2018    Leukocytosis 12/16/2018    Pneumonia due to infectious organism 12/16/2018    COPD exacerbation ([address] 11/27/2018    Pulmonary fibrosis ([address] 11/27/2018    Acute on chronic respiratory failure with hypoxemia ([address] 11/27/2018    Pleural plaque due to asbestos exposure     Acute on chronic respiratory failure ([address] 10/23/2018    Community acquired pneumonia of right upper lobe of lung ([address] 10/22/2018    Lactic acidosis 10/22/2018    Pulmonary fibrosis ([address] 10/22/2018    Dysuria 10/22/2018    Respiratory distress 05/23/2018  Acute exacerbation of chronic obstructive pulmonary disease (COPD) [facility] 05/23/2018    On home O2 05/23/2018    Pulmonary artery hypertension ([facility] 05/23/2018    Ex-smoker 05/23/2018    Iron deficiency anemia 05/23/2018    Anxiety 11/21/2015    Type 2 diabetes mellitus without complication, with long-term current use of insulin ([facility] 10/30/2014    Hyperlipidemia 10/30/2014         Recommended Follow-up:       1. Acute on chronic respiratory failure - on HiFlow 35 L and 50 %Fio2 . Patient uses 4 L home oxygen.  Monitor blood gases.  Encourage BiPAP and if needed start high flow. 2. 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 Pulmonary hypertension - monitor for fluid overload. 4. Hypotenstion: Midodrine 5 mg   5. COPD - Iv steroids. Breathing treatments. 6. Diabetes mellitus -hypoglycemic on admission. Hypoglycemic protocol.  Insulin sliding scale. HbA1c in March 8.7. Sugars high this morning. Restart home dose of Lantus. 7. DVT prophylaxis - Lovenox  8. Code status - DNR CCA. Palliative care consult for goals of care and hospice discussion.     Yaneth Subramanian MD  Internal Medicine [address], [facility]  PGY-2

## 2019-03-24 NOTE — CARE COORDINATION
Case Management Initial Discharge Plan  Kaleigh Hernandez             Met with:patient to discuss discharge plans. Information verified: address, contacts, phone number, , insurance Yes  PCP: Shannon Willett PA-C  Date of last visit: month    Insurance Provider: [facility] Medicare    Discharge Planning    Living Arrangements:  Alone   Support Systems:  Family Members, Friends/Neighbors, Children    Home has 1 stories  5stairs to climb to get into front door,   Location of bedroom/bathroom in home     Patient able to perform ADL's:Independent    Current Services (outpatient & in home) [facility]  DME equipment: walker cane BSC shower chair nebulizer and home oxygen  DME provider: Pharmacy Counter    Pharmacy: meds to beds   Potential Assistance Purchasing Medications:  No  Does patient want to participate in local refill/ meds to beds program?  Yes    Potential Assistance Needed:  Neel Limon    Patient agreeable to home care: Yes current with [facility]  Freedom of choice provided:  yes    Prior SNF/Rehab Placement and Facility: [facility] Road to SNF/Rehab: Yes  [place] of choice provided: yes   Evaluation: no    Expected Discharge date:  19  Patient expects to be discharged to: [facility]  Follow Up Appointment: Best Day/ Time:      Transportation provider: Amber Willis  Transportation arrangements needed for discharge: No    Readmission Risk              Risk of Unplanned Readmission:        45             Does patient have a readmission risk score greater than 14?: Yes  If yes, follow-up appointment must be made within 7 days of discharge.      Discharge Plan: pt would like [facility] to get stronger was current with [facility]           Electronically signed by Mati Bower RN on 3/24/19 at 10:20 AM

## 2019-03-24 NOTE — PLAN OF CARE
No new breaks in skin integrity. Skin care protocol ordered PTA to stepdown floor from MICU. No falls this shift. Calls out appropriately. Remains on High Flow O2. Will cont. POC.

## 2019-03-25 LAB
ABSOLUTE EOS #: 0.08 K/UL (ref 0–0.44)
ABSOLUTE IMMATURE GRANULOCYTE: 0.1 K/UL (ref 0–0.3)
ABSOLUTE LYMPH #: 0.8 K/UL (ref 1.1–3.7)
ABSOLUTE MONO #: 0.35 K/UL (ref 0.1–1.2)
ANION GAP SERPL CALCULATED.3IONS-SCNC: 10 MMOL/L (ref 9–17)
BASOPHILS # BLD: 0 % (ref 0–2)
BASOPHILS ABSOLUTE: <0.03 K/UL (ref 0–0.2)
BUN BLDV-MCNC: 24 MG/DL (ref 8–23)
BUN/CREAT BLD: ABNORMAL (ref 9–20)
CALCIUM SERPL-MCNC: 8 MG/DL (ref 8.6–10.4)
CHLORIDE BLD-SCNC: 99 MMOL/L (ref 98–107)
CO2: 25 MMOL/L (ref 20–31)
CREAT SERPL-MCNC: 0.6 MG/DL (ref 0.7–1.2)
CULTURE: ABNORMAL
CULTURE: ABNORMAL
DIFFERENTIAL TYPE: ABNORMAL
EKG ATRIAL RATE: 97 BPM
EKG P AXIS: 56 DEGREES
EKG P-R INTERVAL: 116 MS
EKG Q-T INTERVAL: 312 MS
EKG QRS DURATION: 66 MS
EKG QTC CALCULATION (BAZETT): 396 MS
EKG R AXIS: 46 DEGREES
EKG T AXIS: 61 DEGREES
EKG VENTRICULAR RATE: 97 BPM
EOSINOPHILS RELATIVE PERCENT: 1 % (ref 1–4)
GFR AFRICAN AMERICAN: >60 ML/MIN
GFR NON-AFRICAN AMERICAN: >60 ML/MIN
GFR SERPL CREATININE-BSD FRML MDRD: ABNORMAL ML/MIN/{1.73_M2}
GFR SERPL CREATININE-BSD FRML MDRD: ABNORMAL ML/MIN/{1.73_M2}
GLUCOSE BLD-MCNC: 122 MG/DL (ref 75–110)
GLUCOSE BLD-MCNC: 143 MG/DL (ref 75–110)
GLUCOSE BLD-MCNC: 271 MG/DL (ref 75–110)
GLUCOSE BLD-MCNC: 274 MG/DL (ref 70–99)
GLUCOSE BLD-MCNC: 51 MG/DL (ref 75–110)
GLUCOSE BLD-MCNC: 79 MG/DL (ref 75–110)
GLUCOSE BLD-MCNC: 81 MG/DL (ref 75–110)
HCT VFR BLD CALC: 28.5 % (ref 40.7–50.3)
HEMOGLOBIN: 9.3 G/DL (ref 13–17)
IMMATURE GRANULOCYTES: 1 %
LYMPHOCYTES # BLD: 6 % (ref 24–43)
Lab: ABNORMAL
MCH RBC QN AUTO: 31.7 PG (ref 25.2–33.5)
MCHC RBC AUTO-ENTMCNC: 32.6 G/DL (ref 28.4–34.8)
MCV RBC AUTO: 97.3 FL (ref 82.6–102.9)
MONOCYTES # BLD: 3 % (ref 3–12)
NRBC AUTOMATED: 0 PER 100 WBC
PDW BLD-RTO: 15.4 % (ref 11.8–14.4)
PLATELET # BLD: 206 K/UL (ref 138–453)
PLATELET ESTIMATE: ABNORMAL
PMV BLD AUTO: 10 FL (ref 8.1–13.5)
POTASSIUM SERPL-SCNC: 4.6 MMOL/L (ref 3.7–5.3)
RBC # BLD: 2.93 M/UL (ref 4.21–5.77)
RBC # BLD: ABNORMAL 10*6/UL
SEG NEUTROPHILS: 89 % (ref 36–65)
SEGMENTED NEUTROPHILS ABSOLUTE COUNT: 11.73 K/UL (ref 1.5–8.1)
SODIUM BLD-SCNC: 134 MMOL/L (ref 135–144)
SPECIMEN DESCRIPTION: ABNORMAL
WBC # BLD: 13.1 K/UL (ref 3.5–11.3)
WBC # BLD: ABNORMAL 10*3/UL

## 2019-03-25 PROCEDURE — 97166 OT EVAL MOD COMPLEX 45 MIN: CPT

## 2019-03-25 PROCEDURE — 2060000000 HC ICU INTERMEDIATE R&B

## 2019-03-25 PROCEDURE — 82947 ASSAY GLUCOSE BLOOD QUANT: CPT

## 2019-03-25 PROCEDURE — 97530 THERAPEUTIC ACTIVITIES: CPT

## 2019-03-25 PROCEDURE — 36415 COLL VENOUS BLD VENIPUNCTURE: CPT

## 2019-03-25 PROCEDURE — 6370000000 HC RX 637 (ALT 250 FOR IP): Performed by: STUDENT IN AN ORGANIZED HEALTH CARE EDUCATION/TRAINING PROGRAM

## 2019-03-25 PROCEDURE — 80048 BASIC METABOLIC PNL TOTAL CA: CPT

## 2019-03-25 PROCEDURE — 99233 SBSQ HOSP IP/OBS HIGH 50: CPT | Performed by: INTERNAL MEDICINE

## 2019-03-25 PROCEDURE — 6360000002 HC RX W HCPCS: Performed by: STUDENT IN AN ORGANIZED HEALTH CARE EDUCATION/TRAINING PROGRAM

## 2019-03-25 PROCEDURE — 2700000000 HC OXYGEN THERAPY PER DAY

## 2019-03-25 PROCEDURE — 76937 US GUIDE VASCULAR ACCESS: CPT

## 2019-03-25 PROCEDURE — 85025 COMPLETE CBC W/AUTO DIFF WBC: CPT

## 2019-03-25 PROCEDURE — 94762 N-INVAS EAR/PLS OXIMTRY CONT: CPT

## 2019-03-25 PROCEDURE — 94640 AIRWAY INHALATION TREATMENT: CPT

## 2019-03-25 PROCEDURE — 2580000003 HC RX 258: Performed by: STUDENT IN AN ORGANIZED HEALTH CARE EDUCATION/TRAINING PROGRAM

## 2019-03-25 RX ADMIN — IPRATROPIUM BROMIDE AND ALBUTEROL SULFATE 1 AMPULE: .5; 3 SOLUTION RESPIRATORY (INHALATION) at 11:30

## 2019-03-25 RX ADMIN — ENOXAPARIN SODIUM 40 MG: 40 INJECTION, SOLUTION INTRAVENOUS; SUBCUTANEOUS at 08:38

## 2019-03-25 RX ADMIN — IPRATROPIUM BROMIDE AND ALBUTEROL SULFATE 1 AMPULE: .5; 3 SOLUTION RESPIRATORY (INHALATION) at 20:41

## 2019-03-25 RX ADMIN — INSULIN LISPRO 9 UNITS: 100 INJECTION, SOLUTION INTRAVENOUS; SUBCUTANEOUS at 08:37

## 2019-03-25 RX ADMIN — TAMSULOSIN HYDROCHLORIDE 0.4 MG: 0.4 CAPSULE ORAL at 10:32

## 2019-03-25 RX ADMIN — SULFAMETHOXAZOLE AND TRIMETHOPRIM 1 TABLET: 800; 160 TABLET ORAL at 10:32

## 2019-03-25 RX ADMIN — Medication 10 ML: at 08:34

## 2019-03-25 RX ADMIN — IPRATROPIUM BROMIDE AND ALBUTEROL SULFATE 1 AMPULE: .5; 3 SOLUTION RESPIRATORY (INHALATION) at 07:35

## 2019-03-25 RX ADMIN — MOMETASONE FUROATE AND FORMOTEROL FUMARATE DIHYDRATE 2 PUFF: 100; 5 AEROSOL RESPIRATORY (INHALATION) at 20:41

## 2019-03-25 RX ADMIN — INSULIN LISPRO 9 UNITS: 100 INJECTION, SOLUTION INTRAVENOUS; SUBCUTANEOUS at 18:56

## 2019-03-25 RX ADMIN — IPRATROPIUM BROMIDE AND ALBUTEROL SULFATE 1 AMPULE: .5; 3 SOLUTION RESPIRATORY (INHALATION) at 15:38

## 2019-03-25 RX ADMIN — DEXTROSE 15 G: 15 GEL ORAL at 21:07

## 2019-03-25 RX ADMIN — DEXTROSE 15 G: 15 GEL ORAL at 21:04

## 2019-03-25 RX ADMIN — MOMETASONE FUROATE AND FORMOTEROL FUMARATE DIHYDRATE 2 PUFF: 100; 5 AEROSOL RESPIRATORY (INHALATION) at 07:35

## 2019-03-25 RX ADMIN — Medication 10 ML: at 21:00

## 2019-03-25 RX ADMIN — METHYLPREDNISOLONE SODIUM SUCCINATE 40 MG: 125 INJECTION, POWDER, FOR SOLUTION INTRAMUSCULAR; INTRAVENOUS at 08:34

## 2019-03-25 RX ADMIN — INSULIN GLARGINE 20 UNITS: 100 INJECTION, SOLUTION SUBCUTANEOUS at 08:36

## 2019-03-25 ASSESSMENT — PAIN SCALES - GENERAL
PAINLEVEL_OUTOF10: 0

## 2019-03-25 NOTE — PLAN OF CARE
No new breaks in skin integrity. Pt. Moves position frequently. No falls. Pt. With weakness and uses urinal for now. PT working with pt. Remains on High Flow. Titrating as needed. Will cont POC.

## 2019-03-25 NOTE — PROGRESS NOTES
[address] [place] [address]     Department of Internal Medicine - Staff Internal Medicine Service     DAILY PROGRESS NOTE - TEAM       Patient:  Mary Lowe  YOB: 1940  MRN: [id number]     Acct: [de-identified]     Admit date: 3/23/2019  Admitting Diagnosis: Acute on chronic respiratory failure    Subjective:   Pt seen and Chart reviewed. VS stable, Afebrile  Patient on high flow with 50% FiO2. Mildly tachycardic in the low 100s  Has dry crackles on lung bases. Patient able to eat and drink without any problems. Has bowel movement    Intake/Output Summary (Last 24 hours) at 3/25/2019 0759  Last data filed at 3/25/2019 7526  Gross per 24 hour   Intake 2078 ml   Output 1725 ml   Net 353 ml         REVIEW OF SYSTEMS:  · Constitutional: Negative for Fever, chills  · Eyes: Negative for visual changes, diplopia  · ENT: Negative for mouth sores, sore throat. · Cardiovascular: Negative for lightheadedness ,chest pain, palpitations   · Respiratory: Positive for Shortness of breath, crackles at lung bases  · Gastrointestinal: Negative for nausea/vomiting, change in bowel habits, abdominal pain  · Genitourinary:Negative for change in bladder habits, dysuria, hematuria. · Musculoskeletal: Negative for joint pain   · Neurological: Negative for headache, change in muscle strength numbness/tingling  Objective:   BP (!) 119/51   Pulse 93   Temp 98.4 °F (36.9 °C) (Oral)   Resp 16   Ht 5' 9\" (1.753 m)   Wt 160 lb (72.6 kg)   SpO2 94%   BMI 23.63 kg/m²       Intake/Output Summary (Last 24 hours) at 3/25/2019 0759  Last data filed at 3/25/2019 3472  Gross per 24 hour   Intake 2078 ml   Output 1725 ml   Net 353 ml       · General appearance: awake, alert, cooperative  · HEENT: Head: Normocephalic, no lesions, without obvious abnormality. · Lungs:  Positive for Shortness of breath, crackles at lung bases.   On nasal cannula high flow  · Heart: regular rate and rhythm, S1, S2 normal, no murmur  · Abdomen: tablet take 2 tablets by mouth twice a day  Patient taking differently: take 2 tablet by mouth twice a day 8/1/16   [place] PA-C   aspirin 81 MG tablet Take 81 mg by mouth daily    Historical Provider, MD   escitalopram (LEXAPRO) 10 MG tablet Take 1 tablet by mouth daily 11/10/15   Herminia Jensen PA-C     Scheduled Meds:   insulin glargine  20 Units Subcutaneous Daily    methylPREDNISolone  40 mg Intravenous Daily    midodrine  5 mg Oral TID     sulfamethoxazole-trimethoprim  1 tablet Oral Once per day on Mon Wed Fri    insulin lispro  0-18 Units Subcutaneous TID     insulin lispro  0-9 Units Subcutaneous Nightly    sodium chloride  1,000 mL Intravenous Once    mometasone-formoterol  2 puff Inhalation BID    tamsulosin  0.4 mg Oral Daily    sodium chloride flush  10 mL Intravenous 2 times per day    enoxaparin  40 mg Subcutaneous Daily    ipratropium-albuterol  1 ampule Inhalation 4x daily     Continuous Infusions:   dextrose       PRN Meds:     sodium chloride flush 10 mL PRN   magnesium hydroxide 30 mL Daily PRN   ondansetron 4 mg Q6H PRN   glucose 15 g PRN   dextrose 12.5 g PRN   glucagon (rDNA) 1 mg PRN   dextrose 100 mL/hr PRN   albuterol 2.5 mg As Directed RT PRN       LABS:  CBC: Recent Labs     03/23/19  1304 03/24/19  0432 03/25/19  0536   WBC 26.9* 14.6* 13.1*   RBC 4.14* 3.18* 2.93*   HGB 13.2 9.9* 9.3*   HCT 40.0* 30.6* 28.5*   MCV 96.6 96.2 97.3   RDW 16.8* 15.6* 15.4*    206 206     BMP: Recent Labs     03/23/19  1537 03/24/19  0432 03/25/19  0536   * 132* 134*   K 4.2 4.6 4.6   CL 94* 97* 99   CO2 24 26 25   BUN 30* 25* 24*   CREATININE 0.48* 0.50* 0.60*     PT/INR:   Recent Labs     03/23/19  1443   PROTIME 11.3   INR 1.1     FASTING LIPID PANEL:  Lab Results   Component Value Date    CHOL 148 12/15/2018    HDL 61 12/15/2018    TRIG 88 12/15/2018     LFTS  Recent Labs     03/23/19  1537   ALKPHOS 73   ALT 16   AST 22   BILITOT 0.62   BILIDIR 0.15   LABALBU 2.7*           ASSESSMENT:     Active Problems:    Chronic obstructive pulmonary disease (HCC)    Pneumonia due to organism    Hypoxia    Encounter for palliative care    Dyspnea and respiratory abnormalities  Resolved Problems:    * No resolved hospital problems. *         PLAN:     1. Acute on chronic respiratory failure - on HiFlow 35 L and 50 %FiO2 . Patient uses 4 L home oxygen.  Monitor blood gases as needed.  Encourage BiPAP     2. 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 Pulmonary hypertension - monitor for fluid overload. 4. Hypotenstion: Midodrine 5 mg    5. COPD. continue IV steroids. RT aerosol protocol. Breathing treatments. 6. Diabetes [id number] in March 8. 7. Hypoglycemic on admission. Hypoglycemic protocol.  Insulin sliding scale.  Sugars high this morning. Patient also on IV steroids at 40 mg IV daily Solu-Medrol  Continue Lantus 20 units daily with HDISS. Continue on carb control diet    7. DVT prophylaxis - Lovenox and EPC cuffs    8. Leukocytosis. Resolving    9. Code status - DNR CCA. Palliative care consult for goals of care and hospice discussion. PT/OT evaluation  Discharge planning: Case management      Vincente Olszewski, M.D. Internal Medicine Resident , PGY-1  [facility]  Attending Physician Statement  I have discussed the care of Brandon Fitzpatrick, including pertinent history and exam findings,  with the resident. I have seen and examined the patient and the key elements of all parts of the encounter have been performed by me. I agree with the assessment, plan and orders as documented by the resident with additions . Treatment plan Discussed with nursing staff in detail , all questions answered . Electronically signed by Fidencio Julian MD on   3/25/19 at 6:51 PM    Please note that this chart was generated using voice recognition Dragon dictation software.   Although every effort was made to ensure the accuracy of this automated transcription, some errors in transcription may have occurred.

## 2019-03-25 NOTE — PROGRESS NOTES
5/25/18   Yamileth Price MD   metFORMIN ER (GLUCOPHAGE-XR) 500 MG XR tablet take 2 tablets by mouth twice a day  Patient taking differently: take 2 tablet by mouth twice a day 8/1/16   Brittany Conteh PA-C   aspirin 81 MG tablet Take 81 mg by mouth daily    Historical Provider, MD   escitalopram (LEXAPRO) 10 MG tablet Take 1 tablet by mouth daily 11/10/15   Laura Jensen PA-C     Recent Surgical History: None = 0     Assessment pt takes treatments 4X day at home    Peak Flow (asthma only)      RR 22  Breath Sounds: decreased      · Bronchodilator assessment at level  3  · Hyperinflation assessment at level   · Secretion Management assessment at level    ·   · [x]    Bronchodilator Assessment  BRONCHODILATOR ASSESSMENT SCORE  Score 0 1 2 3 4 5   Breath Sounds   []  Patient Baseline []  No Wheeze good aeration []  Faint, scattered wheezing, good aeration [x]  Expiratory Wheezing and or moderately diminished []  Insp/Exp wheeze and/or very diminished []  Insp/Exp and/ or marked distress   Respiratory Rate   []  Patient Baseline []  Less than 20 []  Less than 20 [x]  20-25 []  Greater than 25 []  Greater than 25   Peak flow % of Pred or PB [x]  NA   []  Greater than 90%  []  81-90% []  71-80% []  Less than or equal to 70%  or unable to perform []  Unable due to Respiratory Distress   Dyspnea re []  Patient Baseline []  No SOB []  No SOB [x]  SOB on exertion []  SOB min activity []  At rest/acute   e FEV% Predicted       [x]  NA []  Above 69%  []  Unable []  Above 60-69%  []  Unable []  Above 50-59%  []  Unable []  Above 35-49%  []  Unable []  Less than 35%  []  Unable

## 2019-03-25 NOTE — PROGRESS NOTES
Occupational Therapy   Occupational Therapy Initial Assessment  Date: 3/25/2019   Patient Name: Little Melchor  MRN: [id number]     : 1940    Date of Service: 3/25/2019    Discharge Recommendations:    Further therapy recommended at discharge. Assessment   Performance deficits / Impairments: Decreased functional mobility ; Decreased strength;Decreased endurance;Decreased ADL status; Decreased high-level IADLs;Decreased balance  Treatment Diagnosis: leukocytosis   Prognosis: Good  Decision Making: Medium Complexity  Patient Education: pt ed on POC, purpose of eval, importance of continued movement, pursed lip breathing tech, and benefits of therapy. good return   REQUIRES OT FOLLOW UP: Yes  Activity Tolerance  Activity Tolerance: Patient Tolerated treatment well;Patient limited by fatigue  Safety Devices  Safety Devices in place: Yes  Type of devices: Call light within reach; Left in bed;Patient at risk for falls; Bed alarm in place  Restraints  Initially in place: No         Patient Diagnosis(es): The primary encounter diagnosis was Leukocytosis, unspecified type. Diagnoses of Dyspnea and respiratory abnormalities and Pneumonia due to organism were also pertinent to this visit. has a past medical history of Anemia, Anxiety, Bronchitis, COPD (chronic obstructive pulmonary disease) ([facility] [address] Diabetes ([facility] [address] Former smoker, Hyperlipidemia, Oxygen dependent, Respiratory distress, and Type 2 diabetes mellitus ([facility] [address] has a past surgical history that includes AAA repair, open; Pancreas surgery; Finger surgery; and Foot surgery.     Treatment Diagnosis: leukocytosis       Restrictions  Restrictions/Precautions  Restrictions/Precautions: Up as Tolerated, Contact Precautions  Required Braces or Orthoses?: No  Position Activity Restriction  Other position/activity restrictions: up as tolerated     Subjective   General  Chart Reviewed: No  Patient assessed for rehabilitation services?: Yes  Family / Caregiver Present: No  Diagnosis: leukocytosis   Pain Assessment  Pain Assessment: denies    Oxygen Therapy  O2 Device: High flow nasal cannula  O2 Flow Rate (L/min): 35 L/min    Social/Functional History  Social/Functional History  Lives With: Alone  Type of Home: House  Home Layout: One level  Home Access: Stairs to enter without rails  Entrance Stairs - Number of Steps: 2  Bathroom Shower/Tub: Tub/Shower unit  Bathroom Toilet: Standard  Home Equipment: Rolling walker, Cane, Oxygen(pt reported using walker at all times. 4L home O2)  Receives Help From: Neighbor  ADL Assistance: Independent  Homemaking Assistance: Needs assistance  Homemaking Responsibilities: No(pt reported neighbor completes for pt )  Ambulation Assistance: Independent(with DME )  Transfer Assistance: Independent  Active : No  Patient's  Info: neighbor drives  Additional Comments: Pt reported very supportive neighbor who assists with IADLs, but no one to provide 24hr      Objective   Vision: Impaired  Vision Exceptions: Wears glasses for reading  Hearing: Within functional limits    Orientation  Overall Orientation Status: Within Functional Limits     Balance  Sitting Balance: Supervision(~4 minutes on EOB. Pt sats dropped to mid 70s while sitting on EOB so pt returned to laying bed. Once pt returned to laying bed pt O2 returned to mid-high 80s. RN aware)  Standing Balance: Unable to assess(comment)  Standing Balance  Sit to stand: Unable to assess  Stand to sit: Unable to assess     ADL  Feeding: Modified independent   Grooming: Modified independent   UE Bathing: Minimal assistance  LE Bathing: Moderate assistance  UE Dressing: Minimal assistance  LE Dressing: Moderate assistance  Toileting:  Moderate assistance  Tone RUE  RUE Tone: Normotonic  Tone LUE  LUE Tone: Normotonic  Coordination  Movements Are Fluid And Coordinated: Yes     Bed mobility  Supine to Sit: Minimal assistance  Sit to Supine: Minimal assistance  Scooting: Contact guard assistance  Transfers  Sit to stand: Unable to assess  Stand to sit: Unable to assess  Transfer Comments: standing not attempted this date due to decreased O2 sats while sitting on EOB      Cognition  Overall Cognitive Status: WFL     Sensation  Overall Sensation Status: WFL      LUE AROM : WFL  Left Hand AROM: WFL  RUE AROM : WFL  Right Hand AROM: WFL    LUE Strength  Gross LUE Strength: Exceptions to [facility]  L Hand Grasp: 3+/5  LUE Strength Comment: overall muscle strength 3+/5   RUE Strength  Gross RUE Strength: Exceptions to [facility]  R Hand Grasp: 3+/5  RUE Strength Comment: overall muscle strength 3+/5       Plan   Plan  Times per week: 4-5x/wk    AM-PAC Score   AM-[facility] Inpatient Daily Activity Raw Score: 17  AM-PAC Inpatient ADL T-Scale Score : 37.26  ADL Inpatient CMS 0-100% Score: 50.11  ADL Inpatient CMS G-Code Modifier : CK    Goals  Short term goals  Time Frame for Short term goals: pt will, by discharge  Short term goal 1: dem SBA during bed mobility in order to increase independence   Short term goal 2: complete sit>stand transfer with mod A and LRD, as needed  Short term goal 3: complete LB ADLs with min A, setup and AE, as needed  Short term goal 4: complete UB ADLs and grooming tasks with SBA and set up  Short term goal 5: dem understanding and ind initiate use of 2-3 energy conservation tech   Short term goal 6: increase activity tolerance to 15+ minutes in order to participate in ADLs     Therapy Time   Individual Concurrent Group Co-treatment   Time In [facility] 30         Time Out 1017         Minutes Arpit 53, OTR/L

## 2019-03-25 NOTE — PROGRESS NOTES
throughout with good response. Slowy improved to mid 80 after in bed few minutes. RN notified and respiratory arrived shortly after session. Balance  Posture: Fair  Sitting - Static: Good;-  Sitting - Dynamic: Fair;+  Exercises  Comments: EOB 5min, CGA to SBA, several minutes recovering once supine. Assessment   Body structures, Functions, Activity limitations: Decreased endurance  Assessment: Keya to EOB, watch Sp02. on hi flow for session. REQUIRES PT FOLLOW UP: Yes  Activity Tolerance  Activity Tolerance: Patient limited by endurance; Patient limited by fatigue; Other  Activity Tolerance: Decreasing Sp02, RN notified.       Goals  Short term goals  Time Frame for Short term goals: 10  Short term goal 1: pt independent with bed mobility  Short term goal 2: pt MinAx 1 for transfers   Short term goal 3: pt able to ambulate with least restrictive device 50 ft with Keya  Short term goal 4: pt able to tolerate 30 min of activity with mobility, exercise to improve endurance    Plan    Plan  Times per week: 5-6x/wk  Times per day: Daily  Current Treatment Recommendations: Strengthening, Transfer Training, Endurance Training, Gait Training, Functional Mobility Training, Stair training, Patient/Caregiver Education & Training, Safety Education & Training  Safety Devices  Type of devices: Call light within reach, Left in bed, Patient at risk for falls, Nurse notified  Restraints  Initially in place: No     Therapy Time   Individual Concurrent Group Co-treatment   Time In 0952         Time Out 1018         Minutes Christina PT

## 2019-03-25 NOTE — PROGRESS NOTES
Smoking Cessation - topics covered   []  Health Risks  []  Benefits of Quitting   []  Smoking Cessation  []  Patient has no history of tobacco use  [x]  Patient is former smoker. [x]  No need for tobacco cessation education. []  Booklet given  []  Patient verbalizes understanding. []  Patient denies need for tobacco cessation education. []  Unable to meet with patient today. Will follow up as able.   Aydin Linares [phone]  10:00 AM

## 2019-03-26 LAB
ABSOLUTE EOS #: 0.19 K/UL (ref 0–0.44)
ABSOLUTE IMMATURE GRANULOCYTE: 0.1 K/UL (ref 0–0.3)
ABSOLUTE LYMPH #: 0.96 K/UL (ref 1.1–3.7)
ABSOLUTE MONO #: 0.34 K/UL (ref 0.1–1.2)
ANION GAP SERPL CALCULATED.3IONS-SCNC: 9 MMOL/L (ref 9–17)
BASOPHILS # BLD: 0 % (ref 0–2)
BASOPHILS ABSOLUTE: <0.03 K/UL (ref 0–0.2)
BUN BLDV-MCNC: 12 MG/DL (ref 8–23)
BUN/CREAT BLD: ABNORMAL (ref 9–20)
CALCIUM SERPL-MCNC: 8.1 MG/DL (ref 8.6–10.4)
CHLORIDE BLD-SCNC: 98 MMOL/L (ref 98–107)
CO2: 27 MMOL/L (ref 20–31)
CREAT SERPL-MCNC: 0.49 MG/DL (ref 0.7–1.2)
DIFFERENTIAL TYPE: ABNORMAL
EOSINOPHILS RELATIVE PERCENT: 2 % (ref 1–4)
GFR AFRICAN AMERICAN: >60 ML/MIN
GFR NON-AFRICAN AMERICAN: >60 ML/MIN
GFR SERPL CREATININE-BSD FRML MDRD: ABNORMAL ML/MIN/{1.73_M2}
GFR SERPL CREATININE-BSD FRML MDRD: ABNORMAL ML/MIN/{1.73_M2}
GLUCOSE BLD-MCNC: 159 MG/DL (ref 75–110)
GLUCOSE BLD-MCNC: 159 MG/DL (ref 75–110)
GLUCOSE BLD-MCNC: 197 MG/DL (ref 70–99)
GLUCOSE BLD-MCNC: 208 MG/DL (ref 75–110)
GLUCOSE BLD-MCNC: 377 MG/DL (ref 75–110)
GLUCOSE BLD-MCNC: 407 MG/DL (ref 75–110)
GLUCOSE BLD-MCNC: 44 MG/DL (ref 75–110)
GLUCOSE BLD-MCNC: 49 MG/DL (ref 75–110)
HCT VFR BLD CALC: 29.6 % (ref 40.7–50.3)
HEMOGLOBIN: 9.6 G/DL (ref 13–17)
IMMATURE GRANULOCYTES: 1 %
LYMPHOCYTES # BLD: 11 % (ref 24–43)
MCH RBC QN AUTO: 31 PG (ref 25.2–33.5)
MCHC RBC AUTO-ENTMCNC: 32.4 G/DL (ref 28.4–34.8)
MCV RBC AUTO: 95.5 FL (ref 82.6–102.9)
MONOCYTES # BLD: 4 % (ref 3–12)
NRBC AUTOMATED: 0 PER 100 WBC
PDW BLD-RTO: 15.5 % (ref 11.8–14.4)
PLATELET # BLD: 183 K/UL (ref 138–453)
PLATELET ESTIMATE: ABNORMAL
PMV BLD AUTO: 9.9 FL (ref 8.1–13.5)
POTASSIUM SERPL-SCNC: 4.2 MMOL/L (ref 3.7–5.3)
RBC # BLD: 3.1 M/UL (ref 4.21–5.77)
RBC # BLD: ABNORMAL 10*6/UL
SEG NEUTROPHILS: 82 % (ref 36–65)
SEGMENTED NEUTROPHILS ABSOLUTE COUNT: 7.56 K/UL (ref 1.5–8.1)
SODIUM BLD-SCNC: 134 MMOL/L (ref 135–144)
WBC # BLD: 9.2 K/UL (ref 3.5–11.3)
WBC # BLD: ABNORMAL 10*3/UL

## 2019-03-26 PROCEDURE — 76937 US GUIDE VASCULAR ACCESS: CPT

## 2019-03-26 PROCEDURE — 36415 COLL VENOUS BLD VENIPUNCTURE: CPT

## 2019-03-26 PROCEDURE — 99233 SBSQ HOSP IP/OBS HIGH 50: CPT | Performed by: INTERNAL MEDICINE

## 2019-03-26 PROCEDURE — 97110 THERAPEUTIC EXERCISES: CPT

## 2019-03-26 PROCEDURE — 94762 N-INVAS EAR/PLS OXIMTRY CONT: CPT

## 2019-03-26 PROCEDURE — 2700000000 HC OXYGEN THERAPY PER DAY

## 2019-03-26 PROCEDURE — 80048 BASIC METABOLIC PNL TOTAL CA: CPT

## 2019-03-26 PROCEDURE — 2580000003 HC RX 258: Performed by: STUDENT IN AN ORGANIZED HEALTH CARE EDUCATION/TRAINING PROGRAM

## 2019-03-26 PROCEDURE — 6370000000 HC RX 637 (ALT 250 FOR IP): Performed by: STUDENT IN AN ORGANIZED HEALTH CARE EDUCATION/TRAINING PROGRAM

## 2019-03-26 PROCEDURE — 97530 THERAPEUTIC ACTIVITIES: CPT

## 2019-03-26 PROCEDURE — 94640 AIRWAY INHALATION TREATMENT: CPT

## 2019-03-26 PROCEDURE — 2060000000 HC ICU INTERMEDIATE R&B

## 2019-03-26 PROCEDURE — 85025 COMPLETE CBC W/AUTO DIFF WBC: CPT

## 2019-03-26 PROCEDURE — 6360000002 HC RX W HCPCS: Performed by: STUDENT IN AN ORGANIZED HEALTH CARE EDUCATION/TRAINING PROGRAM

## 2019-03-26 RX ADMIN — INSULIN LISPRO 3 UNITS: 100 INJECTION, SOLUTION INTRAVENOUS; SUBCUTANEOUS at 09:32

## 2019-03-26 RX ADMIN — INSULIN LISPRO 9 UNITS: 100 INJECTION, SOLUTION INTRAVENOUS; SUBCUTANEOUS at 21:41

## 2019-03-26 RX ADMIN — IPRATROPIUM BROMIDE AND ALBUTEROL SULFATE 1 AMPULE: .5; 3 SOLUTION RESPIRATORY (INHALATION) at 11:30

## 2019-03-26 RX ADMIN — Medication 10 ML: at 09:24

## 2019-03-26 RX ADMIN — Medication 10 ML: at 21:00

## 2019-03-26 RX ADMIN — TAMSULOSIN HYDROCHLORIDE 0.4 MG: 0.4 CAPSULE ORAL at 09:23

## 2019-03-26 RX ADMIN — INSULIN LISPRO 12 UNITS: 100 INJECTION, SOLUTION INTRAVENOUS; SUBCUTANEOUS at 18:16

## 2019-03-26 RX ADMIN — MOMETASONE FUROATE AND FORMOTEROL FUMARATE DIHYDRATE 2 PUFF: 100; 5 AEROSOL RESPIRATORY (INHALATION) at 07:45

## 2019-03-26 RX ADMIN — IPRATROPIUM BROMIDE AND ALBUTEROL SULFATE 1 AMPULE: .5; 3 SOLUTION RESPIRATORY (INHALATION) at 20:29

## 2019-03-26 RX ADMIN — INSULIN GLARGINE 20 UNITS: 100 INJECTION, SOLUTION SUBCUTANEOUS at 09:34

## 2019-03-26 RX ADMIN — IPRATROPIUM BROMIDE AND ALBUTEROL SULFATE 1 AMPULE: .5; 3 SOLUTION RESPIRATORY (INHALATION) at 15:38

## 2019-03-26 RX ADMIN — ENOXAPARIN SODIUM 40 MG: 40 INJECTION, SOLUTION INTRAVENOUS; SUBCUTANEOUS at 09:23

## 2019-03-26 RX ADMIN — METHYLPREDNISOLONE SODIUM SUCCINATE 40 MG: 125 INJECTION, POWDER, FOR SOLUTION INTRAMUSCULAR; INTRAVENOUS at 09:23

## 2019-03-26 RX ADMIN — IPRATROPIUM BROMIDE AND ALBUTEROL SULFATE 1 AMPULE: .5; 3 SOLUTION RESPIRATORY (INHALATION) at 07:45

## 2019-03-26 RX ADMIN — MOMETASONE FUROATE AND FORMOTEROL FUMARATE DIHYDRATE 2 PUFF: 100; 5 AEROSOL RESPIRATORY (INHALATION) at 20:30

## 2019-03-26 RX ADMIN — INSULIN LISPRO 6 UNITS: 100 INJECTION, SOLUTION INTRAVENOUS; SUBCUTANEOUS at 13:33

## 2019-03-26 ASSESSMENT — PAIN SCALES - GENERAL
PAINLEVEL_OUTOF10: 0

## 2019-03-26 NOTE — PLAN OF CARE
No new breaks in skin integrity, No falls this shift. Remains on High Flow.  Pt. Breathing comfortably at rest. Will cont POC

## 2019-03-26 NOTE — PROGRESS NOTES
Physical Therapy  Facility/Department: [facility]  Daily Treatment Note  NAME: Ramirez Langley  : 1940  MRN: [id number]    Date of Service: 3/26/2019    Discharge Recommendations:  (.)        Patient Diagnosis(es): The primary encounter diagnosis was Leukocytosis, unspecified type. Diagnoses of Dyspnea and respiratory abnormalities and Pneumonia due to organism were also pertinent to this visit. has a past medical history of Anemia, Anxiety, Bronchitis, COPD (chronic obstructive pulmonary disease) ([facility] [address] Diabetes ([facility] [address] Former smoker, Hyperlipidemia, Oxygen dependent, Respiratory distress, and Type 2 diabetes mellitus ([facility] [address] has a past surgical history that includes AAA repair, open; Pancreas surgery; Finger surgery; and Foot surgery. Restrictions  Restrictions/Precautions  Restrictions/Precautions: Up as Tolerated, Contact Precautions  Required Braces or Orthoses?: No  Position Activity Restriction  Other position/activity restrictions: up as tolerated   Subjective   General  Chart Reviewed: Yes  Response To Previous Treatment: Patient with no complaints from previous session. Family / Caregiver Present: No  Subjective  Subjective: RN and pt agreeable to PT. Pt alert in bed upon arrival, left pt in chair with call light and phone in reach. RN aware. General Comment  Comments: Pt very pleasant and cooperative. Pain Screening  Patient Currently in Pain: Denies  Vital Signs  Patient Currently in Pain: Denies       Orientation  Orientation  Overall Orientation Status: Within Functional Limits  Cognition      Objective   Bed mobility  Rolling to Left: Minimal assistance  Rolling to Right: Minimal assistance  Supine to Sit: Minimal assistance(EOB elavated. Cues for hand placement.)  Scooting: Contact guard assistance  Comment: Performed rolling for ADLS after using bed pan. Cues given for sequencing. SPO2 ranges from 71-80 with rolling, and 72-88with EOB sitting.   Transfers  Sit to Stand: (Sit to stand X2 for less than min each on sera steady , limited by weakness and endurance)  Stand to sit: Moderate Assistance  Bed to Chair: Dependent/Total(Sera stedy for safety. pt very SOB. SpO2 71-88% on high flow)  Comment: Pt SPO2 range form 71-88% on high flow. Cues given for breathing exercises with good returns  to 91%. sitting in recliner. Ambulation  Ambulation?: No(NT. unsafe at this time due weakness. Pt a high fall risk.)     Balance  Posture: Fair  Sitting - Static: Good;-  Sitting - Dynamic: Fair; +(Pt able to stand with CGA assist and BUE support on seara steady for less than a 1min x2)  Standing - Static: Fair;-  Standing - Dynamic: Poor  Exercises  Quad Sets: 10reps supine  Gluteal Sets: 10reps supine  Knee Long Arc Quad: 10reps  Knee Active Range of Motion: 10reps  Ankle Pumps: 10reps  Core Strengthening: ~6 minutes EOB  SBA . Comments: Seated execises to improve strength to BLE. Cues for proper technique. Multiple rest break d/t SOB. SPO2 ranges form 88-90 %with The ex. Assessment   Body structures, Functions, Activity limitations: Decreased endurance;Decreased strength;Decreased ADL status; Decreased functional mobility   Assessment: Pt SPO2 range from 71-90%, on high flow, with activity  Prognosis: Good  REQUIRES PT FOLLOW UP: Yes  Activity Tolerance  Activity Tolerance: Patient limited by endurance; Patient limited by fatigue; Other  Activity Tolerance: RN in Room to monitor SPO2 and Assist in transfer.       Goals  Short term goals  Time Frame for Short term goals: 10  Short term goal 1: pt independent with bed mobility  Short term goal 2: pt MinAx 1 for transfers   Short term goal 3: pt able to ambulate with least restrictive device 50 ft with Giovanni  Short term goal 4: pt able to tolerate 30 min of activity with mobility, exercise to improve endurance    Plan    Plan  Times per week: 5-6x/wk  Times per day: Daily  Current Treatment Recommendations: Strengthening, Transfer Training, Endurance Training, Gait Training, Functional Mobility Training, Stair training, Patient/Caregiver Education & Training, Safety Education & Training  Safety Devices  Type of devices: Call light within reach, Patient at risk for falls, Nurse notified, Left in chair  Restraints  Initially in place: No     Therapy Time   Individual Concurrent Group Co-treatment   Time In 0225         Time Out 0310         Minutes [address]

## 2019-03-26 NOTE — CARE COORDINATION
Transition planning patient on 60% hi breanna met with patient to discuss ltac choice reviewed list agreeable to referral to [facility] faxed face sheet and left vm with juan luis.  if needed [facility] [place]

## 2019-03-26 NOTE — PROGRESS NOTES
[address] [place] [address]     Department of Internal Medicine - Staff Internal Medicine Service     DAILY PROGRESS NOTE - TEAM       Patient:  Wilian Son  YOB: 1940  MRN: [id number]     Acct: [de-identified]     Admit date: 3/23/2019  Admitting Diagnosis: Acute on chronic respiratory failure    Subjective:   Pt seen and Chart reviewed. VS stable, Afebrile  Patient on high flow at 30 LPM with 65% FiO2. Mildly tachycardic in the low 100s. Tachypneic in low 30s intermittently, saturations between 88-94%  Has dry crackles on lung bases given his significant past medical history  Patient able to eat and drink without any problems. Has bowel movement. Intake/Output Summary (Last 24 hours) at 3/26/2019 0724  Last data filed at 3/26/2019 0345  Gross per 24 hour   Intake 1970 ml   Output 2650 ml   Net -680 ml         REVIEW OF SYSTEMS:  · Constitutional: Negative for Fever, chills  · Eyes: Negative for visual changes, diplopia  · ENT: Negative for mouth sores, sore throat. · Cardiovascular: Negative for lightheadedness ,chest pain, palpitations   · Respiratory: Positive for Shortness of breath, crackles at lung bases  · Gastrointestinal: Negative for nausea/vomiting, change in bowel habits, abdominal pain  · Genitourinary:Negative for change in bladder habits, dysuria, hematuria. · Musculoskeletal: Negative for joint pain   · Neurological: Negative for headache, change in muscle strength numbness/tingling  Objective:   /64   Pulse 101   Temp 98.1 °F (36.7 °C) (Oral)   Resp 30   Ht 5' 9\" (1.753 m)   Wt 160 lb 15 oz (73 kg)   SpO2 93%   BMI 23.77 kg/m²       Intake/Output Summary (Last 24 hours) at 3/26/2019 0724  Last data filed at 3/26/2019 0345  Gross per 24 hour   Intake 1970 ml   Output 2650 ml   Net -680 ml       · General appearance: awake, alert, cooperative  · HEENT: Head: Normocephalic, no lesions, without obvious abnormality.   · Lungs:  Positive for Shortness of breath, crackles at lung bases. On nasal cannula high flow  · Heart: regular rate and rhythm, S1, S2 normal, no murmur  · Abdomen: soft, non-tender; bowel sounds normal; no masses,  no organomegaly  · Extremities: extremities normal, atraumatic, no cyanosis or edema  · Neurological:  Awake, alert, oriented to name, place and time. Cranial nerves II-XII are grossly intact. Reflexes normal and symmetric. Sensation grossly normal  · Eye no icterus no redness      Medications:   MEDICATIONS:  Prior to Admission medications    Medication Sig Start Date End Date Taking?  Authorizing Provider   predniSONE (DELTASONE) 20 MG tablet Take 1 tablet by mouth daily for 3 days 3/24/19 3/27/19  aLtoya Mcdaniel MD   predniSONE (DELTASONE) 10 MG tablet Take 1 tablet by mouth daily for 3 days 3/28/19 3/31/19  Latoya Mcdaniel MD   sodium chloride (OCEAN) 0.65 % nasal spray 1 spray by Nasal route as needed for Congestion 1/29/19   Josr Grider MD   ergocalciferol (ERGOCALCIFEROL) 38625 units capsule Take 1 capsule by mouth once a week 12/24/18   Mary Thomas MD   furosemide (LASIX) 20 MG tablet Take 1 tablet by mouth every other day 12/1/18   Shiv Pierce MD   insulin glargine (LANTUS) 100 UNIT/ML injection vial Inject 20 Units into the skin nightly 11/30/18   Shiv Pierce MD   tamsulosin (FLOMAX) 0.4 MG capsule Take 0.4 mg by mouth daily    Historical Provider, MD   famotidine (PEPCID) 20 MG tablet Take 20 mg by mouth 2 times daily    Historical Provider, MD   acetaminophen (TYLENOL) 325 MG tablet Take 650 mg by mouth every 6 hours as needed for Pain    Historical Provider, MD   aclidinium (TUDORZA PRESSAIR) 400 MCG/ACT AEPB inhaler Inhale 1 puff into the lungs 2 times daily 6/22/18 7/22/19  Roya Patel MD   albuterol sulfate HFA (VENTOLIN HFA) 108 (90 Base) MCG/ACT inhaler Inhale 2 puffs into the lungs every 6 hours as needed for Wheezing 6/22/18   Roya Patel MD   budesonide-formoterol [facility] 80-4.5 MCG/ACT AERO Inhale 2 puffs into the lungs 2 times daily 5/25/18   Vance Becker MD   metFORMIN ER (GLUCOPHAGE-XR) 500 MG XR tablet take 2 tablets by mouth twice a day  Patient taking differently: take 2 tablet by mouth twice a day 8/1/16   Lauren Agrawal PA-C   aspirin 81 MG tablet Take 81 mg by mouth daily    Historical Provider, MD   escitalopram (LEXAPRO) 10 MG tablet Take 1 tablet by mouth daily 11/10/15   [place] Laeugenia Jensen PA-C     Scheduled Meds:   insulin glargine  20 Units Subcutaneous Daily    methylPREDNISolone  40 mg Intravenous Daily    midodrine  5 mg Oral TID WC    sulfamethoxazole-trimethoprim  1 tablet Oral Once per day on Mon Wed Fri    insulin lispro  0-18 Units Subcutaneous TID     insulin lispro  0-9 Units Subcutaneous Nightly    sodium chloride  1,000 mL Intravenous Once    mometasone-formoterol  2 puff Inhalation BID    tamsulosin  0.4 mg Oral Daily    sodium chloride flush  10 mL Intravenous 2 times per day    enoxaparin  40 mg Subcutaneous Daily    ipratropium-albuterol  1 ampule Inhalation 4x daily     Continuous Infusions:   dextrose       PRN Meds:     sodium chloride flush 10 mL PRN   magnesium hydroxide 30 mL Daily PRN   ondansetron 4 mg Q6H PRN   glucose 15 g PRN   dextrose 12.5 g PRN   glucagon (rDNA) 1 mg PRN   dextrose 100 mL/hr PRN   albuterol 2.5 mg As Directed RT PRN       LABS:  CBC:   Recent Labs     03/24/19  0432 03/25/19  0536 03/26/19  0551   WBC 14.6* 13.1* 9.2   RBC 3.18* 2.93* 3.10*   HGB 9.9* 9.3* 9.6*   HCT 30.6* 28.5* 29.6*   MCV 96.2 97.3 95.5   RDW 15.6* 15.4* 15.5*    206 183     BMP:   Recent Labs     03/24/19  0432 03/25/19  0536 03/26/19  0551   * 134* 134*   K 4.6 4.6 4.2   CL 97* 99 98   CO2 26 25 27   BUN 25* 24* 12   CREATININE 0.50* 0.60* 0.49*     PT/INR:   Recent Labs     03/23/19  1443   PROTIME 11.3   INR 1.1     FASTING LIPID PANEL:  Lab Results   Component Value Date    CHOL 148 12/15/2018    HDL 61 12/15/2018

## 2019-03-26 NOTE — DISCHARGE INSTR - COC
Continuity of Care Form    Patient Name: Jared Aponte   :  1940  MRN:  [id number]    Admit date:  3/23/2019  Discharge date:  ***    Code Status Order: DNR-CCA   Advance Directives:   [facility] Documentation     Date/Time Healthcare Directive Type of Healthcare Directive Copy in [address] Agent's Name Healthcare Agent's Phone Number    [phone]  Yes, patient has an advance directive for healthcare treatment Needs updated per pt--wife . Durable power of  for health care  -- Needs updated--forms delivered to pt. Adult Children Pt said he wants daughter to be agent. -- --          Admitting Physician:  Rohan Coates MD  PCP: Kaelyn Payan PA-C    Discharging Nurse: [facility] Unit/Room#: 3021/3021-01  Discharging Unit Phone Number: [phone]    Emergency Contact:   Extended Emergency Contact Information  Primary Emergency Contact: Ivis Rapp [address] Phone: [phone]  Mobile Phone: [phone]  Relation: Child  Secondary Emergency Contact: Jose Velasco, [address] [facility] [facility] Phone: [phone]  Work Phone: [phone]  Relation: Step Child   needed?  No    Past Surgical History:  Past Surgical History:   Procedure Laterality Date    ABDOMINAL AORTIC ANEURYSM REPAIR, OPEN      FINGER SURGERY      FOOT SURGERY      PANCREAS SURGERY         Immunization History:   Immunization History   Administered Date(s) Administered    Influenza Vaccine, unspecified formulation 2016    Influenza, High Dose (Fluzone 65 yrs and older) 10/30/2014, 11/10/2015    Influenza, Live, Intranasal, Quadv, (Flumist 2-49 yrs) 10/23/2018    Influenza, Nae Reamer, 6 mo and older, IM, PF (Flulaval, Fluarix) 10/23/2018    Pneumococcal 13-valent Conjugate (Malachi Judah) 11/10/2015, 11/10/2016, 2016       Active Problems:  Patient Active Problem List   Diagnosis Code    Type 2 diabetes mellitus without complication, with long-term current use of insulin ([facility]) E11.9, Z79.4    Hyperlipidemia E78.5    Anxiety F41.9    Respiratory distress R06.03    Acute exacerbation of chronic obstructive pulmonary disease (COPD) ([facility]) J44.1    On home O2 Z99.81    Pulmonary artery hypertension ([facility]) I27.21    Ex-smoker Z87.891    Iron deficiency anemia D50.9    Community acquired pneumonia of right upper lobe of lung ([facility]) J18.1    Lactic acidosis E87.2    Pulmonary fibrosis ([facility]) J84.10    Dysuria R30.0    Acute on chronic respiratory failure ([facility]) J96.20    Pleural plaque due to asbestos exposure J92.0    Chronic obstructive pulmonary disease ([facility]) J44.9    Pulmonary fibrosis ([facility]) J84.10    Acute on chronic respiratory failure with hypoxemia ([facility]) J96.21    Hypomagnesemia E83.42    C. difficile colitis A04.72    Leukocytosis D72.829    Pneumonia due to organism J18.9    Diarrhea of infectious origin A09    General weakness R53.1    Hyperglycemia R73.9    Asbestosis ([facility] [address] J61    Goals of care, counseling/discussion Z71.89    Acute respiratory failure with hypoxia ([facility]) J96.01    Hypoxia R09.02    Atrial flutter ([facility]) I48.92    Encounter for palliative care Z51.5    Dyspnea and respiratory abnormalities R06.00, R06.89       Isolation/Infection:   Isolation          Contact        Patient Infection Status     Infection Encounter Level?  Onset Date Added Added By Resolved Resolved By Review Date    MRSA No  03/10/19 Urine Culture       3/2019 nasal swab          Nurse Assessment:  Last Vital Signs: BP (!) 116/100   Pulse 84   Temp 98.2 °F (36.8 °C) (Oral)   Resp 24   Ht 5' 9\" (1.753 m)   Wt 160 lb 15 oz (73 kg)   SpO2 95%   BMI 23.77 kg/m²     Last documented pain score (0-10 scale): Pain Level: 0  Last Weight:   Wt Readings from Last 1 Encounters:   03/26/19 160 lb 15 oz (73 kg)     Mental Status:  oriented and alert    IV Access:  - None    Nursing Mobility/ADLs:  Walking   Assisted  Transfer Assisted  Bathing  Assisted  Dressing  Assisted  Toileting  Assisted  Feeding  Assisted  Med Admin  Assisted  Med Delivery   none    Wound Care Documentation and Therapy:        Elimination:  Continence:   · Bowel: Yes  · Bladder: Yes  Urinary Catheter: None   Colostomy/Ileostomy/Ileal Conduit: No       Date of Last BM: 3/29/2019    Intake/Output Summary (Last 24 hours) at 3/26/2019 0852  Last data filed at 3/26/2019 0345  Gross per 24 hour   Intake 1970 ml   Output 2650 ml   Net -680 ml     I/O last 3 completed shifts: In: 1970 [P.O.:1970]  Out: [facility] 3    Safety Concerns:      At Risk for Falls    Impairments/Disabilities:      Vision and Hearing    Nutrition Therapy:  Current Nutrition Therapy:   - Oral Diet:  General    Routes of Feeding: Oral  Liquids: No Restrictions  Daily Fluid Restriction: no  Last Modified Barium Swallow with Video (Video Swallowing Test): not done    Treatments at the Time of Hospital Discharge:   Respiratory Treatments: ***  Oxygen Therapy:  high flow oxygen; 65/35   Ventilator:    Rehab Therapies: Physical Therapy and Occupational Therapy  Weight Bearing Status/Restrictions: No weight bearing restirctions  Other Medical Equipment (for information only, NOT a DME order):  walker  Other Treatments: Diabetes Mellitus type II, Respiratory,     Patient's personal belongings (please select all that are sent with patient):  {P DME Belongings:493827473}    RN SIGNATURE:  {Esignature:888421609}    CASE MANAGEMENT/SOCIAL WORK SECTION    Inpatient Status Date: 3/23/19    Readmission Risk Assessment Score:  Readmission Risk              Risk of Unplanned Readmission:        42           Discharging to Facility/ Agency   · Name: [facility]   Address:  [address] [place] [address]        Phone: [phone]       Fax: [phone]        ·   · Phone:  · Fax:    Dialysis Facility (if applicable)   · Name:  · Address:  · Dialysis Schedule:  · Phone:  · Fax:    Case Manager/ signature: {Esignature:936940425}    PHYSICIAN SECTION    Prognosis: {Prognosis:5927809220}    Condition at Discharge: Katty8 Maggie Lock Patient Condition:486851021}    Rehab Potential (if transferring to Rehab): {Prognosis:8671499537}    Recommended Labs or Other Treatments After Discharge: ***    Physician Certification: I certify the above information and transfer of Stacie Wayne  is necessary for the continuing treatment of the diagnosis listed and that he requires {Admit to Appropriate Level of Care:07269} for {GREATER/LESS:646709712} 30 days.      Update Admission H&P: {CHP DME Changes in XZOAF:433307261}    PHYSICIAN SIGNATURE:  {Esignature:313980932}

## 2019-03-26 NOTE — PLAN OF CARE
PROVIDE OPTIMAL VENTILATION/ACCEPTABLE SPO2  MAINTAIN ACCEPTABLE SPO2  ASSESS SKIN INTEGRITY/BREAKDOWN SCORE  PATIENT EDUCATION AS NEEDED      BRONCHOSPASM/BRONCHOCONSTRICTION   ? IMPROVE AERATION/BREATH SOUNDS  ? ADMINISTER BRONCHODILATOR THERAPY AS APPROPRIATE  ? ASSESS BREATH SOUNDS  ? IMPLEMENT AEROSOL/MDI PROTOCOL  ?    PATIENT EDUCATION AS NEEDED

## 2019-03-26 NOTE — PROGRESS NOTES
BRONCHOSPASM/BRONCHOCONSTRICTION     [x]         IMPROVE AERATION/BREATH SOUNDS  [x]   ADMINISTER BRONCHODILATOR THERAPY AS APPROPRIATE  [x]   ASSESS BREATH SOUNDS  [x]   IMPLEMENT AEROSOL/MDI PROTOCOL  [x]   PATIENT EDUCATION AS NEEDED    ALMA HOWARDatient Assessment complete. Hypoxia [R09.02]  Hypoxia [R09.02] . Vitals:    03/26/19 0745   BP:    Pulse:    Resp:    Temp:    SpO2: 95%   . Patients home meds are   Prior to Admission medications    Medication Sig Start Date End Date Taking?  Authorizing Provider   predniSONE (DELTASONE) 20 MG tablet Take 1 tablet by mouth daily for 3 days 3/24/19 3/27/19  Neal Das MD   predniSONE (DELTASONE) 10 MG tablet Take 1 tablet by mouth daily for 3 days 3/28/19 3/31/19  Neal Das MD   sodium chloride (OCEAN) 0.65 % nasal spray 1 spray by Nasal route as needed for Congestion 1/29/19   Farhat Alcantara MD   ergocalciferol (ERGOCALCIFEROL) 45146 units capsule Take 1 capsule by mouth once a week 12/24/18   Rebekah Camacho MD   furosemide (LASIX) 20 MG tablet Take 1 tablet by mouth every other day 12/1/18   Serafin Martinez MD   insulin glargine (LANTUS) 100 UNIT/ML injection vial Inject 20 Units into the skin nightly 11/30/18   Serafin Martinez MD   tamsulosin (FLOMAX) 0.4 MG capsule Take 0.4 mg by mouth daily    Historical Provider, MD   famotidine (PEPCID) 20 MG tablet Take 20 mg by mouth 2 times daily    Historical Provider, MD   acetaminophen (TYLENOL) 325 MG tablet Take 650 mg by mouth every 6 hours as needed for Pain    Historical Provider, MD   aclidinium (TUDORZA PRESSAIR) 400 MCG/ACT AEPB inhaler Inhale 1 puff into the lungs 2 times daily 6/22/18 7/22/19  Kim Dixon MD   albuterol sulfate HFA (VENTOLIN HFA) 108 (90 Base) MCG/ACT inhaler Inhale 2 puffs into the lungs every 6 hours as needed for Wheezing 6/22/18   Kim Dixon MD   budesonide-formoterol (SYMBICORT) 80-4.5 MCG/ACT AERO Inhale 2 puffs into the lungs 2 times daily

## 2019-03-26 NOTE — FLOWSHEET NOTE
Blood glucose recheck 51, patient refuses oral glucose gel per hypoglycemic protocol, remains alert & oriented, tolerating PO, given orange juice per patient request. Continue to monitor

## 2019-03-27 ENCOUNTER — APPOINTMENT (OUTPATIENT)
Dept: GENERAL RADIOLOGY | Age: 79
DRG: 189 | End: 2019-03-27
Payer: MEDICARE

## 2019-03-27 LAB
ABSOLUTE EOS #: 0.06 K/UL (ref 0–0.44)
ABSOLUTE IMMATURE GRANULOCYTE: 0.09 K/UL (ref 0–0.3)
ABSOLUTE LYMPH #: 0.89 K/UL (ref 1.1–3.7)
ABSOLUTE MONO #: 0.44 K/UL (ref 0.1–1.2)
ANION GAP SERPL CALCULATED.3IONS-SCNC: 11 MMOL/L (ref 9–17)
BASOPHILS # BLD: 0 % (ref 0–2)
BASOPHILS ABSOLUTE: <0.03 K/UL (ref 0–0.2)
BUN BLDV-MCNC: 18 MG/DL (ref 8–23)
BUN/CREAT BLD: ABNORMAL (ref 9–20)
CALCIUM SERPL-MCNC: 8.6 MG/DL (ref 8.6–10.4)
CHLORIDE BLD-SCNC: 95 MMOL/L (ref 98–107)
CO2: 25 MMOL/L (ref 20–31)
CREAT SERPL-MCNC: 0.51 MG/DL (ref 0.7–1.2)
DIFFERENTIAL TYPE: ABNORMAL
EOSINOPHILS RELATIVE PERCENT: 1 % (ref 1–4)
GFR AFRICAN AMERICAN: >60 ML/MIN
GFR NON-AFRICAN AMERICAN: >60 ML/MIN
GFR SERPL CREATININE-BSD FRML MDRD: ABNORMAL ML/MIN/{1.73_M2}
GFR SERPL CREATININE-BSD FRML MDRD: ABNORMAL ML/MIN/{1.73_M2}
GLUCOSE BLD-MCNC: 179 MG/DL (ref 75–110)
GLUCOSE BLD-MCNC: 225 MG/DL (ref 75–110)
GLUCOSE BLD-MCNC: 309 MG/DL (ref 75–110)
GLUCOSE BLD-MCNC: 310 MG/DL (ref 70–99)
GLUCOSE BLD-MCNC: 333 MG/DL (ref 75–110)
HCT VFR BLD CALC: 30.7 % (ref 40.7–50.3)
HEMOGLOBIN: 9.9 G/DL (ref 13–17)
IMMATURE GRANULOCYTES: 1 %
LYMPHOCYTES # BLD: 7 % (ref 24–43)
MCH RBC QN AUTO: 31.5 PG (ref 25.2–33.5)
MCHC RBC AUTO-ENTMCNC: 32.2 G/DL (ref 28.4–34.8)
MCV RBC AUTO: 97.8 FL (ref 82.6–102.9)
MONOCYTES # BLD: 4 % (ref 3–12)
NRBC AUTOMATED: 0 PER 100 WBC
PDW BLD-RTO: 15.1 % (ref 11.8–14.4)
PLATELET # BLD: 203 K/UL (ref 138–453)
PLATELET ESTIMATE: ABNORMAL
PMV BLD AUTO: 10 FL (ref 8.1–13.5)
POTASSIUM SERPL-SCNC: 4.7 MMOL/L (ref 3.7–5.3)
RBC # BLD: 3.14 M/UL (ref 4.21–5.77)
RBC # BLD: ABNORMAL 10*6/UL
SEG NEUTROPHILS: 87 % (ref 36–65)
SEGMENTED NEUTROPHILS ABSOLUTE COUNT: 11.14 K/UL (ref 1.5–8.1)
SODIUM BLD-SCNC: 131 MMOL/L (ref 135–144)
WBC # BLD: 12.6 K/UL (ref 3.5–11.3)
WBC # BLD: ABNORMAL 10*3/UL

## 2019-03-27 PROCEDURE — 94660 CPAP INITIATION&MGMT: CPT

## 2019-03-27 PROCEDURE — 6360000002 HC RX W HCPCS: Performed by: STUDENT IN AN ORGANIZED HEALTH CARE EDUCATION/TRAINING PROGRAM

## 2019-03-27 PROCEDURE — 2580000003 HC RX 258: Performed by: STUDENT IN AN ORGANIZED HEALTH CARE EDUCATION/TRAINING PROGRAM

## 2019-03-27 PROCEDURE — 6370000000 HC RX 637 (ALT 250 FOR IP): Performed by: STUDENT IN AN ORGANIZED HEALTH CARE EDUCATION/TRAINING PROGRAM

## 2019-03-27 PROCEDURE — 99232 SBSQ HOSP IP/OBS MODERATE 35: CPT | Performed by: INTERNAL MEDICINE

## 2019-03-27 PROCEDURE — 36415 COLL VENOUS BLD VENIPUNCTURE: CPT

## 2019-03-27 PROCEDURE — 94640 AIRWAY INHALATION TREATMENT: CPT

## 2019-03-27 PROCEDURE — 2060000000 HC ICU INTERMEDIATE R&B

## 2019-03-27 PROCEDURE — 82947 ASSAY GLUCOSE BLOOD QUANT: CPT

## 2019-03-27 PROCEDURE — 80048 BASIC METABOLIC PNL TOTAL CA: CPT

## 2019-03-27 PROCEDURE — 71045 X-RAY EXAM CHEST 1 VIEW: CPT

## 2019-03-27 PROCEDURE — 85025 COMPLETE CBC W/AUTO DIFF WBC: CPT

## 2019-03-27 PROCEDURE — 94762 N-INVAS EAR/PLS OXIMTRY CONT: CPT

## 2019-03-27 PROCEDURE — 2700000000 HC OXYGEN THERAPY PER DAY

## 2019-03-27 RX ORDER — MIDODRINE HYDROCHLORIDE 5 MG/1
5 TABLET ORAL EVERY 8 HOURS PRN
Status: DISCONTINUED | OUTPATIENT
Start: 2019-03-27 | End: 2019-03-31 | Stop reason: HOSPADM

## 2019-03-27 RX ORDER — INSULIN GLARGINE 100 [IU]/ML
40 INJECTION, SOLUTION SUBCUTANEOUS DAILY
Status: DISCONTINUED | OUTPATIENT
Start: 2019-03-28 | End: 2019-03-28

## 2019-03-27 RX ORDER — SODIUM CHLORIDE 9 MG/ML
INJECTION, SOLUTION INTRAVENOUS CONTINUOUS
Status: DISCONTINUED | OUTPATIENT
Start: 2019-03-27 | End: 2019-03-27

## 2019-03-27 RX ADMIN — INSULIN LISPRO 12 UNITS: 100 INJECTION, SOLUTION INTRAVENOUS; SUBCUTANEOUS at 09:20

## 2019-03-27 RX ADMIN — MOMETASONE FUROATE AND FORMOTEROL FUMARATE DIHYDRATE 2 PUFF: 100; 5 AEROSOL RESPIRATORY (INHALATION) at 08:57

## 2019-03-27 RX ADMIN — SODIUM CHLORIDE: 9 INJECTION, SOLUTION INTRAVENOUS at 10:48

## 2019-03-27 RX ADMIN — INSULIN GLARGINE 20 UNITS: 100 INJECTION, SOLUTION SUBCUTANEOUS at 09:19

## 2019-03-27 RX ADMIN — ALBUTEROL SULFATE 2.5 MG: 2.5 SOLUTION RESPIRATORY (INHALATION) at 11:53

## 2019-03-27 RX ADMIN — Medication 10 ML: at 09:24

## 2019-03-27 RX ADMIN — INSULIN LISPRO 3 UNITS: 100 INJECTION, SOLUTION INTRAVENOUS; SUBCUTANEOUS at 23:49

## 2019-03-27 RX ADMIN — IPRATROPIUM BROMIDE AND ALBUTEROL SULFATE 1 AMPULE: .5; 3 SOLUTION RESPIRATORY (INHALATION) at 16:19

## 2019-03-27 RX ADMIN — METHYLPREDNISOLONE SODIUM SUCCINATE 40 MG: 125 INJECTION, POWDER, FOR SOLUTION INTRAMUSCULAR; INTRAVENOUS at 09:18

## 2019-03-27 RX ADMIN — INSULIN LISPRO 6 UNITS: 100 INJECTION, SOLUTION INTRAVENOUS; SUBCUTANEOUS at 14:18

## 2019-03-27 RX ADMIN — TAMSULOSIN HYDROCHLORIDE 0.4 MG: 0.4 CAPSULE ORAL at 09:19

## 2019-03-27 RX ADMIN — INSULIN LISPRO 12 UNITS: 100 INJECTION, SOLUTION INTRAVENOUS; SUBCUTANEOUS at 18:57

## 2019-03-27 RX ADMIN — MOMETASONE FUROATE AND FORMOTEROL FUMARATE DIHYDRATE 2 PUFF: 100; 5 AEROSOL RESPIRATORY (INHALATION) at 21:05

## 2019-03-27 RX ADMIN — Medication 10 ML: at 21:50

## 2019-03-27 RX ADMIN — SULFAMETHOXAZOLE AND TRIMETHOPRIM 1 TABLET: 800; 160 TABLET ORAL at 09:18

## 2019-03-27 RX ADMIN — IPRATROPIUM BROMIDE AND ALBUTEROL SULFATE 1 AMPULE: .5; 3 SOLUTION RESPIRATORY (INHALATION) at 21:05

## 2019-03-27 RX ADMIN — ENOXAPARIN SODIUM 40 MG: 40 INJECTION, SOLUTION INTRAVENOUS; SUBCUTANEOUS at 09:19

## 2019-03-27 RX ADMIN — IPRATROPIUM BROMIDE AND ALBUTEROL SULFATE 1 AMPULE: .5; 3 SOLUTION RESPIRATORY (INHALATION) at 11:30

## 2019-03-27 RX ADMIN — IPRATROPIUM BROMIDE AND ALBUTEROL SULFATE 1 AMPULE: .5; 3 SOLUTION RESPIRATORY (INHALATION) at 08:57

## 2019-03-27 ASSESSMENT — PAIN SCALES - GENERAL
PAINLEVEL_OUTOF10: 0

## 2019-03-27 NOTE — PROGRESS NOTES
[address] [place] [address]     Department of Internal Medicine - Staff Internal Medicine Service     DAILY PROGRESS NOTE - TEAM       Patient:  Sanam Cunningham  YOB: 1940  MRN: [id number]     Acct: [de-identified]     Admit date: 3/23/2019  Admitting Diagnosis: Acute on chronic respiratory failure    Subjective:   Pt seen and Chart reviewed. VS stable, Afebrile  Patient continues to be  on high flow at 30 LPM with 65% FiO2. No tachycardia and mild tachypnea, with good oxygen saturations  Has dry crackles on lung bases given his significant past medical history  Patient able to eat and drink without any problems. Has bowel movement. Intake/Output Summary (Last 24 hours) at 3/27/2019 0851  Last data filed at 3/27/2019 0400  Gross per 24 hour   Intake 1560 ml   Output 2400 ml   Net -840 ml         REVIEW OF SYSTEMS:  · Constitutional: Negative for Fever, chills  · Eyes: Negative for visual changes, diplopia  · ENT: Negative for mouth sores, sore throat. · Cardiovascular: Negative for lightheadedness ,chest pain, palpitations   · Respiratory: Positive for Shortness of breath, crackles at lung bases  · Gastrointestinal: Negative for nausea/vomiting, change in bowel habits, abdominal pain  · Genitourinary:Negative for change in bladder habits, dysuria, hematuria. · Musculoskeletal: Negative for joint pain   · Neurological: Negative for headache, change in muscle strength numbness/tingling  Objective:   BP (!) 143/67   Pulse 86   Temp 98.2 °F (36.8 °C) (Oral)   Resp 23   Ht 5' 9\" (1.753 m)   Wt 160 lb 15 oz (73 kg)   SpO2 96%   BMI 23.77 kg/m²       Intake/Output Summary (Last 24 hours) at 3/27/2019 0851  Last data filed at 3/27/2019 0400  Gross per 24 hour   Intake 1560 ml   Output 2400 ml   Net -840 ml       · General appearance: awake, alert, cooperative  · HEENT: Head: Normocephalic, no lesions, without obvious abnormality.   · Lungs:  Positive for Shortness of breath, crackles at lung bases. On nasal cannula high flow  · Heart: regular rate and rhythm, S1, S2 normal, no murmur  · Abdomen: soft, non-tender; bowel sounds normal; no masses,  no organomegaly  · Extremities: extremities normal, atraumatic, no cyanosis or edema  · Neurological:  Awake, alert, oriented to name, place and time. Cranial nerves II-XII are grossly intact. Reflexes normal and symmetric. Sensation grossly normal  · Eye no icterus no redness      Medications:   MEDICATIONS:  Prior to Admission medications    Medication Sig Start Date End Date Taking?  Authorizing Provider   predniSONE (DELTASONE) 20 MG tablet Take 1 tablet by mouth daily for 3 days 3/24/19 3/27/19  Maximino Ford MD   predniSONE (DELTASONE) 10 MG tablet Take 1 tablet by mouth daily for 3 days 3/28/19 3/31/19  Maximino Ford MD   sodium chloride (OCEAN) 0.65 % nasal spray 1 spray by Nasal route as needed for Congestion 1/29/19   Terri Garza MD   ergocalciferol (ERGOCALCIFEROL) 55324 units capsule Take 1 capsule by mouth once a week 12/24/18   Lady Gabriella MD   furosemide (LASIX) 20 MG tablet Take 1 tablet by mouth every other day 12/1/18   Ro Shaw MD   insulin glargine (LANTUS) 100 UNIT/ML injection vial Inject 20 Units into the skin nightly 11/30/18   Ro Shaw MD   tamsulosin (FLOMAX) 0.4 MG capsule Take 0.4 mg by mouth daily    Historical Provider, MD   famotidine (PEPCID) 20 MG tablet Take 20 mg by mouth 2 times daily    Historical Provider, MD   acetaminophen (TYLENOL) 325 MG tablet Take 650 mg by mouth every 6 hours as needed for Pain    Historical Provider, MD   aclidinium (TUDORZA PRESSAIR) 400 MCG/ACT AEPB inhaler Inhale 1 puff into the lungs 2 times daily 6/22/18 7/22/19  Aydee Ronquillo MD   albuterol sulfate HFA (VENTOLIN HFA) 108 (90 Base) MCG/ACT inhaler Inhale 2 puffs into the lungs every 6 hours as needed for Wheezing 6/22/18   Aydee Ronquillo MD   budesonide-formoterol (SYMBICORT) 80-4.5 MCG/ACT AERO Inhale 2 puffs into the lungs 2 times daily 5/25/18   Vance Becker MD   metFORMIN ER (GLUCOPHAGE-XR) 500 MG XR tablet take 2 tablets by mouth twice a day  Patient taking differently: take 2 tablet by mouth twice a day 8/1/16   Surjit Nava PA-C   aspirin 81 MG tablet Take 81 mg by mouth daily    Historical Provider, MD   escitalopram (LEXAPRO) 10 MG tablet Take 1 tablet by mouth daily 11/10/15   Vin Jensen PA-C     Scheduled Meds:   insulin glargine  20 Units Subcutaneous Daily    methylPREDNISolone  40 mg Intravenous Daily    sulfamethoxazole-trimethoprim  1 tablet Oral Once per day on Mon Wed Fri    insulin lispro  0-18 Units Subcutaneous TID WC    insulin lispro  0-9 Units Subcutaneous Nightly    sodium chloride  1,000 mL Intravenous Once    mometasone-formoterol  2 puff Inhalation BID    tamsulosin  0.4 mg Oral Daily    sodium chloride flush  10 mL Intravenous 2 times per day    enoxaparin  40 mg Subcutaneous Daily    ipratropium-albuterol  1 ampule Inhalation 4x daily     Continuous Infusions:   sodium chloride      dextrose       PRN Meds:     midodrine 5 mg Q8H PRN   sodium chloride flush 10 mL PRN   magnesium hydroxide 30 mL Daily PRN   ondansetron 4 mg Q6H PRN   glucose 15 g PRN   dextrose 12.5 g PRN   glucagon (rDNA) 1 mg PRN   dextrose 100 mL/hr PRN   albuterol 2.5 mg As Directed RT PRN       LABS:  CBC:   Recent Labs     03/25/19  0536 03/26/19  0551 03/27/19  0805   WBC 13.1* 9.2 12.6*   RBC 2.93* 3.10* 3.14*   HGB 9.3* 9.6* 9.9*   HCT 28.5* 29.6* 30.7*   MCV 97.3 95.5 97.8   RDW 15.4* 15.5* 15.1*    183 203     BMP:   Recent Labs     03/25/19  0536 03/26/19  0551   * 134*   K 4.6 4.2   CL 99 98   CO2 25 27   BUN 24* 12   CREATININE 0.60* 0.49*     FASTING LIPID PANEL:  Lab Results   Component Value Date    CHOL 148 12/15/2018    HDL 61 12/15/2018    TRIG 88 12/15/2018           ASSESSMENT:     Active Problems:    Chronic obstructive pulmonary disease (HCC)    Pneumonia due to organism    Hypoxia    Encounter for palliative care    Dyspnea and respiratory abnormalities  Resolved Problems:    * No resolved hospital problems. *         PLAN:     1. Acute on chronic respiratory failure with Hypoxemia . On HiFlow 35 L and 65 %FiO2 . Patient uses 4 L home oxygen.  Monitor blood gases as needed. We'll encourage BiPAP as tolerated by the patient and attempt weaning. Patient hasn't tolerated weaning till now. 2. 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 3. Pulmonary hypertension - monitor for fluid overload. 4. Hypotension: Continue Midodrine 5 mg    5. COPD. continue IV steroids. will switch to oral steroids. RT aerosol protocol. Breathing treatments. Continue DuoNeb Dulera. 6. Diabetes [id number] in March 8. 7. Hypoglycemic on admission. Hypoglycemic protocol.  Insulin sliding scale.  Sugars well controlled for now. Patient also on IV steroids at 40 mg IV daily Solu-Medrol. Point-of-care glucose checks before meals and at bedtime. Continue Lantus 20 units daily with HDISS. Continue on carb control diet. Consider adjusting doses of Lantus and frequency as needed. 7. DVT prophylaxis - Lovenox and EPC cuffs    8. Leukocytosis. Resolving. Patient is on steroids. 9.   Code status - DNR CCA. Palliative care consult for goals of care and hospice discussion. PT/OT evaluation  Discharge planning: Case management-awaiting placement      Geeta Singh M.D. Internal Medicine Resident , PGY-1  [facility]  Attending Physician Statement  I have discussed the care of Ramón Almazan, including pertinent history and exam findings,  with the resident. I have seen and examined the patient and the key elements of all parts of the encounter have been performed by me. I agree with the assessment, plan and orders as documented by the resident with additions .       Treatment plan Discussed with nursing staff in detail , all questions answered . Electronically signed by Nieves Gerard MD on   3/27/19 at 6:06 PM    Please note that this chart was generated using voice recognition Dragon dictation software. Although every effort was made to ensure the accuracy of this automated transcription, some errors in transcription may have occurred.

## 2019-03-27 NOTE — PLAN OF CARE
ALMA Shieldsatient Assessment complete. Hypoxia [R09.02]  Hypoxia [R09.02] . Vitals:    03/27/19 0722   BP: (!) 143/67   Pulse: 86   Resp: 23   Temp: 98.2 °F (36.8 °C)   SpO2: 96%   . Patients home meds are   Prior to Admission medications    Medication Sig Start Date End Date Taking?  Authorizing Provider   predniSONE (DELTASONE) 20 MG tablet Take 1 tablet by mouth daily for 3 days 3/24/19 3/27/19  Senia Agarwal MD   predniSONE (DELTASONE) 10 MG tablet Take 1 tablet by mouth daily for 3 days 3/28/19 3/31/19  Senia Agarwal MD   sodium chloride (OCEAN) 0.65 % nasal spray 1 spray by Nasal route as needed for Congestion 1/29/19   Maryjane Hill MD   ergocalciferol (ERGOCALCIFEROL) 33149 units capsule Take 1 capsule by mouth once a week 12/24/18   Reagan Salgado MD   furosemide (LASIX) 20 MG tablet Take 1 tablet by mouth every other day 12/1/18   Jose Taylor MD   insulin glargine (LANTUS) 100 UNIT/ML injection vial Inject 20 Units into the skin nightly 11/30/18   Jose Taylor MD   tamsulosin (FLOMAX) 0.4 MG capsule Take 0.4 mg by mouth daily    Historical Provider, MD   famotidine (PEPCID) 20 MG tablet Take 20 mg by mouth 2 times daily    Historical Provider, MD   acetaminophen (TYLENOL) 325 MG tablet Take 650 mg by mouth every 6 hours as needed for Pain    Historical Provider, MD   aclidinium (TUDORZA PRESSAIR) 400 MCG/ACT AEPB inhaler Inhale 1 puff into the lungs 2 times daily 6/22/18 7/22/19  Gilma Chew MD   albuterol sulfate HFA (VENTOLIN HFA) 108 (90 Base) MCG/ACT inhaler Inhale 2 puffs into the lungs every 6 hours as needed for Wheezing 6/22/18   Gilma Chew MD   budesonide-formoterol (SYMBICORT) 80-4.5 MCG/ACT AERO Inhale 2 puffs into the lungs 2 times daily 5/25/18   Ignacio Britton MD   metFORMIN ER (GLUCOPHAGE-XR) 500 MG XR tablet take 2 tablets by mouth twice a day  Patient taking differently: take 2 tablet by mouth twice a day 8/1/16   GIANNI CainC aspirin 81 MG tablet Take 81 mg by mouth daily    Historical Provider, MD   escitalopram (LEXAPRO) 10 MG tablet Take 1 tablet by mouth daily 11/10/15   Colette Jensen PA-C       RR 30  Breath Sounds: Clear/Diminished      · Bronchodilator assessment at level  3  · Hyperinflation assessment at level   · Secretion Management assessment at level    ·   · [x]    Bronchodilator Assessment  BRONCHODILATOR ASSESSMENT SCORE  Score 0 1 2 3 4 5   Breath Sounds   []  Patient Baseline []  No Wheeze good aeration []  Faint, scattered wheezing, good aeration [x]  Expiratory Wheezing and or moderately diminished []  Insp/Exp wheeze and/or very diminished []  Insp/Exp and/ or marked distress   Respiratory Rate   []  Patient Baseline []  Less than 20 []  Less than 20 []  20-25 [x]  Greater than 25 []  Greater than 25   Peak flow % of Pred or PB [x]  NA   []  Greater than 90%  []  81-90% []  71-80% []  Less than or equal to 70%  or unable to perform []  Unable due to Respiratory Distress   Dyspnea re []  Patient Baseline []  No SOB []  No SOB [x]  SOB on exertion []  SOB min activity []  At rest/acute   e FEV% Predicted       [x]  NA []  Above 69%  []  Unable []  Above 60-69%  []  Unable []  Above 50-59%  []  Unable []  Above 35-49%  []  Unable []  Less than 35%  []  Unable                 []  Hyperinflation Assessment  Score 1 2 3   CXR and Breath Sounds   []  Clear []  No atelectasis  Basilar aeration []  Atelectasis or absent basilar breath sounds   Incentive Spirometry Volume  (Per IBW)   []  Greater than or equal to 15ml/Kg []  less than 15ml/Kg []  less than 15ml/Kg   Surgery within last 2 weeks []  None or general   []  Abdominal or thoracic surgery  []  Abdominal or thoracic   Chronic Pulmonary Historyre []  No []  Yes []  Yes     []  Secretion Management Assessment  Score 1 2 3   Bilateral Breath Sounds   []  Occasional Rhonchi []  Scattered Rhonchi []  Course Rhonchi and/or poor aeration   Sputum    []  Small amount of thin secretions []  Moderate amount of viscous secretions []  Copius, Viscious Yellow/ Secretions   CXR as reported by physician []  clear  []  Unavailable []  Infiltrates and/or consolidation  []  Unavailable []  Mucus Plugging and or lobar consolidation  []  Unavailable   Cough []  Strong, productive cough []  Weak productive cough []  No cough or weak non-productive cough   Jayda RAY Thomas  9:02 AM                            FEMALE                                  MALE                            FEV1 Predicted Normal Values                        FEV1 Predicted Normal Values          Age                                     Height in Feet and Inches       Age                                     Height in Feet and Inches       4' 11\" 5' 1\" 5' 3\" 5' 5\" 5' 7\" 5' 9\" 5' 11\" 6' 1\"  4' 11\" 5' 1\" 5' 3\" 5' 5\" 5' 7\" 5' 9\" 5' 11\" 6' 1\"   42 - 45 2.49 2.66 2.84 3.03 3.22 3.42 3.62 3.83 42 - 45 2.82 3.03 3.26 3.49 3.72 3.96 4.22 4.47   46 - 49 2.40 2.57 2.76 2.94 3.14 3.33 3.54 3.75 46 - 49 2.70 2.92 3.14 3.37 3.61 3.85 4.10 4.36   50 - 53 2.31 2.48 2.66 2.85 3.04 3.24 3.45 3.66 50 - 53 2.58 2.80 3.02 3.25 3.49 3.73 3.98 4.24   54 - 57 2.21 2.38 2.57 2.75 2.95 3.14 3.35 3.56 54 - 57 2.46 2.67 2.89 3.12 3.36 3.60 3.85 4.11   58 - 61 2.10 2.28 2.46 2.65 2.84 3.04 3.24 3.45 58 - 61 2.32 2.54 2.76 2.99 3.23 3.47 3.72 3.98   62 - 65 1.99 2.17 2.35 2.54 2.73 2.93 3.13 3.34 62 - 65 2.19 2.40 2.62 2.85 3.09 3.33 3.58 3.84   66 - 69 1.88 2.05 2.23 2.42 2.61 2.81 3.02 3.23 66 - 69 2.04 2.26 2.48 2.71 2.95 3.19 3.44 3.70   70+ 1.82 1.99 2.17 2.36 2.55 2.75 2.95 3.16 70+ 1.97 2.19 2.41 2.64 2.87 3.12 3.37 3.62             Predicted Peak Expiratory Flow Rate                                       Height (in)  Female       Height (in) Male           Age 64 62 64 63 57 71 78 74 Age            89 358 497 860 291 153 792 080 569  51 09 46 14 62 70 72 74 76   25 337 352 366 381 396 411 426 441 25 447 476 505 533 562 591 619 648 677   30 313 876 [phone]   50 373 810 575 412 463 817 424 475 12 310 068 864 144 341 257 517 428 403   85 618 297 835 738 528 646 204 548 70 005 070 228 488 415 222 612 843 632   50 689 828 341 103 506 913 403 096 86 888 683 431 506 555 976 358 920 357   65 277 292 306 321 336 351 366 381 65 363 392 421 449 478 507 535 564 594   70 269 284 299 314 329 344 359 374 70 353 382 410 439 468 496 525 554 583   75 261 274 289 305 319 334 348 364 75 344 372 400 429 458 487 515 544 573   80 253 266 282 296 312 327 342 356 80 491 615 245 290 979 [phone] 306

## 2019-03-27 NOTE — CARE COORDINATION
Transition planning;  Spoke with Shruthi from [facility], they will accept pt and starting precert. Spoke with Chantale at [facility], they can accept if high flow down to 40L/60%, pt is higher than that now, they will withdraw precert so [facility] can start.

## 2019-03-27 NOTE — PROGRESS NOTES
11:30 Pt. Choking and stating he \"can't breath\". High Flow on and pt. sats in the 70's. Respiratory notified and in room shortly after. Breathing tx. Administered. Pt. Still having dyspnea. Resp. In room monitoring sats, in case increase in high flow settings warranted. 12:10  Resp. Updated RN she was getting order for Bipap. Pt. Doc Pauling on Bipap and sats up to 90's.

## 2019-03-27 NOTE — PROGRESS NOTES
NONINVASIVE VENTILATION     PROVIDE OPTIMAL VENTILATION/ACCEPTABLE SPO2              IMPLEMENT NONINVASIVE VENTILATION PROTOCOL              MAINTAIN ACCEPTABLE SPO2              ASSESS SKIN INTEGRITY/BREAKDOWN SCORE              PATIENT EDUCATION AS NEEDED              BIPAP AS NEEDED    Pt has increased WOB, high RR, tachycardia. Began bipap with verbal approval from Dr. Katelin Vieira.

## 2019-03-28 LAB
ABSOLUTE EOS #: 0.25 K/UL (ref 0–0.44)
ABSOLUTE IMMATURE GRANULOCYTE: 0.15 K/UL (ref 0–0.3)
ABSOLUTE LYMPH #: 1.79 K/UL (ref 1.1–3.7)
ABSOLUTE MONO #: 0.47 K/UL (ref 0.1–1.2)
ANION GAP SERPL CALCULATED.3IONS-SCNC: 10 MMOL/L (ref 9–17)
BASOPHILS # BLD: 0 % (ref 0–2)
BASOPHILS ABSOLUTE: <0.03 K/UL (ref 0–0.2)
BUN BLDV-MCNC: 15 MG/DL (ref 8–23)
BUN/CREAT BLD: ABNORMAL (ref 9–20)
CALCIUM SERPL-MCNC: 8.5 MG/DL (ref 8.6–10.4)
CHLORIDE BLD-SCNC: 99 MMOL/L (ref 98–107)
CO2: 29 MMOL/L (ref 20–31)
CREAT SERPL-MCNC: 0.48 MG/DL (ref 0.7–1.2)
DIFFERENTIAL TYPE: ABNORMAL
EOSINOPHILS RELATIVE PERCENT: 2 % (ref 1–4)
GFR AFRICAN AMERICAN: >60 ML/MIN
GFR NON-AFRICAN AMERICAN: >60 ML/MIN
GFR SERPL CREATININE-BSD FRML MDRD: ABNORMAL ML/MIN/{1.73_M2}
GFR SERPL CREATININE-BSD FRML MDRD: ABNORMAL ML/MIN/{1.73_M2}
GLUCOSE BLD-MCNC: 146 MG/DL (ref 75–110)
GLUCOSE BLD-MCNC: 151 MG/DL (ref 75–110)
GLUCOSE BLD-MCNC: 218 MG/DL (ref 75–110)
GLUCOSE BLD-MCNC: 230 MG/DL (ref 75–110)
GLUCOSE BLD-MCNC: 413 MG/DL (ref 75–110)
GLUCOSE BLD-MCNC: 95 MG/DL (ref 70–99)
HCT VFR BLD CALC: 31.9 % (ref 40.7–50.3)
HEMOGLOBIN: 10.3 G/DL (ref 13–17)
IMMATURE GRANULOCYTES: 1 %
LYMPHOCYTES # BLD: 12 % (ref 24–43)
MCH RBC QN AUTO: 31.1 PG (ref 25.2–33.5)
MCHC RBC AUTO-ENTMCNC: 32.3 G/DL (ref 28.4–34.8)
MCV RBC AUTO: 96.4 FL (ref 82.6–102.9)
MONOCYTES # BLD: 3 % (ref 3–12)
NRBC AUTOMATED: 0 PER 100 WBC
PDW BLD-RTO: 15.3 % (ref 11.8–14.4)
PLATELET # BLD: 208 K/UL (ref 138–453)
PLATELET ESTIMATE: ABNORMAL
PMV BLD AUTO: 10 FL (ref 8.1–13.5)
POTASSIUM SERPL-SCNC: 4.4 MMOL/L (ref 3.7–5.3)
RBC # BLD: 3.31 M/UL (ref 4.21–5.77)
RBC # BLD: ABNORMAL 10*6/UL
SEG NEUTROPHILS: 82 % (ref 36–65)
SEGMENTED NEUTROPHILS ABSOLUTE COUNT: 12.31 K/UL (ref 1.5–8.1)
SODIUM BLD-SCNC: 138 MMOL/L (ref 135–144)
WBC # BLD: 15 K/UL (ref 3.5–11.3)
WBC # BLD: ABNORMAL 10*3/UL

## 2019-03-28 PROCEDURE — 6360000002 HC RX W HCPCS: Performed by: STUDENT IN AN ORGANIZED HEALTH CARE EDUCATION/TRAINING PROGRAM

## 2019-03-28 PROCEDURE — 6370000000 HC RX 637 (ALT 250 FOR IP): Performed by: STUDENT IN AN ORGANIZED HEALTH CARE EDUCATION/TRAINING PROGRAM

## 2019-03-28 PROCEDURE — 2700000000 HC OXYGEN THERAPY PER DAY

## 2019-03-28 PROCEDURE — 82947 ASSAY GLUCOSE BLOOD QUANT: CPT

## 2019-03-28 PROCEDURE — 2580000003 HC RX 258: Performed by: STUDENT IN AN ORGANIZED HEALTH CARE EDUCATION/TRAINING PROGRAM

## 2019-03-28 PROCEDURE — 94640 AIRWAY INHALATION TREATMENT: CPT

## 2019-03-28 PROCEDURE — 99233 SBSQ HOSP IP/OBS HIGH 50: CPT | Performed by: INTERNAL MEDICINE

## 2019-03-28 PROCEDURE — 94760 N-INVAS EAR/PLS OXIMETRY 1: CPT

## 2019-03-28 PROCEDURE — 85025 COMPLETE CBC W/AUTO DIFF WBC: CPT

## 2019-03-28 PROCEDURE — 36415 COLL VENOUS BLD VENIPUNCTURE: CPT

## 2019-03-28 PROCEDURE — 97530 THERAPEUTIC ACTIVITIES: CPT

## 2019-03-28 PROCEDURE — 80048 BASIC METABOLIC PNL TOTAL CA: CPT

## 2019-03-28 PROCEDURE — 94660 CPAP INITIATION&MGMT: CPT

## 2019-03-28 PROCEDURE — 2060000000 HC ICU INTERMEDIATE R&B

## 2019-03-28 RX ORDER — PREDNISONE 10 MG/1
10 TABLET ORAL DAILY
Qty: 10 TABLET | Refills: 0 | Status: SHIPPED | OUTPATIENT
Start: 2019-03-28 | End: 2019-04-07

## 2019-03-28 RX ORDER — INSULIN GLARGINE 100 [IU]/ML
40 INJECTION, SOLUTION SUBCUTANEOUS DAILY
Status: DISCONTINUED | OUTPATIENT
Start: 2019-03-28 | End: 2019-03-31 | Stop reason: HOSPADM

## 2019-03-28 RX ORDER — CLONAZEPAM 0.5 MG/1
0.5 TABLET ORAL ONCE
Status: COMPLETED | OUTPATIENT
Start: 2019-03-28 | End: 2019-03-28

## 2019-03-28 RX ORDER — PREDNISONE 1 MG/1
5 TABLET ORAL DAILY
Qty: 10 TABLET | Refills: 0 | Status: SHIPPED | OUTPATIENT
Start: 2019-03-28 | End: 2019-04-07

## 2019-03-28 RX ORDER — PREDNISONE 20 MG/1
20 TABLET ORAL DAILY
Qty: 10 TABLET | Refills: 0 | Status: SHIPPED | OUTPATIENT
Start: 2019-03-28 | End: 2019-04-07

## 2019-03-28 RX ORDER — SULFAMETHOXAZOLE AND TRIMETHOPRIM 800; 160 MG/1; MG/1
1 TABLET ORAL
Qty: 2 TABLET | Refills: 0 | Status: SHIPPED | OUTPATIENT
Start: 2019-03-29 | End: 2019-04-01

## 2019-03-28 RX ORDER — PREDNISONE 10 MG/1
10 TABLET ORAL DAILY
Qty: 30 TABLET | Refills: 3 | Status: SHIPPED | OUTPATIENT
Start: 2019-04-08

## 2019-03-28 RX ORDER — MIDODRINE HYDROCHLORIDE 5 MG/1
5 TABLET ORAL EVERY 8 HOURS PRN
Qty: 90 TABLET | Refills: 3 | Status: SHIPPED | OUTPATIENT
Start: 2019-03-28

## 2019-03-28 RX ORDER — PREDNISONE 10 MG/1
TABLET ORAL
Qty: 18 TABLET | Refills: 0 | Status: SHIPPED | OUTPATIENT
Start: 2019-03-28 | End: 2019-04-07

## 2019-03-28 RX ADMIN — CLONAZEPAM 0.5 MG: 0.5 TABLET ORAL at 18:57

## 2019-03-28 RX ADMIN — TAMSULOSIN HYDROCHLORIDE 0.4 MG: 0.4 CAPSULE ORAL at 09:17

## 2019-03-28 RX ADMIN — IPRATROPIUM BROMIDE AND ALBUTEROL SULFATE 1 AMPULE: .5; 3 SOLUTION RESPIRATORY (INHALATION) at 20:06

## 2019-03-28 RX ADMIN — MOMETASONE FUROATE AND FORMOTEROL FUMARATE DIHYDRATE 2 PUFF: 100; 5 AEROSOL RESPIRATORY (INHALATION) at 20:06

## 2019-03-28 RX ADMIN — Medication 10 ML: at 09:17

## 2019-03-28 RX ADMIN — IPRATROPIUM BROMIDE AND ALBUTEROL SULFATE 1 AMPULE: .5; 3 SOLUTION RESPIRATORY (INHALATION) at 08:19

## 2019-03-28 RX ADMIN — INSULIN LISPRO 3 UNITS: 100 INJECTION, SOLUTION INTRAVENOUS; SUBCUTANEOUS at 21:05

## 2019-03-28 RX ADMIN — METHYLPREDNISOLONE SODIUM SUCCINATE 40 MG: 125 INJECTION, POWDER, FOR SOLUTION INTRAMUSCULAR; INTRAVENOUS at 09:14

## 2019-03-28 RX ADMIN — IPRATROPIUM BROMIDE AND ALBUTEROL SULFATE 1 AMPULE: .5; 3 SOLUTION RESPIRATORY (INHALATION) at 15:46

## 2019-03-28 RX ADMIN — INSULIN LISPRO 3 UNITS: 100 INJECTION, SOLUTION INTRAVENOUS; SUBCUTANEOUS at 09:22

## 2019-03-28 RX ADMIN — Medication 10 ML: at 09:40

## 2019-03-28 RX ADMIN — INSULIN GLARGINE 40 UNITS: 100 INJECTION, SOLUTION SUBCUTANEOUS at 09:15

## 2019-03-28 RX ADMIN — MOMETASONE FUROATE AND FORMOTEROL FUMARATE DIHYDRATE 2 PUFF: 100; 5 AEROSOL RESPIRATORY (INHALATION) at 08:19

## 2019-03-28 RX ADMIN — ALBUTEROL SULFATE 2.5 MG: 2.5 SOLUTION RESPIRATORY (INHALATION) at 01:46

## 2019-03-28 RX ADMIN — INSULIN LISPRO 18 UNITS: 100 INJECTION, SOLUTION INTRAVENOUS; SUBCUTANEOUS at 14:03

## 2019-03-28 RX ADMIN — ENOXAPARIN SODIUM 40 MG: 40 INJECTION, SOLUTION INTRAVENOUS; SUBCUTANEOUS at 09:13

## 2019-03-28 RX ADMIN — INSULIN LISPRO 3 UNITS: 100 INJECTION, SOLUTION INTRAVENOUS; SUBCUTANEOUS at 18:56

## 2019-03-28 RX ADMIN — Medication 10 ML: at 21:06

## 2019-03-28 RX ADMIN — IPRATROPIUM BROMIDE AND ALBUTEROL SULFATE 1 AMPULE: .5; 3 SOLUTION RESPIRATORY (INHALATION) at 12:00

## 2019-03-28 ASSESSMENT — PAIN SCALES - GENERAL
PAINLEVEL_OUTOF10: 0

## 2019-03-28 NOTE — PROGRESS NOTES
Writer informed pt's RN. Pt stated \"I am tired of being SOB\". Prognosis: Good  REQUIRES PT FOLLOW UP: Yes  Activity Tolerance  Activity Tolerance: Patient limited by endurance; Patient limited by fatigue; Other  Activity Tolerance: RN in Room to monitor SPO2 and Assist in transfer. Goals  Short term goals  Time Frame for Short term goals: 10  Short term goal 1: pt independent with bed mobility  Short term goal 2: pt MinAx 1 for transfers   Short term goal 3: pt able to ambulate with least restrictive device 50 ft with Giovanni  Short term goal 4: pt able to tolerate 30 min of activity with mobility, exercise to improve endurance    Plan    Plan  Times per week: 5-6x/wk  Times per day: Daily  Current Treatment Recommendations: Strengthening, Transfer Training, Endurance Training, Gait Training, Functional Mobility Training, Stair training, Patient/Caregiver Education & Training, Safety Education & Training  Safety Devices  Type of devices: Call light within reach, Patient at risk for falls, Nurse notified, Left in bed  Restraints  Initially in place: No     Therapy Time   Individual Concurrent Group Co-treatment   Time In  0220         Time Out  0240         Minutes  [place], [place]  Treatment performed by Student PTA under the supervision of co-signing PTA who agrees with all treatment and documentation.    Ally Lang PTA

## 2019-03-28 NOTE — FLOWSHEET NOTE
Stopped to see pt per referral from unit: \"Pt wants to talk. \"    When  arrived in pt's room, he learned that pt wanted assistance in preparing new HC-POA. · Pt had information re: name of his chosen agent, his daughter Reny Teixeira. ·  probed if he wished for an alternate to be named as well. Pt chose his step-daughter, Samara Morris as an alternate. ·  walked through the \"Special Instructions\" paragraphs in the [address] [place] [address]. ·  & pt's RN witnessed pt's initials for special instructions & his signature for his HC-POA. ·  took signed & witnessed document & had unit clerk, Aldair Dorsey, make a copy & place it in pt's chart & return original to pt. This was done while  filled out some of his charting nearby. · Pt expressed much gratitude to  for getting the AD done as he said that he Linda Chelitaky been having some of those feelings\" that \"things like this needed to be taken care of.\"  · Chaplains will remain available to offer spiritual and emotional support as needed. Rev. Andrés Willingham     03/28/19 6692   Encounter Summary   Services provided to: Patient   Referral/Consult From: Patient   Support System Children;Family members   Continue Visiting   (3/28)   Complexity of Encounter Moderate   Length of Encounter 30 minutes   Routine   Type Follow up   Assessment Calm; Approachable;Coping   Intervention Sustaining presence/ Ministry of presence; Active listening;Explored feelings, thoughts, concerns;Explored coping resources  (assisted w/ signing HCPOA)   Outcome Receptive; Acceptance; Coping;Encouraged;Comfort;Expressed gratitude   Advance Directives (For Healthcare)   Healthcare Directive Yes, patient has an advance directive for healthcare treatment   Type of Healthcare Directive Durable power of  for health care   Copy in Chart Yes, copy in chart   Chart Copy Status : Updated   Date Reviewed and Current: 03/28/19   Healthcare Agent Appointed Adult [address] [place] Agent's Name [id number] Agent's Phone Number [phone]

## 2019-03-28 NOTE — PLAN OF CARE
NONINVASIVE VENTILATION    PROVIDE OPTIMAL VENTILATION/ACCEPTABLE SPO2   IMPLEMENT NONINVASIVE VENTILATION PROTOCOL   MAINTAIN ACCEPTABLE SPO2   ASSESS SKIN INTEGRITY/BREAKDOWN SCORE   PATIENT EDUCATION AS NEEDED   BIPAP AS NEEDED    Pt having a slight anxiety attack.    Gave PRN albuterol  Diminished crackles t/o  Explained the benefit of BIPAP for crackles Statement Selected

## 2019-03-28 NOTE — PROGRESS NOTES
Take 1 tablet by mouth daily 11/10/15   Mathew Jensen PA-C   .   Recent Surgical History: None = 0     Assessment           RR 24  Breath Sounds: diminished      · Bronchodilator assessment at level  3  · Hyperinflation assessment at level   · Secretion Management assessment at level    ·   · []    Bronchodilator Assessment  BRONCHODILATOR ASSESSMENT SCORE  Score 0 1 2 3 4 5   Breath Sounds   []  Patient Baseline []  No Wheeze good aeration []  Faint, scattered wheezing, good aeration [x]  Expiratory Wheezing and or moderately diminished []  Insp/Exp wheeze and/or very diminished []  Insp/Exp and/ or marked distress   Respiratory Rate   []  Patient Baseline []  Less than 20 []  Less than 20 [x]  20-25 []  Greater than 25 []  Greater than 25   Peak flow % of Pred or PB []  NA   []  Greater than 90%  []  81-90% []  71-80% []  Less than or equal to 70%  or unable to perform []  Unable due to Respiratory Distress   Dyspnea re []  Patient Baseline []  No SOB []  No SOB [x]  SOB on exertion []  SOB min activity []  At rest/acute   e FEV% Predicted       []  NA []  Above 69%  []  Unable []  Above 60-69%  []  Unable []  Above 50-59%  [x]  Unable []  Above 35-49%  []  Unable []  Less than 35%  []  Unable                 []  Hyperinflation Assessment  Score 1 2 3   CXR and Breath Sounds   []  Clear []  No atelectasis  Basilar aeration []  Atelectasis or absent basilar breath sounds   Incentive Spirometry Volume  (Per IBW)   []  Greater than or equal to 15ml/Kg []  less than 15ml/Kg []  less than 15ml/Kg   Surgery within last 2 weeks []  None or general   []  Abdominal or thoracic surgery  []  Abdominal or thoracic   Chronic Pulmonary Historyre []  No []  Yes []  Yes     []  Secretion Management Assessment  Score 1 2 3   Bilateral Breath Sounds   []  Occasional Rhonchi []  Scattered Rhonchi []  Course Rhonchi and/or poor aeration   Sputum    []  Small amount of thin secretions []  Moderate amount of viscous secretions [phone]

## 2019-03-28 NOTE — PLAN OF CARE
BRONCHOSPASM/BRONCHOCONSTRICTION     [x]         IMPROVE AERATION/BREATH SOUNDS  [x]   ADMINISTER BRONCHODILATOR THERAPY AS APPROPRIATE  [x]   ASSESS BREATH SOUNDS  []   IMPLEMENT AEROSOL/MDI PROTOCOL  PROVIDE ADEQUATE OXYGENATION WITH ACCEPTABLE SP02/ABG'S    [x]  IDENTIFY APPROPRIATE OXYGEN THERAPY  [x]   MONITOR SP02/ABG'S AS NEEDED   [x]   PATIENT EDUCATION AS NEEDED    [x]   PATIENT EDUCATION AS NEEDED

## 2019-03-28 NOTE — PROGRESS NOTES
[address] [place] [address]     Department of Internal Medicine - Staff Internal Medicine Service     DAILY PROGRESS NOTE - TEAM       Patient:  Dex Lucio  YOB: 1940  MRN: [id number]     Acct: [de-identified]     Admit date: 3/23/2019  Admitting Diagnosis: Acute on chronic respiratory failure    Subjective:   Pt seen and Chart reviewed. VS stable, Afebrile  Patient continues to be  on high flow at 35 LPM with 60% FiO2. No tachycardia and mild tachypnea, with good oxygen saturations. Had an episode of desaturation but patient was fine after that. Has dry crackles on lung bases given his significant past medical history  Patient able to eat and drink without any problems. Has bowel movement. Intake/Output Summary (Last 24 hours) at 3/28/2019 0710  Last data filed at 3/28/2019 0547  Gross per 24 hour   Intake 1710 ml   Output 1100 ml   Net 610 ml         REVIEW OF SYSTEMS:  · Constitutional: Negative for Fever, chills  · Eyes: Negative for visual changes, diplopia  · ENT: Negative for mouth sores, sore throat. · Cardiovascular: Negative for lightheadedness ,chest pain, palpitations   · Respiratory: Positive for Shortness of breath, crackles at lung bases  · Gastrointestinal: Negative for nausea/vomiting, change in bowel habits, abdominal pain  · Genitourinary:Negative for change in bladder habits, dysuria, hematuria.   · Musculoskeletal: Negative for joint pain   · Neurological: Negative for headache, change in muscle strength numbness/tingling  Objective:   BP 97/70   Pulse 92   Temp 97.6 °F (36.4 °C) (Axillary)   Resp 28   Ht 5' 9\" (1.753 m)   Wt 160 lb 15 oz (73 kg)   SpO2 96%   BMI 23.77 kg/m²       Intake/Output Summary (Last 24 hours) at 3/28/2019 0710  Last data filed at 3/28/2019 0547  Gross per 24 hour   Intake 1710 ml   Output 1100 ml   Net 610 ml       · General appearance: awake, alert, cooperative  · HEENT: Head: Normocephalic, no lesions, without obvious abnormality. · Lungs:  Positive for Shortness of breath, crackles at lung bases. On nasal cannula high flow  · Heart: regular rate and rhythm, S1, S2 normal, no murmur  · Abdomen: soft, non-tender; bowel sounds normal; no masses,  no organomegaly  · Extremities: extremities normal, atraumatic, no cyanosis or edema  · Neurological:  Awake, alert, oriented to name, place and time. Cranial nerves II-XII are grossly intact. Reflexes normal and symmetric. Sensation grossly normal  · Eye no icterus no redness      Medications:   MEDICATIONS:  Prior to Admission medications    Medication Sig Start Date End Date Taking?  Authorizing Provider   predniSONE (DELTASONE) 10 MG tablet Take 1 tablet by mouth daily for 3 days 3/28/19 3/31/19  Latoya Mcdaniel MD   sodium chloride (OCEAN) 0.65 % nasal spray 1 spray by Nasal route as needed for Congestion 1/29/19   [address] [place] [address] MD Adam   ergocalciferol (ERGOCALCIFEROL) 46245 units capsule Take 1 capsule by mouth once a week 12/24/18   Mary Thomas MD   furosemide (LASIX) 20 MG tablet Take 1 tablet by mouth every other day 12/1/18   Shiv Pierce MD   insulin glargine (LANTUS) 100 UNIT/ML injection vial Inject 20 Units into the skin nightly 11/30/18   Shiv Pierce MD   tamsulosin (FLOMAX) 0.4 MG capsule Take 0.4 mg by mouth daily    Historical Provider, MD   famotidine (PEPCID) 20 MG tablet Take 20 mg by mouth 2 times daily    Historical Provider, MD   acetaminophen (TYLENOL) 325 MG tablet Take 650 mg by mouth every 6 hours as needed for Pain    Historical Provider, MD   aclidinium (TUDORZA PRESSAIR) 400 MCG/ACT AEPB inhaler Inhale 1 puff into the lungs 2 times daily 6/22/18 7/22/19  Roya Patel MD   albuterol sulfate HFA (VENTOLIN HFA) 108 (90 Base) MCG/ACT inhaler Inhale 2 puffs into the lungs every 6 hours as needed for Wheezing 6/22/18   Roya Patel MD   budesonide-formoterol (SYMBICORT) 80-4.5 MCG/ACT AERO Inhale 2 puffs into the lungs 2 times daily 5/25/18   Carlos Blackburn MD   metFORMIN ER (GLUCOPHAGE-XR) 500 MG XR tablet take 2 tablets by mouth twice a day  Patient taking differently: take 2 tablet by mouth twice a day 8/1/16   Lylia Gilford, PA-C   aspirin 81 MG tablet Take 81 mg by mouth daily    Historical Provider, MD   escitalopram (LEXAPRO) 10 MG tablet Take 1 tablet by mouth daily 11/10/15   Eduard Jensen PA-C     Scheduled Meds:   insulin glargine  40 Units Subcutaneous Daily    methylPREDNISolone  40 mg Intravenous Daily    sulfamethoxazole-trimethoprim  1 tablet Oral Once per day on Mon Wed Fri    insulin lispro  0-18 Units Subcutaneous TID WC    insulin lispro  0-9 Units Subcutaneous Nightly    sodium chloride  1,000 mL Intravenous Once    mometasone-formoterol  2 puff Inhalation BID    tamsulosin  0.4 mg Oral Daily    sodium chloride flush  10 mL Intravenous 2 times per day    enoxaparin  40 mg Subcutaneous Daily    ipratropium-albuterol  1 ampule Inhalation 4x daily     Continuous Infusions:   dextrose       PRN Meds:     midodrine 5 mg Q8H PRN   sodium chloride flush 10 mL PRN   magnesium hydroxide 30 mL Daily PRN   ondansetron 4 mg Q6H PRN   glucose 15 g PRN   dextrose 12.5 g PRN   glucagon (rDNA) 1 mg PRN   dextrose 100 mL/hr PRN   albuterol 2.5 mg As Directed RT PRN       LABS:  CBC:   Recent Labs     03/26/19  0551 03/27/19  0805 03/28/19  0637   WBC 9.2 12.6* 15.0*   RBC 3.10* 3.14* 3.31*   HGB 9.6* 9.9* 10.3*   HCT 29.6* 30.7* 31.9*   MCV 95.5 97.8 96.4   RDW 15.5* 15.1* 15.3*    203 208     BMP:   Recent Labs     03/26/19  0551 03/27/19  0805   * 131*   K 4.2 4.7   CL 98 95*   CO2 27 25   BUN 12 18   CREATININE 0.49* 0.51*     FASTING LIPID PANEL:  Lab Results   Component Value Date    CHOL 148 12/15/2018    HDL 61 12/15/2018    TRIG 88 12/15/2018           ASSESSMENT:     Active Problems:    Chronic obstructive pulmonary disease ([address]    Pneumonia due to organism    Hypoxia    Encounter for palliative with nursing staff in detail , all questions answered . Electronically signed by Raymon Almanzar MD on   3/28/19 at 6:59 PM    Please note that this chart was generated using voice recognition Dragon dictation software. Although every effort was made to ensure the accuracy of this automated transcription, some errors in transcription may have occurred.

## 2019-03-28 NOTE — PROGRESS NOTES
BRONCHOSPASM/BRONCHOCONSTRICTION     [x]         IMPROVE AERATION/BREATH SOUNDS  [x]   ADMINISTER BRONCHODILATOR THERAPY AS APPROPRIATE  [x]   ASSESS BREATH SOUNDS  [x]   IMPLEMENT AEROSOL/MDI PROTOCOL  [x]   PATIENT EDUCATION AS NEEDED        PROVIDE ADEQUATE OXYGENATION WITH ACCEPTABLE SP02/ABG'S    [x]  IDENTIFY APPROPRIATE OXYGEN THERAPY  [x]   MONITOR SP02/ABG'S AS NEEDED   [x]   PATIENT EDUCATION AS NEEDED

## 2019-03-28 NOTE — CARE COORDINATION
Transition planning:  Pt states he now wants hospice care. [place] [facility] asks me to call and ask if they can take pt on high flow, I called and they state it has to be weaned off. Called [facility] hospice, they will accept pt on high flow, spoke with pt and he is agreeable. Called [facility] hospice, nurse will meet with him tomorrow at 9:30   Shruthi casey Thompsons notified.

## 2019-03-29 PROBLEM — N39.0 UTI (URINARY TRACT INFECTION): Status: RESOLVED | Noted: 2019-03-29 | Resolved: 2019-03-29

## 2019-03-29 PROBLEM — N39.0 UTI (URINARY TRACT INFECTION): Status: ACTIVE | Noted: 2019-03-29

## 2019-03-29 LAB
ABSOLUTE EOS #: 0.3 K/UL (ref 0–0.44)
ABSOLUTE IMMATURE GRANULOCYTE: 0.19 K/UL (ref 0–0.3)
ABSOLUTE LYMPH #: 2.09 K/UL (ref 1.1–3.7)
ABSOLUTE MONO #: 0.42 K/UL (ref 0.1–1.2)
BASOPHILS # BLD: 0 % (ref 0–2)
BASOPHILS ABSOLUTE: 0.03 K/UL (ref 0–0.2)
CULTURE: NORMAL
CULTURE: NORMAL
DIFFERENTIAL TYPE: ABNORMAL
EOSINOPHILS RELATIVE PERCENT: 2 % (ref 1–4)
GLUCOSE BLD-MCNC: 113 MG/DL (ref 75–110)
GLUCOSE BLD-MCNC: 177 MG/DL (ref 75–110)
GLUCOSE BLD-MCNC: 329 MG/DL (ref 75–110)
GLUCOSE BLD-MCNC: 359 MG/DL (ref 75–110)
HCT VFR BLD CALC: 33.8 % (ref 40.7–50.3)
HEMOGLOBIN: 10.4 G/DL (ref 13–17)
IMMATURE GRANULOCYTES: 1 %
LYMPHOCYTES # BLD: 15 % (ref 24–43)
Lab: NORMAL
Lab: NORMAL
MCH RBC QN AUTO: 31.1 PG (ref 25.2–33.5)
MCHC RBC AUTO-ENTMCNC: 30.8 G/DL (ref 28.4–34.8)
MCV RBC AUTO: 101.2 FL (ref 82.6–102.9)
MONOCYTES # BLD: 3 % (ref 3–12)
NRBC AUTOMATED: 0 PER 100 WBC
PDW BLD-RTO: 15.2 % (ref 11.8–14.4)
PLATELET # BLD: 179 K/UL (ref 138–453)
PLATELET ESTIMATE: ABNORMAL
PMV BLD AUTO: 10.3 FL (ref 8.1–13.5)
RBC # BLD: 3.34 M/UL (ref 4.21–5.77)
RBC # BLD: ABNORMAL 10*6/UL
SEG NEUTROPHILS: 79 % (ref 36–65)
SEGMENTED NEUTROPHILS ABSOLUTE COUNT: 10.51 K/UL (ref 1.5–8.1)
SPECIMEN DESCRIPTION: NORMAL
SPECIMEN DESCRIPTION: NORMAL
WBC # BLD: 13.5 K/UL (ref 3.5–11.3)
WBC # BLD: ABNORMAL 10*3/UL

## 2019-03-29 PROCEDURE — 99233 SBSQ HOSP IP/OBS HIGH 50: CPT | Performed by: FAMILY MEDICINE

## 2019-03-29 PROCEDURE — 2580000003 HC RX 258: Performed by: STUDENT IN AN ORGANIZED HEALTH CARE EDUCATION/TRAINING PROGRAM

## 2019-03-29 PROCEDURE — 97110 THERAPEUTIC EXERCISES: CPT

## 2019-03-29 PROCEDURE — 6370000000 HC RX 637 (ALT 250 FOR IP): Performed by: STUDENT IN AN ORGANIZED HEALTH CARE EDUCATION/TRAINING PROGRAM

## 2019-03-29 PROCEDURE — 2700000000 HC OXYGEN THERAPY PER DAY

## 2019-03-29 PROCEDURE — 94640 AIRWAY INHALATION TREATMENT: CPT

## 2019-03-29 PROCEDURE — 82947 ASSAY GLUCOSE BLOOD QUANT: CPT

## 2019-03-29 PROCEDURE — 99233 SBSQ HOSP IP/OBS HIGH 50: CPT | Performed by: INTERNAL MEDICINE

## 2019-03-29 PROCEDURE — 85025 COMPLETE CBC W/AUTO DIFF WBC: CPT

## 2019-03-29 PROCEDURE — 94760 N-INVAS EAR/PLS OXIMETRY 1: CPT

## 2019-03-29 PROCEDURE — 2060000000 HC ICU INTERMEDIATE R&B

## 2019-03-29 PROCEDURE — 36415 COLL VENOUS BLD VENIPUNCTURE: CPT

## 2019-03-29 PROCEDURE — 6360000002 HC RX W HCPCS: Performed by: STUDENT IN AN ORGANIZED HEALTH CARE EDUCATION/TRAINING PROGRAM

## 2019-03-29 RX ORDER — PREDNISONE 20 MG/1
40 TABLET ORAL DAILY
Status: DISCONTINUED | OUTPATIENT
Start: 2019-03-29 | End: 2019-03-31 | Stop reason: HOSPADM

## 2019-03-29 RX ADMIN — IPRATROPIUM BROMIDE AND ALBUTEROL SULFATE 1 AMPULE: .5; 3 SOLUTION RESPIRATORY (INHALATION) at 19:26

## 2019-03-29 RX ADMIN — SULFAMETHOXAZOLE AND TRIMETHOPRIM 1 TABLET: 800; 160 TABLET ORAL at 09:38

## 2019-03-29 RX ADMIN — TAMSULOSIN HYDROCHLORIDE 0.4 MG: 0.4 CAPSULE ORAL at 09:38

## 2019-03-29 RX ADMIN — INSULIN GLARGINE 40 UNITS: 100 INJECTION, SOLUTION SUBCUTANEOUS at 09:38

## 2019-03-29 RX ADMIN — ENOXAPARIN SODIUM 40 MG: 40 INJECTION, SOLUTION INTRAVENOUS; SUBCUTANEOUS at 09:38

## 2019-03-29 RX ADMIN — INSULIN LISPRO 12 UNITS: 100 INJECTION, SOLUTION INTRAVENOUS; SUBCUTANEOUS at 14:14

## 2019-03-29 RX ADMIN — Medication 10 ML: at 22:02

## 2019-03-29 RX ADMIN — Medication 10 ML: at 15:21

## 2019-03-29 RX ADMIN — INSULIN LISPRO 3 UNITS: 100 INJECTION, SOLUTION INTRAVENOUS; SUBCUTANEOUS at 18:21

## 2019-03-29 RX ADMIN — IPRATROPIUM BROMIDE AND ALBUTEROL SULFATE 1 AMPULE: .5; 3 SOLUTION RESPIRATORY (INHALATION) at 16:31

## 2019-03-29 RX ADMIN — IPRATROPIUM BROMIDE AND ALBUTEROL SULFATE 1 AMPULE: .5; 3 SOLUTION RESPIRATORY (INHALATION) at 12:20

## 2019-03-29 RX ADMIN — PREDNISONE 40 MG: 20 TABLET ORAL at 09:37

## 2019-03-29 RX ADMIN — IPRATROPIUM BROMIDE AND ALBUTEROL SULFATE 1 AMPULE: .5; 3 SOLUTION RESPIRATORY (INHALATION) at 07:47

## 2019-03-29 RX ADMIN — MOMETASONE FUROATE AND FORMOTEROL FUMARATE DIHYDRATE 2 PUFF: 100; 5 AEROSOL RESPIRATORY (INHALATION) at 19:31

## 2019-03-29 RX ADMIN — INSULIN LISPRO 7 UNITS: 100 INJECTION, SOLUTION INTRAVENOUS; SUBCUTANEOUS at 21:59

## 2019-03-29 RX ADMIN — MOMETASONE FUROATE AND FORMOTEROL FUMARATE DIHYDRATE 2 PUFF: 100; 5 AEROSOL RESPIRATORY (INHALATION) at 07:48

## 2019-03-29 ASSESSMENT — PAIN SCALES - GENERAL: PAINLEVEL_OUTOF10: 0

## 2019-03-29 NOTE — PROGRESS NOTES
Did peer to peer and they have refused.     Lux Azevedo MD, PGY-2  Internal Medicine  [address]  3/29/19  3:33 PM

## 2019-03-29 NOTE — CARE COORDINATION
Transition planning:  Hospice nurse from [facility] hospice was here, pt did not want to discuss hospice now,says maybe next week. Received call from Deborah Roman is denied with information for peer to peer.    Spoke with Dr. Tierney Perry, she will do peer to peer, gave her contact information to Steven Mcmahon with [facility], he will arrange peer to peer   Peer to peer # [phone]

## 2019-03-29 NOTE — CARE COORDINATION
Transition planning: [facility] has denied peer to peer for admission. They recommend SNf  [facility] cannot accept with oxygen requirement. [facility], they cannot accept with oxygen requirement.    Called [facility] , faxed information to them

## 2019-03-29 NOTE — PROGRESS NOTES
Palliative Care Progress Note    NAME:  [facility] RECORD NUMBER:  [id number]  AGE: 70 y.o. GENDER: male  : 1940  TODAY'S DATE:  3/29/2019    Reason for Consult:  goals of care  History of Present Illness     The patient is a 70 y.o. Non-/non  male who presents with Fatigue and Shortness of Breath      Referred to Palliative Care by  ?x Physician   ? Nursing  ? Family Request   ? Other:       He was admitted to the internal medicine service for Hypoxia [R09.02]  Hypoxia [R09.02]. His hospital course has been associated with shortness of breath. The patient has a complicated medical history and has been hospitalized since 3/23/2019 12:40 PM.    OVERNIGHT EVENTS:  - No acute events overnight  - Patient continues to be on high flow  - Patient in no acute distress       /69   Pulse 93   Temp 97.7 °F (36.5 °C) (Oral)   Resp 26   Ht 5' 9\" (1.753 m)   Wt 160 lb 6.4 oz (72.8 kg)   SpO2 93%   BMI 23.69 kg/m²     Assessment        REVIEW OF SYSTEMS    ? UNABLE TO OBTAIN:     Constitutional:  ?   Chills   ?x  Fatigue   ? Fevers   ? Malaise   ? Weight loss   ? Other:     Respiratory:   ?  Cough    ?x  Shortness of breath    ? Chest pain    ? Other:     Cardiovascular:   ?  Chest pain  ?x  Dyspnea    ?x  Exertional chest pressure/discomfort     ?x Fatigue      ? Palpitations    ? Syncope   ? Other:     Gastrointestinal:   ?  Abdominal pain   ? Constipation    ? Diarrhea    ? Dysphagia   ? Reflux             ? Vomiting   ? Other:     Genitourinary:  ?  Dysuria     ? Frequency   ? Hematuria   ? Nocturia   ? Urinary incontinence   ? Other:     Musculoskeletal:   ? Back pain    ? Muscle weakness   ? Myalgias    ? Neck pain   ? Stiff joints   ? Other:     Behavioral/Psych:   ? Anxiety    ? Depression     ? Mood swings   ? Other:     PHYSICAL ASSESSMENT:     General: ?x  Oriented x3      ? well appearing      ? Intubated      ? ill appearing      ? Other:  On high flow    Mental Status: ?x normal mental status exam      ? drowsy      ? Confused      ? Other:     Cardiovascular: ?x  Regular rate/rhythm      ? Arrhythmia      ? Other:     Chest: ? Effort normal      ? lungs clear      ? respiratory distress      ? Tachypnea      ?x  Other: Crackles present bilaterally, decreased breath sounds    Abdomen: ?x Soft/non-tender      ?x  Normal appearance      ? Distended      ? Ascites      ? Other:    Neurological: ?x Normal Speech      ?x Normal Sensation      ? Deficits present:      Extremity:  ?x normal skin color/temp      ? clubbing/cyanosis      ? No edema      ? Other:     Palliative Performance Scale:  ___60%  Ambulation reduced; Significant disease; Can't do hobbies/housework; intake normal or reduced; occasional assist; LOC full/confusion  ___50%  Mainly sit/lie; Extensive disease; Can't do any work; Considerable assist; intake normal or reduced; LOC full/confusion  __x_40%  Mainly in bed; Extensive disease; Mainly assist; intake normal or reduced; LOC full/confusion   ___30%  Bed Bound; Extensive disease; Total care; intake reduced; LOCfull/confusion  ___20%  Bed Bound; Extensive disease; Total care; intake minimal; Drowsy/coma  ___10%  Bed Bound; Extensive disease;  Total care; Mouth care only; Drowsy/coma  ___0       Death      Plan      Palliative Interaction:  - The patient was seen today for continuity of care  - Patient continues to be on high flow  - Patient yesterday had wanted hospice to be contacted and the nurse Cheryl Brennan from [address] [place] [address],[address] of [facility] was also present  - Cheryl Brennan informed me that the patient seemed to have changed his mind for going to hospice facility and presently wanted to go to [facility]  - I discussed patient's current medical conditions with him and informed him that he has been continuously requiring high flow and may do so in the near future also  - The patient told that he understands about it but wants to go to a long-term facility for some time before deciding about hospice care  - I explained different types of code status to the patient and he told that he does not want to have chest compressions/CPR done in case his heart stopped and also does not want to be intubated if his breathing stopped   - Patient's code status had been changed to DNR CCA but no paper document was present in patient's chart   - I completed patients DNR form and patient's code status was entered as DNR CCA with no intubation as per his wishes   - I offered comfort and emotional support to the patient     Education/support to staff  Education/support to family  Education/support to patient  Discharge planning/helping to coordinate care  Communications with primary service  Caregiver support/education  Code status clarified: Full Code  Code status clarified: [facility]  Code status clarified: DNRCCA  Other major issues     Principle Problem/Diagnosis:   Acute on chronic respiratory failure     Additional Assessments:  Active Problems:    Anxiety    Pulmonary artery hypertension ([address]    Chronic obstructive pulmonary disease ([address]    Pulmonary fibrosis ([facility]    Pneumonia due to organism    Hypoxia    Encounter for palliative care    Dyspnea and respiratory abnormalities  Resolved Problems:    UTI (urinary tract infection)    1- Symptom management/ pain control     Pain Assessment:  The patient is not having any pain. Anxiety:  fatigue, shortness of breath                          Dyspnea:  acute dyspnea and chronic dyspnea                          Fatigue:  Tiredness and weakness    Other: On high flow     We feel the patient symptoms are being controlled. his current regimen is reviewed by myself and discussed with the staff.      2- Goals of care evaluation   The patient goals of care are improve or maintain function/quality of life, accomplish a particular personal goal, spiritual needs, strengthening relationships, preserve independence/autonomy/control and support for family/caregiver   Goals of care discussed with:    ?x Patient independently    ? Patient and Family    ? Family or Healthcare DPOA independently    ? Unable to discuss with patient, family/DPOA not present    3- Code Status  DNR-CCA with no intubation     4- Other recommendations  - We will continue to provide comfort and support to the patient and the family    Please call with any palliative questions or concerns. Palliative Care Team is available via perfect serve or via phone. Palliative Care will continue to follow Mr. Kavon Mcdonnell care as needed. The note has been dictated by dragon, typing errors may be a possibility     Thank you for allowing Palliative Care to participate in the care of Mr. Feliciano Clark .        Electronically signed by   Lina Valverde MD  Palliative Care Team  on 3/29/2019 at 10:22 AM    Palliative care office: [phone]

## 2019-03-29 NOTE — PROGRESS NOTES
by mouth 2 times daily    Historical Provider, MD   acetaminophen (TYLENOL) 325 MG tablet Take 650 mg by mouth every 6 hours as needed for Pain    Historical Provider, MD   aclidinium (TUDORZA PRESSAIR) 400 MCG/ACT AEPB inhaler Inhale 1 puff into the lungs 2 times daily 6/22/18 7/22/19  Estela Campoverde MD   albuterol sulfate HFA (VENTOLIN HFA) 108 (90 Base) MCG/ACT inhaler Inhale 2 puffs into the lungs every 6 hours as needed for Wheezing 6/22/18   Estela Campoverde MD   budesonide-formoterol (SYMBICORT) 80-4.5 MCG/ACT AERO Inhale 2 puffs into the lungs 2 times daily 5/25/18   Verónica Montana MD   metFORMIN ER (GLUCOPHAGE-XR) 500 MG XR tablet take 2 tablets by mouth twice a day  Patient taking differently: take 2 tablet by mouth twice a day 8/1/16   Jagdeep Yates PA-C   aspirin 81 MG tablet Take 81 mg by mouth daily    Historical Provider, MD   escitalopram (LEXAPRO) 10 MG tablet Take 1 tablet by mouth daily 11/10/15   Moira Jensen PA-C     Scheduled Meds:   predniSONE  40 mg Oral Daily    insulin glargine  40 Units Subcutaneous Daily    insulin lispro  0-18 Units Subcutaneous TID WC    insulin lispro  0-9 Units Subcutaneous Nightly    sodium chloride  1,000 mL Intravenous Once    mometasone-formoterol  2 puff Inhalation BID    tamsulosin  0.4 mg Oral Daily    sodium chloride flush  10 mL Intravenous 2 times per day    enoxaparin  40 mg Subcutaneous Daily    ipratropium-albuterol  1 ampule Inhalation 4x daily     Continuous Infusions:   dextrose       PRN Meds:     midodrine 5 mg Q8H PRN   sodium chloride flush 10 mL PRN   magnesium hydroxide 30 mL Daily PRN   ondansetron 4 mg Q6H PRN   glucose 15 g PRN   dextrose 12.5 g PRN   glucagon (rDNA) 1 mg PRN   dextrose 100 mL/hr PRN   albuterol 2.5 mg As Directed RT PRN       LABS:  CBC:   Recent Labs     03/27/19  0805 03/28/19  0637 03/29/19  0533   WBC 12.6* 15.0* 13.5*   RBC 3.14* 3.31* 3.34*   HGB 9.9* 10.3* 10.4*   HCT 30.7* 31.9* 33.8*   MCV 97.8 96.4 101.2   RDW 15.1* 15.3* 15.2*    208 179     BMP:   Recent Labs     03/27/19  0805 03/28/19  0637   * 138   K 4.7 4.4   CL 95* 99   CO2 25 29   BUN 18 15   CREATININE 0.51* 0.48*     FASTING LIPID PANEL:  Lab Results   Component Value Date    CHOL 148 12/15/2018    HDL 61 12/15/2018    TRIG 88 12/15/2018           ASSESSMENT:     Active Problems:    Anxiety    Pulmonary artery hypertension (HCC)    Chronic obstructive pulmonary disease (HCC)    Pulmonary fibrosis (HCC)    Pneumonia due to organism    Hypoxia    Encounter for palliative care    Dyspnea and respiratory abnormalities  Resolved Problems:    UTI (urinary tract infection)         PLAN:     1. Acute on chronic respiratory failure with Hypoxemia . On HiFlow 40 L a nd 60 %FiO2 .     2. 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 Steroids switched from IV to PO, steroid taper on d/c    3. Hypotension: Continue Midodrine 5 mg prn     4. COPD, continue duoneb, dulera, RT aerosol protocol    5. Diabetes mellitus HbA1c in March 8.7. Controlled on 40 units lantus    6. DVT prophylaxis - Lovenox and EPC cuffs    8. Leukocytosis likely due to steroids, WBC trending down. 9.  UTI- growing MRSA and E.coli sensitive to Bactrim- completed course of Bactrim     9. Code status - DNR CCA. Patient medically clear for discharge. Discharge planning: Medardo Jurado MD, PGY-2  Internal Medicine  [address]  3/29/19  10:55 AM   Attending Physician Statement  I have discussed the care of Rmairez Langley, including pertinent history and exam findings,  with the resident. I have seen and examined the patient and the key elements of all parts of the encounter have been performed by me. I agree with the assessment, plan and orders as documented by the resident with additions . Changed patient. Will keep him comfortable. to hospice. Will continue present Rx. Discussed with    Treatment plan Discussed with nursing staff in detail , all questions answered . Electronically signed by Venita Hi MD on   3/29/19 at 6:11 PM    Please note that this chart was generated using voice recognition Dragon dictation software. Although every effort was made to ensure the accuracy of this automated transcription, some errors in transcription may have occurred.

## 2019-03-29 NOTE — PROGRESS NOTES
None  · Anthropometric Measures:  · Ht: 5' 9\" (175.3 cm)   · Current Body Wt: 160 lb (72.6 kg)  · Admission Body Wt: 160 lb (72.6 kg)  · Usual Body Wt: 175 lb (79.4 kg)(per pt)  · % Weight Change:  ,  Per EMR wt has flux from 148-173 lbs x 10 mo; per pt, 8.6% wt loss x 4 mo  · Ideal Body Wt: 160 lb (72.6 kg), % Ideal Body 100%  · BMI Classification: BMI 18.5 - 24.9 Normal Weight    Nutrition Interventions:   Continue current diet  Continued Inpatient Monitoring, Education Not Indicated    Nutrition Evaluation:   · Evaluation: Goals set   · Goals: Pt to consume >75% of meals    · Monitoring: Nutrition Progression, Meal Intake, Diet Tolerance, Skin Integrity, Wound Healing, I&O, Weight, Pertinent Labs, Monitor Bowel Function      Electronically signed by Candi Daniels RD, LD on 3/29/19 at 11:08 AM    Contact Number: [phone]

## 2019-03-29 NOTE — PROGRESS NOTES
BRONCHOSPASM/BRONCHOCONSTRICTION   [x]  IMPROVE AERATION/BREATH SOUNDS  [x]   ADMINISTER BRONCHODILATOR THERAPY AS APPROPRIATE  [x]   ASSESS BREATH SOUNDS  []   IMPLEMENT AEROSOL/MDI PROTOCOL  [x]   PATIENT EDUCATION AS NEEDED    PATIENT REFUSES TO WEAR BIPAP     [x] Risks and benefits explained to patient   [x] Patient refuses to wear Bipap stating \"Im fine with just the canula\"   [x] Patient verbalizes understanding of information presented.

## 2019-03-29 NOTE — PROGRESS NOTES
Physical Therapy  Facility/Department: [facility] 3  Daily Treatment Note  NAME: June Valle  : 1940  MRN: [id number]    Date of Service: 3/29/2019    Discharge Recommendations:  CTA       Patient Diagnosis(es): The primary encounter diagnosis was Leukocytosis, unspecified type. Diagnoses of Dyspnea and respiratory abnormalities and Pneumonia due to organism were also pertinent to this visit. has a past medical history of Anemia, Anxiety, Bronchitis, COPD (chronic obstructive pulmonary disease) ([facility] [address] Diabetes ([facility] [address] Former smoker, Hyperlipidemia, Oxygen dependent, Respiratory distress, and Type 2 diabetes mellitus ([facility] [address] has a past surgical history that includes AAA repair, open; Pancreas surgery; Finger surgery; and Foot surgery. Restrictions  Restrictions/Precautions  Restrictions/Precautions: Up as Tolerated, Contact Precautions  Required Braces or Orthoses?: No  Position Activity Restriction  Other position/activity restrictions: up as tolerated   Subjective   Pt laying in bed upon arrival, initially agreeable to get up in chair, then changed his mind to only wanting to do supine exercises in bed. No c/o pain, pt c/o SOB  Orientation  WFL  Objective   Exercises  Total Knee Arthroplasty Exercise Program: Quad sets, ankle pumps, heel slides, short arc quads. Reps: 10x each AROM. Comments: unable to complete all 10 SAQ due to fatigue and SOB. Pt stated that was all he could do and wanted to be done. Assessment     Body structures, Functions, Activity limitations: Decreased endurance;Decreased strength;Decreased ADL status; Decreased functional mobility   Assessment: Pt completed supine exercises in bed, pt declined wanting to sit up in his chair right now. Pt is on high flow and fearful of becoming too SOB. Tolerated treatment well. Prognosis: Good  REQUIRES PT FOLLOW UP: Yes  Activity Tolerance  Activity Tolerance: Patient limited by endurance; Patient limited by fatigue; Other  Activity Tolerance: RN in Room to monitor SPO2 and Assist in transfer. Goals  Short term goals  Time Frame for Short term goals: 10  Short term goal 1: pt independent with bed mobility  Short term goal 2: pt MinAx 1 for transfers   Short term goal 3: pt able to ambulate with least restrictive device 50 ft with Giovanni  Short term goal 4: pt able to tolerate 30 min of activity with mobility, exercise to improve endurance    Plan    Plan  Times per week: 5-6x/wk  Times per day: Daily  Current Treatment Recommendations: Strengthening, Transfer Training, Endurance Training, Gait Training, Functional Mobility Training, Stair training, Patient/Caregiver Education & Training, Safety Education & Training  Safety Devices  Type of devices: Call light within reach, Patient at risk for falls, Nurse notified, Left in bed  Restraints  Initially in place: No     Therapy Time   Individual Concurrent Group Co-treatment   Time In   1110         Time Out  1125         Minutes  [address], [place]  Treatment performed by Student PTA under the supervision of co-signing PTA who agrees with all treatment and documentation.    Naila Estrella PTA

## 2019-03-29 NOTE — PLAN OF CARE
BRONCHOSPASM/BRONCHOCONSTRICTION     [x]         IMPROVE AERATION/BREATH SOUNDS  [x]   ADMINISTER BRONCHODILATOR THERAPY AS APPROPRIATE  [x]   ASSESS BREATH SOUNDS  [x]   IMPLEMENT AEROSOL/MDI PROTOCOL  PROVIDE ADEQUATE OXYGENATION WITH ACCEPTABLE SP02/ABG'S    [x]  IDENTIFY APPROPRIATE OXYGEN THERAPY  [x]   MONITOR SP02/ABG'S AS NEEDED   [x]   PATIENT EDUCATION AS NEEDED    [x]   PATIENT EDUCATION AS NEEDED

## 2019-03-29 NOTE — PROGRESS NOTES
PATIENT REFUSES TO WEAR BIPAP     [x] Risks and benefits explained to patient   [x] Patient refuses to wear Bipap stating \"I hate that mask\"   [x] Patient verbalizes understanding of information presented.

## 2019-03-29 NOTE — PLAN OF CARE
Problem: Neurological  Goal: Maximum potential motor/sensory/cognitive function  3/29/2019 0235 by Crystal Sims RN  Outcome: Ongoing  3/29/2019 0233 by Crystal Sims RN  Outcome: Ongoing     Problem: Risk for Impaired Skin Integrity  Goal: Tissue integrity - skin and mucous membranes  Description  Structural intactness and normal physiological function of skin and  mucous membranes.   3/29/2019 0235 by Crystal Sims RN  Outcome: Ongoing  3/29/2019 0233 by Crystal Sims RN  Outcome: Ongoing     Problem: Falls - Risk of:  Goal: Will remain free from falls  Description  Will remain free from falls  3/29/2019 0235 by Crystal Sims RN  Outcome: Ongoing  3/29/2019 0233 by Crystal Sims RN  Outcome: Ongoing  Goal: Absence of physical injury  Description  Absence of physical injury  3/29/2019 0235 by Crystal Sims RN  Outcome: Ongoing  3/29/2019 0233 by Crysatl Sims RN  Outcome: Ongoing     Problem: Gas Exchange - Impaired:  Goal: Levels of oxygenation will improve  Description  Levels of oxygenation will improve  3/29/2019 0235 by Crystal Sism RN  Outcome: Ongoing  3/29/2019 0233 by Crystal Sims RN  Outcome: Ongoing

## 2019-03-30 LAB
GLUCOSE BLD-MCNC: 139 MG/DL (ref 75–110)
GLUCOSE BLD-MCNC: 165 MG/DL (ref 75–110)
GLUCOSE BLD-MCNC: 245 MG/DL (ref 75–110)
GLUCOSE BLD-MCNC: 270 MG/DL (ref 75–110)

## 2019-03-30 PROCEDURE — 6370000000 HC RX 637 (ALT 250 FOR IP): Performed by: STUDENT IN AN ORGANIZED HEALTH CARE EDUCATION/TRAINING PROGRAM

## 2019-03-30 PROCEDURE — 94762 N-INVAS EAR/PLS OXIMTRY CONT: CPT

## 2019-03-30 PROCEDURE — 6360000002 HC RX W HCPCS: Performed by: STUDENT IN AN ORGANIZED HEALTH CARE EDUCATION/TRAINING PROGRAM

## 2019-03-30 PROCEDURE — 82947 ASSAY GLUCOSE BLOOD QUANT: CPT

## 2019-03-30 PROCEDURE — 2580000003 HC RX 258: Performed by: STUDENT IN AN ORGANIZED HEALTH CARE EDUCATION/TRAINING PROGRAM

## 2019-03-30 PROCEDURE — 2060000000 HC ICU INTERMEDIATE R&B

## 2019-03-30 PROCEDURE — 94640 AIRWAY INHALATION TREATMENT: CPT

## 2019-03-30 PROCEDURE — 2700000000 HC OXYGEN THERAPY PER DAY

## 2019-03-30 PROCEDURE — 99233 SBSQ HOSP IP/OBS HIGH 50: CPT | Performed by: INTERNAL MEDICINE

## 2019-03-30 RX ORDER — CLONAZEPAM 0.5 MG/1
0.5 TABLET ORAL ONCE
Status: COMPLETED | OUTPATIENT
Start: 2019-03-30 | End: 2019-03-31

## 2019-03-30 RX ADMIN — ENOXAPARIN SODIUM 40 MG: 40 INJECTION, SOLUTION INTRAVENOUS; SUBCUTANEOUS at 09:16

## 2019-03-30 RX ADMIN — TAMSULOSIN HYDROCHLORIDE 0.4 MG: 0.4 CAPSULE ORAL at 09:13

## 2019-03-30 RX ADMIN — INSULIN LISPRO 9 UNITS: 100 INJECTION, SOLUTION INTRAVENOUS; SUBCUTANEOUS at 13:04

## 2019-03-30 RX ADMIN — IPRATROPIUM BROMIDE AND ALBUTEROL SULFATE 1 AMPULE: .5; 3 SOLUTION RESPIRATORY (INHALATION) at 11:49

## 2019-03-30 RX ADMIN — PREDNISONE 40 MG: 20 TABLET ORAL at 09:14

## 2019-03-30 RX ADMIN — INSULIN GLARGINE 40 UNITS: 100 INJECTION, SOLUTION SUBCUTANEOUS at 09:15

## 2019-03-30 RX ADMIN — Medication 10 ML: at 20:42

## 2019-03-30 RX ADMIN — IPRATROPIUM BROMIDE AND ALBUTEROL SULFATE 1 AMPULE: .5; 3 SOLUTION RESPIRATORY (INHALATION) at 16:57

## 2019-03-30 RX ADMIN — IPRATROPIUM BROMIDE AND ALBUTEROL SULFATE 1 AMPULE: .5; 3 SOLUTION RESPIRATORY (INHALATION) at 20:44

## 2019-03-30 RX ADMIN — INSULIN LISPRO 3 UNITS: 100 INJECTION, SOLUTION INTRAVENOUS; SUBCUTANEOUS at 21:40

## 2019-03-30 RX ADMIN — IPRATROPIUM BROMIDE AND ALBUTEROL SULFATE 1 AMPULE: .5; 3 SOLUTION RESPIRATORY (INHALATION) at 07:56

## 2019-03-30 RX ADMIN — MOMETASONE FUROATE AND FORMOTEROL FUMARATE DIHYDRATE 2 PUFF: 100; 5 AEROSOL RESPIRATORY (INHALATION) at 07:56

## 2019-03-30 RX ADMIN — MOMETASONE FUROATE AND FORMOTEROL FUMARATE DIHYDRATE 2 PUFF: 100; 5 AEROSOL RESPIRATORY (INHALATION) at 20:44

## 2019-03-30 RX ADMIN — INSULIN LISPRO 3 UNITS: 100 INJECTION, SOLUTION INTRAVENOUS; SUBCUTANEOUS at 19:32

## 2019-03-30 RX ADMIN — CLONAZEPAM 0.5 MG: 0.5 TABLET ORAL at 20:41

## 2019-03-30 RX ADMIN — Medication 10 ML: at 09:14

## 2019-03-30 ASSESSMENT — PAIN SCALES - GENERAL
PAINLEVEL_OUTOF10: 0

## 2019-03-30 NOTE — PROGRESS NOTES
[facility] Hypoxia [R09.02]  Hypoxia [R09.02] . Vitals:    03/30/19 0756   BP:    Pulse: 91   Resp: 24   Temp:    SpO2: 97%   . Patients home meds are   Prior to Admission medications    Medication Sig Start Date End Date Taking?  Authorizing Provider   sulfamethoxazole-trimethoprim (BACTRIM DS;SEPTRA DS) 800-160 MG per tablet Take 1 tablet by mouth three times a week for 3 days 3/29/19 4/1/19 Yes Beatriz Crystal MD   midodrine (PROAMATINE) 5 MG tablet Take 1 tablet by mouth every 8 hours as needed (SBP < 100) 3/28/19  Yes Beatriz Crystal MD   predniSONE (DELTASONE) 10 MG tablet 3 tabs x 3 days, then 2 tabs x 3 days, then 1 tab x 3 days 3/28/19 4/7/19 Yes Beatriz Crystal MD   predniSONE (DELTASONE) 20 MG tablet Take 1 tablet by mouth daily for 10 days 3/28/19 4/7/19 Yes Beatriz Crystal MD   predniSONE (DELTASONE) 10 MG tablet Take 1 tablet by mouth daily for 10 days 3/28/19 4/7/19 Yes Beatriz Crystal MD   predniSONE (DELTASONE) 10 MG tablet Take 1 tablet by mouth daily for 10 days 3/28/19 4/7/19 Yes Beatriz Crystal MD   predniSONE (DELTASONE) 5 MG tablet Take 1 tablet by mouth daily for 10 days 3/28/19 4/7/19 Yes Beatriz Crystal MD   predniSONE (DELTASONE) 10 MG tablet Take 1 tablet by mouth daily 4/8/19  Yes Beatriz Crystal MD   sodium chloride (OCEAN) 0.65 % nasal spray 1 spray by Nasal route as needed for Congestion 1/29/19   Mercedez Loomis MD   ergocalciferol (ERGOCALCIFEROL) 94013 units capsule Take 1 capsule by mouth once a week 12/24/18   Giancarlo Hoyt MD   insulin glargine (LANTUS) 100 UNIT/ML injection vial Inject 20 Units into the skin nightly 11/30/18   Daryl Parish MD   tamsulosin (FLOMAX) 0.4 MG capsule Take 0.4 mg by mouth daily    Historical Provider, MD   famotidine (PEPCID) 20 MG tablet Take 20 mg by mouth 2 times daily    Historical Provider, MD   acetaminophen (TYLENOL) 325 MG tablet Take 650 mg by mouth every 6 hours as needed for Pain Historical Provider, MD   aclidinium (TUDORZA PRESSAIR) 400 MCG/ACT AEPB inhaler Inhale 1 puff into the lungs 2 times daily 6/22/18 7/22/19  Michael Huynh MD   albuterol sulfate HFA (VENTOLIN HFA) 108 (90 Base) MCG/ACT inhaler Inhale 2 puffs into the lungs every 6 hours as needed for Wheezing 6/22/18   Michael Huynh MD   budesonide-formoterol (SYMBICORT) 80-4.5 MCG/ACT AERO Inhale 2 puffs into the lungs 2 times daily 5/25/18   Kristian Ruiz MD   metFORMIN ER (GLUCOPHAGE-XR) 500 MG XR tablet take 2 tablets by mouth twice a day  Patient taking differently: take 2 tablet by mouth twice a day 8/1/16   Surjit Nava PA-C   aspirin 81 MG tablet Take 81 mg by mouth daily    Historical Provider, MD   escitalopram (LEXAPRO) 10 MG tablet Take 1 tablet by mouth daily 11/10/15   Vin Jesnen PA-C   .   Recent Surgical History: None = 0     Assessment     Peak Flow (asthma only)    Predicted: 458  Personal Best: NA  PEF   % Predicted   Peak Flow :     FEV1/FVC    FEV1 Predicted 3.12      FEV1 NA    FEV1 % Predicted   FVC   IS volume   IBW 70.7 kg    RR 24  Breath Sounds: clear      · Bronchodilator assessment at level  0  · Hyperinflation assessment at level   · Secretion Management assessment at level    ·   · [x]    Bronchodilator Assessment  BRONCHODILATOR ASSESSMENT SCORE  Score 0 1 2 3 4 5   Breath Sounds   [x]  Patient Baseline []  No Wheeze good aeration []  Faint, scattered wheezing, good aeration []  Expiratory Wheezing and or moderately diminished []  Insp/Exp wheeze and/or very diminished []  Insp/Exp and/ or marked distress   Respiratory Rate   [x]  Patient Baseline []  Less than 20 []  Less than 20 []  20-25 []  Greater than 25 []  Greater than 25   Peak flow % of Pred or PB [x]  NA   []  Greater than 90%  []  81-90% []  71-80% []  Less than or equal to 70%  or unable to perform []  Unable due to Respiratory Distress   Dyspnea re [x]  Patient Baseline []  No SOB []  No SOB []  SOB on exertion []  SOB min activity []  At rest/acute   e FEV% Predicted       [x]  NA []  Above 69%  []  Unable []  Above 60-69%  []  Unable []  Above 50-59%  []  Unable []  Above 35-49%  []  Unable []  Less than 35%  []  Unable                 []  Hyperinflation Assessment  Score 1 2 3   CXR and Breath Sounds   []  Clear []  No atelectasis  Basilar aeration []  Atelectasis or absent basilar breath sounds   Incentive Spirometry Volume  (Per IBW)   []  Greater than or equal to 15ml/Kg []  less than 15ml/Kg []  less than 15ml/Kg   Surgery within last 2 weeks []  None or general   []  Abdominal or thoracic surgery  []  Abdominal or thoracic   Chronic Pulmonary Historyre []  No []  Yes []  Yes     []  Secretion Management Assessment  Score 1 2 3   Bilateral Breath Sounds   []  Occasional Rhonchi []  Scattered Rhonchi []  Course Rhonchi and/or poor aeration   Sputum    []  Small amount of thin secretions []  Moderate amount of viscous secretions []  Copius, Viscious Yellow/ Secretions   CXR as reported by physician []  clear  []  Unavailable []  Infiltrates and/or consolidation  []  Unavailable []  Mucus Plugging and or lobar consolidation  []  Unavailable   Cough []  Strong, productive cough []  Weak productive cough []  No cough or weak non-productive cough   OPHELIA SHER  8:01 AM                            FEMALE                                  MALE                            FEV1 Predicted Normal Values                        FEV1 Predicted Normal Values          Age                                     Height in Feet and Inches       Age                                     Height in Feet and Inches       4' 11\" 5' 1\" 5' 3\" 5' 5\" 5' 7\" 5' 9\" 5' 11\" 6' 1\"  4' 11\" 5' 1\" 5' 3\" 5' 5\" 5' 7\" 5' 9\" 5' 11\" 6' 1\"   42 - 45 2.49 2.66 2.84 3.03 3.22 3.42 3.62 3.83 42 - 45 2.82 3.03 3.26 3.49 3.72 3.96 4.22 4.47   46 - 49 2.40 2.57 2.76 2.94 3.14 3.33 3.54 3.75 46 - 49 2.70 2.92 3.14 3.37 3.61 3.85 4.10 4.36   50 - 53 2.31 2.48 2.66 2.85 3. 04 3.24 3.45 3.66 50 - 53 2.58 2.80 3.02 3.25 3.49 3.73 3.98 4.24   54 - 57 2.21 2.38 2.57 2.75 2.95 3.14 3.35 3.56 54 - 57 2.46 2.67 2.89 3.12 3.36 3.60 3.85 4.11   58 - 61 2.10 2.28 2.46 2.65 2.84 3.04 3.24 3.45 58 - 61 2.32 2.54 2.76 2.99 3.23 3.47 3.72 3.98   62 - 65 1.99 2.17 2.35 2.54 2.73 2.93 3.13 3.34 62 - 65 2.19 2.40 2.62 2.85 3.09 3.33 3.58 3.84   66 - 69 1.88 2.05 2.23 2.42 2.61 2.81 3.02 3.23 66 - 69 2.04 2.26 2.48 2.71 2.95 3.19 3.44 3.70   70+ 1.82 1.99 2.17 2.36 2.55 2.75 2.95 3.16 70+ 1.97 2.19 2.41 2.64 2.87 3.12 3.37 3.62             Predicted Peak Expiratory Flow Rate                                       Height (in)  Female       Height (in) Male           Age 64 63 56 61 58 73 78 74 Age            21 344 357 372 387 402 417 432 446  60 62 64 66 68 70 72 74 76   25 337 352 366 381 396 411 426 441 25 447 476 505 533 562 591 619 648 677   30 329 344 359 374 389 404 419 434 30 437 466 494 523 552 580 609 638 667   35 322 337 351 366 381 396 411 426 35 426 455 484 512 541 570 598 627 657   40 314 329 344 359 374 389 404 419 40 416 445 473 502 531 559 588 617 647   45 307 322 336 351 366 381 396 411 45 405 434 463 491 520 549 577 606 636   50 299 314 329 344 359 374 389 404 50 395 424 452 481 510 538 567 596 625   55 292 307 321 336 351 366 381 396 55 384 413 442 470 499 528 556 585 615   60 284 299 314 329 344 359 374 389 60 374 403 431 460 489 517 546 575 605   65 277 292 306 321 336 351 366 381 65 363 392 421 449 478 507 535 564 594   70 269 284 299 314 329 344 359 374 70 353 382 410 439 468 496 525 554 583   75 261 274 289 305 319 334 348 364 75 344 372 400 429 458 487 515 544 573   80 253 266 282 296 312 327 342 356 80 335 362 390 419 448 476 [phone]

## 2019-03-30 NOTE — PROGRESS NOTES
[address] [place] [address]     Department of Internal Medicine - Staff Internal Medicine Service     DAILY PROGRESS NOTE - TEAM       Patient:  Ely Bullock  YOB: 1940  MRN: [id number]     Acct: [de-identified]     Admit date: 3/23/2019  Admitting Diagnosis: Acute on chronic respiratory failure    Subjective:   Pt seen and Chart reviewed. VS stable, Afebrile. No acute events overnight. Resting comfortably in bed, no acute distress. Patient  on high flow at 35 LPM with 60% FiO2. Patient on oral steroids to be discharged on steroid taper. Glucose controlled on 40 units L. Urine output good. Patient eating and drinking comfortably. Intake/Output Summary (Last 24 hours) at 3/30/2019 0802  Last data filed at 3/30/2019 8934  Gross per 24 hour   Intake 10 ml   Output 875 ml   Net -865 ml         REVIEW OF SYSTEMS:  · Constitutional: Negative for Fever, chills  · Eyes: Negative for visual changes, diplopia  · ENT: Negative for mouth sores, sore throat. · Cardiovascular: Negative for lightheadedness ,chest pain, palpitations   · Respiratory: Positive for Shortness of breath  · Gastrointestinal: Negative for nausea/vomiting, change in bowel habits, abdominal pain  · Genitourinary:Negative for change in bladder habits, dysuria, hematuria. · Musculoskeletal: Negative for joint pain   · Neurological: Negative for headache, change in muscle strength numbness/tingling  Objective:   /67   Pulse 91   Temp 97.7 °F (36.5 °C) (Oral)   Resp 24   Ht 5' 9\" (1.753 m)   Wt 160 lb 6.4 oz (72.8 kg)   SpO2 97%   BMI 23.69 kg/m²       Intake/Output Summary (Last 24 hours) at 3/30/2019 0802  Last data filed at 3/30/2019 4725  Gross per 24 hour   Intake 10 ml   Output 875 ml   Net -865 ml       · General appearance: awake, alert, cooperative  · HEENT: Head: Normocephalic, no lesions, without obvious abnormality. · Lungs: crackles at lung bases bilaterally.   On nasal cannula high flow  · Heart: regular rate and rhythm, S1, S2 normal, no murmur  · Abdomen: soft, non-tender; bowel sounds normal; no masses,  no organomegaly  · Extremities: extremities normal, atraumatic, no cyanosis or edema  · Neurological:  Awake, alert, oriented to name, place and time. Sensation grossly normal  · Eye no icterus no redness      Medications:   MEDICATIONS:  Prior to Admission medications    Medication Sig Start Date End Date Taking?  Authorizing Provider   sulfamethoxazole-trimethoprim (BACTRIM DS;SEPTRA DS) 800-160 MG per tablet Take 1 tablet by mouth three times a week for 3 days 3/29/19 4/1/19 Yes Butch Mobley MD   midodrine (PROAMATINE) 5 MG tablet Take 1 tablet by mouth every 8 hours as needed (SBP < 100) 3/28/19  Yes Butch Mobley MD   predniSONE (DELTASONE) 10 MG tablet 3 tabs x 3 days, then 2 tabs x 3 days, then 1 tab x 3 days 3/28/19 4/7/19 Yes Butch Mobley MD   predniSONE (DELTASONE) 20 MG tablet Take 1 tablet by mouth daily for 10 days 3/28/19 4/7/19 Yes Butch Mobley MD   predniSONE (DELTASONE) 10 MG tablet Take 1 tablet by mouth daily for 10 days 3/28/19 4/7/19 Yes Butch Mobley MD   predniSONE (DELTASONE) 10 MG tablet Take 1 tablet by mouth daily for 10 days 3/28/19 4/7/19 Yes Butch Mobley MD   predniSONE (DELTASONE) 5 MG tablet Take 1 tablet by mouth daily for 10 days 3/28/19 4/7/19 Yes Butch Mobley MD   predniSONE (DELTASONE) 10 MG tablet Take 1 tablet by mouth daily 4/8/19  Yes Butch Mobley MD   sodium chloride (OCEAN) 0.65 % nasal spray 1 spray by Nasal route as needed for Congestion 1/29/19   Harlan Joyce MD   ergocalciferol (ERGOCALCIFEROL) 12128 units capsule Take 1 capsule by mouth once a week 12/24/18   Valdo Taylor MD   insulin glargine (LANTUS) 100 UNIT/ML injection vial Inject 20 Units into the skin nightly 11/30/18   Syed George MD   tamsulosin (FLOMAX) 0.4 MG capsule Take 0.4 mg by mouth daily    Historical Provider, MD   famotidine (PEPCID) 20 MG tablet Take 20 mg 96.4 101.2   RDW 15.1* 15.3* 15.2*    208 179     BMP:   Recent Labs     03/27/19  0805 03/28/19  0637   * 138   K 4.7 4.4   CL 95* 99   CO2 25 29   BUN 18 15   CREATININE 0.51* 0.48*     FASTING LIPID PANEL:  Lab Results   Component Value Date    CHOL 148 12/15/2018    HDL 61 12/15/2018    TRIG 88 12/15/2018           ASSESSMENT:     Active Problems:    Anxiety    Pulmonary artery hypertension (HCC)    Chronic obstructive pulmonary disease (HCC)    Pulmonary fibrosis (HCC)    Pneumonia due to organism    Hypoxia    Encounter for palliative care    Dyspnea and respiratory abnormalities  Resolved Problems:    UTI (urinary tract infection)         PLAN:     1. Acute on chronic respiratory failure with Hypoxemia . On HiFlow 35 L and 60 %FiO2 .     2. 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 Continue oral steroids. Steroid taper on discharge. 3. Hypotension: Continue Midodrine 5 mg prn     4. COPD, continue duoneb, dulera, RT aerosol protocol    5. Diabetes mellitus HbA1c in March 8.7. Controlled on 40 units lantus    6. DVT prophylaxis - Lovenox and EPC cuffs    8. Leukocytosis   Labs DC'd    9. UTI-  completed course of Bactrim     9. Code status - DNR CCA. Patient medically clear for discharge. Discharge planning: Hospice. Awaiting placement to nursing home/hospice which will accept patient with high flow oxygen therapy    Isaac Balderas M.D. Internal Medicine Resident , PGY-1  [facility]  Attending Physician Statement  I have discussed the care of Paula Mcgrath, including pertinent history and exam findings,  with the resident. I have seen and examined the patient and the key elements of all parts of the encounter have been performed by me. I agree with the assessment, plan and orders as documented by the resident with additions . We have discussed with insurance . They have refusedECF transfer inspite he being on high flow

## 2019-03-31 VITALS
HEIGHT: 69 IN | BODY MASS INDEX: 23.76 KG/M2 | DIASTOLIC BLOOD PRESSURE: 58 MMHG | WEIGHT: 160.4 LBS | TEMPERATURE: 97.6 F | SYSTOLIC BLOOD PRESSURE: 106 MMHG | RESPIRATION RATE: 20 BRPM | OXYGEN SATURATION: 92 % | HEART RATE: 105 BPM

## 2019-03-31 LAB
GLUCOSE BLD-MCNC: 128 MG/DL (ref 75–110)
GLUCOSE BLD-MCNC: 326 MG/DL (ref 75–110)

## 2019-03-31 PROCEDURE — 2580000003 HC RX 258: Performed by: STUDENT IN AN ORGANIZED HEALTH CARE EDUCATION/TRAINING PROGRAM

## 2019-03-31 PROCEDURE — 2700000000 HC OXYGEN THERAPY PER DAY

## 2019-03-31 PROCEDURE — 6370000000 HC RX 637 (ALT 250 FOR IP): Performed by: STUDENT IN AN ORGANIZED HEALTH CARE EDUCATION/TRAINING PROGRAM

## 2019-03-31 PROCEDURE — 99233 SBSQ HOSP IP/OBS HIGH 50: CPT | Performed by: INTERNAL MEDICINE

## 2019-03-31 PROCEDURE — 94640 AIRWAY INHALATION TREATMENT: CPT

## 2019-03-31 PROCEDURE — 6370000000 HC RX 637 (ALT 250 FOR IP)

## 2019-03-31 PROCEDURE — 82947 ASSAY GLUCOSE BLOOD QUANT: CPT

## 2019-03-31 PROCEDURE — 6360000002 HC RX W HCPCS: Performed by: STUDENT IN AN ORGANIZED HEALTH CARE EDUCATION/TRAINING PROGRAM

## 2019-03-31 PROCEDURE — 94762 N-INVAS EAR/PLS OXIMTRY CONT: CPT

## 2019-03-31 RX ORDER — CLONAZEPAM 1 MG/1
TABLET ORAL
Status: COMPLETED
Start: 2019-03-31 | End: 2019-03-31

## 2019-03-31 RX ORDER — MORPHINE SULFATE 2 MG/ML
2 INJECTION, SOLUTION INTRAMUSCULAR; INTRAVENOUS
Status: DISCONTINUED | OUTPATIENT
Start: 2019-03-31 | End: 2019-03-31 | Stop reason: HOSPADM

## 2019-03-31 RX ORDER — CLONAZEPAM 0.5 MG/1
0.5 TABLET ORAL 2 TIMES DAILY PRN
Status: DISCONTINUED | OUTPATIENT
Start: 2019-03-31 | End: 2019-03-31 | Stop reason: HOSPADM

## 2019-03-31 RX ORDER — CLONAZEPAM 0.25 MG/1
0.5 TABLET, ORALLY DISINTEGRATING ORAL ONCE
Status: DISCONTINUED | OUTPATIENT
Start: 2019-03-31 | End: 2019-03-31 | Stop reason: HOSPADM

## 2019-03-31 RX ADMIN — Medication 10 ML: at 09:20

## 2019-03-31 RX ADMIN — ENOXAPARIN SODIUM 40 MG: 40 INJECTION, SOLUTION INTRAVENOUS; SUBCUTANEOUS at 09:20

## 2019-03-31 RX ADMIN — INSULIN LISPRO 12 UNITS: 100 INJECTION, SOLUTION INTRAVENOUS; SUBCUTANEOUS at 09:21

## 2019-03-31 RX ADMIN — IPRATROPIUM BROMIDE AND ALBUTEROL SULFATE 1 AMPULE: .5; 3 SOLUTION RESPIRATORY (INHALATION) at 07:33

## 2019-03-31 RX ADMIN — PREDNISONE 40 MG: 20 TABLET ORAL at 09:20

## 2019-03-31 RX ADMIN — CLONAZEPAM 0.5 MG: 0.5 TABLET ORAL at 10:50

## 2019-03-31 RX ADMIN — MOMETASONE FUROATE AND FORMOTEROL FUMARATE DIHYDRATE 2 PUFF: 100; 5 AEROSOL RESPIRATORY (INHALATION) at 07:33

## 2019-03-31 RX ADMIN — INSULIN GLARGINE 40 UNITS: 100 INJECTION, SOLUTION SUBCUTANEOUS at 09:19

## 2019-03-31 RX ADMIN — CLONAZEPAM 0.5 MG: 1 TABLET ORAL at 10:50

## 2019-03-31 RX ADMIN — MORPHINE SULFATE 2 MG: 2 INJECTION, SOLUTION INTRAMUSCULAR; INTRAVENOUS at 12:32

## 2019-03-31 RX ADMIN — TAMSULOSIN HYDROCHLORIDE 0.4 MG: 0.4 CAPSULE ORAL at 09:20

## 2019-03-31 ASSESSMENT — PAIN SCALES - GENERAL
PAINLEVEL_OUTOF10: 0
PAINLEVEL_OUTOF10: 6

## 2019-03-31 NOTE — CARE COORDINATION
Notified by Fifi Medina RN that patient is wanting to pursue Hospice. Spoke with patient, states he feels ready now more than ever to pursue hospice, and is interested in speaking with someone at [facility]osp[facility] of [address] [place] [address] today. Spoke with Lucina Carpenter at [facility] of [address] [place] [address], [facility] Fanny Poole will be here at 10:45. Patient and Fifi Medina RN notified    [phone] spoke with Fanny Poole with [facility], they need to order High flow, looking to transfer patient to [facility] [place] location later this afternoon, he will let me know timing and I will arrange transportation. Fifi Medina RN updated, PS Dr. Yvon Bowles to update    [phone] transportation arranged for 4 pm pickup per Hospice. Fanny Poole with [facility] updated. Patient updated, agreeable, he has spoken with his daughter.   Fifi Medina RN notified    Discharge [facility] Case Management Department  Written by: Cassius Roberts RN    Patient Name: Asaf Norris  Attending Provider: Lake Valdivia MD  Admit Date: 3/23/2019 12:40 PM  MRN: [id number]  Account: [de-identified]                     : 1940  Discharge Date:   3/31/2019      Disposition: [facility] of [address] [place] [address] at Jessenia Boo RN

## 2019-03-31 NOTE — PROGRESS NOTES
Physical Therapy    DATE: 3/31/2019    NAME: Zheng Rodriges  MRN: [id number]   : 1940    Patient not seen this date for Physical Therapy due to:  [] Blood transfusion in progress  [] Hemodialysis  []  Patient Declined  [] Spine Precautions   [] Strict Bedrest  [] Surgery/ Procedure  [] Testing      [x] Other deferred PT this date secondary to pt being transferred to Hospice and being discharged this afternoon. [] PT being discontinued at this time. Patient independent. No further needs. [] PT being discontinued at this time as the patient has been transferred to palliative care. No further needs.     Mee Oliveira, PTA

## 2019-03-31 NOTE — SIGNIFICANT EVENT
Code status discussion:    I had a long discussion with the patient, in presence of the RN taking care of the patient. Patient's current condition laboratory and radiographic findings as well as recommendations of physicians consulted on the case were discussed with the patient in detail in simple English. All questions and concerns of patient were addressed, and appropriate emotional support was provided. After understanding his current medical condition, patient decided that he wanted the status be changed to [facility], and Mr. Aguila Thompson signed  [facility] order form in my presence, which was witnessed by the RN. I honored the patient's wishes, and changed the patient's code status to DNR CC. Emotional support given.     Sebastian Duggan MD      Department of Internal Medicine  [facility]         3/31/2019, 11:59 AM

## 2019-03-31 NOTE — PROGRESS NOTES
[address] [place] [address]     Department of Internal Medicine - Staff Internal Medicine Service     DAILY PROGRESS NOTE - TEAM       Patient:  Sanam Cunningham  YOB: 1940  MRN: [id number]     Acct: [de-identified]     Admit date: 3/23/2019  Admitting Diagnosis: Acute on chronic respiratory failure    Subjective:   Pt seen and Chart reviewed. VS stable, Afebrile. Was anxious overnight. Resting comfortably in bed, no acute distress. Patient  on high flow at 35 LPM with 60% FiO2. Patient on oral steroids to be discharged on steroid taper. Glucose controlled on 40 units L. Urine output good. Patient eating and drinking comfortably. Patient likes to eat popsicles even if it causes fluctuations in his blood glucose levels. Intake/Output Summary (Last 24 hours) at 3/31/2019 0824  Last data filed at 3/31/2019 0547  Gross per 24 hour   Intake 520 ml   Output 4000 ml   Net -3480 ml         REVIEW OF SYSTEMS:  · Constitutional: Negative for Fever, chills  · Eyes: Negative for visual changes, diplopia  · ENT: Negative for mouth sores, sore throat. · Cardiovascular: Negative for lightheadedness ,chest pain, palpitations   · Respiratory: Positive for Shortness of breath  · Gastrointestinal: Negative for nausea/vomiting, change in bowel habits, abdominal pain  · Genitourinary:Negative for change in bladder habits, dysuria, hematuria.   · Musculoskeletal: Negative for joint pain   · Neurological: Negative for headache, change in muscle strength numbness/tingling  Objective:   /73   Pulse 92   Temp 98.7 °F (37.1 °C) (Oral)   Resp 20   Ht 5' 9\" (1.753 m)   Wt 160 lb 6.4 oz (72.8 kg)   SpO2 93%   BMI 23.69 kg/m²       Intake/Output Summary (Last 24 hours) at 3/31/2019 0824  Last data filed at 3/31/2019 0547  Gross per 24 hour   Intake 520 ml   Output 4000 ml   Net -3480 ml       · General appearance: awake, alert, cooperative  · HEENT: Head: Normocephalic, no lesions, without obvious abnormality. · Lungs: crackles at lung bases bilaterally. On nasal cannula high flow  · Heart: regular rate and rhythm, S1, S2 normal, no murmur  · Abdomen: soft, non-tender; bowel sounds normal; no masses,  no organomegaly  · Extremities: extremities normal, atraumatic, no cyanosis or edema  · Neurological:  Awake, alert, oriented to name, place and time. Sensation grossly normal  · Eye no icterus no redness      Medications:   MEDICATIONS:  Prior to Admission medications    Medication Sig Start Date End Date Taking?  Authorizing Provider   sulfamethoxazole-trimethoprim (BACTRIM DS;SEPTRA DS) 800-160 MG per tablet Take 1 tablet by mouth three times a week for 3 days 3/29/19 4/1/19 Yes Arleth Fitzpatrick MD   midodrine (PROAMATINE) 5 MG tablet Take 1 tablet by mouth every 8 hours as needed (SBP < 100) 3/28/19  Yes Arleth Fitzpatrick MD   predniSONE (DELTASONE) 10 MG tablet 3 tabs x 3 days, then 2 tabs x 3 days, then 1 tab x 3 days 3/28/19 4/7/19 Yes Arleth Fitzpatrick MD   predniSONE (DELTASONE) 20 MG tablet Take 1 tablet by mouth daily for 10 days 3/28/19 4/7/19 Yes Arleth Fitzpatrick MD   predniSONE (DELTASONE) 10 MG tablet Take 1 tablet by mouth daily for 10 days 3/28/19 4/7/19 Yes Arleth Fitzpatrick MD   predniSONE (DELTASONE) 10 MG tablet Take 1 tablet by mouth daily for 10 days 3/28/19 4/7/19 Yes Arleth Fitzpatrick MD   predniSONE (DELTASONE) 5 MG tablet Take 1 tablet by mouth daily for 10 days 3/28/19 4/7/19 Yes Arleth Fitzpatrick MD   predniSONE (DELTASONE) 10 MG tablet Take 1 tablet by mouth daily 4/8/19  Yes Arleth Fitzpatrick MD   sodium chloride (OCEAN) 0.65 % nasal spray 1 spray by Nasal route as needed for Congestion 1/29/19   Ted Bellamy MD   ergocalciferol (ERGOCALCIFEROL) 78663 units capsule Take 1 capsule by mouth once a week 12/24/18   Franklin Mobley MD   insulin glargine (LANTUS) 100 UNIT/ML injection vial Inject 20 Units into the skin nightly 11/30/18   Jodi Presley MD   tamsulosin (FLOMAX) 0.4 MG capsule HCT 33.8*   .2   RDW 15.2*        BMP:   No results for input(s): NA, K, CL, CO2, PHOS, BUN, CREATININE in the last 72 hours. Invalid input(s): CA  FASTING LIPID PANEL:  Lab Results   Component Value Date    CHOL 148 12/15/2018    HDL 61 12/15/2018    TRIG 88 12/15/2018           ASSESSMENT:     Active Problems:    Anxiety    Pulmonary artery hypertension (HCC)    Chronic obstructive pulmonary disease (HCC)    Pulmonary fibrosis (HCC)    Pneumonia due to organism    Hypoxia    Encounter for palliative care    Dyspnea and respiratory abnormalities  Resolved Problems:    UTI (urinary tract infection)         PLAN:     1. Acute on chronic respiratory failure with Hypoxemia . On HiFlow 35 L and 60 %FiO2 .     2. 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 Continue oral steroids. Steroid taper on discharge. 3. Hypotension: Continue Midodrine 5 mg prn     4. COPD, continue duoneb, dulera, RT aerosol protocol    5. Diabetes mellitus HbA1c in March 8.7. Controlled on 40 units lantus. Very strict glucose control not recommended per guidelines. 6. DVT prophylaxis - Lovenox and EPC cuffs    8. Leukocytosis. Labs DC'd    9. UTI-  completed course of Bactrim     9. Code status - DNR CCA. Patient medically clear for discharge. Discharge planning: Peer to Peer denied once. Difficulty getting accepted due to patient being on high flow oxygen. Hospice. Awaiting placement to nursing home/hospice which will accept patient with high flow oxygen therapy    Geeta Singh M.D. Internal Medicine Resident , PGY-1  [facility]  Attending Physician Statement  I have discussed the care of Ramón Almazan, including pertinent history and exam findings,  with the resident. I have seen and examined the patient and the key elements of all parts of the encounter have been performed by me.   I agree with the assessment, plan and orders as documented by the resident with additions . Treatment plan Discussed with nursing staff in detail , all questions answered . Electronically signed by Gilma Chew MD on   3/31/19 at 2:07 PM    Please note that this chart was generated using voice recognition Dragon dictation software. Although every effort was made to ensure the accuracy of this automated transcription, some errors in transcription may have occurred.

## 2019-03-31 NOTE — PROGRESS NOTES
PATIENT REFUSES TO WEAR BIPAP     [x] Risks and benefits explained to patient   [x] Patient refuses to wear Bipap stating \"im okay with the cannula\"  [x] Patient verbalizes understanding of information presented. Patient has been on high flow nasal cannula and prefers it over the bipap, doing okay on HFNC.

## 2019-03-31 NOTE — FLOWSHEET NOTE
Assessment: Patient is a 70 y.o. male who was admitted to the hospital due to \"fatigue and shortness of breath. \" Patient was sitting up, awake and alert in hospital bed, when  visited. Patient welcomed  to sit in chair at bedside during visit. Patient shared feelings of fear and worry regarding his health. Patient indicated that he has been \"very sick for six months, with no improvement. \" Patient expressed a desire to be transitioned to \"hospice. \" Patient expressed concern that doing so would \"be against God's wishes. \" Patient shared his fears, was tearful, and vulnerable with  throughout visit. No Advance Directive is scanned into chart. Patient is listed as \"DNR-CCA, no intubation,\" per scanned documents in chart. Per patient, he is hopeful for a \"meeting on Monday to discuss Hospice Care. \" Patient expressed gratitude and comfort by end of visit. Intervention:  [facility] \"discuss end of life care. \" Zen Lucero introduced herself to patient and learned about his hospitalization.  learned about patient's fears as well as the recent losses he has experienced, including his spouse's death in January.  provided comfort, prayer, and reassurance during visit. Outcomes: Patient was receptive and thanked  for visit and support. Recommendation: Chaplains can make follow-up visit, per request. Dereck Nichols can be reached 24/7 via [employer]. John Cheatham     03/30/19 1951   Encounter Summary   Services provided to: Patient   Referral/Consult From: Patient;Nurse   Continue Visiting   (3/30/2019)   Complexity of Encounter Moderate   Length of Encounter 30 minutes   Spiritual Assessment Completed Yes   Spiritual/Jew   Type Spiritual support   Grief and Life Adjustment   Type End of life;Palliative care; Hospice   Assessment Approachable;Tearful; Anxious; Fearful;Helplessness;Guilt   Intervention Active listening;Explored feelings, thoughts, concerns;Nurtured hope;Prayer;Sustaining presence/ Ministry of presence; End of life care; Discussed death;Discussed afterlife; Discussed meaning/purpose;Discussed relationship with God;Discussed belief system/Hindu practices/angelica;Discussed illness/injury and it's impact   Outcome Comfort;Expressed gratitude; Concerns relieved;Engaged in conversation; Shared life review;Expressed feelings/needs/concerns; Less anxious, less agitated;Encouraged; Hopeful;Receptive

## 2019-04-02 NOTE — DISCHARGE SUMMARY
[address] [place] [address]     Department of Internal Medicine - Staff Internal Medicine Service    INPATIENT DISCHARGE SUMMARY      PATIENT IDENTIFICATION:  NAME:  Saba Youssef   :   1940  MRN:    [id number]     Acct:    [de-identified]   Admit Date:  3/23/2019  Discharge date:  2019  Attending Provider: Dr Daron Briones:   Saba Youssef is a 70 y.o. male who presented with   Chief Complaint   Patient presents with    Fatigue    Shortness of Breath     DIAGNOSES:  Primary:   Hypoxia [R09.02]  Hypoxia [R09.02]    Secondary: Active Hospital Problems    Diagnosis Date Noted    Encounter for palliative care [Z51.5]     Dyspnea and respiratory abnormalities [R06.00, R06.89]     Hypoxia [R09.02]     Pneumonia due to organism [J18.9] 2018    Chronic obstructive pulmonary disease ([facility] [address] [J44.9] 2018    Pulmonary fibrosis ([facility] [address] [J84.10] 2018    Pulmonary artery hypertension ([facility] [address] [I27.21] 2018    Anxiety [F41.9] 2015       TREATMENT:  Brief Inpatient Course:    Saba Youssef is a 70 y.o. 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 He was weak and short of breath and hence brought to the ER. He was on nonrebreather. Blood pressures were low and he was tachycardic but improved with fluids. Patient was discharged from the hospital on the day prior to admission. He presented with shortness of breath. He was treated with steroids and antibiotics and discharged. He was recommended to go to hospice or extended nursing facility but patient refused and went home and  he wass back to the ER the very next day.  Initially needed to be on non-rebreather due to low saturations.  Patient transitioned to 5 L nasal cannula and tolerating saturation between 82% to 89%. Presented with tachycardia and hypotension.  Improved with fluids.  WBC elevated. Received vancomycin and Zosyn in ER. Code status discussion was done in ED and patient signed DNR CCA with no intubation. Palliative and hospice care was discussed with patient during previous admission but patient refused. He was evaluated by pulmonology during his previous admission. He was sent home on trilogy vent. Did not qualify for L2K device. Patient was initially in the ICU and managed with oxygen therapy and steroids. Patient was given breathing treatment. Antibiotics were given. Patient was subsequently stable to transfer to the floor. In the medical unit patient was treated with high flow 35 L and 50% FiO2. Blood gases were monitored. Use of BiPAP was increased. Drugs to prevent hypotension number given. Breathing in aerosol treatments were continued. Blood sugars were managed with insulin. Patient initially had leukocytosis was resolved. Weaning was tried but the patient desaturated. Hence could not be weaned off. Patient agreed for nursing home/hospice care during this admission. Request was sent to nursing home with hospice but some of them refused to accept as he was on high flow oxygen. Peer to peer talk was also refused by nursing homes and insurances. Finally he was accepted by one of the nursing homes with hospice care. During this admission the patient's code status was changed to [facility] after extensive discussion and understanding of the various code status. Consults: Palliative care    Procedures:  None    Any Hospital Acquired Infections: None    PATIENT'S DISCHARGE CONDITION:  Patient was stable at the time of discharge.     PATIENT/FAMILY INSTRUCTIONS:   Discharge Medication List as of 3/31/2019  4:38 PM      START taking these medications    Details   sulfamethoxazole-trimethoprim (BACTRIM DS;SEPTRA DS) 800-160 MG per tablet Take 1 tablet by mouth three times a week for 3 days, Disp-2 tablet, R-0Normal      midodrine (PROAMATINE) 5 MG tablet Take 1 tablet by mouth every 8 hours as needed (SBP < 100), Disp-90 tablet, R-3Normal         CONTINUE these medications which have CHANGED    Details   ! ! predniSONE (DELTASONE) 10 MG tablet 3 tabs x 3 days, then 2 tabs x 3 days, then 1 tab x 3 days, Disp-18 tablet, R-0Normal      !! predniSONE (DELTASONE) 20 MG tablet Take 1 tablet by mouth daily for 10 days, Disp-10 tablet, R-0Normal      !! predniSONE (DELTASONE) 10 MG tablet Take 1 tablet by mouth daily for 10 days, Disp-10 tablet, R-0Normal      !! predniSONE (DELTASONE) 10 MG tablet Take 1 tablet by mouth daily for 10 days, Disp-10 tablet, R-0Normal      !! predniSONE (DELTASONE) 5 MG tablet Take 1 tablet by mouth daily for 10 days, Disp-10 tablet, R-0Normal      !! predniSONE (DELTASONE) 10 MG tablet Take 1 tablet by mouth daily, Disp-30 tablet, R-3Normal       !! - Potential duplicate medications found. Please discuss with provider.       CONTINUE these medications which have NOT CHANGED    Details   sodium chloride (OCEAN) 0.65 % nasal spray 1 spray by Nasal route as needed for Congestion, Disp-1 Bottle, R-3Normal      ergocalciferol (ERGOCALCIFEROL) 06327 units capsule Take 1 capsule by mouth once a week, Disp-30 capsule, R-3Normal      insulin glargine (LANTUS) 100 UNIT/ML injection vial Inject 20 Units into the skin nightly, Disp-1 vial, R-3Normal      tamsulosin (FLOMAX) 0.4 MG capsule Take 0.4 mg by mouth dailyHistorical Med      famotidine (PEPCID) 20 MG tablet Take 20 mg by mouth 2 times dailyHistorical Med      acetaminophen (TYLENOL) 325 MG tablet Take 650 mg by mouth every 6 hours as needed for PainHistorical Med      aclidinium (TUDORZA PRESSAIR) 400 MCG/ACT AEPB inhaler Inhale 1 puff into the lungs 2 times daily, Disp-1 each, R-3Normal      albuterol sulfate HFA (VENTOLIN HFA) 108 (90 Base) MCG/ACT inhaler Inhale 2 puffs into the lungs every 6 hours as needed for Wheezing, Disp-1 Inhaler, R-3Normal      budesonide-formoterol (SYMBICORT) 80-4.5 MCG/ACT AERO Inhale 2 puffs into the lungs 2 times daily, Disp-1 Inhaler, R-3Normal      metFORMIN ER (GLUCOPHAGE-XR) 500 MG XR tablet take 2 tablets by mouth twice a day, Disp-120 tablet, R-0      aspirin 81 MG tablet Take 81 mg by mouth daily      escitalopram (LEXAPRO) 10 MG tablet Take 1 tablet by mouth daily, Disp-30 tablet, R-3         STOP taking these medications       furosemide (LASIX) 20 MG tablet Comments:   Reason for Stopping:             Activity: activity as tolerated    Diet: diabetic diet    Disposition: Hospice [facility]    Follow-up:  in the next few days with VANDANA Vidal PA-C [place]  [phone]            Follow up labs: As per PCP    Follow up imaging: As per PCP    Time Spent on discharge is more than 45 minutes in the examination, evaluation, counseling and review of medications and discharge plan. Arya Colón M.D.   Internal Medicine Resident , PGY-1  [facility]

## 2020-11-03 PROBLEM — J18.9 COMMUNITY ACQUIRED PNEUMONIA OF RIGHT UPPER LOBE OF LUNG: Status: RESOLVED | Noted: 2018-10-22 | Resolved: 2020-11-03
